# Patient Record
Sex: MALE | Race: WHITE | NOT HISPANIC OR LATINO | Employment: OTHER | URBAN - METROPOLITAN AREA
[De-identification: names, ages, dates, MRNs, and addresses within clinical notes are randomized per-mention and may not be internally consistent; named-entity substitution may affect disease eponyms.]

---

## 2017-01-03 ENCOUNTER — TELEPHONE (OUTPATIENT)
Dept: CARDIOLOGY | Facility: MEDICAL CENTER | Age: 67
End: 2017-01-03

## 2017-01-03 DIAGNOSIS — Z95.5 PRESENCE OF STENT IN LAD CORONARY ARTERY: ICD-10-CM

## 2017-01-03 DIAGNOSIS — I10 ESSENTIAL HYPERTENSION: Chronic | ICD-10-CM

## 2017-01-03 DIAGNOSIS — E78.5 HYPERLIPIDEMIA, UNSPECIFIED HYPERLIPIDEMIA TYPE: ICD-10-CM

## 2017-01-03 NOTE — TELEPHONE ENCOUNTER
----- Message from Kenya Rodriguez sent at 1/3/2017 11:25 AM PST -----  Regarding: patient wants to  his lab order  ALICIA/María      Patient needs lab order for his 01/13 appt. He wants to know if he can pick it up at the office instead of having it mailed out. He can be reached at [phone] or [phone].

## 2017-01-07 ENCOUNTER — HOSPITAL ENCOUNTER (OUTPATIENT)
Dept: LAB | Facility: MEDICAL CENTER | Age: 67
End: 2017-01-07
Attending: INTERNAL MEDICINE
Payer: MEDICARE

## 2017-01-07 DIAGNOSIS — E78.5 HYPERLIPIDEMIA, UNSPECIFIED HYPERLIPIDEMIA TYPE: ICD-10-CM

## 2017-01-07 DIAGNOSIS — Z95.5 PRESENCE OF STENT IN LAD CORONARY ARTERY: ICD-10-CM

## 2017-01-07 DIAGNOSIS — I10 ESSENTIAL HYPERTENSION: Chronic | ICD-10-CM

## 2017-01-07 LAB
ALBUMIN SERPL BCP-MCNC: 4.1 G/DL (ref 3.2–4.9)
ALBUMIN/GLOB SERPL: 1.6 G/DL
ALP SERPL-CCNC: 61 U/L (ref 30–99)
ALT SERPL-CCNC: 20 U/L (ref 2–50)
ANION GAP SERPL CALC-SCNC: 8 MMOL/L (ref 0–11.9)
AST SERPL-CCNC: 29 U/L (ref 12–45)
BILIRUB SERPL-MCNC: 1.9 MG/DL (ref 0.1–1.5)
BUN SERPL-MCNC: 18 MG/DL (ref 8–22)
CALCIUM SERPL-MCNC: 9.6 MG/DL (ref 8.5–10.5)
CHLORIDE SERPL-SCNC: 104 MMOL/L (ref 96–112)
CHOLEST SERPL-MCNC: 191 MG/DL (ref 100–199)
CO2 SERPL-SCNC: 28 MMOL/L (ref 20–33)
CREAT SERPL-MCNC: 1.05 MG/DL (ref 0.5–1.4)
GLOBULIN SER CALC-MCNC: 2.5 G/DL (ref 1.9–3.5)
GLUCOSE SERPL-MCNC: 98 MG/DL (ref 65–99)
HDLC SERPL-MCNC: 65 MG/DL
LDLC SERPL CALC-MCNC: 101 MG/DL
POTASSIUM SERPL-SCNC: 4.5 MMOL/L (ref 3.6–5.5)
PROT SERPL-MCNC: 6.6 G/DL (ref 6–8.2)
SODIUM SERPL-SCNC: 140 MMOL/L (ref 135–145)
TRIGL SERPL-MCNC: 126 MG/DL (ref 0–149)

## 2017-01-07 PROCEDURE — 36415 COLL VENOUS BLD VENIPUNCTURE: CPT

## 2017-01-07 PROCEDURE — 80061 LIPID PANEL: CPT

## 2017-01-07 PROCEDURE — 80053 COMPREHEN METABOLIC PANEL: CPT

## 2017-01-10 ENCOUNTER — OFFICE VISIT (OUTPATIENT)
Dept: CARDIOLOGY | Facility: MEDICAL CENTER | Age: 67
End: 2017-01-10
Payer: MEDICARE

## 2017-01-10 VITALS
HEIGHT: 71 IN | HEART RATE: 80 BPM | OXYGEN SATURATION: 94 % | DIASTOLIC BLOOD PRESSURE: 80 MMHG | WEIGHT: 239.8 LBS | BODY MASS INDEX: 33.57 KG/M2 | SYSTOLIC BLOOD PRESSURE: 130 MMHG

## 2017-01-10 DIAGNOSIS — I25.10 CORONARY ARTERY DISEASE INVOLVING NATIVE CORONARY ARTERY OF NATIVE HEART WITHOUT ANGINA PECTORIS: ICD-10-CM

## 2017-01-10 DIAGNOSIS — I10 ESSENTIAL HYPERTENSION: Chronic | ICD-10-CM

## 2017-01-10 DIAGNOSIS — R23.3 EASY BRUISING: ICD-10-CM

## 2017-01-10 DIAGNOSIS — Z95.5 PRESENCE OF STENT IN LAD CORONARY ARTERY: ICD-10-CM

## 2017-01-10 DIAGNOSIS — E78.5 DYSLIPIDEMIA: ICD-10-CM

## 2017-01-10 DIAGNOSIS — E66.09 NON MORBID OBESITY DUE TO EXCESS CALORIES: ICD-10-CM

## 2017-01-10 DIAGNOSIS — Z95.5 PRESENCE OF STENT IN LEFT CIRCUMFLEX CORONARY ARTERY: Chronic | ICD-10-CM

## 2017-01-10 PROCEDURE — 99214 OFFICE O/P EST MOD 30 MIN: CPT | Performed by: INTERNAL MEDICINE

## 2017-01-10 NOTE — MR AVS SNAPSHOT
"        Polo Pyle   1/10/2017 11:45 AM   Office Visit   MRN: [id number]    Department:  [facility]   Dept Phone:  [phone]    Description:  Male : 1950   Provider:  Manuel Flores M.D.           Reason for Visit     Follow-Up           Allergies as of 1/10/2017     Allergen Noted Reactions    Mercury 2010       And mercury based preservatives-Thimerasol    Sulfa Drugs 2010   Anaphylaxis, Swelling    Other Environmental 2010       Insect toxins      You were diagnosed with     Easy bruising   [268117]       Presence of stent in left circumflex coronary artery   [948234]       Presence of stent in LAD coronary artery   [494215]       Non morbid obesity due to excess calories   [9545324]       Coronary artery disease involving native coronary artery of native heart without angina pectoris   [5259425]       Essential hypertension   [0377021]       Dyslipidemia   [887645]         Vital Signs     Blood Pressure Pulse Height Weight Body Mass Index Oxygen Saturation    130/80 mmHg 80 1.791 m (5' 10.51\") 108.773 kg (239 lb 12.8 oz) 33.91 kg/m2 94%    Smoking Status                   Former Smoker           Basic Information     Date Of Birth Sex Race Ethnicity Preferred Language    1950 Male White Non- English      Your appointments     2017  3:30 PM   FOLLOW UP with Manuel Flores M.D.   [facility] (--)    [address] , [facility] [[email or web address] [address]  [facility] [address]   [phone]              Problem List              ICD-10-CM Priority Class Noted - Resolved    Dyslipidemia E78.5   2011 - Present    CAD (coronary artery disease) I25.10   2011 - Present    Obesity E66.9   3/2/2013 - Present    Essential hypertension (Chronic) I10   Unknown - Present    Presence of stent in LAD coronary artery Z95.5   Unknown - Present    Presence of stent in left circumflex coronary artery (Chronic) Z95.5   Unknown - Present      "   Health Maintenance        Date Due Completion Dates    IMM DTaP/Tdap/Td Vaccine (1 - Tdap) 4/3/1970 ---    COLONOSCOPY 4/3/2001 ---    IMM ZOSTER VACCINE 4/3/2011 ---    IMM PNEUMOCOCCAL 65+ (ADULT) LOW/MEDIUM RISK SERIES (1 of 2 - PCV13) 4/3/2016 ---    IMM INFLUENZA (1) 9/1/2016 ---            Current Immunizations     No immunizations on file.      Below and/or attached are the medications your provider expects you to take. Review all of your home medications and newly ordered medications with your provider and/or pharmacist. Follow medication instructions as directed by your provider and/or pharmacist. Please keep your medication list with you and share with your provider. Update the information when medications are discontinued, doses are changed, or new medications (including over-the-counter products) are added; and carry medication information at all times in the event of emergency situations     Allergies:  MERCURY - (reactions not documented)     SULFA DRUGS - Anaphylaxis,Swelling     OTHER ENVIRONMENTAL - (reactions not documented)               Medications  Valid as of: January 10, 2017 - 12:15 PM    Generic Name Brand Name Tablet Size Instructions for use    Ascorbic Acid   Take 6,000 mg by mouth every day.        Aspirin (Tab) aspirin 81 MG Take 81 mg by mouth every day.        Coenzyme Q10 (Cap) Coenzyme Q10 200 MG Take 300 mg by mouth every day.        Fluticasone Propionate (Suspension) FLONASE 50 MCG/ACT Spray 2 Sprays in nose every day.        Krill Oil   Take  by mouth.        Lovastatin (Tab) MEVACOR 10 MG Take 5 mg by mouth every evening.        Lovastatin (Tab) MEVACOR 10 MG Take 1 Tab by mouth every day.        Multiple Vitamins-Minerals   Take  by mouth every day.        Niacin (Antihyperlipidemic) (Tab CR) NIASPAN 1000 MG Take 2 Tabs by mouth every day at 6 PM. at bedtime        .                 Medicines prescribed today were sent to:     [id number] - [place] - [address]    [address] [address]    Phone: [phone] Fax: [phone]    Open 24 Hours?: No      Medication refill instructions:       If your prescription bottle indicates you have medication refills left, it is not necessary to call your provider’s office. Please contact your pharmacy and they will refill your medication.    If your prescription bottle indicates you do not have any refills left, you may request refills at any time through one of the following ways: The online [email or web address] system (except Urgent Care), by calling your provider’s office, or by asking your pharmacy to contact your provider’s office with a refill request. Medication refills are processed only during regular business hours and may not be available until the next business day. Your provider may request additional information or to have a follow-up visit with you prior to refilling your medication.   *Please Note: Medication refills are assigned a new Rx number when refilled electronically. Your pharmacy may indicate that no refills were authorized even though a new prescription for the same medication is available at the pharmacy. Please request the medicine by name with the pharmacy before contacting your provider for a refill.        Your To Do List     Future Labs/Procedures Complete By Expires    CBC WITH DIFFERENTIAL  As directed 1/10/2018         [email or web address] Status: Patient Declined

## 2017-01-10 NOTE — Clinical Note
[facility]   [address] [facility] [address]  [place] [address]  Phone: [phone]  Fax: [phone]              Polo Pyle  1950    Encounter Date: 1/10/2017    Manuel Flores M.D.          PROGRESS NOTE:  Subjective:   Polo Pyle is a 66 y.o. male who presents today for follow-up of his history of stenting on multiple occasions      He has been doing well he only endorses easy bruising, his CBC hasn't checked in a while    Past Medical History   Diagnosis Date   • Unspecified hemorrhagic conditions (CMS-HCC)      TAKES ASA . PROLONGED BLEEDING   • Myocardial infarct (CMS-HCC) 2005     STENTS   • CATARACT    • Hyperlipidemia 8/26/2011   • CAD (coronary artery disease) 8/26/2011   • Indigestion    • Pneumonia    • ASTHMA      as a youth   • Dental disorder      hx of staph infection; has partials   • Pain 11/14/11     NECK 7.5/10   • Personal history of venous thrombosis and embolism 2006   • Essential hypertension    • Presence of stent in left circumflex coronary artery      Past Surgical History   Procedure Laterality Date   • Tonsillectomy and adenoidectomy  1957   • Inguinal hernia repair  1962     WITH UNDESCENDED TESTICLE   • Sinus trephine frontal  1971   • Other cardiac surgery  2005     STENTS x 4   • Cataract phaco with iol  11/2/2010     Performed by RM WILLIS at SURGERY SAME DAY [facility] ORS   • Cataract phaco with iol  11/23/2010     Performed by RM WILLIS at SURGERY SAME DAY [facility] ORS   • Ventral hernia repair  11/30/2011     Performed by TROY GONZALEZ at SURGERY [facility] ORS   • Inguinal hernia repair  11/30/2011     Performed by TROY GONZALEZ at SURGERY [facility] ORS   • Sinusotomies       Family History   Problem Relation Age of Onset   • Heart Attack Mother    • Heart Disease Mother      History   Smoking status   • Former Smoker -- 30 years   • Types: Cigars   • Quit date: 11/05/2011   Smokeless  "tobacco   • Never Used     Comment: 2000     Allergies   Allergen Reactions   • Mercury      And mercury based preservatives-Thimerasol   • Sulfa Drugs Anaphylaxis and Swelling   • Other Environmental      Insect toxins     Outpatient Encounter Prescriptions as of 1/10/2017   Medication Sig Dispense Refill   • lovastatin (MEVACOR) 10 MG tablet Take 1 Tab by mouth every day. 90 Tab 0   • niacin (NIASPAN) 1000 MG CR tablet Take 2 Tabs by mouth every day at 6 PM. at bedtime 180 Tab 3   • KRILL OIL PO Take  by mouth.     • Ascorbic Acid (VITAMIN C PO) Take 6,000 mg by mouth every day.     • Multiple Vitamin (MULTIVITAMIN PO) Take  by mouth every day.     • aspirin 81 MG tablet Take 81 mg by mouth every day.     • Coenzyme Q10 (COQ10) 200 MG CAPS Take 300 mg by mouth every day.     • lovastatin (MEVACOR) 10 MG tablet Take 5 mg by mouth every evening.     • fluticasone (FLONASE) 50 MCG/ACT nasal spray Spray 2 Sprays in nose every day. 1 Bottle 0     No facility-administered encounter medications on file as of 1/10/2017.     Review of Systems   Constitutional: Negative for fever and chills.   HENT: Negative for sore throat.    Eyes: Negative for blurred vision.   Respiratory: Negative for cough and shortness of breath.    Cardiovascular: Negative for chest pain, palpitations, claudication, leg swelling and PND.   Gastrointestinal: Negative for nausea and abdominal pain.   Musculoskeletal: Negative for falls.   Skin: Negative for rash.   Neurological: Negative for dizziness, focal weakness, loss of consciousness, weakness and headaches.   Endo/Heme/Allergies: Bruises/bleeds easily.        Objective:   /80 mmHg  Pulse 80  Ht 1.791 m (5' 10.51\")  Wt 108.773 kg (239 lb 12.8 oz)  BMI 33.91 kg/m2  SpO2 94%    Physical Exam   Constitutional: No distress.   HENT:   Mouth/Throat: Oropharynx is clear and moist.   Eyes: No scleral icterus.   Neck: Neck supple. No JVD present.   Cardiovascular: Normal rate, regular rhythm, " normal heart sounds and intact distal pulses.  Exam reveals no gallop and no friction rub.    No murmur heard.  Pulmonary/Chest: Effort normal. He has no rales.   Abdominal: Soft. Bowel sounds are normal. There is no tenderness.   Musculoskeletal: He exhibits no edema.   Neurological: He is alert.   Skin: No rash noted. He is not diaphoretic.   Psychiatric: He has a normal mood and affect.       Assessment:     1. Easy bruising  CBC WITH DIFFERENTIAL   2. Presence of stent in left circumflex coronary artery     3. Presence of stent in LAD coronary artery     4. Non morbid obesity due to excess calories     5. Coronary artery disease involving native coronary artery of native heart without angina pectoris     6. Essential hypertension     7. Dyslipidemia         Medical Decision Making:  Today's Assessment / Status / Plan:     It was my pleasure to meet with Mr. Pyle.    He'll continue on aspirin we'll check a CBC to ensure he doesn't have thrombocytopenia doesn't appear to have significant bruising on exam    Blood pressure is well controlled    He'll work on weight loss as best he can    I will see Mr. Pyle back in 1 year time and encouraged him to follow up with us over the phone or e-mail using my MyChart as issues arise.    It is my pleasure to participate in the care of Mr. Pyle.  Please do not hesitate to contact me with questions or concerns.    Manuel Flores MD PhD [facility]  Cardiologist [facility]        Cherelle Wilde M.D.  [address]  [facility] [address]  VIA Facsimile: [phone]

## 2017-01-17 ASSESSMENT — ENCOUNTER SYMPTOMS
ABDOMINAL PAIN: 0
SORE THROAT: 0
FALLS: 0
LOSS OF CONSCIOUSNESS: 0
PALPITATIONS: 0
FOCAL WEAKNESS: 0
NAUSEA: 0
CLAUDICATION: 0
DIZZINESS: 0
COUGH: 0
SHORTNESS OF BREATH: 0
HEADACHES: 0
WEAKNESS: 0
BLURRED VISION: 0
CHILLS: 0
FEVER: 0
PND: 0
BRUISES/BLEEDS EASILY: 1

## 2017-01-18 NOTE — PROGRESS NOTES
Subjective:   Polo Pyle is a 66 y.o. male who presents today for follow-up of his history of stenting on multiple occasions      He has been doing well he only endorses easy bruising, his CBC hasn't checked in a while    Past Medical History   Diagnosis Date   • Unspecified hemorrhagic conditions (CMS-HCC)      TAKES ASA . PROLONGED BLEEDING   • Myocardial infarct (CMS-[facility]) 2005     STENTS   • CATARACT    • Hyperlipidemia 8/26/2011   • CAD (coronary artery disease) 8/26/2011   • Indigestion    • Pneumonia    • ASTHMA      as a youth   • Dental disorder      hx of staph infection; has partials   • Pain 11/14/11     NECK 7.5/10   • Personal history of venous thrombosis and embolism 2006   • Essential hypertension    • Presence of stent in left circumflex coronary artery      Past Surgical History   Procedure Laterality Date   • Tonsillectomy and adenoidectomy  1957   • Inguinal hernia repair  1962     WITH UNDESCENDED TESTICLE   • Sinus trephine frontal  1971   • Other cardiac surgery  2005     STENTS x 4   • Cataract phaco with iol  11/2/2010     Performed by RM WILLIS at [facility]ERY [facility]S   • Cataract phaco with iol  11/23/2010     Performed by RM WILLIS at [facility]ERY [facility] ORS   • Ventral hernia repair  11/30/2011     Performed by TROY GONZALEZ at SURGERY [facility] ORS   • Inguinal hernia repair  11/30/2011     Performed by TROY GONZALEZ at SURGERY [facility] ORS   • Sinusotomies       Family History   Problem Relation Age of Onset   • Heart Attack Mother    • Heart Disease Mother      History   Smoking status   • Former Smoker -- 30 years   • Types: Cigars   • Quit date: 11/05/2011   Smokeless tobacco   • Never Used     Comment: 2000     Allergies   Allergen Reactions   • Mercury      And mercury based preservatives-Thimerasol   • Sulfa Drugs Anaphylaxis and Swelling   • Other Environmental      Insect toxins     Outpatient Encounter Prescriptions as of 1/10/2017  "  Medication Sig Dispense Refill   • lovastatin (MEVACOR) 10 MG tablet Take 1 Tab by mouth every day. 90 Tab 0   • niacin (NIASPAN) 1000 MG CR tablet Take 2 Tabs by mouth every day at 6 PM. at bedtime 180 Tab 3   • KRILL OIL PO Take  by mouth.     • Ascorbic Acid (VITAMIN C PO) Take 6,000 mg by mouth every day.     • Multiple Vitamin (MULTIVITAMIN PO) Take  by mouth every day.     • aspirin 81 MG tablet Take 81 mg by mouth every day.     • Coenzyme Q10 (COQ10) 200 MG CAPS Take 300 mg by mouth every day.     • lovastatin (MEVACOR) 10 MG tablet Take 5 mg by mouth every evening.     • fluticasone (FLONASE) 50 MCG/ACT nasal spray Spray 2 Sprays in nose every day. 1 Bottle 0     No facility-administered encounter medications on file as of 1/10/2017.     Review of Systems   Constitutional: Negative for fever and chills.   HENT: Negative for sore throat.    Eyes: Negative for blurred vision.   Respiratory: Negative for cough and shortness of breath.    Cardiovascular: Negative for chest pain, palpitations, claudication, leg swelling and PND.   Gastrointestinal: Negative for nausea and abdominal pain.   Musculoskeletal: Negative for falls.   Skin: Negative for rash.   Neurological: Negative for dizziness, focal weakness, loss of consciousness, weakness and headaches.   Endo/Heme/Allergies: Bruises/bleeds easily.        Objective:   /80 mmHg  Pulse 80  Ht 1.791 m (5' 10.51\")  Wt 108.773 kg (239 lb 12.8 oz)  BMI 33.91 kg/m2  SpO2 94%    Physical Exam   Constitutional: No distress.   HENT:   Mouth/Throat: Oropharynx is clear and moist.   Eyes: No scleral icterus.   Neck: Neck supple. No JVD present.   Cardiovascular: Normal rate, regular rhythm, normal heart sounds and intact distal pulses.  Exam reveals no gallop and no friction rub.    No murmur heard.  Pulmonary/Chest: Effort normal. He has no rales.   Abdominal: Soft. Bowel sounds are normal. There is no tenderness.   Musculoskeletal: He exhibits no edema. "   Neurological: He is alert.   Skin: No rash noted. He is not diaphoretic.   Psychiatric: He has a normal mood and affect.       Assessment:     1. Easy bruising  CBC WITH DIFFERENTIAL   2. Presence of stent in left circumflex coronary artery     3. Presence of stent in LAD coronary artery     4. Non morbid obesity due to excess calories     5. Coronary artery disease involving native coronary artery of native heart without angina pectoris     6. Essential hypertension     7. Dyslipidemia         Medical Decision Making:  Today's Assessment / Status / Plan:     It was my pleasure to meet with Mr. Pyle.    He'll continue on aspirin we'll check a CBC to ensure he doesn't have thrombocytopenia doesn't appear to have significant bruising on exam    Blood pressure is well controlled    He'll work on weight loss as best he can    I will see Mr. Pyle back in 1 year time and encouraged him to follow up with us over the phone or e-mail using my MyChart as issues arise.    It is my pleasure to participate in the care of Mr. Pyle.  Please do not hesitate to contact me with questions or concerns.    Manuel Flores MD PhD FACC  Cardiologist [facility]

## 2017-03-12 ENCOUNTER — OFFICE VISIT (OUTPATIENT)
Dept: URGENT CARE | Facility: PHYSICIAN GROUP | Age: 67
End: 2017-03-12
Payer: MEDICARE

## 2017-03-12 VITALS
TEMPERATURE: 99.7 F | HEART RATE: 77 BPM | SYSTOLIC BLOOD PRESSURE: 128 MMHG | RESPIRATION RATE: 16 BRPM | OXYGEN SATURATION: 97 % | DIASTOLIC BLOOD PRESSURE: 82 MMHG | BODY MASS INDEX: 35.01 KG/M2 | WEIGHT: 247.6 LBS

## 2017-03-12 DIAGNOSIS — J06.9 UPPER RESPIRATORY TRACT INFECTION, UNSPECIFIED TYPE: ICD-10-CM

## 2017-03-12 PROCEDURE — 99213 OFFICE O/P EST LOW 20 MIN: CPT | Performed by: PHYSICIAN ASSISTANT

## 2017-03-12 PROCEDURE — G8419 CALC BMI OUT NRM PARAM NOF/U: HCPCS | Performed by: PHYSICIAN ASSISTANT

## 2017-03-12 PROCEDURE — G8598 ASA/ANTIPLAT THER USED: HCPCS | Performed by: PHYSICIAN ASSISTANT

## 2017-03-12 PROCEDURE — 3017F COLORECTAL CA SCREEN DOC REV: CPT | Mod: 8P | Performed by: PHYSICIAN ASSISTANT

## 2017-03-12 PROCEDURE — 1101F PT FALLS ASSESS-DOCD LE1/YR: CPT | Mod: 8P | Performed by: PHYSICIAN ASSISTANT

## 2017-03-12 PROCEDURE — G8432 DEP SCR NOT DOC, RNG: HCPCS | Performed by: PHYSICIAN ASSISTANT

## 2017-03-12 PROCEDURE — 4040F PNEUMOC VAC/ADMIN/RCVD: CPT | Mod: 8P | Performed by: PHYSICIAN ASSISTANT

## 2017-03-12 PROCEDURE — G8484 FLU IMMUNIZE NO ADMIN: HCPCS | Performed by: PHYSICIAN ASSISTANT

## 2017-03-12 PROCEDURE — 1036F TOBACCO NON-USER: CPT | Performed by: PHYSICIAN ASSISTANT

## 2017-03-12 NOTE — MR AVS SNAPSHOT
Polo Meredithjaclyn Pyle   3/12/2017 2:30 PM   Office Visit   MRN: [id number]    Department:  [facility]   Dept Phone:  [phone]    Description:  Male : 1950   Provider:  Rylee Fraser PA-C           Reason for Visit     Cough cough, congestion, aches in back, x 3 days      Allergies as of 3/12/2017     Allergen Noted Reactions    Mercury 2010       And mercury based preservatives-Thimerasol    Sulfa Drugs 2010   Anaphylaxis, Swelling    Other Environmental 2010       Insect toxins      Vital Signs     Blood Pressure Pulse Temperature Respirations Weight Oxygen Saturation    128/82 mmHg 77 37.6 °C (99.7 °F) 16 112.311 kg (247 lb 9.6 oz) 97%    Smoking Status                   Former Smoker           Basic Information     Date Of Birth Sex Race Ethnicity Preferred Language    1950 Male White Non- English      Problem List              ICD-10-CM Priority Class Noted - Resolved    Dyslipidemia E78.5   2011 - Present    CAD (coronary artery disease) I25.10   2011 - Present    Obesity E66.9   3/2/2013 - Present    Essential hypertension (Chronic) I10   Unknown - Present    Presence of stent in LAD coronary artery Z95.5   Unknown - Present    Presence of stent in left circumflex coronary artery (Chronic) Z95.5   Unknown - Present      Health Maintenance        Date Due Completion Dates    IMM DTaP/Tdap/Td Vaccine (1 - Tdap) 4/3/1970 ---    COLONOSCOPY 4/3/2001 ---    IMM ZOSTER VACCINE 4/3/2011 ---    IMM PNEUMOCOCCAL 65+ (ADULT) LOW/MEDIUM RISK SERIES (1 of 2 - PCV13) 4/3/2016 ---    IMM INFLUENZA (1) 2016 ---            Current Immunizations     No immunizations on file.      Below and/or attached are the medications your provider expects you to take. Review all of your home medications and newly ordered medications with your provider and/or pharmacist. Follow medication instructions as directed by your provider and/or pharmacist. Please keep your  medication list with you and share with your provider. Update the information when medications are discontinued, doses are changed, or new medications (including over-the-counter products) are added; and carry medication information at all times in the event of emergency situations     Allergies:  MERCURY - (reactions not documented)     SULFA DRUGS - Anaphylaxis,Swelling     OTHER ENVIRONMENTAL - (reactions not documented)               Medications  Valid as of: March 12, 2017 -  2:59 PM    Generic Name Brand Name Tablet Size Instructions for use    Ascorbic Acid   Take 6,000 mg by mouth every day.        Aspirin (Tab) aspirin 81 MG Take 81 mg by mouth every day.        Coenzyme Q10 (Cap) Coenzyme Q10 200 MG Take 300 mg by mouth every day.        Fluticasone Propionate (Suspension) FLONASE 50 MCG/ACT Spray 2 Sprays in nose every day.        Krill Oil   Take  by mouth.        Lovastatin (Tab) MEVACOR 10 MG Take 5 mg by mouth every evening.        Lovastatin (Tab) MEVACOR 10 MG TAKE ONE TABLET BY MOUTH EVERY DAY        Multiple Vitamins-Minerals   Take  by mouth every day.        Niacin (Antihyperlipidemic) (Tab CR) NIASPAN 1000 MG Take 2 Tabs by mouth every day at 6 PM. at bedtime        .                 Medicines prescribed today were sent to:     [facility] - [place], [place] - [address]    [address] [place] [address]    Phone: [phone] Fax: [phone]    Open 24 Hours?: No      Medication refill instructions:       If your prescription bottle indicates you have medication refills left, it is not necessary to call your provider’s office. Please contact your pharmacy and they will refill your medication.    If your prescription bottle indicates you do not have any refills left, you may request refills at any time through one of the following ways: The online [employer] system (except Urgent Care), by calling your provider’s office, or by asking your pharmacy to contact your provider’s office  with a refill request. Medication refills are processed only during regular business hours and may not be available until the next business day. Your provider may request additional information or to have a follow-up visit with you prior to refilling your medication.   *Please Note: Medication refills are assigned a new Rx number when refilled electronically. Your pharmacy may indicate that no refills were authorized even though a new prescription for the same medication is available at the pharmacy. Please request the medicine by name with the pharmacy before contacting your provider for a refill.           MyChart Status: Patient Declined

## 2017-03-12 NOTE — PROGRESS NOTES
CC:Patient is q__73____-[id number]___ who comes in with a _3___ day history of cough, nasal congestion, sore throat and body aches. Denies fever, chills, nausea and vomiting. Denies any hx of asthma or other respiratory disorder. IS not a smoker. Denies shortness of breath, chest pain or chest tightness    PMH: non pertinent to this examination  PSH: non pertinent to this examination  FH: not pertinent to this examination    Medications: OTC cold medication    Allergies: Mercury, sulfa     ROS: denies chest tightness, sob, fever, chills, nausea or vomiting, no change in urinary habbits    Blood pressure 128/82, pulse 77, temperature 37.6 °C (99.7 °F), resp. rate 16, weight 112.311 kg (247 lb 9.6 oz), SpO2 97 %.    Physical Exam:  Vitals: are unremarkable. Patient is afebrile and O2sats are within normal range.  Gen: A and O ×3 no acute distress, well-nourished well-hydrated, nontoxic  HEENT: TMs and oropharynx are clear. nares are patent and clear. No pain to percussion in the maxillary or frontal sinus region  Neck soft supple without cervical lymphadenopathy  Cardiovascular: Regular rate and rhythm normal S1 and S2. No murmur rubs or gallops  Lungs are clear to auscultation bilaterally, no wheezes rales or rhonchi. Good inspiratory and expiratory breath sounds  Abdomen: Soft nontender nondistended with good bowel  Skin is well perfused without evidence of rash or lesions  Neurologically: No deficit noted    Assessment plan:    1. Viral upper respiratory infection: Discussed viral etiology at length. Discussed supportive care measures. Increase fluids, take meds as directed and follow-up if symptoms persist or worsen despite treatment. Patient defers on any cough prescription. He states he'll take DayQuil and NyQuil as needed. Please follow up with us as needed

## 2017-03-13 ENCOUNTER — OFFICE VISIT (OUTPATIENT)
Dept: URGENT CARE | Facility: PHYSICIAN GROUP | Age: 67
End: 2017-03-13
Payer: MEDICARE

## 2017-03-13 VITALS
HEART RATE: 95 BPM | TEMPERATURE: 100.1 F | SYSTOLIC BLOOD PRESSURE: 118 MMHG | RESPIRATION RATE: 16 BRPM | OXYGEN SATURATION: 96 % | BODY MASS INDEX: 34.79 KG/M2 | DIASTOLIC BLOOD PRESSURE: 86 MMHG | WEIGHT: 246 LBS

## 2017-03-13 DIAGNOSIS — H10.32 ACUTE BACTERIAL CONJUNCTIVITIS OF LEFT EYE: ICD-10-CM

## 2017-03-13 DIAGNOSIS — J06.9 URI, ACUTE: ICD-10-CM

## 2017-03-13 PROCEDURE — G8484 FLU IMMUNIZE NO ADMIN: HCPCS | Performed by: PHYSICIAN ASSISTANT

## 2017-03-13 PROCEDURE — 3017F COLORECTAL CA SCREEN DOC REV: CPT | Mod: 8P | Performed by: PHYSICIAN ASSISTANT

## 2017-03-13 PROCEDURE — 99214 OFFICE O/P EST MOD 30 MIN: CPT | Performed by: PHYSICIAN ASSISTANT

## 2017-03-13 PROCEDURE — 4040F PNEUMOC VAC/ADMIN/RCVD: CPT | Mod: 8P | Performed by: PHYSICIAN ASSISTANT

## 2017-03-13 PROCEDURE — G8419 CALC BMI OUT NRM PARAM NOF/U: HCPCS | Performed by: PHYSICIAN ASSISTANT

## 2017-03-13 PROCEDURE — G8432 DEP SCR NOT DOC, RNG: HCPCS | Performed by: PHYSICIAN ASSISTANT

## 2017-03-13 PROCEDURE — 1036F TOBACCO NON-USER: CPT | Performed by: PHYSICIAN ASSISTANT

## 2017-03-13 PROCEDURE — 1101F PT FALLS ASSESS-DOCD LE1/YR: CPT | Mod: 8P | Performed by: PHYSICIAN ASSISTANT

## 2017-03-13 PROCEDURE — G8598 ASA/ANTIPLAT THER USED: HCPCS | Performed by: PHYSICIAN ASSISTANT

## 2017-03-13 RX ORDER — CIPROFLOXACIN HYDROCHLORIDE 3.5 MG/ML
SOLUTION/ DROPS TOPICAL
Qty: 1 BOTTLE | Refills: 0 | Status: SHIPPED | OUTPATIENT
Start: 2017-03-13 | End: 2019-01-31

## 2017-03-13 NOTE — MR AVS SNAPSHOT
Polo Vasquez Lashanda   3/13/2017 6:30 PM   Office Visit   MRN: [id number]    Department:  [facility]   Dept Phone:  [phone]    Description:  Male : 1950   Provider:  Lexie Ctaalan PA-C           Reason for Visit     Conjunctivitis seen yesterday for same issue its gettiung worse      Allergies as of 3/13/2017     Allergen Noted Reactions    Mercury 2010       And mercury based preservatives-Thimerasol    Sulfa Drugs 2010   Anaphylaxis, Swelling    Other Environmental 2010       Insect toxins      You were diagnosed with     Acute bacterial conjunctivitis of left eye   [[id number]]         Vital Signs     Blood Pressure Pulse Temperature Respirations Weight Oxygen Saturation    118/86 mmHg 95 37.8 °C (100.1 °F) 16 111.585 kg (246 lb) 96%    Smoking Status                   Former Smoker           Basic Information     Date Of Birth Sex Race Ethnicity Preferred Language    1950 Male White Non- English      Problem List              ICD-10-CM Priority Class Noted - Resolved    Dyslipidemia E78.5   2011 - Present    CAD (coronary artery disease) I25.10   2011 - Present    Obesity E66.9   3/2/2013 - Present    Essential hypertension (Chronic) I10   Unknown - Present    Presence of stent in LAD coronary artery Z95.5   Unknown - Present    Presence of stent in left circumflex coronary artery (Chronic) Z95.5   Unknown - Present      Health Maintenance        Date Due Completion Dates    IMM DTaP/Tdap/Td Vaccine (1 - Tdap) 4/3/1970 ---    COLONOSCOPY 4/3/2001 ---    IMM ZOSTER VACCINE 4/3/2011 ---    IMM PNEUMOCOCCAL 65+ (ADULT) LOW/MEDIUM RISK SERIES (1 of 2 - PCV13) 4/3/2016 ---    IMM INFLUENZA (1) 2016 ---            Current Immunizations     No immunizations on file.      Below and/or attached are the medications your provider expects you to take. Review all of your home medications and newly ordered medications with your provider and/or pharmacist.  Follow medication instructions as directed by your provider and/or pharmacist. Please keep your medication list with you and share with your provider. Update the information when medications are discontinued, doses are changed, or new medications (including over-the-counter products) are added; and carry medication information at all times in the event of emergency situations     Allergies:  MERCURY - (reactions not documented)     SULFA DRUGS - Anaphylaxis,Swelling     OTHER ENVIRONMENTAL - (reactions not documented)               Medications  Valid as of: March 13, 2017 -  7:35 PM    Generic Name Brand Name Tablet Size Instructions for use    Ascorbic Acid   Take 6,000 mg by mouth every day.        Aspirin (Tab) aspirin 81 MG Take 81 mg by mouth every day.        Ciprofloxacin HCl (Solution) CILOXIN 0.3 % Place 1 drop in left eye every 2 hours while awake today, then 1 drop every 4 hours while awake for 7 days.        Coenzyme Q10 (Cap) Coenzyme Q10 200 MG Take 300 mg by mouth every day.        Fluticasone Propionate (Suspension) FLONASE 50 MCG/ACT Spray 2 Sprays in nose every day.        Krill Oil   Take  by mouth.        Lovastatin (Tab) MEVACOR 10 MG Take 5 mg by mouth every evening.        Lovastatin (Tab) MEVACOR 10 MG TAKE ONE TABLET BY MOUTH EVERY DAY        Multiple Vitamins-Minerals   Take  by mouth every day.        Niacin (Antihyperlipidemic) (Tab CR) NIASPAN 1000 MG Take 2 Tabs by mouth every day at 6 PM. at bedtime        .                 Medicines prescribed today were sent to:     [id number] - [place], [place] - [address]    [address] [address]    Phone: [phone] Fax: [phone]    Open 24 Hours?: No      Medication refill instructions:       If your prescription bottle indicates you have medication refills left, it is not necessary to call your provider’s office. Please contact your pharmacy and they will refill your medication.    If your prescription bottle  "indicates you do not have any refills left, you may request refills at any time through one of the following ways: The online [employer] system (except Urgent Care), by calling your provider’s office, or by asking your pharmacy to contact your provider’s office with a refill request. Medication refills are processed only during regular business hours and may not be available until the next business day. Your provider may request additional information or to have a follow-up visit with you prior to refilling your medication.   *Please Note: Medication refills are assigned a new Rx number when refilled electronically. Your pharmacy may indicate that no refills were authorized even though a new prescription for the same medication is available at the pharmacy. Please request the medicine by name with the pharmacy before contacting your provider for a refill.        Instructions    Conjunctivitis  Conjunctivitis is commonly called \"pink eye.\" Conjunctivitis can be caused by bacterial or viral infection, allergies, or injuries. There is usually redness of the lining of the eye, itching, discomfort, and sometimes discharge. There may be deposits of matter along the eyelids. A viral infection usually causes a watery discharge, while a bacterial infection causes a yellowish, thick discharge. Pink eye is very contagious and spreads by direct contact.  You may be given antibiotic eyedrops as part of your treatment. Before using your eye medicine, remove all drainage from the eye by washing gently with warm water and cotton balls. Continue to use the medication until you have awakened 2 mornings in a row without discharge from the eye. Do not rub your eye. This increases the irritation and helps spread infection. Use separate towels from other household members. Wash your hands with soap and water before and after touching your eyes. Use cold compresses to reduce pain and sunglasses to relieve irritation from light. Do not wear " contact lenses or wear eye makeup until the infection is gone.  SEEK MEDICAL CARE IF:   · Your symptoms are not better after 3 days of treatment.  · You have increased pain or trouble seeing.  · The outer eyelids become very red or swollen.  Document Released: 01/25/2006 Document Revised: 03/11/2013 Document Reviewed: 12/18/2006  [employer]® Patient Information ©2014 [employer].            MyChart Status: Patient Declined

## 2017-03-14 NOTE — PATIENT INSTRUCTIONS
"Conjunctivitis  Conjunctivitis is commonly called \"pink eye.\" Conjunctivitis can be caused by bacterial or viral infection, allergies, or injuries. There is usually redness of the lining of the eye, itching, discomfort, and sometimes discharge. There may be deposits of matter along the eyelids. A viral infection usually causes a watery discharge, while a bacterial infection causes a yellowish, thick discharge. Pink eye is very contagious and spreads by direct contact.  You may be given antibiotic eyedrops as part of your treatment. Before using your eye medicine, remove all drainage from the eye by washing gently with warm water and cotton balls. Continue to use the medication until you have awakened 2 mornings in a row without discharge from the eye. Do not rub your eye. This increases the irritation and helps spread infection. Use separate towels from other household members. Wash your hands with soap and water before and after touching your eyes. Use cold compresses to reduce pain and sunglasses to relieve irritation from light. Do not wear contact lenses or wear eye makeup until the infection is gone.  SEEK MEDICAL CARE IF:   · Your symptoms are not better after 3 days of treatment.  · You have increased pain or trouble seeing.  · The outer eyelids become very red or swollen.  Document Released: 01/25/2006 Document Revised: 03/11/2013 Document Reviewed: 12/18/2006  [employer]® Patient Information ©2014 [employer].    "

## 2017-03-15 ASSESSMENT — ENCOUNTER SYMPTOMS
EYE PAIN: 0
PHOTOPHOBIA: 1
EYE DISCHARGE: 1
COUGH: 1
EYE REDNESS: 1

## 2017-03-15 NOTE — PROGRESS NOTES
"Subjective:      Polo Pyle is a 66 y.o. male who presents with Conjunctivitis    Pt PMH, SocHx, SurgHx, FamHx, Drug allergies and medications reviewed with pt/EPIC.      Family history reviewed, it is not pertinent to this complaint.           HPI Comments: Patient presents with:  Conjunctivitis: seen yesterday for same issue its gettiung worse        Conjunctivitis  This is a new problem. The current episode started yesterday. The problem occurs constantly. The problem has been rapidly worsening. Associated symptoms include congestion and coughing. Associated symptoms comments: \"Left eye is very gooey\". Nothing aggravates the symptoms. Treatments tried: cool compress. The treatment provided no relief.       Review of Systems   HENT: Positive for congestion.    Eyes: Positive for photophobia, discharge and redness. Negative for pain.   Respiratory: Positive for cough.    All other systems reviewed and are negative.         Objective:     /86 mmHg  Pulse 95  Temp(Src) 37.8 °C (100.1 °F)  Resp 16  Wt 111.585 kg (246 lb)  SpO2 96%     Physical Exam   Constitutional: He is oriented to person, place, and time. He appears well-developed and well-nourished. No distress.   HENT:   Head: Normocephalic.   Right Ear: Tympanic membrane normal.   Left Ear: Tympanic membrane normal.   Nose: Mucosal edema and rhinorrhea present.   Mouth/Throat: Uvula is midline and oropharynx is clear and moist.   Eyes: EOM are normal. Pupils are equal, round, and reactive to light. Lids are everted and swept, no foreign bodies found. Left eye exhibits discharge and exudate. Left eye exhibits no hordeolum. No foreign body present in the left eye. Left conjunctiva is injected. No scleral icterus.   Neck: Normal range of motion.   Cardiovascular: Normal rate.    Pulmonary/Chest: Effort normal. He has rhonchi.   Musculoskeletal: Normal range of motion.   Neurological: He is alert and oriented to person, place, and time.   Skin: " Skin is warm and dry.   Psychiatric: He has a normal mood and affect.   Nursing note and vitals reviewed.              Assessment/Plan:     1. Acute bacterial conjunctivitis of left eye     - ciprofloxacin (CILOXIN) 0.3 % Solution; Place 1 drop in left eye every 2 hours while awake today, then 1 drop every 4 hours while awake for 7 days.  Dispense: 1 Bottle; Refill: 0    PT can use cool compress on affected eye(s) for relief of symptoms.       PT should follow up with PCP in 1-2 days for re-evaluation if symptoms have not improved.  Discussed red flags and reasons to return to UC or ED.  Pt and/or family verbalized understanding of diagnosis and follow up instructions and was given informational handout on diagnosis.  PT discharged.

## 2017-06-20 DIAGNOSIS — E78.5 HYPERLIPIDEMIA, UNSPECIFIED HYPERLIPIDEMIA TYPE: ICD-10-CM

## 2017-06-21 RX ORDER — NIACIN 1000 MG/1
TABLET, EXTENDED RELEASE ORAL
Qty: 180 TAB | Refills: 3 | Status: SHIPPED | OUTPATIENT
Start: 2017-06-21 | End: 2018-09-17 | Stop reason: SDUPTHER

## 2017-10-28 ENCOUNTER — HOSPITAL ENCOUNTER (OUTPATIENT)
Facility: MEDICAL CENTER | Age: 67
End: 2017-10-28
Attending: FAMILY MEDICINE
Payer: MEDICARE

## 2017-10-28 ENCOUNTER — HOSPITAL ENCOUNTER (OUTPATIENT)
Dept: LAB | Facility: MEDICAL CENTER | Age: 67
End: 2017-10-28
Attending: FAMILY MEDICINE
Payer: MEDICARE

## 2017-10-28 LAB
ALBUMIN SERPL BCP-MCNC: 4 G/DL (ref 3.2–4.9)
ALBUMIN/GLOB SERPL: 1.4 G/DL
ALP SERPL-CCNC: 64 U/L (ref 30–99)
ALT SERPL-CCNC: 17 U/L (ref 2–50)
ANION GAP SERPL CALC-SCNC: 6 MMOL/L (ref 0–11.9)
AST SERPL-CCNC: 23 U/L (ref 12–45)
BILIRUB SERPL-MCNC: 1.7 MG/DL (ref 0.1–1.5)
BUN SERPL-MCNC: 25 MG/DL (ref 8–22)
CALCIUM SERPL-MCNC: 9.7 MG/DL (ref 8.5–10.5)
CHLORIDE SERPL-SCNC: 104 MMOL/L (ref 96–112)
CHOLEST SERPL-MCNC: 167 MG/DL (ref 100–199)
CO2 SERPL-SCNC: 27 MMOL/L (ref 20–33)
CREAT SERPL-MCNC: 1.05 MG/DL (ref 0.5–1.4)
GFR SERPL CREATININE-BSD FRML MDRD: >60 ML/MIN/1.73 M 2
GLOBULIN SER CALC-MCNC: 2.8 G/DL (ref 1.9–3.5)
GLUCOSE SERPL-MCNC: 95 MG/DL (ref 65–99)
HDLC SERPL-MCNC: 58 MG/DL
LDLC SERPL CALC-MCNC: 87 MG/DL
POTASSIUM SERPL-SCNC: 4.7 MMOL/L (ref 3.6–5.5)
PROT SERPL-MCNC: 6.8 G/DL (ref 6–8.2)
SODIUM SERPL-SCNC: 137 MMOL/L (ref 135–145)
TRIGL SERPL-MCNC: 111 MG/DL (ref 0–149)

## 2017-10-28 PROCEDURE — 36415 COLL VENOUS BLD VENIPUNCTURE: CPT

## 2017-10-28 PROCEDURE — 82274 ASSAY TEST FOR BLOOD FECAL: CPT

## 2017-10-28 PROCEDURE — 80061 LIPID PANEL: CPT

## 2017-10-28 PROCEDURE — 80053 COMPREHEN METABOLIC PANEL: CPT

## 2017-11-01 LAB — HEMOCCULT STL QL IA: NEGATIVE

## 2017-12-15 ENCOUNTER — OFFICE VISIT (OUTPATIENT)
Dept: URGENT CARE | Facility: PHYSICIAN GROUP | Age: 67
End: 2017-12-15
Payer: MEDICARE

## 2017-12-15 ENCOUNTER — HOSPITAL ENCOUNTER (OUTPATIENT)
Facility: MEDICAL CENTER | Age: 67
End: 2017-12-15
Attending: EMERGENCY MEDICINE
Payer: MEDICARE

## 2017-12-15 ENCOUNTER — HOSPITAL ENCOUNTER (OUTPATIENT)
Dept: RADIOLOGY | Facility: MEDICAL CENTER | Age: 67
End: 2017-12-15
Attending: EMERGENCY MEDICINE
Payer: MEDICARE

## 2017-12-15 VITALS
HEART RATE: 88 BPM | SYSTOLIC BLOOD PRESSURE: 122 MMHG | RESPIRATION RATE: 16 BRPM | TEMPERATURE: 97.3 F | DIASTOLIC BLOOD PRESSURE: 76 MMHG | BODY MASS INDEX: 34.65 KG/M2 | OXYGEN SATURATION: 97 % | WEIGHT: 245 LBS

## 2017-12-15 DIAGNOSIS — L03.115 CELLULITIS OF RIGHT LOWER EXTREMITY: ICD-10-CM

## 2017-12-15 PROCEDURE — 99213 OFFICE O/P EST LOW 20 MIN: CPT | Mod: 25 | Performed by: EMERGENCY MEDICINE

## 2017-12-15 PROCEDURE — 90471 IMMUNIZATION ADMIN: CPT | Performed by: EMERGENCY MEDICINE

## 2017-12-15 PROCEDURE — 87205 SMEAR GRAM STAIN: CPT

## 2017-12-15 PROCEDURE — 87070 CULTURE OTHR SPECIMN AEROBIC: CPT

## 2017-12-15 PROCEDURE — 90714 TD VACC NO PRESV 7 YRS+ IM: CPT | Performed by: EMERGENCY MEDICINE

## 2017-12-15 PROCEDURE — 73630 X-RAY EXAM OF FOOT: CPT | Mod: RT

## 2017-12-15 PROCEDURE — 87186 SC STD MICRODIL/AGAR DIL: CPT

## 2017-12-15 PROCEDURE — 87077 CULTURE AEROBIC IDENTIFY: CPT

## 2017-12-15 RX ORDER — AMOXICILLIN AND CLAVULANATE POTASSIUM 875; 125 MG/1; MG/1
1 TABLET, FILM COATED ORAL 2 TIMES DAILY
Qty: 20 TAB | Refills: 0 | Status: SHIPPED | OUTPATIENT
Start: 2017-12-15 | End: 2017-12-25

## 2017-12-15 RX ORDER — CHLORAL HYDRATE 500 MG
1000 CAPSULE ORAL DAILY
COMMUNITY
End: 2020-12-18

## 2017-12-15 RX ORDER — CEFTRIAXONE 1 G/1
1 INJECTION, POWDER, FOR SOLUTION INTRAMUSCULAR; INTRAVENOUS ONCE
Status: COMPLETED | OUTPATIENT
Start: 2017-12-15 | End: 2017-12-15

## 2017-12-15 RX ADMIN — CEFTRIAXONE 1 G: 1 INJECTION, POWDER, FOR SOLUTION INTRAMUSCULAR; INTRAVENOUS at 19:28

## 2017-12-15 ASSESSMENT — ENCOUNTER SYMPTOMS
COUGH: 0
VOMITING: 0
ABDOMINAL PAIN: 0
CHILLS: 1
NAUSEA: 0
SENSORY CHANGE: 0
FEVER: 0

## 2017-12-16 ENCOUNTER — OFFICE VISIT (OUTPATIENT)
Dept: URGENT CARE | Facility: PHYSICIAN GROUP | Age: 67
End: 2017-12-16
Payer: MEDICARE

## 2017-12-16 ENCOUNTER — HOSPITAL ENCOUNTER (OUTPATIENT)
Dept: RADIOLOGY | Facility: MEDICAL CENTER | Age: 67
End: 2017-12-16
Attending: FAMILY MEDICINE
Payer: MEDICARE

## 2017-12-16 VITALS
HEART RATE: 85 BPM | BODY MASS INDEX: 36.29 KG/M2 | HEIGHT: 69 IN | TEMPERATURE: 98.4 F | SYSTOLIC BLOOD PRESSURE: 120 MMHG | OXYGEN SATURATION: 96 % | DIASTOLIC BLOOD PRESSURE: 78 MMHG | WEIGHT: 245 LBS

## 2017-12-16 DIAGNOSIS — L03.115 CELLULITIS OF RIGHT LOWER EXTREMITY: ICD-10-CM

## 2017-12-16 DIAGNOSIS — R60.0 LEG EDEMA, RIGHT: ICD-10-CM

## 2017-12-16 LAB
GRAM STN SPEC: NORMAL
SIGNIFICANT IND 70042: NORMAL
SITE SITE: NORMAL
SOURCE SOURCE: NORMAL

## 2017-12-16 PROCEDURE — 99213 OFFICE O/P EST LOW 20 MIN: CPT | Performed by: FAMILY MEDICINE

## 2017-12-16 PROCEDURE — 93971 EXTREMITY STUDY: CPT | Mod: RT

## 2017-12-16 ASSESSMENT — PAIN SCALES - GENERAL: PAINLEVEL: 10=SEVERE PAIN

## 2017-12-16 NOTE — PROGRESS NOTES
Subjective:      Polo Pyle is a 67 y.o. male who presents with Leg Swelling (right leg swelling and painful x this AM )            HPI  Pt with cellulitis of the right leg now 50 % larger than it was 1 1/2 day ago. Had shaking chill 6 days ago.. Patient does not recall when his last tetanus shot was. He is adamant that he does not want to be seen in the emergency department or admitted to the hospital.    Allergies   Allergen Reactions   • Mercury      And mercury based preservatives-Thimerasol   • Sulfa Drugs Anaphylaxis and Swelling   • Other Environmental      Insect toxins     Social History     Social History   • Marital status:      Spouse name: N/A   • Number of children: N/A   • Years of education: N/A     Occupational History   • Not on file.     Social History Main Topics   • Smoking status: Former Smoker     Years: 30.00     Types: Cigars     Quit date: 11/5/2011   • Smokeless tobacco: Never Used      Comment: 2000   • Alcohol use No   • Drug use: No   • Sexual activity: Not on file     Other Topics Concern   • Not on file     Social History Narrative   • No narrative on file     Past Medical History:   Diagnosis Date   • ASTHMA     as a youth   • CAD (coronary artery disease) 8/26/2011   • CATARACT    • Dental disorder     hx of staph infection; has partials   • Essential hypertension    • Hyperlipidemia 8/26/2011   • Indigestion    • Myocardial infarct (CMS-HCC) 2005    STENTS   • Pain 11/14/11    NECK 7.5/10   • Personal history of venous thrombosis and embolism 2006   • Pneumonia    • Presence of stent in left circumflex coronary artery    • Unspecified hemorrhagic conditions (CMS-HCC)     TAKES ASA . PROLONGED BLEEDING     Current Outpatient Prescriptions on File Prior to Visit   Medication Sig Dispense Refill   • niacin (NIASPAN) 1000 MG CR tablet TAKE   TABLET BY MOUTH TAKE 2 TABLETS BY MOUTH EVERY DAY AT 6 PM. AT BEDTIME 180 Tab 3   • ciprofloxacin (CILOXIN) 0.3 % Solution Place  1 drop in left eye every 2 hours while awake today, then 1 drop every 4 hours while awake for 7 days. 1 Bottle 0   • lovastatin (MEVACOR) 10 MG tablet TAKE ONE TABLET BY MOUTH EVERY DAY 90 Tab 3   • lovastatin (MEVACOR) 10 MG tablet Take 5 mg by mouth every evening.     • fluticasone (FLONASE) 50 MCG/ACT nasal spray Spray 2 Sprays in nose every day. 1 Bottle 0   • KRILL OIL PO Take  by mouth.     • Ascorbic Acid (VITAMIN C PO) Take 6,000 mg by mouth every day.     • Multiple Vitamin (MULTIVITAMIN PO) Take  by mouth every day.     • aspirin 81 MG tablet Take 81 mg by mouth every day.     • Coenzyme Q10 (COQ10) 200 MG CAPS Take 300 mg by mouth every day.       No current facility-administered medications on file prior to visit.      Family History   Problem Relation Age of Onset   • Heart Attack Mother    • Heart Disease Mother      Review of Systems   Constitutional: Positive for chills. Negative for fever.   Respiratory: Negative for cough.    Gastrointestinal: Negative for abdominal pain, nausea and vomiting.   Musculoskeletal: Negative for joint pain.   Skin:        Patient has marked cellulitis two thirds of the way up his right leg with 50% increase in swelling compared to his contralateral left leg. Patient has a open wound on the medial heel with drainage.   Neurological: Negative for sensory change.          Objective:     /76   Pulse 88   Temp 36.3 °C (97.3 °F)   Resp 16   Wt 111.1 kg (245 lb)   SpO2 97%   BMI 34.65 kg/m²      Physical Exam   Constitutional: He appears well-developed and well-nourished. No distress.       HENT:   Head: Normocephalic and atraumatic.   Right Ear: External ear normal.   Left Ear: External ear normal.   Eyes: Conjunctivae are normal. Right eye exhibits no discharge. Left eye exhibits no discharge.   Cardiovascular: Normal rate and regular rhythm.    Pulmonary/Chest: Effort normal and breath sounds normal.   Abdominal: He exhibits no distension. There is no  tenderness.   Musculoskeletal: He exhibits edema, tenderness and deformity.   Examination of patient's right lower extremity reveals an extremely swollen right leg distal to the knees see the diagram. Very erythematous with a with weeping wound on the medial aspect of his foot. (Cultured) redressed   Skin: Skin is warm. He is not diaphoretic. There is erythema.   Psychiatric: He has a normal mood and affect.   Patient admits to be very stubborn          Foot x-ray no cellulitis  Assessment/Plan:     DX cellulitis to the right leg. r/ o DVT     Pt refuses to go the ED,  refuses admission due to dogs and feral cats at home, agrees to come into  tomorrow for re eval and possible US.. We checked and he will go to [place] urgent care where ultrasound is available.    I offered the patient crutches which he declined. He admits that is extremely stubborn and will not miss work in order to obtain outpatient IV therapy. He refuses to begin the hospital stating that his wife became ill from her chemotherapy and subsequently  there.. He is willing to stay in his lazy boy chair elevating his right lower extremity even though he's not been in that chair since his wife passed.

## 2017-12-17 NOTE — PROGRESS NOTES
Subjective:      Polo Pyle is a 67 y.o. male who presents with Leg Swelling (right leg swelling and painful x this AM )            HPI  Pt with cellulitis of the right leg now 50 % larger than it was 1 1/2 days ago. Had shaking chill 6 days ago.. Patient does not recall when his last tetanus shot was. He is adamant that he does not want to be seen in the emergency department or admitted to the hospital.    Pt appear very wary of health care after the recent death of his wife from [place] abd complications of chemo. Pt has hx of DVT,  Allergies   Allergen Reactions   • Mercury      And mercury based preservatives-Thimerasol   • Sulfa Drugs Anaphylaxis and Swelling   • Other Environmental      Insect toxins     Social History     Social History   • Marital status:      Spouse name: N/A   • Number of children: N/A   • Years of education: N/A     Occupational History   • Not on file.     Social History Main Topics   • Smoking status: Former Smoker     Years: 30.00     Types: Cigars     Quit date: 11/5/2011   • Smokeless tobacco: Never Used      Comment: 2000   • Alcohol use No   • Drug use: No   • Sexual activity: Not on file     Other Topics Concern   • Not on file     Social History Narrative   • No narrative on file     Past Medical History:   Diagnosis Date   • ASTHMA     as a youth   • CAD (coronary artery disease) 8/26/2011   • CATARACT    • Dental disorder     hx of staph infection; has partials   • Essential hypertension    • Hyperlipidemia 8/26/2011   • Indigestion    • Myocardial infarct (CMS-HCC) 2005    STENTS   • Pain 11/14/11    NECK 7.5/10   • Personal history of venous thrombosis and embolism 2006   • Pneumonia    • Presence of stent in left circumflex coronary artery    • Unspecified hemorrhagic conditions (CMS-HCC)     TAKES ASA . PROLONGED BLEEDING     Current Outpatient Prescriptions on File Prior to Visit   Medication Sig Dispense Refill   • niacin (NIASPAN) 1000 MG CR tablet TAKE    TABLET BY MOUTH TAKE 2 TABLETS BY MOUTH EVERY DAY AT 6 PM. AT BEDTIME 180 Tab 3   • ciprofloxacin (CILOXIN) 0.3 % Solution Place 1 drop in left eye every 2 hours while awake today, then 1 drop every 4 hours while awake for 7 days. 1 Bottle 0   • lovastatin (MEVACOR) 10 MG tablet TAKE ONE TABLET BY MOUTH EVERY DAY 90 Tab 3   • lovastatin (MEVACOR) 10 MG tablet Take 5 mg by mouth every evening.     • fluticasone (FLONASE) 50 MCG/ACT nasal spray Spray 2 Sprays in nose every day. 1 Bottle 0   • KRILL OIL PO Take  by mouth.     • Ascorbic Acid (VITAMIN C PO) Take 6,000 mg by mouth every day.     • Multiple Vitamin (MULTIVITAMIN PO) Take  by mouth every day.     • aspirin 81 MG tablet Take 81 mg by mouth every day.     • Coenzyme Q10 (COQ10) 200 MG CAPS Take 300 mg by mouth every day.       No current facility-administered medications on file prior to visit.      Family History   Problem Relation Age of Onset   • Heart Attack Mother    • Heart Disease Mother      Review of Systems   Constitutional: Positive for chills. Negative for fever.   Respiratory: Negative for cough.    Gastrointestinal: Negative for abdominal pain, nausea and vomiting.   Musculoskeletal: Negative for joint pain.   Skin:        Patient has marked cellulitis two thirds of the way up his right leg with 50% increase in swelling compared to his contralateral left leg. Patient has a open wound on the medial heel with drainage.   Neurological: Negative for sensory change.          Objective:     /76   Pulse 88   Temp 36.3 °C (97.3 °F)   Resp 16   Wt 111.1 kg (245 lb)   SpO2 97%   BMI 34.65 kg/m²      Physical Exam   Constitutional: He appears well-developed and well-nourished. No distress.       HENT:   Head: Normocephalic and atraumatic.   Right Ear: External ear normal.   Left Ear: External ear normal.   Eyes: Conjunctivae are normal. Right eye exhibits no discharge. Left eye exhibits no discharge.   Cardiovascular: Normal rate and regular  rhythm.    Pulmonary/Chest: Effort normal and breath sounds normal.   Abdominal: He exhibits no distension. There is no tenderness.   Musculoskeletal: He exhibits edema, tenderness and deformity.   Examination of patient's right lower extremity reveals an extremely swollen right leg distal to the knees see the diagram. Very erythematous with a with weeping wound on the medial aspect of his foot. (Cultured) redressed   Skin: Skin is warm. He is not diaphoretic. There is erythema.   Psychiatric: He has a normal mood and affect.   Patient admits to be very stubborn          Foot x-ray no osteo  Assessment/Plan:     DX: severe cellulitis to the right leg. r/ o DVT     Pt refuses to go the ED,  refuses admission due to dogs and feral cats at home, agrees to come into  tomorrow for re eval and possible US.. We checked and he will go to [place] urgent care where ultrasound is available.    I offered the patient crutches which he declined. He admits that is extremely stubborn and will not miss work in order to obtain outpatient IV therapy. He refuses to begin the hospital stating that his wife became ill from her chemotherapy and subsequently  there.. He is willing to stay in his lazy boy chair elevating his right lower extremity even though he's not been in that chair since his wife passed.    Pt signed AMA form acknowledging he could die from this illness that his cellulitis has probably spread systemically based on his shaking chill.pt given Rocephin, TD immunization, Augmentin and Bactroban will soak leg BID keep it elevated in recliner,

## 2017-12-18 LAB
BACTERIA WND AEROBE CULT: ABNORMAL
BACTERIA WND AEROBE CULT: ABNORMAL
GRAM STN SPEC: ABNORMAL
SIGNIFICANT IND 70042: ABNORMAL
SITE SITE: ABNORMAL
SOURCE SOURCE: ABNORMAL

## 2017-12-19 ENCOUNTER — TELEPHONE (OUTPATIENT)
Dept: URGENT CARE | Facility: PHYSICIAN GROUP | Age: 67
End: 2017-12-19

## 2017-12-19 NOTE — TELEPHONE ENCOUNTER
"I contacted the patient states he is doing much better the swelling is almost \"back to normal\". He will continue his medicines and is on the correct antibiotic for the staph aureus ulcer cellulitis that he's suffering from.  "

## 2017-12-31 ASSESSMENT — ENCOUNTER SYMPTOMS
FEVER: 0
CHILLS: 0
VOMITING: 0
LEG SWELLING: 1
NAUSEA: 0

## 2018-01-01 NOTE — PROGRESS NOTES
"Subjective:   Polo Pyle is a 67 y.o. male who presents for Leg Swelling (R ;Leg Swelling Per Dr. Morataya pt needs ultrasound for poss Clot)        Leg Swelling   This is a new problem. The current episode started in the past 7 days. The problem occurs constantly. The problem has been gradually improving. Pertinent negatives include no chills, fever, nausea or vomiting.     Review of Systems   Constitutional: Negative for chills and fever.   Gastrointestinal: Negative for nausea and vomiting.     Allergies   Allergen Reactions   • Mercury      And mercury based preservatives-Thimerasol   • Sulfa Drugs Anaphylaxis and Swelling   • Other Environmental      Insect toxins      Objective:   /78   Pulse 85   Temp 36.9 °C (98.4 °F)   Ht 1.753 m (5' 9\")   Wt 111.1 kg (245 lb)   SpO2 96%   BMI 36.18 kg/m²   Physical Exam   Constitutional: He is oriented to person, place, and time. He appears well-developed and well-nourished. No distress.   HENT:   Head: Normocephalic and atraumatic.   Eyes: Conjunctivae are normal. Pupils are equal, round, and reactive to light.   Cardiovascular: Normal rate and regular rhythm.    Pulmonary/Chest: Effort normal and breath sounds normal.   Musculoskeletal: He exhibits edema.   Neurological: He is alert and oriented to person, place, and time.   Skin: Skin is warm and dry.   Psychiatric: He has a normal mood and affect. Thought content normal.   Vitals reviewed.        Assessment/Plan:   Assessment    1. Leg edema, right  No DVT on my read. Differential diagnosis, natural history, supportive care, and indications for immediate D-up ABx.Continue iscussed.   - US-EXTREMITY VENOUS UNILATERAL-LOWER RIGHT; Future     "

## 2018-01-18 ENCOUNTER — TELEPHONE (OUTPATIENT)
Dept: CARDIOLOGY | Facility: MEDICAL CENTER | Age: 68
End: 2018-01-18

## 2018-01-18 DIAGNOSIS — I10 ESSENTIAL HYPERTENSION: Chronic | ICD-10-CM

## 2018-01-18 DIAGNOSIS — E78.5 DYSLIPIDEMIA: ICD-10-CM

## 2018-01-18 NOTE — TELEPHONE ENCOUNTER
----- Message from Tristan Ruffin sent at 1/18/2018  1:17 PM PST -----  Regarding: Lab slip for upcoming appt with CW   Contact: [phone]  ALICIA/María Navas is requesting a lab slip be mailed in preparation for upcoming appt with CW 1/31. If question's can be reached at [phone]       Please mail to:  [facility] [place] [id number]

## 2018-01-27 ENCOUNTER — HOSPITAL ENCOUNTER (OUTPATIENT)
Dept: LAB | Facility: MEDICAL CENTER | Age: 68
End: 2018-01-27
Attending: INTERNAL MEDICINE
Payer: MEDICARE

## 2018-01-27 DIAGNOSIS — I10 ESSENTIAL HYPERTENSION: Chronic | ICD-10-CM

## 2018-01-27 DIAGNOSIS — E78.5 DYSLIPIDEMIA: ICD-10-CM

## 2018-01-27 LAB
ALBUMIN SERPL BCP-MCNC: 4.2 G/DL (ref 3.2–4.9)
ALBUMIN/GLOB SERPL: 1.7 G/DL
ALP SERPL-CCNC: 63 U/L (ref 30–99)
ALT SERPL-CCNC: 15 U/L (ref 2–50)
ANION GAP SERPL CALC-SCNC: 8 MMOL/L (ref 0–11.9)
AST SERPL-CCNC: 24 U/L (ref 12–45)
BILIRUB SERPL-MCNC: 2 MG/DL (ref 0.1–1.5)
BUN SERPL-MCNC: 28 MG/DL (ref 8–22)
CALCIUM SERPL-MCNC: 9.1 MG/DL (ref 8.5–10.5)
CHLORIDE SERPL-SCNC: 106 MMOL/L (ref 96–112)
CHOLEST SERPL-MCNC: 162 MG/DL (ref 100–199)
CO2 SERPL-SCNC: 24 MMOL/L (ref 20–33)
CREAT SERPL-MCNC: 1.05 MG/DL (ref 0.5–1.4)
GLOBULIN SER CALC-MCNC: 2.5 G/DL (ref 1.9–3.5)
GLUCOSE SERPL-MCNC: 97 MG/DL (ref 65–99)
HDLC SERPL-MCNC: 56 MG/DL
LDLC SERPL CALC-MCNC: 86 MG/DL
POTASSIUM SERPL-SCNC: 4 MMOL/L (ref 3.6–5.5)
PROT SERPL-MCNC: 6.7 G/DL (ref 6–8.2)
SODIUM SERPL-SCNC: 138 MMOL/L (ref 135–145)
TRIGL SERPL-MCNC: 98 MG/DL (ref 0–149)

## 2018-01-27 PROCEDURE — 80053 COMPREHEN METABOLIC PANEL: CPT

## 2018-01-27 PROCEDURE — 80061 LIPID PANEL: CPT

## 2018-01-27 PROCEDURE — 36415 COLL VENOUS BLD VENIPUNCTURE: CPT

## 2018-01-31 ENCOUNTER — OFFICE VISIT (OUTPATIENT)
Dept: CARDIOLOGY | Facility: MEDICAL CENTER | Age: 68
End: 2018-01-31
Payer: MEDICARE

## 2018-01-31 VITALS
WEIGHT: 248 LBS | OXYGEN SATURATION: 94 % | HEIGHT: 69 IN | HEART RATE: 90 BPM | BODY MASS INDEX: 36.73 KG/M2 | DIASTOLIC BLOOD PRESSURE: 80 MMHG | SYSTOLIC BLOOD PRESSURE: 120 MMHG

## 2018-01-31 DIAGNOSIS — I87.2 VENOUS INSUFFICIENCY OF BOTH LOWER EXTREMITIES: Chronic | ICD-10-CM

## 2018-01-31 DIAGNOSIS — Z95.5 PRESENCE OF STENT IN LEFT CIRCUMFLEX CORONARY ARTERY: Chronic | ICD-10-CM

## 2018-01-31 DIAGNOSIS — Z95.5 PRESENCE OF STENT IN LAD CORONARY ARTERY: ICD-10-CM

## 2018-01-31 DIAGNOSIS — I25.83 CORONARY ARTERY DISEASE DUE TO LIPID RICH PLAQUE: ICD-10-CM

## 2018-01-31 DIAGNOSIS — I10 ESSENTIAL HYPERTENSION: Chronic | ICD-10-CM

## 2018-01-31 DIAGNOSIS — E78.5 DYSLIPIDEMIA: ICD-10-CM

## 2018-01-31 DIAGNOSIS — I25.10 CORONARY ARTERY DISEASE DUE TO LIPID RICH PLAQUE: ICD-10-CM

## 2018-01-31 DIAGNOSIS — R23.3 EASY BRUISING: ICD-10-CM

## 2018-01-31 PROCEDURE — 99214 OFFICE O/P EST MOD 30 MIN: CPT | Performed by: INTERNAL MEDICINE

## 2018-01-31 RX ORDER — CALCIUM CARBONATE 300MG(750)
TABLET,CHEWABLE ORAL
COMMUNITY
End: 2019-07-29

## 2018-01-31 NOTE — LETTER
[facility]   [address], [address]  [place] [address]  Phone: [phone]  Fax: [phone]              Polo Pyle  1950    Encounter Date: 1/31/2018    Manuel Flores M.D.          PROGRESS NOTE:  Subjective:   Polo Pyle is a 67 y.o. male who presents today for follow-up of his history of multiple stenting in the past    Overall has been stable tolerating his medications well he didn't get the CBC but did do recent blood work for chemistries and cholesterol which is near goal    Past Medical History:   Diagnosis Date   • ASTHMA     as a youth   • CAD (coronary artery disease) 8/26/2011   • CATARACT    • Dental disorder     hx of staph infection; has partials   • Essential hypertension    • Hyperlipidemia 8/26/2011   • Indigestion    • Myocardial infarct 2005    STENTS   • Pain 11/14/11    NECK 7.5/10   • Personal history of venous thrombosis and embolism 2006   • Pneumonia    • Presence of stent in left circumflex coronary artery    • Unspecified hemorrhagic conditions     TAKES ASA . PROLONGED BLEEDING   • Venous insufficiency of both lower extremities      Past Surgical History:   Procedure Laterality Date   • VENTRAL HERNIA REPAIR  11/30/2011    Performed by TROY GONZALEZ at SURGERY [facility] ORS   • INGUINAL HERNIA REPAIR  11/30/2011    Performed by TROY GONZALEZ at SURGERY [facility] ORS   • CATARACT PHACO WITH IOL  11/23/2010    Performed by RM WILLIS at SURGERY SAME DAY [facility] ORS   • CATARACT PHACO WITH IOL  11/2/2010    Performed by RM WILLIS at SURGERY SAME DAY [facility] ORS   • OTHER CARDIAC SURGERY  2005    STENTS x 4   • SINUS TREPHINE FRONTAL  1971   • INGUINAL HERNIA REPAIR  1962    WITH UNDESCENDED TESTICLE   • TONSILLECTOMY AND ADENOIDECTOMY  1957   • SINUSOTOMIES       Family History   Problem Relation Age of Onset   • Heart Attack Mother    • Heart Disease Mother      History   Smoking Status     • Former Smoker   • Years: 30.00   • Types: Cigars   • Quit date: 11/5/2011   Smokeless Tobacco   • Never Used     Comment: 2000     Allergies   Allergen Reactions   • Mercury      And mercury based preservatives-Thimerasol   • Sulfa Drugs Anaphylaxis and Swelling   • Other Environmental      Insect toxins     Outpatient Encounter Prescriptions as of 1/31/2018   Medication Sig Dispense Refill   • Calcium Polycarbophil (FIBER-CAPS PO) Take  by mouth.     • Magnesium 400 MG Tab Take  by mouth.     • Omega-3 Fatty Acids (FISH OIL) 1000 MG Cap capsule Take 1,000 mg by mouth 3 times a day, with meals.     • mupirocin (BACTROBAN) 2 % Ointment Apply 1 Application to affected area(s) 2 times a day. 1 Tube 0   • niacin (NIASPAN) 1000 MG CR tablet TAKE   TABLET BY MOUTH TAKE 2 TABLETS BY MOUTH EVERY DAY AT 6 PM. AT BEDTIME 180 Tab 3   • lovastatin (MEVACOR) 10 MG tablet TAKE ONE TABLET BY MOUTH EVERY DAY 90 Tab 3   • lovastatin (MEVACOR) 10 MG tablet Take 10 mg by mouth every evening.     • KRILL OIL PO Take  by mouth.     • Ascorbic Acid (VITAMIN C PO) Take 6,000 mg by mouth every day.     • Multiple Vitamin (MULTIVITAMIN PO) Take  by mouth every day.     • aspirin 81 MG tablet Take 162 mg by mouth every day.     • Coenzyme Q10 (COQ10) 200 MG CAPS Take 300 mg by mouth every day.     • mupirocin (BACTROBAN) 2 % Ointment Apply 1 Application to affected area(s) 2 times a day. 1 Tube 0   • ciprofloxacin (CILOXIN) 0.3 % Solution Place 1 drop in left eye every 2 hours while awake today, then 1 drop every 4 hours while awake for 7 days. (Patient not taking: Reported on 1/31/2018) 1 Bottle 0   • fluticasone (FLONASE) 50 MCG/ACT nasal spray Spray 2 Sprays in nose every day. (Patient not taking: Reported on 1/31/2018) 1 Bottle 0     No facility-administered encounter medications on file as of 1/31/2018.      Review of Systems   Constitutional: Negative for chills and fever.   HENT: Negative for sore throat.    Eyes: Negative for  "blurred vision.   Respiratory: Negative for cough and shortness of breath.    Cardiovascular: Negative for chest pain, palpitations, claudication, leg swelling and PND.   Gastrointestinal: Negative for abdominal pain and nausea.   Musculoskeletal: Negative for falls and joint pain.   Skin: Negative for rash.   Neurological: Negative for dizziness, focal weakness and weakness.   Endo/Heme/Allergies: Does not bruise/bleed easily.        Objective:   /80   Pulse 90   Ht 1.753 m (5' 9\")   Wt 112.5 kg (248 lb)   SpO2 94%   BMI 36.62 kg/m²      Physical Exam   Constitutional: No distress.   HENT:   Mouth/Throat: Oropharynx is clear and moist.   Eyes: No scleral icterus.   Neck: Neck supple. No JVD present.   Cardiovascular: Normal rate, regular rhythm, normal heart sounds and intact distal pulses.  Exam reveals no gallop and no friction rub.    No murmur heard.  Pulmonary/Chest: Effort normal. He has no rales.   Abdominal: Soft. Bowel sounds are normal. There is no tenderness.   Musculoskeletal: He exhibits no edema.   Neurological: He is alert.   Skin: No rash noted. He is not diaphoretic.   Psychiatric: He has a normal mood and affect.       Assessment:     1. Essential hypertension     2. Presence of stent in left circumflex coronary artery     3. Dyslipidemia     4. Presence of stent in LAD coronary artery     5. Coronary artery disease due to lipid rich plaque     6. Easy bruising  CBC WITH DIFFERENTIAL   7. Venous insufficiency of both lower extremities         Medical Decision Making:  Today's Assessment / Status / Plan:     It was my pleasure to meet with Mr. Pyle.    Encouraged to get a CBC to evaluate his platelets as he reported easy bruising in the past but overall has been doing well    Pressure is well controlled,     He is on low-dose statin but his cholesterol is near goal    I will see Mr. Pyle back in 1 year time and encouraged him to follow up with us over the phone or e-mail " using my MyChart as issues arise.    It is my pleasure to participate in the care of Mr. Pyle.  Please do not hesitate to contact me with questions or concerns.    Manuel Flores MD PhD [facility]  Cardiologist [facility]            Austen Talbot D.O.  [address] [address]  VIA Facsimile: [phone]

## 2018-02-09 ASSESSMENT — ENCOUNTER SYMPTOMS
FOCAL WEAKNESS: 0
BRUISES/BLEEDS EASILY: 0
DIZZINESS: 0
FALLS: 0
CLAUDICATION: 0
PND: 0
FEVER: 0
WEAKNESS: 0
PALPITATIONS: 0
COUGH: 0
ABDOMINAL PAIN: 0
CHILLS: 0
NAUSEA: 0
SORE THROAT: 0
BLURRED VISION: 0
SHORTNESS OF BREATH: 0

## 2018-02-10 NOTE — PROGRESS NOTES
Subjective:   Polo Pyle is a 67 y.o. male who presents today for follow-up of his history of multiple stenting in the past    Overall has been stable tolerating his medications well he didn't get the CBC but did do recent blood work for chemistries and cholesterol which is near goal    Past Medical History:   Diagnosis Date   • ASTHMA     as a youth   • CAD (coronary artery disease) 8/26/2011   • CATARACT    • Dental disorder     hx of staph infection; has partials   • Essential hypertension    • Hyperlipidemia 8/26/2011   • Indigestion    • Myocardial infarct 2005    STENTS   • Pain 11/14/11    NECK 7.5/10   • Personal history of venous thrombosis and embolism 2006   • Pneumonia    • Presence of stent in left circumflex coronary artery    • Unspecified hemorrhagic conditions     TAKES ASA . PROLONGED BLEEDING   • Venous insufficiency of both lower extremities      Past Surgical History:   Procedure Laterality Date   • VENTRAL HERNIA REPAIR  11/30/2011    Performed by TROY GONZALEZ at SURGERY [facility] ORS   • INGUINAL HERNIA REPAIR  11/30/2011    Performed by TROY GONZALEZ at SURGERY [facility] ORS   • CATARACT PHACO WITH IOL  11/23/2010    Performed by RM WILLIS at SURGERY SAME DAY [facility] ORS   • CATARACT PHACO WITH IOL  11/2/2010    Performed by RM WILLIS at SURGERY [facility] DAY [facility] ORS   • OTHER CARDIAC SURGERY  2005    STENTS x 4   • SINUS TREPHINE FRONTAL  1971   • INGUINAL HERNIA REPAIR  1962    WITH UNDESCENDED TESTICLE   • TONSILLECTOMY AND ADENOIDECTOMY  1957   • SINUSOTOMIES       Family History   Problem Relation Age of Onset   • Heart Attack Mother    • Heart Disease Mother      History   Smoking Status   • Former Smoker   • Years: 30.00   • Types: Cigars   • Quit date: 11/5/2011   Smokeless Tobacco   • Never Used     Comment: 2000     Allergies   Allergen Reactions   • Mercury      And mercury based preservatives-Thimerasol   • Sulfa Drugs Anaphylaxis and Swelling   •  Other Environmental      Insect toxins     Outpatient Encounter Prescriptions as of 1/31/2018   Medication Sig Dispense Refill   • Calcium Polycarbophil (FIBER-CAPS PO) Take  by mouth.     • Magnesium 400 MG Tab Take  by mouth.     • Omega-3 Fatty Acids (FISH OIL) 1000 MG Cap capsule Take 1,000 mg by mouth 3 times a day, with meals.     • mupirocin (BACTROBAN) 2 % Ointment Apply 1 Application to affected area(s) 2 times a day. 1 Tube 0   • niacin (NIASPAN) 1000 MG CR tablet TAKE   TABLET BY MOUTH TAKE 2 TABLETS BY MOUTH EVERY DAY AT 6 PM. AT BEDTIME 180 Tab 3   • lovastatin (MEVACOR) 10 MG tablet TAKE ONE TABLET BY MOUTH EVERY DAY 90 Tab 3   • lovastatin (MEVACOR) 10 MG tablet Take 10 mg by mouth every evening.     • KRILL OIL PO Take  by mouth.     • Ascorbic Acid (VITAMIN C PO) Take 6,000 mg by mouth every day.     • Multiple Vitamin (MULTIVITAMIN PO) Take  by mouth every day.     • aspirin 81 MG tablet Take 162 mg by mouth every day.     • Coenzyme Q10 (COQ10) 200 MG CAPS Take 300 mg by mouth every day.     • mupirocin (BACTROBAN) 2 % Ointment Apply 1 Application to affected area(s) 2 times a day. 1 Tube 0   • ciprofloxacin (CILOXIN) 0.3 % Solution Place 1 drop in left eye every 2 hours while awake today, then 1 drop every 4 hours while awake for 7 days. (Patient not taking: Reported on 1/31/2018) 1 Bottle 0   • fluticasone (FLONASE) 50 MCG/ACT nasal spray Spray 2 Sprays in nose every day. (Patient not taking: Reported on 1/31/2018) 1 Bottle 0     No facility-administered encounter medications on file as of 1/31/2018.      Review of Systems   Constitutional: Negative for chills and fever.   HENT: Negative for sore throat.    Eyes: Negative for blurred vision.   Respiratory: Negative for cough and shortness of breath.    Cardiovascular: Negative for chest pain, palpitations, claudication, leg swelling and PND.   Gastrointestinal: Negative for abdominal pain and nausea.   Musculoskeletal: Negative for falls and  "joint pain.   Skin: Negative for rash.   Neurological: Negative for dizziness, focal weakness and weakness.   Endo/Heme/Allergies: Does not bruise/bleed easily.        Objective:   /80   Pulse 90   Ht 1.753 m (5' 9\")   Wt 112.5 kg (248 lb)   SpO2 94%   BMI 36.62 kg/m²     Physical Exam   Constitutional: No distress.   HENT:   Mouth/Throat: Oropharynx is clear and moist.   Eyes: No scleral icterus.   Neck: Neck supple. No JVD present.   Cardiovascular: Normal rate, regular rhythm, normal heart sounds and intact distal pulses.  Exam reveals no gallop and no friction rub.    No murmur heard.  Pulmonary/Chest: Effort normal. He has no rales.   Abdominal: Soft. Bowel sounds are normal. There is no tenderness.   Musculoskeletal: He exhibits no edema.   Neurological: He is alert.   Skin: No rash noted. He is not diaphoretic.   Psychiatric: He has a normal mood and affect.       Assessment:     1. Essential hypertension     2. Presence of stent in left circumflex coronary artery     3. Dyslipidemia     4. Presence of stent in LAD coronary artery     5. Coronary artery disease due to lipid rich plaque     6. Easy bruising  CBC WITH DIFFERENTIAL   7. Venous insufficiency of both lower extremities         Medical Decision Making:  Today's Assessment / Status / Plan:     It was my pleasure to meet with Mr. Pyle.    Encouraged to get a CBC to evaluate his platelets as he reported easy bruising in the past but overall has been doing well    Pressure is well controlled,     He is on low-dose statin but his cholesterol is near goal    I will see Mr. Pyle back in 1 year time and encouraged him to follow up with us over the phone or e-mail using my MyChart as issues arise.    It is my pleasure to participate in the care of Mr. Pyle.  Please do not hesitate to contact me with questions or concerns.    Manuel Flores MD PhD FACC  Cardiologist [facility]        "

## 2018-02-27 DIAGNOSIS — E78.49 OTHER HYPERLIPIDEMIA: ICD-10-CM

## 2018-02-28 RX ORDER — LOVASTATIN 10 MG/1
TABLET ORAL
Qty: 90 TAB | Refills: 3 | Status: SHIPPED | OUTPATIENT
Start: 2018-02-28 | End: 2019-02-22 | Stop reason: SDUPTHER

## 2018-09-08 ENCOUNTER — HOSPITAL ENCOUNTER (OUTPATIENT)
Dept: LAB | Facility: MEDICAL CENTER | Age: 68
End: 2018-09-08
Attending: FAMILY MEDICINE
Payer: MEDICARE

## 2018-09-08 PROCEDURE — 82274 ASSAY TEST FOR BLOOD FECAL: CPT

## 2018-09-14 LAB — AMBIGUOUS DTTM AMBI4: NORMAL

## 2018-09-16 LAB — HEMOCCULT STL QL IA: NEGATIVE

## 2018-09-17 DIAGNOSIS — E78.5 HYPERLIPIDEMIA, UNSPECIFIED HYPERLIPIDEMIA TYPE: ICD-10-CM

## 2018-09-17 RX ORDER — NIACIN 1000 MG/1
TABLET, EXTENDED RELEASE ORAL
Qty: 180 TAB | Refills: 2 | Status: SHIPPED | OUTPATIENT
Start: 2018-09-17 | End: 2019-09-01 | Stop reason: SDUPTHER

## 2018-12-13 ENCOUNTER — HOSPITAL ENCOUNTER (OUTPATIENT)
Dept: RADIOLOGY | Facility: MEDICAL CENTER | Age: 68
End: 2018-12-13
Attending: FAMILY MEDICINE
Payer: MEDICARE

## 2018-12-13 DIAGNOSIS — M17.9 OSTEOARTHRITIS OF KNEE, UNSPECIFIED (CODE): ICD-10-CM

## 2018-12-13 PROCEDURE — 73562 X-RAY EXAM OF KNEE 3: CPT | Mod: LT

## 2019-01-26 ENCOUNTER — HOSPITAL ENCOUNTER (OUTPATIENT)
Dept: LAB | Facility: MEDICAL CENTER | Age: 69
End: 2019-01-26
Attending: INTERNAL MEDICINE
Payer: MEDICARE

## 2019-01-26 DIAGNOSIS — R23.3 EASY BRUISING: ICD-10-CM

## 2019-01-26 LAB
BASOPHILS # BLD AUTO: 1.1 % (ref 0–1.8)
BASOPHILS # BLD: 0.1 K/UL (ref 0–0.12)
EOSINOPHIL # BLD AUTO: 0.24 K/UL (ref 0–0.51)
EOSINOPHIL NFR BLD: 2.7 % (ref 0–6.9)
ERYTHROCYTE [DISTWIDTH] IN BLOOD BY AUTOMATED COUNT: 45.2 FL (ref 35.9–50)
HCT VFR BLD AUTO: 46.1 % (ref 42–52)
HGB BLD-MCNC: 15.2 G/DL (ref 14–18)
IMM GRANULOCYTES # BLD AUTO: 0.03 K/UL (ref 0–0.11)
IMM GRANULOCYTES NFR BLD AUTO: 0.3 % (ref 0–0.9)
LYMPHOCYTES # BLD AUTO: 1.93 K/UL (ref 1–4.8)
LYMPHOCYTES NFR BLD: 21.8 % (ref 22–41)
MCH RBC QN AUTO: 30.7 PG (ref 27–33)
MCHC RBC AUTO-ENTMCNC: 33 G/DL (ref 33.7–35.3)
MCV RBC AUTO: 93.1 FL (ref 81.4–97.8)
MONOCYTES # BLD AUTO: 1.01 K/UL (ref 0–0.85)
MONOCYTES NFR BLD AUTO: 11.4 % (ref 0–13.4)
NEUTROPHILS # BLD AUTO: 5.55 K/UL (ref 1.82–7.42)
NEUTROPHILS NFR BLD: 62.7 % (ref 44–72)
NRBC # BLD AUTO: 0 K/UL
NRBC BLD-RTO: 0 /100 WBC
PLATELET # BLD AUTO: 171 K/UL (ref 164–446)
PMV BLD AUTO: 10.4 FL (ref 9–12.9)
RBC # BLD AUTO: 4.95 M/UL (ref 4.7–6.1)
WBC # BLD AUTO: 8.9 K/UL (ref 4.8–10.8)

## 2019-01-26 PROCEDURE — 36415 COLL VENOUS BLD VENIPUNCTURE: CPT

## 2019-01-26 PROCEDURE — 85025 COMPLETE CBC W/AUTO DIFF WBC: CPT

## 2019-01-31 ENCOUNTER — OFFICE VISIT (OUTPATIENT)
Dept: CARDIOLOGY | Facility: MEDICAL CENTER | Age: 69
End: 2019-01-31
Payer: MEDICARE

## 2019-01-31 VITALS
BODY MASS INDEX: 35.99 KG/M2 | HEART RATE: 80 BPM | SYSTOLIC BLOOD PRESSURE: 158 MMHG | HEIGHT: 69 IN | WEIGHT: 243 LBS | OXYGEN SATURATION: 96 % | DIASTOLIC BLOOD PRESSURE: 100 MMHG

## 2019-01-31 DIAGNOSIS — E66.9 OBESITY (BMI 30-39.9): ICD-10-CM

## 2019-01-31 DIAGNOSIS — Z95.5 PRESENCE OF STENT IN LEFT CIRCUMFLEX CORONARY ARTERY: Chronic | ICD-10-CM

## 2019-01-31 DIAGNOSIS — E78.5 DYSLIPIDEMIA: ICD-10-CM

## 2019-01-31 DIAGNOSIS — I25.10 CORONARY ARTERY DISEASE DUE TO LIPID RICH PLAQUE: ICD-10-CM

## 2019-01-31 DIAGNOSIS — I10 ESSENTIAL HYPERTENSION: Chronic | ICD-10-CM

## 2019-01-31 DIAGNOSIS — I25.83 CORONARY ARTERY DISEASE DUE TO LIPID RICH PLAQUE: ICD-10-CM

## 2019-01-31 DIAGNOSIS — I10 WHITE COAT SYNDROME WITH DIAGNOSIS OF HYPERTENSION: Chronic | ICD-10-CM

## 2019-01-31 DIAGNOSIS — I87.2 VENOUS INSUFFICIENCY OF BOTH LOWER EXTREMITIES: Chronic | ICD-10-CM

## 2019-01-31 DIAGNOSIS — Z95.5 PRESENCE OF STENT IN LAD CORONARY ARTERY: ICD-10-CM

## 2019-01-31 PROCEDURE — 99214 OFFICE O/P EST MOD 30 MIN: CPT | Performed by: INTERNAL MEDICINE

## 2019-01-31 RX ORDER — ACETAMINOPHEN 325 MG/1
650 TABLET ORAL EVERY 4 HOURS PRN
COMMUNITY
End: 2021-02-10

## 2019-01-31 ASSESSMENT — ENCOUNTER SYMPTOMS
NAUSEA: 0
CLAUDICATION: 0
PALPITATIONS: 0
CHILLS: 0
COUGH: 0
ABDOMINAL PAIN: 0
DIZZINESS: 0
PND: 0
SORE THROAT: 0
BLURRED VISION: 0
FEVER: 0
SHORTNESS OF BREATH: 0
FOCAL WEAKNESS: 0
BRUISES/BLEEDS EASILY: 0
WEAKNESS: 0
FALLS: 0

## 2019-01-31 NOTE — LETTER
[facility]   [address]  [place] [address]  Phone: [phone]  Fax: [phone]              Polo Pyle  1950    Encounter Date: 1/31/2019    Manuel Folres M.D.          PROGRESS NOTE:  Chief Complaint   Patient presents with   • Hyperlipidemia     Follow up       Subjective:   Polo Pyle is a 68 y.o. male who presents today for follow-up of his history of coronary disease status post remote stenting in 2006    Is been doing well on his current medications he does have high blood pressure reading in the office but reports normal blood pressure readings at home which is been long-term he believes in the past he was given some antihypertensives which appear to be in the setting of his MI likely had diarrhea with metoprolol    Past Medical History:   Diagnosis Date   • ASTHMA     as a youth   • CAD (coronary artery disease) 8/26/2011   • CATARACT    • Dental disorder     hx of staph infection; has partials   • Essential hypertension    • Hyperlipidemia 8/26/2011   • Indigestion    • Myocardial infarct (HCC) 2005    STENTS   • Pain 11/14/11    NECK 7.5/10   • Personal history of venous thrombosis and embolism 2006   • Pneumonia    • Presence of stent in left circumflex coronary artery    • Unspecified hemorrhagic conditions     TAKES ASA . PROLONGED BLEEDING   • Venous insufficiency of both lower extremities      Past Surgical History:   Procedure Laterality Date   • VENTRAL HERNIA REPAIR  11/30/2011    Performed by TROY GONZALEZ at SURGERY [facility] ORS   • INGUINAL HERNIA REPAIR  11/30/2011    Performed by TROY GONZALEZ at SURGERY [facility] ORS   • CATARACT PHACO WITH IOL  11/23/2010    Performed by RM WILLIS at SURGERY SAME DAY [facility] ORS   • CATARACT PHACO WITH IOL  11/2/2010    Performed by RM WILLIS at SURGERY SAME DAY [facility] ORS   • OTHER CARDIAC SURGERY  2005    STENTS x 4   • SINUS TREPHINE  FRONTAL  1971   • INGUINAL HERNIA REPAIR  1962    WITH UNDESCENDED TESTICLE   • TONSILLECTOMY AND ADENOIDECTOMY  1957   • SINUSOTOMIES       Family History   Problem Relation Age of Onset   • Heart Attack Mother    • Heart Disease Mother      Social History     Social History   • Marital status:      Spouse name: N/A   • Number of children: N/A   • Years of education: N/A     Occupational History   • Not on file.     Social History Main Topics   • Smoking status: Former Smoker     Years: 30.00     Types: Cigars     Quit date: 11/5/2011   • Smokeless tobacco: Never Used      Comment: 2000   • Alcohol use No   • Drug use: No   • Sexual activity: Not on file     Other Topics Concern   • Not on file     Social History Narrative   • No narrative on file     Allergies   Allergen Reactions   • Mercury      And mercury based preservatives-Thimerasol   • Sulfa Drugs Anaphylaxis and Swelling   • Other Environmental      Insect toxins     Outpatient Encounter Prescriptions as of 1/31/2019   Medication Sig Dispense Refill   • acetaminophen (TYLENOL) 325 MG Tab Take 650 mg by mouth every four hours as needed.     • niacin (NIASPAN) 1000 MG CR tablet Take 2 tablets by mouth daily 180 Tab 2   • lovastatin (MEVACOR) 10 MG tablet TAKE ONE TABLET BY MOUTH EVERY DAY 90 Tab 3   • Magnesium 400 MG Tab Take  by mouth.     • Omega-3 Fatty Acids (FISH OIL) 1000 MG Cap capsule Take 1,000 mg by mouth 3 times a day, with meals.     • Ascorbic Acid (VITAMIN C PO) Take 6,000 mg by mouth every day.     • Multiple Vitamin (MULTIVITAMIN PO) Take  by mouth every day.     • aspirin 81 MG tablet Take 162 mg by mouth every day.     • Coenzyme Q10 (COQ10) 200 MG CAPS Take 300 mg by mouth every day.     • [DISCONTINUED] Calcium Polycarbophil (FIBER-CAPS PO) Take  by mouth.     • [DISCONTINUED] mupirocin (BACTROBAN) 2 % Ointment Apply 1 Application to affected area(s) 2 times a day. (Patient not taking: Reported on 1/31/2019) 1 Tube 0   •  "[DISCONTINUED] mupirocin (BACTROBAN) 2 % Ointment Apply 1 Application to affected area(s) 2 times a day. (Patient not taking: Reported on 1/31/2019) 1 Tube 0   • [DISCONTINUED] ciprofloxacin (CILOXIN) 0.3 % Solution Place 1 drop in left eye every 2 hours while awake today, then 1 drop every 4 hours while awake for 7 days. (Patient not taking: Reported on 1/31/2018) 1 Bottle 0   • [DISCONTINUED] lovastatin (MEVACOR) 10 MG tablet Take 10 mg by mouth every evening.     • [DISCONTINUED] fluticasone (FLONASE) 50 MCG/ACT nasal spray Spray 2 Sprays in nose every day. (Patient not taking: Reported on 1/31/2018) 1 Bottle 0   • [DISCONTINUED] KRILL OIL PO Take  by mouth.       No facility-administered encounter medications on file as of 1/31/2019.      Review of Systems   Constitutional: Negative for chills and fever.   HENT: Negative for sore throat.    Eyes: Negative for blurred vision.   Respiratory: Negative for cough and shortness of breath.    Cardiovascular: Negative for chest pain, palpitations, claudication, leg swelling and PND.   Gastrointestinal: Negative for abdominal pain and nausea.   Musculoskeletal: Negative for falls and joint pain.   Skin: Negative for rash.   Neurological: Negative for dizziness, focal weakness and weakness.   Endo/Heme/Allergies: Does not bruise/bleed easily.        Objective:   /100 (BP Location: Left arm, Patient Position: Sitting, BP Cuff Size: Large adult)   Pulse 80   Ht 1.753 m (5' 9\")   Wt 110.2 kg (243 lb)   SpO2 96%   BMI 35.88 kg/m²      Physical Exam   Constitutional: No distress.   HENT:   Mouth/Throat: Oropharynx is clear and moist. No oropharyngeal exudate.   Eyes: No scleral icterus.   Neck: No JVD present.   Cardiovascular: Normal rate and normal heart sounds.  Exam reveals no gallop and no friction rub.    No murmur heard.  Pulmonary/Chest: No respiratory distress. He has no wheezes. He has no rales.   Abdominal: Soft. Bowel sounds are normal.   "   Musculoskeletal: He exhibits no edema.   Neurological: He is alert.   Skin: No rash noted. He is not diaphoretic.   Psychiatric: He has a normal mood and affect.       Assessment:     1. Dyslipidemia     2. Essential hypertension     3. Presence of stent in LAD coronary artery     4. Presence of stent in left circumflex coronary artery     5. Venous insufficiency of both lower extremities     6. Obesity (BMI 30-39.9)     7. Coronary artery disease due to lipid rich plaque         Medical Decision Making:  Today's Assessment / Status / Plan:     It was my pleasure to meet with Mr. Pyle.    He has had a reaction to pravastatin in the past it appears may be some other so he is doing lovastatin cholesterol is been reasonable    Pressure certainly elevated today no symptoms he reports adequate blood pressure control at home    Reviewed the reports of his angiograms from 2007 with his stenting to left circumflex and LAD  We discussed the importance of meaningful weight loss.    I will see Mr. Pyle back in 1 year time and encouraged him to follow up with us over the phone or e-mail using my MyChart as issues arise.    It is my pleasure to participate in the care of Mr. yPle.  Please do not hesitate to contact me with questions or concerns.    Manuel Flores MD PhD [facility]  Cardiologist [facility]        Austen Talobt D.O.  [address] [facility] [address]  VIA Facsimile: [phone]

## 2019-02-01 NOTE — PROGRESS NOTES
Chief Complaint   Patient presents with   • Hyperlipidemia     Follow up       Subjective:   Polo Pyle is a 68 y.o. male who presents today for follow-up of his history of coronary disease status post remote stenting in 2006    Is been doing well on his current medications he does have high blood pressure reading in the office but reports normal blood pressure readings at home which is been long-term he believes in the past he was given some antihypertensives which appear to be in the setting of his MI likely had diarrhea with metoprolol    Past Medical History:   Diagnosis Date   • ASTHMA     as a youth   • CAD (coronary artery disease) 8/26/2011   • CATARACT    • Dental disorder     hx of staph infection; has partials   • Essential hypertension    • Hyperlipidemia 8/26/2011   • Indigestion    • Myocardial infarct (HCC) 2005    STENTS   • Pain 11/14/11    NECK 7.5/10   • Personal history of venous thrombosis and embolism 2006   • Pneumonia    • Presence of stent in left circumflex coronary artery    • Unspecified hemorrhagic conditions     TAKES ASA . PROLONGED BLEEDING   • Venous insufficiency of both lower extremities      Past Surgical History:   Procedure Laterality Date   • VENTRAL HERNIA REPAIR  11/30/2011    Performed by TROY GONZALEZ at SURGERY [facility] ORS   • INGUINAL HERNIA REPAIR  11/30/2011    Performed by TROY GONZALEZ at SURGERY [facility] ORS   • CATARACT PHACO WITH IOL  11/23/2010    Performed by RM WILLIS at SURGERY SAME DAY [facility] ORS   • CATARACT PHACO WITH IOL  11/2/2010    Performed by RM WILLIS at SURGERY SAME DAY [facility] ORS   • OTHER CARDIAC SURGERY  2005    STENTS x 4   • SINUS TREPHINE FRONTAL  1971   • INGUINAL HERNIA REPAIR  1962    WITH UNDESCENDED TESTICLE   • TONSILLECTOMY AND ADENOIDECTOMY  1957   • SINUSOTOMIES       Family History   Problem Relation Age of Onset   • Heart Attack Mother    • Heart Disease Mother      Social History     Social  History   • Marital status:      Spouse name: N/A   • Number of children: N/A   • Years of education: N/A     Occupational History   • Not on file.     Social History Main Topics   • Smoking status: Former Smoker     Years: 30.00     Types: Cigars     Quit date: 11/5/2011   • Smokeless tobacco: Never Used      Comment: 2000   • Alcohol use No   • Drug use: No   • Sexual activity: Not on file     Other Topics Concern   • Not on file     Social History Narrative   • No narrative on file     Allergies   Allergen Reactions   • Mercury      And mercury based preservatives-Thimerasol   • Sulfa Drugs Anaphylaxis and Swelling   • Other Environmental      Insect toxins     Outpatient Encounter Prescriptions as of 1/31/2019   Medication Sig Dispense Refill   • acetaminophen (TYLENOL) 325 MG Tab Take 650 mg by mouth every four hours as needed.     • niacin (NIASPAN) 1000 MG CR tablet Take 2 tablets by mouth daily 180 Tab 2   • lovastatin (MEVACOR) 10 MG tablet TAKE ONE TABLET BY MOUTH EVERY DAY 90 Tab 3   • Magnesium 400 MG Tab Take  by mouth.     • Omega-3 Fatty Acids (FISH OIL) 1000 MG Cap capsule Take 1,000 mg by mouth 3 times a day, with meals.     • Ascorbic Acid (VITAMIN C PO) Take 6,000 mg by mouth every day.     • Multiple Vitamin (MULTIVITAMIN PO) Take  by mouth every day.     • aspirin 81 MG tablet Take 162 mg by mouth every day.     • Coenzyme Q10 (COQ10) 200 MG CAPS Take 300 mg by mouth every day.     • [DISCONTINUED] Calcium Polycarbophil (FIBER-CAPS PO) Take  by mouth.     • [DISCONTINUED] mupirocin (BACTROBAN) 2 % Ointment Apply 1 Application to affected area(s) 2 times a day. (Patient not taking: Reported on 1/31/2019) 1 Tube 0   • [DISCONTINUED] mupirocin (BACTROBAN) 2 % Ointment Apply 1 Application to affected area(s) 2 times a day. (Patient not taking: Reported on 1/31/2019) 1 Tube 0   • [DISCONTINUED] ciprofloxacin (CILOXIN) 0.3 % Solution Place 1 drop in left eye every 2 hours while awake today,  "then 1 drop every 4 hours while awake for 7 days. (Patient not taking: Reported on 1/31/2018) 1 Bottle 0   • [DISCONTINUED] lovastatin (MEVACOR) 10 MG tablet Take 10 mg by mouth every evening.     • [DISCONTINUED] fluticasone (FLONASE) 50 MCG/ACT nasal spray Spray 2 Sprays in nose every day. (Patient not taking: Reported on 1/31/2018) 1 Bottle 0   • [DISCONTINUED] KRILL OIL PO Take  by mouth.       No facility-administered encounter medications on file as of 1/31/2019.      Review of Systems   Constitutional: Negative for chills and fever.   HENT: Negative for sore throat.    Eyes: Negative for blurred vision.   Respiratory: Negative for cough and shortness of breath.    Cardiovascular: Negative for chest pain, palpitations, claudication, leg swelling and PND.   Gastrointestinal: Negative for abdominal pain and nausea.   Musculoskeletal: Negative for falls and joint pain.   Skin: Negative for rash.   Neurological: Negative for dizziness, focal weakness and weakness.   Endo/Heme/Allergies: Does not bruise/bleed easily.        Objective:   /100 (BP Location: Left arm, Patient Position: Sitting, BP Cuff Size: Large adult)   Pulse 80   Ht 1.753 m (5' 9\")   Wt 110.2 kg (243 lb)   SpO2 96%   BMI 35.88 kg/m²     Physical Exam   Constitutional: No distress.   HENT:   Mouth/Throat: Oropharynx is clear and moist. No oropharyngeal exudate.   Eyes: No scleral icterus.   Neck: No JVD present.   Cardiovascular: Normal rate and normal heart sounds.  Exam reveals no gallop and no friction rub.    No murmur heard.  Pulmonary/Chest: No respiratory distress. He has no wheezes. He has no rales.   Abdominal: Soft. Bowel sounds are normal.   Musculoskeletal: He exhibits no edema.   Neurological: He is alert.   Skin: No rash noted. He is not diaphoretic.   Psychiatric: He has a normal mood and affect.       Assessment:     1. Dyslipidemia     2. Essential hypertension     3. Presence of stent in LAD coronary artery     4. " Presence of stent in left circumflex coronary artery     5. Venous insufficiency of both lower extremities     6. Obesity (BMI 30-39.9)     7. Coronary artery disease due to lipid rich plaque         Medical Decision Making:  Today's Assessment / Status / Plan:     It was my pleasure to meet with Mr. Pyle.    He has had a reaction to pravastatin in the past it appears may be some other so he is doing lovastatin cholesterol is been reasonable    Pressure certainly elevated today no symptoms he reports adequate blood pressure control at home    Reviewed the reports of his angiograms from 2007 with his stenting to left circumflex and LAD  We discussed the importance of meaningful weight loss.    I will see Mr. Pyle back in 1 year time and encouraged him to follow up with us over the phone or e-mail using my MyChart as issues arise.    It is my pleasure to participate in the care of Mr. Pyle.  Please do not hesitate to contact me with questions or concerns.    Manuel Flores MD PhD FAC  Cardiologist [facility]

## 2019-02-15 ENCOUNTER — HOSPITAL ENCOUNTER (OUTPATIENT)
Dept: LAB | Facility: MEDICAL CENTER | Age: 69
End: 2019-02-15
Attending: FAMILY MEDICINE
Payer: MEDICARE

## 2019-02-15 ENCOUNTER — HOSPITAL ENCOUNTER (OUTPATIENT)
Dept: RADIOLOGY | Facility: MEDICAL CENTER | Age: 69
End: 2019-02-15
Attending: FAMILY MEDICINE
Payer: MEDICARE

## 2019-02-15 DIAGNOSIS — L03.115 CELLULITIS OF RIGHT LOWER EXTREMITY: ICD-10-CM

## 2019-02-15 DIAGNOSIS — I87.2 VENOUS INSUFFICIENCY OF RIGHT LOWER EXTREMITY: ICD-10-CM

## 2019-02-15 LAB
BASOPHILS # BLD AUTO: 0 % (ref 0–1.8)
BASOPHILS # BLD: 0 K/UL (ref 0–0.12)
D DIMER PPP IA.FEU-MCNC: 7.89 UG/ML (FEU) (ref 0–0.5)
EOSINOPHIL # BLD AUTO: 0.3 K/UL (ref 0–0.51)
EOSINOPHIL NFR BLD: 1.8 % (ref 0–6.9)
ERYTHROCYTE [DISTWIDTH] IN BLOOD BY AUTOMATED COUNT: 48.1 FL (ref 35.9–50)
HCT VFR BLD AUTO: 42.4 % (ref 42–52)
HGB BLD-MCNC: 13.8 G/DL (ref 14–18)
LYMPHOCYTES # BLD AUTO: 1.32 K/UL (ref 1–4.8)
LYMPHOCYTES NFR BLD: 7.8 % (ref 22–41)
MANUAL DIFF BLD: NORMAL
MCH RBC QN AUTO: 30.8 PG (ref 27–33)
MCHC RBC AUTO-ENTMCNC: 32.5 G/DL (ref 33.7–35.3)
MCV RBC AUTO: 94.6 FL (ref 81.4–97.8)
MONOCYTES # BLD AUTO: 1.91 K/UL (ref 0–0.85)
MONOCYTES NFR BLD AUTO: 11.3 % (ref 0–13.4)
MORPHOLOGY BLD-IMP: NORMAL
NEUTROPHILS # BLD AUTO: 13.37 K/UL (ref 1.82–7.42)
NEUTROPHILS NFR BLD: 77.4 % (ref 44–72)
NEUTS BAND NFR BLD MANUAL: 1.7 % (ref 0–10)
NRBC # BLD AUTO: 0 K/UL
NRBC BLD-RTO: 0 /100 WBC
PLATELET # BLD AUTO: 198 K/UL (ref 164–446)
PLATELET BLD QL SMEAR: NORMAL
PMV BLD AUTO: 10.6 FL (ref 9–12.9)
RBC # BLD AUTO: 4.48 M/UL (ref 4.7–6.1)
RBC BLD AUTO: NORMAL
WBC # BLD AUTO: 16.9 K/UL (ref 4.8–10.8)

## 2019-02-15 PROCEDURE — 93971 EXTREMITY STUDY: CPT | Mod: RT

## 2019-02-15 PROCEDURE — 36415 COLL VENOUS BLD VENIPUNCTURE: CPT

## 2019-02-15 PROCEDURE — 85027 COMPLETE CBC AUTOMATED: CPT

## 2019-02-15 PROCEDURE — 85007 BL SMEAR W/DIFF WBC COUNT: CPT

## 2019-02-15 PROCEDURE — 85379 FIBRIN DEGRADATION QUANT: CPT

## 2019-02-22 DIAGNOSIS — E78.49 OTHER HYPERLIPIDEMIA: ICD-10-CM

## 2019-02-22 RX ORDER — LOVASTATIN 10 MG/1
TABLET ORAL
Qty: 90 TAB | Refills: 2 | Status: SHIPPED | OUTPATIENT
Start: 2019-02-22 | End: 2019-09-01 | Stop reason: SDUPTHER

## 2019-03-25 ENCOUNTER — OFFICE VISIT (OUTPATIENT)
Dept: WOUND CARE | Facility: MEDICAL CENTER | Age: 69
End: 2019-03-25
Attending: FAMILY MEDICINE
Payer: MEDICARE

## 2019-03-25 VITALS
TEMPERATURE: 98.7 F | RESPIRATION RATE: 20 BRPM | SYSTOLIC BLOOD PRESSURE: 124 MMHG | DIASTOLIC BLOOD PRESSURE: 91 MMHG | OXYGEN SATURATION: 97 % | HEART RATE: 98 BPM

## 2019-03-25 PROCEDURE — 97602 WOUND(S) CARE NON-SELECTIVE: CPT

## 2019-03-25 ASSESSMENT — PAIN SCALES - GENERAL: PAINLEVEL: 4=SLIGHT-MODERATE PAIN

## 2019-03-26 NOTE — PATIENT INSTRUCTIONS
Avoid prolonged standing or sitting without elevating your legs.  - Apply tubigrip to your legs ending 2 fingers below back of knee without wrinkles. Remove if tubi feels too tight by rolling off, not cutting.       Should you experience any significant changes in your wound(s), such as infection (redness, swelling, localized heat, increased pain, fever > 101 F, chills) or have any questions regarding your home care instructions, please contact the wound center at [phone]. If after hours, contact your primary care physician or go to the hospital emergency room.   Keep dressing clean, dry and covered while bathing. Only change dressing if it becomes over saturated, soiled or falls off.

## 2019-03-26 NOTE — PROCEDURES
"Joint visit with Dr. Crawford.. She stated that pt has \"poor arterial status\" as well as venous insufficiency.   Non selective wound debridement with NS, gauze.   "

## 2019-03-26 NOTE — PROGRESS NOTES
Provider Encounter- Venous Stasis Ulcer    HISTORY OF PRESENT ILLNESS    START OF CARE IN CLINIC: 3/25/2019      REFERRING PROVIDER: Austen Talbot MD     WOUND- Venous stasis ulcer   LOCATION: Right medial and anterior lower extremity   VARICOSE VEINS: none obvious   WOUND HISTORY: patient states the wounds have been present for several weeks    PREVIOUS TREATMENT:dressings and tubigrip at home   PERTINENT PMH: known history of PAD with NIV demonstrating Right SFA and popliteal occlusion       IMAGING:NIV from my office on their way   VASCULAR STUDIES:Same     LAST  WOUND CULTURE:  DATE : not indicated  DIABETES:  no  TOBACCO USE: no    RESULTS:  MOST RECENT VASCULAR STUDIES: KAMAR's .66 and .74 on the right and .54 and .59 on the left.  Arterial duplex from last July suggest bilateral SFA and popliteal artery occlusion.     Venous reflux throughout the right greater saphenous vein on the right and only below the knee on the left, in the great saphenous vein.     PAST MEDICAL HISTORY:   Past Medical History:   Diagnosis Date   • ASTHMA     as a youth   • CAD (coronary artery disease) 8/26/2011   • CATARACT    • Dental disorder     hx of staph infection; has partials   • Essential hypertension    • Hyperlipidemia 8/26/2011   • Indigestion    • Myocardial infarct (HCC) 2005    STENTS   • Pain 11/14/11    NECK 7.5/10   • Personal history of venous thrombosis and embolism 2006   • Pneumonia    • Presence of stent in left circumflex coronary artery    • Unspecified hemorrhagic conditions     TAKES ASA . PROLONGED BLEEDING   • Venous insufficiency of both lower extremities    • White coat syndrome with diagnosis of hypertension        PAST SURGICAL HISTORY:   Past Surgical History:   Procedure Laterality Date   • VENTRAL HERNIA REPAIR  11/30/2011    Performed by TROY GONZALEZ at SURGERY [facility] ORS   • INGUINAL HERNIA REPAIR  11/30/2011    Performed by TROY GONZALEZ at SURGERY [facility] ORS   • CATARACT PHACO  WITH IOL  11/23/2010    Performed by RM WILLIS at SURGERY SAME DAY [facility] ORS   • CATARACT PHACO WITH IOL  11/2/2010    Performed by RM WILLIS at SURGERY SAME DAY [facility] ORS   • OTHER CARDIAC SURGERY  2005    STENTS x 4   • SINUS TREPHINE FRONTAL  1971   • INGUINAL HERNIA REPAIR  1962    WITH UNDESCENDED TESTICLE   • TONSILLECTOMY AND ADENOIDECTOMY  1957   • SINUSOTOMIES          MEDICATIONS:   Current Outpatient Prescriptions   Medication   • lovastatin (MEVACOR) 10 MG tablet   • acetaminophen (TYLENOL) 325 MG Tab   • niacin (NIASPAN) 1000 MG CR tablet   • Magnesium 400 MG Tab   • Omega-3 Fatty Acids (FISH OIL) 1000 MG Cap capsule   • Ascorbic Acid (VITAMIN C PO)   • Multiple Vitamin (MULTIVITAMIN PO)   • aspirin 81 MG tablet   • Coenzyme Q10 (COQ10) 200 MG CAPS     No current facility-administered medications for this visit.        ALLERGIES:    Allergies   Allergen Reactions   • Mercury      And mercury based preservatives-Thimerasol   • Sulfa Drugs Anaphylaxis and Swelling   • Other Environmental      Insect toxins         SOCIAL HISTORY:   Social History     Social History   • Marital status:      Spouse name: N/A   • Number of children: N/A   • Years of education: N/A     Social History Main Topics   • Smoking status: Former Smoker     Years: 30.00     Types: Cigars     Quit date: 11/5/2011   • Smokeless tobacco: Never Used      Comment: 2000   • Alcohol use No   • Drug use: No   • Sexual activity: Not on file     Other Topics Concern   • Not on file     Social History Narrative   • No narrative on file       FAMILY HISTORY:   Family History   Problem Relation Age of Onset   • Heart Attack Mother    • Heart Disease Mother         REVIEW OF SYSTEMS:   Review of Systems   Skin:        Partial thickness open wounds   All other systems reviewed and are negative.      PHYSICAL EXAMINATION:   /91   Pulse 98   Temp 37.1 °C (98.7 °F)   Resp 20   SpO2 97%   Physical Exam    Constitutional: He is well-developed, well-nourished, and in no distress.   Obese   HENT:   Head: Normocephalic and atraumatic.   Eyes: Pupils are equal, round, and reactive to light. Conjunctivae are normal.   Neck: Normal range of motion. Neck supple.   Cardiovascular: Normal rate and regular rhythm.    Pulmonary/Chest: Effort normal. No respiratory distress.   Abdominal: Soft. There is no tenderness.   obese   Skin:   Several partial thickness wound on the right leg          Pre-debridement photo                Wound care completed by Arielle Irizarry RN.    PATIENT EDUCATION  - Etiology of venous stasis ulceration discussed with patient  - Importance of managing edema for healing of ulcer, and for prevention of new ulcer development  -Need for limited compression of lower legs   -Elevate legs above the level of the heart periodically throughout the day.  - Importance of adequate nutrition for wound healing  -Increase protein intake (unless contraindicated by renal status)  -Advised to go to ER for any increased redness, swelling, drainage or odor, or if patient develops fever, chills, nausea or vomiting.    ASSESSMENT AND PLAN:   Mr. Pyle is a 69 year old gentleman with mixed venous and arterial disease.  His wounds at this point are very superficial and only partial thickness.  No excisional debridement was performed.  I believe they will improve with gentle care and given his severe bilateral peripheral arterial disease, if we can be judicious and avoid worsening wounds, limb threat will also be avoided.    Long-term, we must consider arteriography with possible bypass.  Percutaneous treatment of long segment superficial femoral and popliteal occlusion is probably unreasonable.  Treatment of venous disease with saphenous ablation is ill advised as this would eliminate possible conduit options.    Please note that this dictation was created using voice recognition software. I have worked with technical  experts from [facility] to optimize the interface.  I have made every reasonable attempt to correct obvious errors, but there may be errors of grammar and possibly content that I did not discover before finalizing the note.

## 2019-03-29 ENCOUNTER — NON-PROVIDER VISIT (OUTPATIENT)
Dept: WOUND CARE | Facility: MEDICAL CENTER | Age: 69
End: 2019-03-29
Attending: FAMILY MEDICINE
Payer: MEDICARE

## 2019-03-29 PROCEDURE — 97602 WOUND(S) CARE NON-SELECTIVE: CPT

## 2019-03-29 NOTE — PATIENT INSTRUCTIONS
Avoid prolonged standing or sitting without elevating your legs.  - Apply tubigrip to your legs ending 2 fingers below back of knee without wrinkles.   Remove compression if decrease in circulation.     Should you experience any significant changes in your wound(s), such as infection (redness, swelling, localized heat, increased pain, fever > 101 F, chills) or have any questions regarding your home care instructions, please contact the wound center at [phone]. If after hours, contact your primary care physician or go to the hospital emergency room.   Keep dressing clean, dry and covered while bathing. Only change dressing if it becomes over saturated, soiled or falls off.

## 2019-04-02 ENCOUNTER — NON-PROVIDER VISIT (OUTPATIENT)
Dept: WOUND CARE | Facility: MEDICAL CENTER | Age: 69
End: 2019-04-02
Attending: FAMILY MEDICINE
Payer: MEDICARE

## 2019-04-02 PROCEDURE — 97597 DBRDMT OPN WND 1ST 20 CM/<: CPT

## 2019-04-03 NOTE — PATIENT INSTRUCTIONS
Avoid prolonged standing or sitting without elevating your legs.  - Apply tubigrip to your legs ending 2 fingers below back of knee without wrinkles.     If compression needs to be removed, un-wrap it do not cut it off.     Should you experience any significant changes in your wound(s), such as infection (redness, swelling, localized heat, increased pain, fever > 101 F, chills) or have any questions regarding your home care instructions, please contact the wound center at [phone]. If after hours, contact your primary care physician or go to the hospital emergency room.   Keep dressing clean, dry and covered while bathing. Only change dressing if it becomes over saturated, soiled or falls off.

## 2019-04-04 ENCOUNTER — NON-PROVIDER VISIT (OUTPATIENT)
Dept: WOUND CARE | Facility: MEDICAL CENTER | Age: 69
End: 2019-04-04
Attending: FAMILY MEDICINE
Payer: MEDICARE

## 2019-04-04 PROCEDURE — 97602 WOUND(S) CARE NON-SELECTIVE: CPT

## 2019-04-04 NOTE — PATIENT INSTRUCTIONS
-Keep dressings clean, dry and covered while bathing. Only change dressings if they become over saturated, soiled or fall off.     -Avoid prolonged standing or sitting without elevating your legs.    -Remove your compression garments if you have severe pain, severe swelling, numbness, color change, or temperature change in your toes. If you need to remove your compression garments, do so by unrolling them. Do not cut the compression garments off, this is to prevent cutting yourself on accident.    -Should you experience any significant changes in your wound(s), such as infection (redness, swelling, localized heat, increased pain, fever > 101 F, chills) or have any questions regarding your home care instructions, please contact the wound center at [phone]. If after hours, contact your primary care physician or go to the hospital emergency room.

## 2019-04-04 NOTE — PROCEDURES
Wound Care Procedures 4/4/2019:  Nonselective debridement with NS and gauze to remove biofilm from RLE wounds.

## 2019-04-09 ENCOUNTER — NON-PROVIDER VISIT (OUTPATIENT)
Dept: WOUND CARE | Facility: MEDICAL CENTER | Age: 69
End: 2019-04-09
Attending: FAMILY MEDICINE
Payer: MEDICARE

## 2019-04-09 PROCEDURE — 97602 WOUND(S) CARE NON-SELECTIVE: CPT

## 2019-04-09 NOTE — PROCEDURES
Wound Care Procedures 4/9/2019:  Nonselective debridement with NS and gauze to remove biofilm from RLE wounds.

## 2019-04-09 NOTE — PATIENT INSTRUCTIONS
-Keep dressings clean, dry and covered while bathing. Only change dressings if they become over saturated, soiled or fall off.     -Avoid prolonged standing or sitting without elevating your legs.    -Remove your compression garments if you have severe pain, severe swelling, numbness, color change, or temperature change in your toes. If you need to remove your compression garments, do so by unrolling them. Do not cut the compression garments off, this is to prevent cutting yourself on accident.    -Should you experience any significant changes in your wound(s), such as infection (redness, swelling, localized heat, increased pain, fever > 101 F, chills) or have any questions regarding your home care instructions, please contact the wound center at [phone]. If after hours, contact your primary care physician or go to the hospital emergency room.

## 2019-04-12 ENCOUNTER — NON-PROVIDER VISIT (OUTPATIENT)
Dept: WOUND CARE | Facility: MEDICAL CENTER | Age: 69
End: 2019-04-12
Attending: FAMILY MEDICINE
Payer: MEDICARE

## 2019-04-12 PROCEDURE — 97602 WOUND(S) CARE NON-SELECTIVE: CPT

## 2019-04-12 NOTE — PATIENT INSTRUCTIONS
-Keep dressings clean, dry and covered while bathing. Only change dressings if they become over saturated, soiled or fall off.     -Avoid prolonged standing or sitting without elevating your legs.    -Remove your compression garments if you have severe pain, severe swelling, numbness, color change, or temperature change in your toes.    -Should you experience any significant changes in your wound(s), such as infection (redness, swelling, localized heat, increased pain, fever > 101 F, chills) or have any questions regarding your home care instructions, please contact the wound center at [phone]. If after hours, contact your primary care physician or go to the hospital emergency room.

## 2019-04-16 ENCOUNTER — NON-PROVIDER VISIT (OUTPATIENT)
Dept: WOUND CARE | Facility: MEDICAL CENTER | Age: 69
End: 2019-04-16
Attending: FAMILY MEDICINE
Payer: MEDICARE

## 2019-04-16 PROCEDURE — 97597 DBRDMT OPN WND 1ST 20 CM/<: CPT

## 2019-04-16 NOTE — PROCEDURES
2% Viscous lidocaine applied to wound and periwound ~5 minutes dwell time.  CSWD using curette to remove approx. ~1cm2 of nonviable tissue from right lateral ankle wound bed.

## 2019-04-16 NOTE — PATIENT INSTRUCTIONS
Keep dressing clean and dry and cover while bathing. Only change dressing if over saturated, soiled or its falling off.     Should you experience any significant changes in your wound(s) such as infection (redness, swelling, localized heat, increased pain, fever >101 F, chills) or have any questions regarding your home care instructions, please contact the wound center [phone]. If after hours, contact your primary care physician or go the hospital emergency room.

## 2019-04-18 ENCOUNTER — NON-PROVIDER VISIT (OUTPATIENT)
Dept: WOUND CARE | Facility: MEDICAL CENTER | Age: 69
End: 2019-04-18
Attending: FAMILY MEDICINE
Payer: MEDICARE

## 2019-04-18 PROCEDURE — 97602 WOUND(S) CARE NON-SELECTIVE: CPT

## 2019-04-23 ENCOUNTER — NON-PROVIDER VISIT (OUTPATIENT)
Dept: WOUND CARE | Facility: MEDICAL CENTER | Age: 69
End: 2019-04-23
Attending: FAMILY MEDICINE
Payer: MEDICARE

## 2019-04-23 PROCEDURE — 97602 WOUND(S) CARE NON-SELECTIVE: CPT

## 2019-04-23 NOTE — PROCEDURES
Non-selective debridement using ns/gauze to remove nonviable material from right lateral ankle wound bed.  Pt tolerated well; denied pain.

## 2019-05-02 ENCOUNTER — NON-PROVIDER VISIT (OUTPATIENT)
Dept: WOUND CARE | Facility: MEDICAL CENTER | Age: 69
End: 2019-05-02
Attending: FAMILY MEDICINE
Payer: MEDICARE

## 2019-05-02 PROCEDURE — 97602 WOUND(S) CARE NON-SELECTIVE: CPT

## 2019-05-02 NOTE — PATIENT INSTRUCTIONS
Should you experience any significant changes in your wound(s) such as infection (redness, swelling, localized heat, increased pain, fever >101 F, chills) or have any questions regarding your home care instructions, please contact the wound center [phone]. If after hours, contact your primary care physician or go the hospital emergency room.  Keep dressing clean and dry and cover while bathing. Only change dressing if over saturated, soiled or its falling off.

## 2019-05-02 NOTE — PROGRESS NOTES
"Pt refused to let me remove the R anterior LE band aid or assess wound today, saying \"It's just about healed, I don't want you to disturb it\"   "

## 2019-05-09 ENCOUNTER — NON-PROVIDER VISIT (OUTPATIENT)
Dept: WOUND CARE | Facility: MEDICAL CENTER | Age: 69
End: 2019-05-09
Attending: FAMILY MEDICINE
Payer: MEDICARE

## 2019-05-09 PROCEDURE — 97602 WOUND(S) CARE NON-SELECTIVE: CPT

## 2019-05-10 NOTE — PATIENT INSTRUCTIONS
Should you experience any significant changes in your wound(s) such as infection (redness, swelling, localized heat, increased pain, fever >101 F, chills) or have any questions regarding your home care instructions, please contact the wound center [phone]. If after hours, contact your primary care physician or go the hospital emergency room.  Keep dressing clean and dry and cover while bathing. Only change dressing if over saturated, soiled or its falling off.

## 2019-05-16 ENCOUNTER — OFFICE VISIT (OUTPATIENT)
Dept: WOUND CARE | Facility: MEDICAL CENTER | Age: 69
End: 2019-05-16
Attending: FAMILY MEDICINE
Payer: MEDICARE

## 2019-05-16 VITALS
DIASTOLIC BLOOD PRESSURE: 89 MMHG | TEMPERATURE: 97.4 F | OXYGEN SATURATION: 100 % | RESPIRATION RATE: 18 BRPM | HEART RATE: 99 BPM | SYSTOLIC BLOOD PRESSURE: 118 MMHG

## 2019-05-16 DIAGNOSIS — R60.0 EDEMA OF LEG: ICD-10-CM

## 2019-05-16 DIAGNOSIS — E66.9 OBESITY (BMI 30-39.9): ICD-10-CM

## 2019-05-16 PROCEDURE — 11042 DBRDMT SUBQ TIS 1ST 20SQCM/<: CPT

## 2019-05-16 PROCEDURE — 11042 DBRDMT SUBQ TIS 1ST 20SQCM/<: CPT | Performed by: NURSE PRACTITIONER

## 2019-05-16 NOTE — PATIENT INSTRUCTIONS
-Keep dressings clean, dry and covered while bathing. Only change dressings if they become over saturated, soiled or fall off.     -Avoid prolonged standing or sitting without elevating your legs.    -Remove your compression garments if you have severe pain, severe swelling, numbness, color change, or temperature change in your toes. If you need to remove your compression garments, do so by unrolling them. Do not cut the compression garments off, this is to prevent cutting yourself on accident.    -Should you experience any significant changes in your wound(s), such as infection (redness, swelling, localized heat, increased pain, fever > 101 F, chills) or have any questions regarding your home care instructions, please contact the wound center at [phone]. If after hours, contact your primary care physician or go to the hospital emergency room.

## 2019-05-20 ENCOUNTER — TELEPHONE (OUTPATIENT)
Dept: SCHEDULING | Facility: IMAGING CENTER | Age: 69
End: 2019-05-20

## 2019-05-20 PROBLEM — R60.0 EDEMA OF LEG: Status: ACTIVE | Noted: 2019-05-20

## 2019-05-20 ASSESSMENT — ENCOUNTER SYMPTOMS
VOMITING: 0
SHORTNESS OF BREATH: 0
COUGH: 0
CLAUDICATION: 0
CHILLS: 0
FEVER: 0
CONSTIPATION: 0
DIARRHEA: 0
NAUSEA: 0

## 2019-05-21 NOTE — PROGRESS NOTES
" Provider Encounter- Venous Stasis Ulcer    HISTORY OF PRESENT ILLNESS    START OF CARE IN CLINIC: 3/25/2019      REFERRING PROVIDER: Austen Talbot MD     WOUND- Venous stasis ulcer   LOCATION: Right medial and anterior lower extremity   VARICOSE VEINS: none obvious   WOUND HISTORY: patient states the wounds have been present for several weeks, occurred spontaneously.   PREVIOUS TREATMENT:dressings and tubigrip at home   PERTINENT PMH: known history of PAD with NIV demonstrating Right SFA and popliteal occlusion       IMAGING:NIV results available at Dr. Crawford's office   VASCULAR STUDIES:Same     LAST  WOUND CULTURE: None found in epic  DIABETES:  no  TOBACCO USE: no        5/16: Patient presents to the clinic today for assessment debridement of her wounds.  Thin layer of slough buildup on several of her wounds.  He agreed to allow for, \"gentle debridement\".    RESULTS:  MOST RECENT VASCULAR STUDIES: KAMAR's .66 and .74 on the right and .54 and .59 on the left.  Arterial duplex from last July suggest bilateral SFA and popliteal artery occlusion.     Venous reflux throughout the right greater saphenous vein on the right and only below the knee on the left, in the great saphenous vein.     PAST MEDICAL HISTORY:   Past Medical History:   Diagnosis Date   • ASTHMA     as a youth   • CAD (coronary artery disease) 8/26/2011   • CATARACT    • Dental disorder     hx of staph infection; has partials   • Essential hypertension    • Hyperlipidemia 8/26/2011   • Indigestion    • Myocardial infarct (HCC) 2005    STENTS   • Pain 11/14/11    NECK 7.5/10   • Personal history of venous thrombosis and embolism 2006   • Pneumonia    • Presence of stent in left circumflex coronary artery    • Unspecified hemorrhagic conditions     TAKES ASA . PROLONGED BLEEDING   • Venous insufficiency of both lower extremities    • White coat syndrome with diagnosis of hypertension        PAST SURGICAL HISTORY:   Past Surgical History:   Procedure " Laterality Date   • VENTRAL HERNIA REPAIR  11/30/2011    Performed by TROY GONZALEZ at SURGERY [facility]   • INGUINAL HERNIA REPAIR  11/30/2011    Performed by TROY GONZALEZ at SURGERY [facility]   • CATARACT PHACO WITH IOL  11/23/2010    Performed by RM WILLIS at SURGERY SAME DAY [facility]S   • CATARACT PHACO WITH IOL  11/2/2010    Performed by RM WILLIS at SURGERY SAME DAY [facility]   • OTHER CARDIAC SURGERY  2005    STENTS x 4   • SINUS TREPHINE FRONTAL  1971   • INGUINAL HERNIA REPAIR  1962    WITH UNDESCENDED TESTICLE   • TONSILLECTOMY AND ADENOIDECTOMY  1957   • SINUSOTOMIES          MEDICATIONS:   Current Outpatient Prescriptions   Medication   • lovastatin (MEVACOR) 10 MG tablet   • acetaminophen (TYLENOL) 325 MG Tab   • niacin (NIASPAN) 1000 MG CR tablet   • Magnesium 400 MG Tab   • Omega-3 Fatty Acids (FISH OIL) 1000 MG Cap capsule   • Ascorbic Acid (VITAMIN C PO)   • Multiple Vitamin (MULTIVITAMIN PO)   • aspirin 81 MG tablet   • Coenzyme Q10 (COQ10) 200 MG CAPS     No current facility-administered medications for this visit.        ALLERGIES:    Allergies   Allergen Reactions   • Mercury      And mercury based preservatives-Thimerasol   • Sulfa Drugs Anaphylaxis and Swelling   • Other Environmental      Insect toxins         SOCIAL HISTORY:   Social History     Social History   • Marital status:      Spouse name: N/A   • Number of children: N/A   • Years of education: N/A     Social History Main Topics   • Smoking status: Former Smoker     Years: 30.00     Types: Cigars     Quit date: 11/5/2011   • Smokeless tobacco: Never Used      Comment: 2000   • Alcohol use No   • Drug use: No   • Sexual activity: Not on file     Other Topics Concern   • Not on file     Social History Narrative   • No narrative on file       FAMILY HISTORY:   Family History   Problem Relation Age of Onset   • Heart Attack Mother    • Heart Disease Mother         REVIEW OF SYSTEMS:   Review of  Systems   Constitutional: Negative for chills and fever.   Respiratory: Negative for cough and shortness of breath.    Cardiovascular: Positive for leg swelling. Negative for chest pain and claudication.   Gastrointestinal: Negative for constipation, diarrhea, nausea and vomiting.   Genitourinary: Negative for dysuria.   Musculoskeletal: Negative for joint pain.   Skin:        Partial thickness open wounds   All other systems reviewed and are negative.      PHYSICAL EXAMINATION:   /89   Pulse 99   Temp 36.3 °C (97.4 °F) (Temporal)   Resp 18   SpO2 100%   Physical Exam   Constitutional: He is well-developed, well-nourished, and in no distress.   Obese   HENT:   Head: Normocephalic and atraumatic.   Eyes: Pupils are equal, round, and reactive to light. Conjunctivae are normal.   Neck: Normal range of motion. Neck supple.   Cardiovascular: Normal rate and regular rhythm.    Pulmonary/Chest: Effort normal. No respiratory distress.   Abdominal: Soft. There is no tenderness.   obese   Musculoskeletal: He exhibits edema.   2+ edema bilateral lower extremities   Skin:   Several partial thickness wound on the right leg     WOUND ASSESSMENT     Wound 04/02/19 Venous Ulcer Ankle Right Lateral Ankle (Active)   Wound Image    5/16/2019  4:30 PM   Site Assessment Yellow 5/16/2019  4:30 PM   Gina-wound Assessment Edema;Fragile 5/16/2019  4:30 PM   Margins Attached edges 5/16/2019  4:30 PM   Wound Length (cm) 0.2 cm 5/16/2019  4:30 PM   Wound Width (cm) 0.4 cm 5/16/2019  4:30 PM   Wound Depth (cm) 0.1 cm 5/16/2019  4:30 PM   Wound Surface Area (cm^2) 0.08 cm^2 5/16/2019  4:30 PM   Post Wound Length (cm) 0.3 cm 5/16/2019  4:30 PM    Post Wound Width (cm) 0.5 cm 5/16/2019  4:30 PM   Post Wound Depth (cm) 0.1 cm 5/16/2019  4:30 PM   Post Wound Surface Area (cm^2) 0.15 cm^2 5/16/2019  4:30 PM   Tunneling 0 cm 5/9/2019  4:30 PM   Undermining 0 cm 5/9/2019  4:30 PM   Closure Secondary intention 5/9/2019  4:30 PM   Drainage  Amount Small 5/16/2019  4:30 PM   Drainage Description Serosanguineous 5/16/2019  4:30 PM   Non-staged Wound Description Full thickness 5/16/2019  4:30 PM   Treatments Cleansed;Pharmaceutical agent;Other (Comment) 5/16/2019  4:30 PM   Cleansing Approved Wound Cleanser 5/16/2019  4:30 PM   Periwound Protectant Skin Protectant wipes to Periwound 5/16/2019  4:30 PM   Dressing Options Hydrofiber Silver;Adhesive Foam;Tubigrip 5/16/2019  4:30 PM   Dressing Cleansing/Solutions Normal Saline 5/9/2019  4:30 PM   Dressing Changed Changed 5/16/2019  4:30 PM   Dressing Status Clean;Dry;Intact 5/9/2019  4:30 PM   Dressing Change Frequency Every 72 hrs 4/2/2019  4:30 PM   WOUND NURSE ONLY - Odor None 5/16/2019  4:30 PM   WOUND NURSE ONLY - Pulses Not palpable 5/9/2019  4:30 PM   WOUND NURSE ONLY - Exposed Structures Other (Comments) 5/16/2019  4:30 PM   WOUND NURSE ONLY - Tissue Type and Percentage Pre-debridement: 100% yellow adherent tissue. 5/16/2019  4:30 PM       Wound 04/16/19 Right Anterior LE (Active)   Wound Image    5/16/2019  4:30 PM   Site Assessment Red 5/16/2019  4:30 PM   Gian-wound Assessment Edema;Fragile 5/16/2019  4:30 PM   Margins Attached edges 5/16/2019  4:30 PM   Wound Length (cm) 14.4 cm 5/16/2019  4:30 PM   Wound Width (cm) 14 cm 5/16/2019  4:30 PM   Wound Depth (cm) 0.1 cm 5/16/2019  4:30 PM   Wound Surface Area (cm^2) 201.6 cm^2 5/16/2019  4:30 PM   Post Wound Length (cm) 14.5 cm 5/16/2019  4:30 PM    Post Wound Width (cm) 14 cm 5/16/2019  4:30 PM   Post Wound Depth (cm) 0.1 cm 5/16/2019  4:30 PM   Post Wound Surface Area (cm^2) 203 cm^2 5/16/2019  4:30 PM   Tunneling 0 cm 5/9/2019  4:30 PM   Undermining 0 cm 5/9/2019  4:30 PM   Closure Secondary intention 4/23/2019  4:00 PM   Drainage Amount Small 5/16/2019  4:30 PM   Drainage Description Serosanguineous 5/16/2019  4:30 PM   Non-staged Wound Description Partial thickness 5/16/2019  4:30 PM   Treatments Cleansed;Pharmaceutical agent;Other (Comment)  5/16/2019  4:30 PM   Cleansing Approved Wound Cleanser 5/16/2019  4:30 PM   Periwound Protectant Barrier Paste 5/16/2019  4:30 PM   Dressing Options Other (Comments);Roll Gauze;Hypafix Tape;Tubigrip 5/16/2019  4:30 PM   Dressing Changed Changed 5/16/2019  4:30 PM   Dressing Status Clean;Dry;Intact 5/9/2019  4:30 PM   Dressing Change Frequency Daily 5/9/2019  4:30 PM   WOUND NURSE ONLY - Odor None 5/16/2019  4:30 PM   WOUND NURSE ONLY - Exposed Structures None 5/16/2019  4:30 PM   WOUND NURSE ONLY - Tissue Type and Percentage Pre-debridement: 90% red moist, 10% yellow adherent. 5/16/2019  4:30 PM          PRE-DEBRIDEMENT PHOTOS  Right lateral ankle, pre-debridement    Right anterior lower leg wound cluster, pre-debridement      Procedure: Excisional debridement of right lower extremity wound  -2% viscous lidocaine applied topically to wound bed for approximately 5 minutes prior to debridement  -Curette used to debride slough from wound beds.  Excisional debridement was performed to remove devitalized tissue until healthy, bleeding tissue was visualized.  Total area debrided approximately 10 cm² cm², debrided  Tissue debrided into the subcutaneous layer.    -Bleeding controlled with manual pressure.   -Wound care completed per orders, by Arielle Galindo RN.    Right lateral ankle, post debridement      Right anterior lower leg wound cluster, post debridement            PATIENT EDUCATION  - Etiology of venous stasis ulceration discussed with patient  - Importance of managing edema for healing of ulcer, and for prevention of new ulcer development  -Need for limited compression of lower legs   -Elevate legs above the level of the heart periodically throughout the day.  - Importance of adequate nutrition for wound healing    -Advised to go to ER for any increased redness, swelling, drainage or odor, or if patient develops fever, chills, nausea or vomiting.    ASSESSMENT AND PLAN:   1. Chronic venous hypertension with  ulcer involving right side (HCC)  Comments: Multiple ulcers to right lower extremity    5/16: Patient agreed to allow for debridement of his wounds in clinic today  -Excisional debridement of wound in clinic today, medically necessary to promote wound healing.  -Patient to return to clinic 1-2 times per week for assessment debridement  -He is to change his dressings in between clinic visits as needed for saturation or displacement   Wound care: Viscopaste patches over open wounds, Kerlix to secure, Tubigrip F to manage edema    2. Edema of leg  5/16: 2+ edema bilateral lower extremities.  Patient unable to tolerate significant compression  -Tubigrip F to right lower extremity to manage edema  -Patient instructed to remove Tubigrip if he experiences any discomfort or pain.      3. Obesity (BMI 30-39.9)  Comments: Complicating factor.  Contributes to increased venous hypertension of lower extremities.  Poor wound healing potential        Please note that this dictation was created using voice recognition software. I have worked with technical experts from [facility] to optimize the interface.  I have made every reasonable attempt to correct obvious errors, but there may be errors of grammar and possibly content that I did not discover before finalizing the note.

## 2019-05-23 ENCOUNTER — NON-PROVIDER VISIT (OUTPATIENT)
Dept: WOUND CARE | Facility: MEDICAL CENTER | Age: 69
End: 2019-05-23
Attending: FAMILY MEDICINE
Payer: MEDICARE

## 2019-05-23 PROCEDURE — 97602 WOUND(S) CARE NON-SELECTIVE: CPT

## 2019-05-23 NOTE — PATIENT INSTRUCTIONS
Should you experience any significant changes in your wound(s) such as infection (redness, swelling, localized heat, increased pain, fever >101 F, chills) or have any questions regarding your home care instructions, please contact the wound center [phone]. If after hours, contact your primary care physician or go the hospital emergency room.  Keep dressing clean and dry and cover while bathing. Only change dressing if over saturated, soiled or its falling off.

## 2019-05-30 ENCOUNTER — NON-PROVIDER VISIT (OUTPATIENT)
Dept: WOUND CARE | Facility: MEDICAL CENTER | Age: 69
End: 2019-05-30
Attending: FAMILY MEDICINE
Payer: MEDICARE

## 2019-06-06 ENCOUNTER — APPOINTMENT (OUTPATIENT)
Dept: WOUND CARE | Facility: MEDICAL CENTER | Age: 69
End: 2019-06-06
Attending: FAMILY MEDICINE
Payer: MEDICARE

## 2019-06-13 ENCOUNTER — NON-PROVIDER VISIT (OUTPATIENT)
Dept: WOUND CARE | Facility: MEDICAL CENTER | Age: 69
End: 2019-06-13
Attending: FAMILY MEDICINE
Payer: MEDICARE

## 2019-06-13 PROCEDURE — 97602 WOUND(S) CARE NON-SELECTIVE: CPT

## 2019-06-14 NOTE — PATIENT INSTRUCTIONS
Should you experience any significant changes in your wound(s) such as infection (redness, swelling, localized heat, increased pain, fever >101 F, chills) or have any questions regarding your home care instructions, please contact the wound center [phone]. If after hours, contact your primary care physician or go the hospital emergency room.  Keep dressing clean and dry and cover while bathing. Only change dressing if over saturated, soiled or its falling off.       Pt instr to call Dr. Crawford's office to f/u asap. He says he will.

## 2019-06-20 ENCOUNTER — OFFICE VISIT (OUTPATIENT)
Dept: WOUND CARE | Facility: MEDICAL CENTER | Age: 69
End: 2019-06-20
Attending: FAMILY MEDICINE
Payer: MEDICARE

## 2019-06-20 VITALS
HEART RATE: 92 BPM | SYSTOLIC BLOOD PRESSURE: 128 MMHG | RESPIRATION RATE: 18 BRPM | TEMPERATURE: 98.1 F | DIASTOLIC BLOOD PRESSURE: 92 MMHG | OXYGEN SATURATION: 99 %

## 2019-06-20 DIAGNOSIS — R60.0 EDEMA OF LEG: ICD-10-CM

## 2019-06-20 DIAGNOSIS — E66.9 OBESITY (BMI 30-39.9): ICD-10-CM

## 2019-06-20 PROCEDURE — 99212 OFFICE O/P EST SF 10 MIN: CPT | Performed by: NURSE PRACTITIONER

## 2019-06-20 PROCEDURE — 99212 OFFICE O/P EST SF 10 MIN: CPT

## 2019-06-20 ASSESSMENT — ENCOUNTER SYMPTOMS
VOMITING: 0
SHORTNESS OF BREATH: 0
NAUSEA: 0
CHILLS: 0
FEVER: 0
COUGH: 0
CLAUDICATION: 0
CONSTIPATION: 0
DIARRHEA: 0

## 2019-06-20 NOTE — PATIENT INSTRUCTIONS
-Keep dressings clean, dry and covered while bathing. Only change dressings if they become over saturated, soiled or fall off.     -Avoid prolonged standing or sitting without elevating your legs.    -Remove your compression garments if you have severe pain, severe swelling, numbness, color change, or temperature change in your toes. If you need to remove your compression garments, do so by unrolling them. Do not cut the compression garments off, this is to prevent cutting yourself on accident.    -Should you experience any significant changes in your wound(s), such as infection (redness, swelling, localized heat, increased pain, fever > 101 F, chills) or have any questions regarding your home care instructions, please contact the wound center at [phone]. If after hours, contact your primary care physician or go to the hospital emergency room.

## 2019-06-21 NOTE — PROGRESS NOTES
" Provider Encounter- Venous Stasis Ulcer    HISTORY OF PRESENT ILLNESS    START OF CARE IN CLINIC: 3/25/2019      REFERRING PROVIDER: Austen Talbot MD     WOUND- Venous stasis ulcer   LOCATION: Right medial and anterior lower extremity   VARICOSE VEINS: none obvious   WOUND HISTORY: patient states the wounds have been present for several weeks, occurred spontaneously.   PREVIOUS TREATMENT:dressings and tubigrip at home   PERTINENT PMH: known history of PAD with NIV demonstrating Right SFA and popliteal occlusion       IMAGING:NIV results available at Dr. Crawford's office   VASCULAR STUDIES:Same     LAST  WOUND CULTURE: None found in epic  DIABETES:  no  TOBACCO USE: no        5/16: Patient presents to the clinic today for assessment debridement of her wounds.  Thin layer of slough buildup on several of her wounds.  He agreed to allow for, \"gentle debridement\".    6/20: Multiple shallow, diffuse wounds.  He has developed some new wounds, and several of his old wounds have healed.  He informed me today that his workplace will be shutting down in the next week or so.  He is planning to take several weeks to several months off with the hopes of being able to heal these wounds.  He will be able to elevate his legs more frequently, will not be standing all day.  He is supposed to be following up with Dr. Crawford sometime this summer.  He is called and left a message with her office.    RESULTS:  MOST RECENT VASCULAR STUDIES: KAMAR's .66 and .74 on the right and .54 and .59 on the left.  Arterial duplex from last July suggest bilateral SFA and popliteal artery occlusion.     Venous reflux throughout the right greater saphenous vein on the right and only below the knee on the left, in the great saphenous vein.     PAST MEDICAL HISTORY:   Past Medical History:   Diagnosis Date   • ASTHMA     as a youth   • CAD (coronary artery disease) 8/26/2011   • CATARACT    • Dental disorder     hx of staph infection; has partials   • " Essential hypertension    • Hyperlipidemia 8/26/2011   • Indigestion    • Myocardial infarct (HCC) 2005    STENTS   • Pain 11/14/11    NECK 7.5/10   • Personal history of venous thrombosis and embolism 2006   • Pneumonia    • Presence of stent in left circumflex coronary artery    • Unspecified hemorrhagic conditions     TAKES ASA . PROLONGED BLEEDING   • Venous insufficiency of both lower extremities    • White coat syndrome with diagnosis of hypertension        PAST SURGICAL HISTORY:   Past Surgical History:   Procedure Laterality Date   • VENTRAL HERNIA REPAIR  11/30/2011    Performed by TROY GONZALEZ at SURGERY [facility]   • INGUINAL HERNIA REPAIR  11/30/2011    Performed by TROY GONZALEZ at SURGERY [facility]   • CATARACT PHACO WITH IOL  11/23/2010    Performed by RM WILLIS at SURGERY [facility] DAY [facility]   • CATARACT PHACO WITH IOL  11/2/2010    Performed by RM WILLIS at SURGERY SAME DAY [facility]   • OTHER CARDIAC SURGERY  2005    STENTS x 4   • SINUS TREPHINE FRONTAL  1971   • INGUINAL HERNIA REPAIR  1962    WITH UNDESCENDED TESTICLE   • TONSILLECTOMY AND ADENOIDECTOMY  1957   • SINUSOTOMIES          MEDICATIONS:   Current Outpatient Prescriptions   Medication   • lovastatin (MEVACOR) 10 MG tablet   • acetaminophen (TYLENOL) 325 MG Tab   • niacin (NIASPAN) 1000 MG CR tablet   • Magnesium 400 MG Tab   • Omega-3 Fatty Acids (FISH OIL) 1000 MG Cap capsule   • Ascorbic Acid (VITAMIN C PO)   • Multiple Vitamin (MULTIVITAMIN PO)   • aspirin 81 MG tablet   • Coenzyme Q10 (COQ10) 200 MG CAPS     No current facility-administered medications for this visit.        ALLERGIES:    Allergies   Allergen Reactions   • Mercury      And mercury based preservatives-Thimerasol   • Sulfa Drugs Anaphylaxis and Swelling   • Other Environmental      Insect toxins         SOCIAL HISTORY:   Social History     Social History   • Marital status:      Spouse name: N/A   • Number of children: N/A   •  Years of education: N/A     Social History Main Topics   • Smoking status: Former Smoker     Years: 30.00     Types: Cigars     Quit date: 11/5/2011   • Smokeless tobacco: Never Used      Comment: 2000   • Alcohol use No   • Drug use: No   • Sexual activity: Not on file     Other Topics Concern   • Not on file     Social History Narrative   • No narrative on file       FAMILY HISTORY:   Family History   Problem Relation Age of Onset   • Heart Attack Mother    • Heart Disease Mother         REVIEW OF SYSTEMS:   Review of Systems   Constitutional: Negative for chills and fever.   Respiratory: Negative for cough and shortness of breath.    Cardiovascular: Positive for leg swelling. Negative for chest pain and claudication.   Gastrointestinal: Negative for constipation, diarrhea, nausea and vomiting.   Genitourinary: Negative for dysuria.   Musculoskeletal: Negative for joint pain.   Skin:        Partial thickness open wounds   All other systems reviewed and are negative.      PHYSICAL EXAMINATION:   /92   Pulse 92   Temp 36.7 °C (98.1 °F) (Temporal)   Resp 18   SpO2 99%   Physical Exam   Constitutional: He is well-developed, well-nourished, and in no distress.   Obese   HENT:   Head: Normocephalic and atraumatic.   Eyes: Pupils are equal, round, and reactive to light. Conjunctivae are normal.   Neck: Normal range of motion. Neck supple.   Cardiovascular: Normal rate and regular rhythm.    Pulmonary/Chest: Effort normal. No respiratory distress.   Abdominal: Soft. There is no tenderness.   obese   Musculoskeletal: He exhibits edema.   2+ edema bilateral lower extremities   Skin:   Several partial thickness wound on the right leg     WOUND ASSESSMENT   Wound 04/16/19 Right Anterior LE (Active)   Wound Image   6/20/2019  4:07 PM   Site Assessment Red 6/20/2019  4:07 PM   Gina-wound Assessment Edema 6/20/2019  4:07 PM   Margins Attached edges 6/20/2019  4:07 PM   Wound Length (cm) 8.9 cm 6/20/2019  4:07 PM      Wound Width (cm) 8 cm 6/20/2019  4:07 PM   Wound Depth (cm) 0.1 cm 6/13/2019  3:00 PM   Wound Surface Area (cm^2) 71.2 cm^2 6/20/2019  4:07 PM   Post Wound Length (cm) 14.5 cm 5/16/2019  4:30 PM    Post Wound Width (cm) 14 cm 5/16/2019  4:30 PM   Post Wound Depth (cm) 0.1 cm 5/16/2019  4:30 PM   Post Wound Surface Area (cm^2) 203 cm^2 5/16/2019  4:30 PM   Tunneling 0 cm 6/13/2019  3:00 PM   Undermining 0 cm 6/13/2019  3:00 PM   Closure Secondary intention 6/13/2019  3:00 PM   Drainage Amount Small 6/20/2019  4:07 PM   Drainage Description Serosanguineous 6/20/2019  4:07 PM   Non-staged Wound Description Partial thickness 6/20/2019  4:07 PM   Treatments Cleansed 6/20/2019  4:07 PM   Cleansing Approved Wound Cleanser 6/20/2019  4:07 PM   Periwound Protectant Barrier Paste 6/20/2019  4:07 PM   Dressing Options Viscopaste;Roll Gauze;Hypafix Tape;Tubigrip 6/20/2019  4:07 PM   Dressing Cleansing/Solutions Normal Saline 6/13/2019  3:00 PM   Dressing Changed Changed 6/20/2019  4:07 PM   Dressing Status Clean;Dry;Intact 6/13/2019  3:00 PM   Dressing Change Frequency Every 72 hrs 6/13/2019  3:00 PM   WOUND NURSE ONLY - Odor None 6/20/2019  4:07 PM   WOUND NURSE ONLY - Exposed Structures None 6/20/2019  4:07 PM   WOUND NURSE ONLY - Tissue Type and Percentage 100% red moist tissue. 6/20/2019  4:07 PM             WOUND PHOTOS  Right anterolateral lower leg, no debridement          Procedure: Wound care  -No debridement of wounds required  -Wound care completed per orders, by Arielle Galindo RN-refer to flowsheet.        PATIENT EDUCATION  - Etiology of venous stasis ulceration discussed with patient  - Importance of managing edema for healing of ulcer, and for prevention of new ulcer development  -Need for limited compression of lower legs   -Elevate legs above the level of the heart periodically throughout the day.  - Importance of adequate nutrition for wound healing    -Advised to go to ER for any increased redness,  swelling, drainage or odor, or if patient develops fever, chills, nausea or vomiting.    ASSESSMENT AND PLAN:   1. Chronic venous hypertension with ulcer involving right side (HCC)  Comments: Multiple ulcers to right lower extremity    6/20: Patient has new wounds, and some of his old wounds have healed.  All wounds are shallow with clean wound beds  -Debridement was not necessary today  -He is to return to clinic weekly for assessment and debridement as indicated  -He is to schedule for follow-up with Dr. Crawford in her office.  If he is unable to get scheduled there he may be able to see her here in the clinic.   Wound care: Viscopaste patches over open wounds, Kerlix to secure, Tubigrip F to manage edema    2. Edema of leg  6/20: 2+ edema bilateral lower extremities.  Patient unable to tolerate significant compression  -Tubigrip F to right lower extremity to manage edema  -Patient instructed to remove Tubigrip if he experiences any discomfort or pain.      3. Obesity (BMI 30-39.9)  Comments: Complicating factor.  Contributes to increased venous hypertension of lower extremities.  Poor wound healing potential        Please note that this dictation was created using voice recognition software. I have worked with technical experts from [employer] to optimize the interface.  I have made every reasonable attempt to correct obvious errors, but there may be errors of grammar and possibly content that I did not discover before finalizing the note.

## 2019-06-27 ENCOUNTER — NON-PROVIDER VISIT (OUTPATIENT)
Dept: WOUND CARE | Facility: MEDICAL CENTER | Age: 69
End: 2019-06-27
Attending: FAMILY MEDICINE
Payer: MEDICARE

## 2019-06-27 PROCEDURE — 97602 WOUND(S) CARE NON-SELECTIVE: CPT

## 2019-07-03 ENCOUNTER — HOSPITAL ENCOUNTER (OUTPATIENT)
Dept: LAB | Facility: MEDICAL CENTER | Age: 69
End: 2019-07-03
Attending: SURGERY
Payer: MEDICARE

## 2019-07-03 LAB
BUN SERPL-MCNC: 23 MG/DL (ref 8–22)
CREAT SERPL-MCNC: 1.13 MG/DL (ref 0.5–1.4)

## 2019-07-03 PROCEDURE — 84520 ASSAY OF UREA NITROGEN: CPT

## 2019-07-03 PROCEDURE — 82565 ASSAY OF CREATININE: CPT

## 2019-07-03 PROCEDURE — 36415 COLL VENOUS BLD VENIPUNCTURE: CPT

## 2019-07-05 ENCOUNTER — NON-PROVIDER VISIT (OUTPATIENT)
Dept: WOUND CARE | Facility: MEDICAL CENTER | Age: 69
End: 2019-07-05
Attending: FAMILY MEDICINE
Payer: MEDICARE

## 2019-07-05 PROCEDURE — 99211 OFF/OP EST MAY X REQ PHY/QHP: CPT

## 2019-07-05 NOTE — CERTIFICATION
[facility]   [facility]   [address]   [address]   [place] [address]   [phone] Fax: [phone]     Discharge Note        Referring Physician:  Austen Talbot MD  Wound Etiology: Venous stasis ulcer  Wound location: Right anterior LE  ICD-9: I87.2 (ICD-10-CM) - Venous insufficiency (chronic) (peripheral)  Date of Discharge: 07/05/2019     Assessment:  Discharge patient at this time secondary to wound resolution. Educated pt on maturation process of wound healing and how it will take several months up to more than a year to fully heal, which will be about 80% tensile strength of what it was. Instructed pt to continue gentle care, protect the wound site, avoid sunlight, apply sun block if exposed to sun, and apply moisturizer daily. Pt verbalized understanding to all.    Thank you for the referral and the opportunity to treat your patient.

## 2019-07-05 NOTE — PATIENT INSTRUCTIONS
Keep dressing clean and dry and cover while bathing. Only change dressing if over saturated, soiled or its falling off.     Should you experience any significant changes in your wound(s) such as infection (redness, swelling, localized heat, increased pain, fever >101 F, chills) or have any questions regarding your home care instructions, please contact the wound center [phone]. If after hours, contact your primary care physician or go the hospital emergency room.

## 2019-07-10 ENCOUNTER — APPOINTMENT (OUTPATIENT)
Dept: WOUND CARE | Facility: MEDICAL CENTER | Age: 69
End: 2019-07-10
Attending: FAMILY MEDICINE
Payer: MEDICARE

## 2019-07-12 ENCOUNTER — HOSPITAL ENCOUNTER (OUTPATIENT)
Dept: RADIOLOGY | Facility: MEDICAL CENTER | Age: 69
End: 2019-07-12
Attending: SURGERY
Payer: MEDICARE

## 2019-07-12 DIAGNOSIS — I70.213 ATHEROSCLEROSIS OF NATIVE ARTERY OF BOTH LOWER EXTREMITIES WITH INTERMITTENT CLAUDICATION (HCC): ICD-10-CM

## 2019-07-12 PROCEDURE — 74174 CTA ABD&PLVS W/CONTRAST: CPT

## 2019-07-12 PROCEDURE — 700117 HCHG RX CONTRAST REV CODE 255: Performed by: SURGERY

## 2019-07-12 RX ADMIN — IOHEXOL 100 ML: 350 INJECTION, SOLUTION INTRAVENOUS at 09:45

## 2019-07-29 DIAGNOSIS — Z01.812 PRE-OPERATIVE LABORATORY EXAMINATION: ICD-10-CM

## 2019-07-29 LAB
ANION GAP SERPL CALC-SCNC: 10 MMOL/L (ref 0–11.9)
ANISOCYTOSIS BLD QL SMEAR: ABNORMAL
BASOPHILS # BLD AUTO: 1.1 % (ref 0–1.8)
BASOPHILS # BLD: 0.1 K/UL (ref 0–0.12)
BUN SERPL-MCNC: 28 MG/DL (ref 8–22)
CALCIUM SERPL-MCNC: 9.2 MG/DL (ref 8.5–10.5)
CHLORIDE SERPL-SCNC: 109 MMOL/L (ref 96–112)
CO2 SERPL-SCNC: 26 MMOL/L (ref 20–33)
COMMENT 1642: NORMAL
CREAT SERPL-MCNC: 1.26 MG/DL (ref 0.5–1.4)
EOSINOPHIL # BLD AUTO: 0.33 K/UL (ref 0–0.51)
EOSINOPHIL NFR BLD: 3.7 % (ref 0–6.9)
ERYTHROCYTE [DISTWIDTH] IN BLOOD BY AUTOMATED COUNT: 52 FL (ref 35.9–50)
GLUCOSE SERPL-MCNC: 106 MG/DL (ref 65–99)
HCT VFR BLD AUTO: 36.2 % (ref 42–52)
HGB BLD-MCNC: 10.5 G/DL (ref 14–18)
IMM GRANULOCYTES # BLD AUTO: 0.03 K/UL (ref 0–0.11)
IMM GRANULOCYTES NFR BLD AUTO: 0.3 % (ref 0–0.9)
LYMPHOCYTES # BLD AUTO: 1.99 K/UL (ref 1–4.8)
LYMPHOCYTES NFR BLD: 22.3 % (ref 22–41)
MACROCYTES BLD QL SMEAR: ABNORMAL
MCH RBC QN AUTO: 23.3 PG (ref 27–33)
MCHC RBC AUTO-ENTMCNC: 29.2 G/DL (ref 33.7–35.3)
MCV RBC AUTO: 79.6 FL (ref 81.4–97.8)
MONOCYTES # BLD AUTO: 1.1 K/UL (ref 0–0.85)
MONOCYTES NFR BLD AUTO: 12.3 % (ref 0–13.4)
MORPHOLOGY BLD-IMP: NORMAL
NEUTROPHILS # BLD AUTO: 5.38 K/UL (ref 1.82–7.42)
NEUTROPHILS NFR BLD: 60.3 % (ref 44–72)
NRBC # BLD AUTO: 0 K/UL
NRBC BLD-RTO: 0 /100 WBC
OVALOCYTES BLD QL SMEAR: NORMAL
PLATELET # BLD AUTO: 197 K/UL (ref 164–446)
PLATELET BLD QL SMEAR: NORMAL
PMV BLD AUTO: 9.9 FL (ref 9–12.9)
POIKILOCYTOSIS BLD QL SMEAR: NORMAL
POTASSIUM SERPL-SCNC: 4.7 MMOL/L (ref 3.6–5.5)
RBC # BLD AUTO: 4.51 M/UL (ref 4.7–6.1)
RBC BLD AUTO: PRESENT
SODIUM SERPL-SCNC: 145 MMOL/L (ref 135–145)
WBC # BLD AUTO: 8.9 K/UL (ref 4.8–10.8)

## 2019-07-29 PROCEDURE — 85025 COMPLETE CBC W/AUTO DIFF WBC: CPT

## 2019-07-29 PROCEDURE — 36415 COLL VENOUS BLD VENIPUNCTURE: CPT

## 2019-07-29 PROCEDURE — 80048 BASIC METABOLIC PNL TOTAL CA: CPT

## 2019-08-01 ENCOUNTER — HOSPITAL ENCOUNTER (OUTPATIENT)
Facility: MEDICAL CENTER | Age: 69
End: 2019-08-01
Attending: SURGERY | Admitting: SURGERY
Payer: MEDICARE

## 2019-08-01 ENCOUNTER — APPOINTMENT (OUTPATIENT)
Dept: RADIOLOGY | Facility: MEDICAL CENTER | Age: 69
End: 2019-08-01
Attending: SURGERY
Payer: MEDICARE

## 2019-08-01 VITALS
HEIGHT: 70 IN | SYSTOLIC BLOOD PRESSURE: 158 MMHG | RESPIRATION RATE: 16 BRPM | HEART RATE: 60 BPM | BODY MASS INDEX: 35 KG/M2 | DIASTOLIC BLOOD PRESSURE: 97 MMHG | OXYGEN SATURATION: 95 % | TEMPERATURE: 97.3 F | WEIGHT: 244.49 LBS

## 2019-08-01 DIAGNOSIS — I70.213 ATHEROSCLEROSIS OF NATIVE ARTERIES OF EXTREMITIES WITH INTERMITTENT CLAUDICATION, BILATERAL LEGS (HCC): ICD-10-CM

## 2019-08-01 PROBLEM — I73.9 PERIPHERAL ARTERY DISEASE (HCC): Chronic | Status: ACTIVE | Noted: 2019-08-01

## 2019-08-01 PROCEDURE — 160002 HCHG RECOVERY MINUTES (STAT)

## 2019-08-01 PROCEDURE — 700111 HCHG RX REV CODE 636 W/ 250 OVERRIDE (IP): Performed by: SURGERY

## 2019-08-01 PROCEDURE — 700117 HCHG RX CONTRAST REV CODE 255: Performed by: SURGERY

## 2019-08-01 PROCEDURE — 700111 HCHG RX REV CODE 636 W/ 250 OVERRIDE (IP)

## 2019-08-01 PROCEDURE — 99153 MOD SED SAME PHYS/QHP EA: CPT

## 2019-08-01 RX ORDER — HYDRALAZINE HYDROCHLORIDE 20 MG/ML
INJECTION INTRAMUSCULAR; INTRAVENOUS
Status: COMPLETED
Start: 2019-08-01 | End: 2019-08-01

## 2019-08-01 RX ORDER — HEPARIN SODIUM,PORCINE 1000/ML
VIAL (ML) INJECTION
Status: DISCONTINUED
Start: 2019-08-01 | End: 2019-08-01 | Stop reason: HOSPADM

## 2019-08-01 RX ORDER — SODIUM CHLORIDE 9 MG/ML
500 INJECTION, SOLUTION INTRAVENOUS
Status: ACTIVE | OUTPATIENT
Start: 2019-08-01 | End: 2019-08-01

## 2019-08-01 RX ORDER — MIDAZOLAM HYDROCHLORIDE 1 MG/ML
.5-2 INJECTION INTRAMUSCULAR; INTRAVENOUS PRN
Status: ACTIVE | OUTPATIENT
Start: 2019-08-01 | End: 2019-08-01

## 2019-08-01 RX ORDER — MIDAZOLAM HYDROCHLORIDE 1 MG/ML
INJECTION INTRAMUSCULAR; INTRAVENOUS
Status: COMPLETED
Start: 2019-08-01 | End: 2019-08-01

## 2019-08-01 RX ORDER — IODIXANOL 270 MG/ML
100 INJECTION, SOLUTION INTRAVASCULAR ONCE
Status: COMPLETED | OUTPATIENT
Start: 2019-08-01 | End: 2019-08-01

## 2019-08-01 RX ORDER — HYDRALAZINE HYDROCHLORIDE 20 MG/ML
10 INJECTION INTRAMUSCULAR; INTRAVENOUS
Status: DISCONTINUED | OUTPATIENT
Start: 2019-08-01 | End: 2019-08-01 | Stop reason: HOSPADM

## 2019-08-01 RX ORDER — SODIUM CHLORIDE 9 MG/ML
INJECTION, SOLUTION INTRAVENOUS CONTINUOUS
Status: DISCONTINUED | OUTPATIENT
Start: 2019-08-01 | End: 2019-08-01 | Stop reason: HOSPADM

## 2019-08-01 RX ADMIN — MIDAZOLAM HYDROCHLORIDE 1 MG: 1 INJECTION, SOLUTION INTRAMUSCULAR; INTRAVENOUS at 08:46

## 2019-08-01 RX ADMIN — HYDRALAZINE HYDROCHLORIDE 10 MG: 20 INJECTION INTRAMUSCULAR; INTRAVENOUS at 11:47

## 2019-08-01 RX ADMIN — IODIXANOL 40 ML: 270 INJECTION, SOLUTION INTRAVASCULAR at 09:15

## 2019-08-01 RX ADMIN — FENTANYL CITRATE 50 MCG: 0.05 INJECTION, SOLUTION INTRAMUSCULAR; INTRAVENOUS at 08:46

## 2019-08-01 RX ADMIN — MIDAZOLAM HYDROCHLORIDE 1 MG: 1 INJECTION, SOLUTION INTRAMUSCULAR; INTRAVENOUS at 09:12

## 2019-08-01 ASSESSMENT — PAIN SCALES - GENERAL
PAINLEVEL: NO PAIN

## 2019-08-01 NOTE — DISCHARGE INSTRUCTIONS
ACTIVITY: Rest and take it easy for the first 24 hours.  A responsible adult is recommended to remain with you during that time.  It is normal to feel sleepy.  We encourage you to not do anything that requires balance, judgment or coordination.    MILD FLU-LIKE SYMPTOMS ARE NORMAL. YOU MAY EXPERIENCE GENERALIZED MUSCLE ACHES, THROAT IRRITATION, HEADACHE AND/OR SOME NAUSEA.    FOR 24 HOURS DO NOT:  Drive, operate machinery or run household appliances.  Drink beer or alcoholic beverages.   Make important decisions or sign legal documents.    SPECIAL INSTRUCTIONS: Minimal activity at home for 2 days.   ·   No Pushing, pulling, or heavy lifting (20 LBS) for 2 weeks.   ·   Keep dressings on for 2 days, then remove.   ·   Keep incisions dry and clean.   ·   Okay to shower after 2 days.   ·   No bath for 10 days  follow up with your primary care physician as needed  If you experience chest pain, shortness of breath call 911 return to ER   Resume your home medications     DIET: To avoid nausea, slowly advance diet as tolerated, avoiding spicy or greasy foods for the first day.  Add more substantial food to your diet according to your physician's instructions.  Babies can be fed formula or breast milk as soon as they are hungry.  INCREASE FLUIDS AND FIBER TO AVOID CONSTIPATION.    SURGICAL DRESSING/BATHING: keep dressing clean dry intact for 24 hours. Remove dressing after 24 hours.     FOLLOW-UP APPOINTMENT:  A follow-up appointment should be arranged with your doctor in [id number]; call to schedule.    You should CALL YOUR PHYSICIAN if you develop:  Fever greater than 101 degrees F.  Pain not relieved by medication, or persistent nausea or vomiting.  Excessive bleeding (blood soaking through dressing) or unexpected drainage from the wound.  Extreme redness or swelling around the incision site, drainage of pus or foul smelling drainage.  Inability to urinate or empty your bladder within 8 hours.  Problems with breathing or  chest pain.    You should call 911 if you develop problems with breathing or chest pain.  If you are unable to contact your doctor or surgical center, you should go to the nearest emergency room or urgent care center.  Physician's telephone #: [phone]    If any questions arise, call your doctor.  If your doctor is not available, please feel free to call the Surgical Center at [phone].  The Center is open Monday through Friday from 7AM to 7PM.  You can also call the HEALTH HOTLINE open 24 hours/day, 7 days/week and speak to a nurse at [phone], or toll free at [phone].    A registered nurse may call you a few days after your surgery to see how you are doing after your procedure.    MEDICATIONS: Resume taking daily medication.  Take prescribed pain medication with food.  If no medication is prescribed, you may take non-aspirin pain medication if needed.  PAIN MEDICATION CAN BE VERY CONSTIPATING.  Take a stool softener or laxative such as senokot, pericolace, or milk of magnesia if needed.    If your physician has prescribed pain medication that includes Acetaminophen (Tylenol), do not take additional Acetaminophen (Tylenol) while taking the prescribed medication.    Depression / Suicide Risk    As you are discharged from this [facility] facility, it is important to learn how to keep safe from harming yourself.    Recognize the warning signs:  · Abrupt changes in personality, positive or negative- including increase in energy   · Giving away possessions  · Change in eating patterns- significant weight changes-  positive or negative  · Change in sleeping patterns- unable to sleep or sleeping all the time   · Unwillingness or inability to communicate  · Depression  · Unusual sadness, discouragement and loneliness  · Talk of wanting to die  · Neglect of personal appearance   · Rebelliousness- reckless behavior  · Withdrawal from people/activities they love  · Confusion- inability to  "concentrate     If you or a loved one observes any of these behaviors or has concerns about self-harm, here's what you can do:  · Talk about it- your feelings and reasons for harming yourself  · Remove any means that you might use to hurt yourself (examples: pills, rope, extension cords, firearm)  · Get professional help from the community (Mental Health, Substance Abuse, psychological counseling)  · Do not be alone:Call your Safe Contact- someone whom you trust who will be there for you.  · Call your local CRISIS HOTLINE 640-9404 or 090-166-6466  · Call your local Children's Mobile Crisis Response Team [place] (360) 543-8606 or www.1calendar  · Call the toll free National Suicide Prevention Hotlines   · National Suicide Prevention Lifeline 266-917-RSYT (4029)  [place] [facility] Network 800-SUICIDE (559-7390)  Post Angiogram Groin Care Instructions     INSTRUCTIONS  2. Examine (look and feel) the site of your incision site TODAY so you can recognize changes that should be called to your doctor (see below).  3. Avoid straining either by lifting or pulling objects for 4-5 days. Avoid lifting over 5 pounds.   4. For at least 72 hours, if you should sneeze or cough, please hold pressure over your groin area.  5. If you should begin to have oozing from the catheterization site, please hold firm pressure and call your doctor's office immediately.  6. If profuse bleeding occurs from the catheterization site, hold firm pressure and call \"[phone]\" immediately for assistance.  7. Remove bandage after 24 hours.     ACTIVITY  2. Limit activity as instructed by your doctor.  3. No driving or very limited driving with frequent stops for one week.   4. If you must take a long car ride, stop every hour and walk around the car.   5. Warm showers or baths are permitted after the bandage is removed. Avoid hot showers, baths, hot tubs, and swimming for one week.    PLEASE CALL YOUR DOCTOR IF:  1. Temperature elevation " occurs.  2. Catheterization site becomes reddened or begins to drain.   3. Bruising appears to be new or not resolving. The bruise may move down your leg. This is normal.  4. The small round lump in the groin increases in size.  5. Any leg numbness, aching, or discomfort (immediately).  6. Increasing discomfort in the leg at the insertion site.  7. Chest pains, even if relieved by Nitroglycerin.    MISCELLANEOUS INSTRUCTIONS  1. Bruising may occur as a result of heart catheterization. Some of the discoloration may travel down the leg, going from blue to green in color.  2. A small round lump under the catheterization site will remain for up to six weeks.  3. If any questions arise call your physician's office. You can also call the [facility] HOTLINE open 24 hours/day, 7 days/week and speak to a nurse at [phone], or toll free at [phone].   4. You should call 911 if you develop problems with breathing or chest pain.    FOR PROBLEMS CALL LARRY Crawford AT: [phone]    I acknowledge receipt and understanding of these Home Care instructions.  ·

## 2019-08-01 NOTE — OP REPORT
DATE OF SERVICE:  08/01/2019    PREOPERATIVE DIAGNOSIS:  Left leg rest pain.    POSTPROCEDURE DIAGNOSIS:  Left leg rest pain.    PROCEDURES:  1.  Ultrasound-guided right femoral artery access.  2.  Pelvic angiogram.  3.  Bilateral lower extremity angiogram.    SURGEON:  Candelaria Crawford MD    ANESTHESIA:  Conscious sedation    INDICATIONS:  The patient presents with left foot rest pain.  Noninvasive   studies indicate extensive peripheral arterial disease.  He is taken to the   angio suite for identification and treatment of peripheral arterial disease.    He has known renal insufficiency and therefore, contrast administration is a   concern.  However, today, his creatinine is normal.  Risks and benefits   including bleeding, infection, access site complications were all explained to   the patient.  He understands and agrees to proceed.    DESCRIPTION OF PROCEDURE:  Patient was taken to the angio suite, placed in   supine position.  After adequate sedation, the groins were prepped and draped   in usual sterile fashion with Chloraseptic prep, cloth towels and paper   drapes.  The right femoral artery was identified as a highly calcified vessel.    I administered local anesthesia over the right femoral artery and attempted   access with a single puncture technique using a 19-gauge needle.  The vessel   was too calcified to perforate and I then used a micropuncture needle and had   to change to a stiffened micropuncture sheath in order to obtain access.  I   attempted to exchange this for a 4-Kosovan sheath with the standard guidewire,   but this was unsuccessful and we had to use a magic torque wire.  I eventually   gained access and using the stiff wire, I exchanged for an Omniflush   catheter.  This was placed in the infrarenal aorta and a pelvic angiogram was   performed.  I then used a Glidewire to gain access to the left iliac system   with some difficulty.  I used the Glidewire to place the tip of the Omniflush    catheter in the left external iliac artery and performed a left leg angiogram   from there.  When images were sufficient, and I confirmed no percutaneous   treatment could be performed.  I removed the Omniflush catheter and performed   a right leg angiogram through the right femoral sheath.  Good images were   obtained and the sheath was removed.  Pressure was held for hemostasis.    FINDINGS:  Pelvic angiogram demonstrated no evidence of distal aortic, common   iliac, internal or external iliac artery disease.  The left external iliac   artery was patent and large collaterals from the circumflex vessels provided   flow to the superficial femoral and profunda femoral arteries.  The left   common femoral artery is completely occluded.  The superficial femoral and   profunda femoral arteries are both patent at their takeoff with retrograde   flow providing perfusion to both vessels.  The superficial femoral artery   occludes at the adductor canal with a large collateral providing flow to the   tibial trifurcation.  The entire popliteal artery is occluded from just above   the adductor canal to the tibial trifurcation.    On the left, there is a significant femoral plaque with what appears to be a   stenosis of the profunda femoris artery.  Similar to the left lower extremity,   the superficial femoral artery occludes at or just above the adductor canal.    The popliteal artery is occluded to just above the knee joint where it   reconstitutes by extensive collaterals.  The tibial trifurcation is patent   with what appears to be 2-vessel runoff to the ankle.  The right peroneal   artery does not given substantial perfusion to the right foot.    The patient received 7.4 minutes of fluoroscopy with the radiation dose of   438.51 mGy and 40 mL of Visipaque 270 contrast.       ____________________________________     MD EDY Ibrahim / LUDMILA    DD:  08/01/2019 10:08:11  DT:  08/01/2019 12:58:25    D#:  [id number]   Job#:  [id number]

## 2019-08-01 NOTE — OR SURGEON
Immediate Post OP Note    PreOp Diagnosis: Left leg rest pain    PostOp Diagnosis: Same    Procedure(s):ULTRASOUND GUIDED RIGHT FEMORAL ARTERY ACCESS  PELVIC ANGIOGRAM   BILATERAL LOWER EXTREMITY RUNOFF    Surgeon(s):LIVIA Crawford MD    Anesthesiologist/Type of Anesthesia:Nemo Damico RN:  Conscious sedation    Estimated Blood Loss: minimal    Findings: see dictation    Complications: none    7.4min flouro time  438.51mGy  40cc Visipaque  [id number]    8/1/2019 9:55 AM Candelaria Crawford M.D.

## 2019-08-13 ENCOUNTER — OFFICE VISIT (OUTPATIENT)
Dept: MEDICAL GROUP | Facility: MEDICAL CENTER | Age: 69
End: 2019-08-13
Payer: MEDICARE

## 2019-08-13 VITALS
TEMPERATURE: 97.2 F | HEIGHT: 69 IN | RESPIRATION RATE: 16 BRPM | BODY MASS INDEX: 36.43 KG/M2 | WEIGHT: 246 LBS | OXYGEN SATURATION: 96 % | DIASTOLIC BLOOD PRESSURE: 78 MMHG | HEART RATE: 85 BPM | SYSTOLIC BLOOD PRESSURE: 142 MMHG

## 2019-08-13 DIAGNOSIS — H53.9 VISION CHANGES: ICD-10-CM

## 2019-08-13 DIAGNOSIS — E78.5 DYSLIPIDEMIA: ICD-10-CM

## 2019-08-13 DIAGNOSIS — I73.9 PERIPHERAL ARTERY DISEASE (HCC): Chronic | ICD-10-CM

## 2019-08-13 DIAGNOSIS — Z95.5 PRESENCE OF STENT IN LAD CORONARY ARTERY: ICD-10-CM

## 2019-08-13 DIAGNOSIS — R59.1 LYMPHADENOPATHY: ICD-10-CM

## 2019-08-13 DIAGNOSIS — Z12.11 SCREEN FOR COLON CANCER: ICD-10-CM

## 2019-08-13 DIAGNOSIS — I87.2 VENOUS INSUFFICIENCY OF BOTH LOWER EXTREMITIES: Chronic | ICD-10-CM

## 2019-08-13 DIAGNOSIS — E66.9 OBESITY (BMI 35.0-39.9 WITHOUT COMORBIDITY): ICD-10-CM

## 2019-08-13 PROCEDURE — 99214 OFFICE O/P EST MOD 30 MIN: CPT | Performed by: NURSE PRACTITIONER

## 2019-08-13 ASSESSMENT — ENCOUNTER SYMPTOMS
GENERAL WELL-BEING: GOOD
BLURRED VISION: 1

## 2019-08-13 ASSESSMENT — ACTIVITIES OF DAILY LIVING (ADL): BATHING_REQUIRES_ASSISTANCE: 0

## 2019-08-13 ASSESSMENT — PATIENT HEALTH QUESTIONNAIRE - PHQ9: CLINICAL INTERPRETATION OF PHQ2 SCORE: 0

## 2019-08-13 NOTE — PROGRESS NOTES
Subjective:      Polo Pyle is a 69 y.o. male who presents with Establish Care        CC: Patient is here today to establish care for a number of issues including peripheral arterial disease, dyslipidemia and venous insufficiency as well as requesting referral to ophthalmology    HPI     1. Vision changes  Patient reports that his previous ophthalmologist was a Dr. Georges but the practice is no longer open so he needs referral to ophthalmologist.    2. Peripheral artery disease (HCC)  Patient apparently has occlusion of the left common femoral artery and therefore will be going for a femoral endarterectomy next month.  He was on Xarelto at one time but states it was because of a DVT in the medicine was started by his PCP but he stopped the medicine after a few months because he was told he would not need it any longer.  He did not check with his vascular surgeon before stopping the medicine.    3. Venous insufficiency of both lower extremities  Patient currently on lovastatin, fish oil and niacin.    4. Lymphadenopathy  On a recent CT a of the abdomen pelvis he was noted to have mildly enlarged right external iliac and inguinal lymph nodes which were indeterminate so patient states he does have a biopsy pending.    5. Dyslipidemia  Patient on a statin and niacin.  When questioned about the niacin since it is not used as much anymore, he states it was prescribed by his cardiologist.  He apparently did have some problems with pravastatin in the past and that is why he is on lovastatin.    6. Obesity (BMI 35.0-39.9 without comorbidity)  Patient morbidly obese.    7. Presence of stent in LAD coronary artery  Patient on a statin and follows with cardiology.    8. Screen for colon cancer  Patient reports that he was advised by his PCP to have a colonoscopy not only because he he has not had regular screening but apparently he had a positive stool test but he refused any further follow-up on this.  Past Medical  History:   Diagnosis Date   • ASTHMA     as a youth   • CAD (coronary artery disease) 2011   • CATARACT    • Dental disorder     upper and partial lower   • Essential hypertension    • Hyperlipidemia 2011   • Indigestion    • Myocardial infarct (HCC)     STENTS   • Pain 11    NECK 7.5/10   • Personal history of venous thrombosis and embolism    • Pneumonia    • Presence of stent in left circumflex coronary artery    • Unspecified hemorrhagic conditions     TAKES ASA . PROLONGED BLEEDING   • Venous insufficiency of both lower extremities    • White coat syndrome with diagnosis of hypertension      Social History     Socioeconomic History   • Marital status:      Spouse name: Not on file   • Number of children: Not on file   • Years of education: Not on file   • Highest education level: Not on file   Occupational History   • Not on file   Social Needs   • Financial resource strain: Not on file   • Food insecurity:     Worry: Not on file     Inability: Not on file   • Transportation needs:     Medical: Not on file     Non-medical: Not on file   Tobacco Use   • Smoking status: Former Smoker     Years: 30.00     Types: Cigars     Last attempt to quit: 2011     Years since quittin.7   • Smokeless tobacco: Never Used   • Tobacco comment:    Substance and Sexual Activity   • Alcohol use: No   • Drug use: No   • Sexual activity: Not Currently     Partners: Female   Lifestyle   • Physical activity:     Days per week: Not on file     Minutes per session: Not on file   • Stress: Not on file   Relationships   • Social connections:     Talks on phone: Not on file     Gets together: Not on file     Attends Rastafari service: Not on file     Active member of club or organization: Not on file     Attends meetings of clubs or organizations: Not on file     Relationship status: Not on file   • Intimate partner violence:     Fear of current or ex partner: Not on file     Emotionally  "abused: Not on file     Physically abused: Not on file     Forced sexual activity: Not on file   Other Topics Concern   • Not on file   Social History Narrative   • Not on file     Current Outpatient Medications   Medication Sig Dispense Refill   • lovastatin (MEVACOR) 10 MG tablet TAKE ONE TABLET BY MOUTH EVERY DAY 90 Tab 2   • niacin (NIASPAN) 1000 MG CR tablet Take 2 tablets by mouth daily 180 Tab 2   • Omega-3 Fatty Acids (FISH OIL) 1000 MG Cap capsule Take 1,000 mg by mouth 3 times a day, with meals.     • Ascorbic Acid (VITAMIN C PO) Take 6,000 mg by mouth every day.     • Multiple Vitamin (MULTIVITAMIN PO) Take  by mouth every day.     • aspirin 81 MG tablet Take 162 mg by mouth every day.     • Coenzyme Q10 (COQ10) 200 MG CAPS Take 300 mg by mouth every day.     • Rivaroxaban (XARELTO PO) Take  by mouth.     • acetaminophen (TYLENOL) 325 MG Tab Take 650 mg by mouth every four hours as needed.       No current facility-administered medications for this visit.      Family History   Problem Relation Age of Onset   • Heart Attack Mother    • Heart Disease Mother          Review of Systems   Eyes: Positive for blurred vision.   All other systems reviewed and are negative.         Objective:     /78 (BP Location: Right arm, Patient Position: Sitting, BP Cuff Size: Adult)   Pulse 85   Temp 36.2 °C (97.2 °F) (Temporal)   Resp 16   Ht 1.753 m (5' 9\")   Wt 111.6 kg (246 lb)   SpO2 96%   BMI 36.33 kg/m²      Physical Exam   Constitutional: He is oriented to person, place, and time. He appears well-developed and well-nourished. No distress.   HENT:   Head: Normocephalic and atraumatic.   Right Ear: External ear normal.   Left Ear: External ear normal.   Nose: Nose normal.   Mouth/Throat: Oropharynx is clear and moist.   Eyes: Conjunctivae are normal. Right eye exhibits no discharge. Left eye exhibits no discharge.   Neck: Normal range of motion. Neck supple. No tracheal deviation present. No thyromegaly " present.   Cardiovascular: Normal rate, regular rhythm and normal heart sounds.   No murmur heard.  Lower extremity edema   Pulmonary/Chest: Effort normal and breath sounds normal. No respiratory distress. He has no wheezes. He has no rales.   Lymphadenopathy:     He has no cervical adenopathy.   Neurological: He is alert and oriented to person, place, and time. Coordination normal.   Skin: Skin is warm and dry. No rash noted. He is not diaphoretic. No erythema.   Psychiatric: He has a normal mood and affect. His behavior is normal. Judgment and thought content normal.   Nursing note and vitals reviewed.              Assessment/Plan:     1. Vision changes  Patient will be referred to a new ophthalmologist because his previous ophthalmologist has apparently left the area.  - REFERRAL TO OPHTHALMOLOGY    2. Peripheral artery disease (HCC)  Patient follows with Dr. Crawford and has a pending femoral endarterectomy next month.    3. Venous insufficiency of both lower extremities  I advised patient to check with Dr. Crawford regarding the stopping of the Xarelto on his own.  I am not sure why he was on the medication but apparently he reports he had a DVT in the past.    4. Lymphadenopathy  Patient has biopsy pending to determine cause of lymphadenopathy in the external iliac and inguinal lymph node area.    5. Dyslipidemia  Patient currently on medication through cardiology    6. Obesity (BMI 35.0-39.9 without comorbidity)    - Patient identified as having weight management issue.  Appropriate orders and counseling given.    7. Presence of stent in LAD coronary artery  Patient on statin and also taking niacin.    8. Screen for colon cancer  I advised patient that I agree with his previous PCP that he should have routine colonoscopies performed.  I also explained that if he had a positive stool test, he also should follow with gastroenterology.  He left before I can give him a fit kit but I will mail him the request and  have him go to the laboratory to  a new fit test.  - OCCULT BLOOD FECES IMMUNOASSAY; Future

## 2019-08-19 ENCOUNTER — HOSPITAL ENCOUNTER (OUTPATIENT)
Facility: MEDICAL CENTER | Age: 69
End: 2019-08-19
Attending: NURSE PRACTITIONER
Payer: MEDICARE

## 2019-08-19 PROCEDURE — 82274 ASSAY TEST FOR BLOOD FECAL: CPT

## 2019-08-20 DIAGNOSIS — Z12.11 SCREEN FOR COLON CANCER: ICD-10-CM

## 2019-08-21 LAB — HEMOCCULT STL QL IA: NEGATIVE

## 2019-09-01 DIAGNOSIS — E78.49 OTHER HYPERLIPIDEMIA: ICD-10-CM

## 2019-09-01 DIAGNOSIS — E78.5 HYPERLIPIDEMIA, UNSPECIFIED HYPERLIPIDEMIA TYPE: ICD-10-CM

## 2019-09-03 ENCOUNTER — HOSPITAL ENCOUNTER (OUTPATIENT)
Dept: RADIOLOGY | Facility: MEDICAL CENTER | Age: 69
End: 2019-09-03
Attending: SURGERY
Payer: MEDICARE

## 2019-09-03 DIAGNOSIS — R59.0 INGUINAL LYMPHADENOPATHY: ICD-10-CM

## 2019-09-03 LAB — PATHOLOGY CONSULT NOTE: NORMAL

## 2019-09-03 PROCEDURE — 88342 IMHCHEM/IMCYTCHM 1ST ANTB: CPT

## 2019-09-03 PROCEDURE — 88341 IMHCHEM/IMCYTCHM EA ADD ANTB: CPT

## 2019-09-03 PROCEDURE — 76942 ECHO GUIDE FOR BIOPSY: CPT

## 2019-09-03 PROCEDURE — 88305 TISSUE EXAM BY PATHOLOGIST: CPT

## 2019-09-03 RX ORDER — LOVASTATIN 10 MG/1
TABLET ORAL
Qty: 100 TAB | Refills: 2 | Status: ON HOLD | OUTPATIENT
Start: 2019-09-03 | End: 2019-09-20

## 2019-09-03 RX ORDER — NIACIN 1000 MG/1
TABLET, EXTENDED RELEASE ORAL
Qty: 200 TAB | Refills: 2 | Status: ON HOLD | OUTPATIENT
Start: 2019-09-03 | End: 2019-09-20

## 2019-09-03 NOTE — PROGRESS NOTES
US guided Right Inguinal Core Biopsy done by Dr. Paige; 1 jar of Formalin with 5 cores and 1 vial of RPMI with cores x 3 obtained and sent to pathology lab; pt tolerated the procedure well; all appropriate patient education provided for procedure, pt verbalizes understanding all questions and concerns answered prior to being d/c; pt d/c home    Bedside Nurse - JOSE LUIS Starkey

## 2019-09-09 ENCOUNTER — HOSPITAL ENCOUNTER (OUTPATIENT)
Dept: RADIOLOGY | Facility: MEDICAL CENTER | Age: 69
DRG: 253 | End: 2019-09-09
Attending: SURGERY | Admitting: SURGERY
Payer: MEDICARE

## 2019-09-09 DIAGNOSIS — Z01.812 PRE-OPERATIVE LABORATORY EXAMINATION: ICD-10-CM

## 2019-09-09 DIAGNOSIS — Z01.811 PRE-OPERATIVE RESPIRATORY EXAMINATION: ICD-10-CM

## 2019-09-09 DIAGNOSIS — Z01.810 PRE-OPERATIVE CARDIOVASCULAR EXAMINATION: ICD-10-CM

## 2019-09-09 LAB
ANION GAP SERPL CALC-SCNC: 8 MMOL/L (ref 0–11.9)
ANISOCYTOSIS BLD QL SMEAR: ABNORMAL
BASOPHILS # BLD AUTO: 0.9 % (ref 0–1.8)
BASOPHILS # BLD: 0.07 K/UL (ref 0–0.12)
BUN SERPL-MCNC: 20 MG/DL (ref 8–22)
CALCIUM SERPL-MCNC: 9.7 MG/DL (ref 8.5–10.5)
CHLORIDE SERPL-SCNC: 106 MMOL/L (ref 96–112)
CO2 SERPL-SCNC: 25 MMOL/L (ref 20–33)
COMMENT 1642: NORMAL
CREAT SERPL-MCNC: 1.12 MG/DL (ref 0.5–1.4)
EKG IMPRESSION: NORMAL
EOSINOPHIL # BLD AUTO: 0.48 K/UL (ref 0–0.51)
EOSINOPHIL NFR BLD: 6.3 % (ref 0–6.9)
ERYTHROCYTE [DISTWIDTH] IN BLOOD BY AUTOMATED COUNT: 52.4 FL (ref 35.9–50)
GLUCOSE SERPL-MCNC: 95 MG/DL (ref 65–99)
HCT VFR BLD AUTO: 36.5 % (ref 42–52)
HGB BLD-MCNC: 10.4 G/DL (ref 14–18)
IMM GRANULOCYTES # BLD AUTO: 0.03 K/UL (ref 0–0.11)
IMM GRANULOCYTES NFR BLD AUTO: 0.4 % (ref 0–0.9)
LYMPHOCYTES # BLD AUTO: 1.78 K/UL (ref 1–4.8)
LYMPHOCYTES NFR BLD: 23.4 % (ref 22–41)
MCH RBC QN AUTO: 23.2 PG (ref 27–33)
MCHC RBC AUTO-ENTMCNC: 28.5 G/DL (ref 33.7–35.3)
MCV RBC AUTO: 81.3 FL (ref 81.4–97.8)
MICROCYTES BLD QL SMEAR: ABNORMAL
MONOCYTES # BLD AUTO: 0.73 K/UL (ref 0–0.85)
MONOCYTES NFR BLD AUTO: 9.6 % (ref 0–13.4)
MORPHOLOGY BLD-IMP: NORMAL
NEUTROPHILS # BLD AUTO: 4.53 K/UL (ref 1.82–7.42)
NEUTROPHILS NFR BLD: 59.4 % (ref 44–72)
NRBC # BLD AUTO: 0 K/UL
NRBC BLD-RTO: 0 /100 WBC
PLATELET # BLD AUTO: 188 K/UL (ref 164–446)
PLATELET BLD QL SMEAR: NORMAL
PMV BLD AUTO: 10.4 FL (ref 9–12.9)
POIKILOCYTOSIS BLD QL SMEAR: NORMAL
POTASSIUM SERPL-SCNC: 4.3 MMOL/L (ref 3.6–5.5)
RBC # BLD AUTO: 4.49 M/UL (ref 4.7–6.1)
RBC BLD AUTO: PRESENT
SODIUM SERPL-SCNC: 139 MMOL/L (ref 135–145)
WBC # BLD AUTO: 7.6 K/UL (ref 4.8–10.8)

## 2019-09-09 PROCEDURE — 36415 COLL VENOUS BLD VENIPUNCTURE: CPT

## 2019-09-09 PROCEDURE — 85025 COMPLETE CBC W/AUTO DIFF WBC: CPT

## 2019-09-09 PROCEDURE — 93010 ELECTROCARDIOGRAM REPORT: CPT | Performed by: INTERNAL MEDICINE

## 2019-09-09 PROCEDURE — 80048 BASIC METABOLIC PNL TOTAL CA: CPT

## 2019-09-09 PROCEDURE — 93005 ELECTROCARDIOGRAM TRACING: CPT | Performed by: SURGERY

## 2019-09-09 PROCEDURE — 71045 X-RAY EXAM CHEST 1 VIEW: CPT

## 2019-09-18 ENCOUNTER — HOSPITAL ENCOUNTER (OUTPATIENT)
Dept: CARDIOLOGY | Facility: MEDICAL CENTER | Age: 69
DRG: 253 | End: 2019-09-18
Attending: SURGERY
Payer: MEDICARE

## 2019-09-18 DIAGNOSIS — Z01.810 PRE-OPERATIVE CARDIOVASCULAR EXAMINATION: ICD-10-CM

## 2019-09-18 LAB
LV EJECT FRACT  99904: 55
LV EJECT FRACT MOD 2C 99903: 55
LV EJECT FRACT MOD 4C 99902: 52.56
LV EJECT FRACT MOD BP 99901: 55

## 2019-09-18 PROCEDURE — 93306 TTE W/DOPPLER COMPLETE: CPT

## 2019-09-18 PROCEDURE — 93306 TTE W/DOPPLER COMPLETE: CPT | Mod: 26 | Performed by: INTERNAL MEDICINE

## 2019-09-20 ENCOUNTER — HOSPITAL ENCOUNTER (INPATIENT)
Facility: MEDICAL CENTER | Age: 69
LOS: 2 days | DRG: 253 | End: 2019-09-22
Attending: SURGERY | Admitting: SURGERY
Payer: MEDICARE

## 2019-09-20 ENCOUNTER — ANESTHESIA EVENT (OUTPATIENT)
Dept: SURGERY | Facility: MEDICAL CENTER | Age: 69
DRG: 253 | End: 2019-09-20
Payer: MEDICARE

## 2019-09-20 ENCOUNTER — ANESTHESIA (OUTPATIENT)
Dept: SURGERY | Facility: MEDICAL CENTER | Age: 69
DRG: 253 | End: 2019-09-20
Payer: MEDICARE

## 2019-09-20 PROCEDURE — 160035 HCHG PACU - 1ST 60 MINS PHASE I: Performed by: SURGERY

## 2019-09-20 PROCEDURE — 04UL07Z SUPPLEMENT LEFT FEMORAL ARTERY WITH AUTOLOGOUS TISSUE SUBSTITUTE, OPEN APPROACH: ICD-10-PCS | Performed by: SURGERY

## 2019-09-20 PROCEDURE — C1725 CATH, TRANSLUMIN NON-LASER: HCPCS | Performed by: SURGERY

## 2019-09-20 PROCEDURE — 94760 N-INVAS EAR/PLS OXIMETRY 1: CPT

## 2019-09-20 PROCEDURE — 160009 HCHG ANES TIME/MIN: Performed by: SURGERY

## 2019-09-20 PROCEDURE — 160041 HCHG SURGERY MINUTES - EA ADDL 1 MIN LEVEL 4: Performed by: SURGERY

## 2019-09-20 PROCEDURE — 501837 HCHG SUTURE CV: Performed by: SURGERY

## 2019-09-20 PROCEDURE — 700105 HCHG RX REV CODE 258: Performed by: SURGERY

## 2019-09-20 PROCEDURE — 500389 HCHG DRAIN, RESERVOIR SUCT JP 100CC: Performed by: SURGERY

## 2019-09-20 PROCEDURE — 501445 HCHG STAPLER, SKIN DISP: Performed by: SURGERY

## 2019-09-20 PROCEDURE — 160002 HCHG RECOVERY MINUTES (STAT): Performed by: SURGERY

## 2019-09-20 PROCEDURE — 501838 HCHG SUTURE GENERAL: Performed by: SURGERY

## 2019-09-20 PROCEDURE — 700111 HCHG RX REV CODE 636 W/ 250 OVERRIDE (IP): Performed by: ANESTHESIOLOGY

## 2019-09-20 PROCEDURE — 770006 HCHG ROOM/CARE - MED/SURG/GYN SEMI*

## 2019-09-20 PROCEDURE — 160029 HCHG SURGERY MINUTES - 1ST 30 MINS LEVEL 4: Performed by: SURGERY

## 2019-09-20 PROCEDURE — 700101 HCHG RX REV CODE 250: Performed by: ANESTHESIOLOGY

## 2019-09-20 PROCEDURE — 160036 HCHG PACU - EA ADDL 30 MINS PHASE I: Performed by: SURGERY

## 2019-09-20 PROCEDURE — 06BQ0ZZ EXCISION OF LEFT SAPHENOUS VEIN, OPEN APPROACH: ICD-10-PCS | Performed by: SURGERY

## 2019-09-20 PROCEDURE — 700111 HCHG RX REV CODE 636 W/ 250 OVERRIDE (IP)

## 2019-09-20 PROCEDURE — 700102 HCHG RX REV CODE 250 W/ 637 OVERRIDE(OP): Performed by: SURGERY

## 2019-09-20 PROCEDURE — 700101 HCHG RX REV CODE 250: Performed by: SURGERY

## 2019-09-20 PROCEDURE — 04CL0ZZ EXTIRPATION OF MATTER FROM LEFT FEMORAL ARTERY, OPEN APPROACH: ICD-10-PCS | Performed by: SURGERY

## 2019-09-20 PROCEDURE — 700111 HCHG RX REV CODE 636 W/ 250 OVERRIDE (IP): Performed by: SURGERY

## 2019-09-20 PROCEDURE — 160048 HCHG OR STATISTICAL LEVEL 1-5: Performed by: SURGERY

## 2019-09-20 PROCEDURE — A9270 NON-COVERED ITEM OR SERVICE: HCPCS | Performed by: SURGERY

## 2019-09-20 PROCEDURE — 500371 HCHG DRAIN, BLAKE 10MM: Performed by: SURGERY

## 2019-09-20 RX ORDER — LOVASTATIN 20 MG/1
10 TABLET ORAL NIGHTLY
Status: DISCONTINUED | OUTPATIENT
Start: 2019-09-20 | End: 2019-09-22

## 2019-09-20 RX ORDER — BUPIVACAINE HYDROCHLORIDE AND EPINEPHRINE 5; 5 MG/ML; UG/ML
INJECTION, SOLUTION EPIDURAL; INTRACAUDAL; PERINEURAL
Status: DISCONTINUED | OUTPATIENT
Start: 2019-09-20 | End: 2019-09-20 | Stop reason: HOSPADM

## 2019-09-20 RX ORDER — SODIUM CHLORIDE, SODIUM LACTATE, POTASSIUM CHLORIDE, CALCIUM CHLORIDE 600; 310; 30; 20 MG/100ML; MG/100ML; MG/100ML; MG/100ML
INJECTION, SOLUTION INTRAVENOUS CONTINUOUS
Status: ACTIVE | OUTPATIENT
Start: 2019-09-20 | End: 2019-09-20

## 2019-09-20 RX ORDER — HALOPERIDOL 5 MG/ML
1 INJECTION INTRAMUSCULAR
Status: DISCONTINUED | OUTPATIENT
Start: 2019-09-20 | End: 2019-09-22 | Stop reason: HOSPADM

## 2019-09-20 RX ORDER — LABETALOL HYDROCHLORIDE 5 MG/ML
5 INJECTION, SOLUTION INTRAVENOUS
Status: DISCONTINUED | OUTPATIENT
Start: 2019-09-20 | End: 2019-09-22 | Stop reason: HOSPADM

## 2019-09-20 RX ORDER — SPIRONOLACT/HYDROCHLOROTHIAZID 25 MG-25MG
1 TABLET ORAL DAILY
COMMUNITY

## 2019-09-20 RX ORDER — OXYCODONE HYDROCHLORIDE AND ACETAMINOPHEN 5; 325 MG/1; MG/1
2 TABLET ORAL
Status: DISCONTINUED | OUTPATIENT
Start: 2019-09-20 | End: 2019-09-22 | Stop reason: HOSPADM

## 2019-09-20 RX ORDER — HYDROMORPHONE HYDROCHLORIDE 1 MG/ML
0.1 INJECTION, SOLUTION INTRAMUSCULAR; INTRAVENOUS; SUBCUTANEOUS
Status: DISCONTINUED | OUTPATIENT
Start: 2019-09-20 | End: 2019-09-22 | Stop reason: HOSPADM

## 2019-09-20 RX ORDER — M-VIT,TX,IRON,MINS/CALC/FOLIC 27MG-0.4MG
1 TABLET ORAL DAILY
Status: DISCONTINUED | OUTPATIENT
Start: 2019-09-21 | End: 2019-09-22 | Stop reason: HOSPADM

## 2019-09-20 RX ORDER — OXYCODONE HYDROCHLORIDE AND ACETAMINOPHEN 5; 325 MG/1; MG/1
1 TABLET ORAL
Status: DISCONTINUED | OUTPATIENT
Start: 2019-09-20 | End: 2019-09-22 | Stop reason: HOSPADM

## 2019-09-20 RX ORDER — PROTAMINE SULFATE 10 MG/ML
INJECTION, SOLUTION INTRAVENOUS PRN
Status: DISCONTINUED | OUTPATIENT
Start: 2019-09-20 | End: 2019-09-20 | Stop reason: SURG

## 2019-09-20 RX ORDER — SODIUM CHLORIDE, SODIUM LACTATE, POTASSIUM CHLORIDE, CALCIUM CHLORIDE 600; 310; 30; 20 MG/100ML; MG/100ML; MG/100ML; MG/100ML
INJECTION, SOLUTION INTRAVENOUS CONTINUOUS
Status: DISCONTINUED | OUTPATIENT
Start: 2019-09-20 | End: 2019-09-22 | Stop reason: HOSPADM

## 2019-09-20 RX ORDER — DIPHENHYDRAMINE HYDROCHLORIDE 50 MG/ML
25 INJECTION INTRAMUSCULAR; INTRAVENOUS EVERY 6 HOURS PRN
Status: DISCONTINUED | OUTPATIENT
Start: 2019-09-20 | End: 2019-09-22 | Stop reason: HOSPADM

## 2019-09-20 RX ORDER — HYDROMORPHONE HYDROCHLORIDE 1 MG/ML
0.4 INJECTION, SOLUTION INTRAMUSCULAR; INTRAVENOUS; SUBCUTANEOUS
Status: DISCONTINUED | OUTPATIENT
Start: 2019-09-20 | End: 2019-09-22 | Stop reason: HOSPADM

## 2019-09-20 RX ORDER — CELECOXIB 200 MG/1
200 CAPSULE ORAL 2 TIMES DAILY
Status: DISCONTINUED | OUTPATIENT
Start: 2019-09-20 | End: 2019-09-22 | Stop reason: HOSPADM

## 2019-09-20 RX ORDER — GLYCOPYRROLATE 0.2 MG/ML
INJECTION INTRAMUSCULAR; INTRAVENOUS PRN
Status: DISCONTINUED | OUTPATIENT
Start: 2019-09-20 | End: 2019-09-20 | Stop reason: SURG

## 2019-09-20 RX ORDER — ASCORBIC ACID 500 MG
6000 TABLET ORAL DAILY
Status: DISCONTINUED | OUTPATIENT
Start: 2019-09-21 | End: 2019-09-22 | Stop reason: HOSPADM

## 2019-09-20 RX ORDER — LOVASTATIN 10 MG/1
10 TABLET ORAL NIGHTLY
Status: ON HOLD | COMMUNITY
End: 2019-09-22

## 2019-09-20 RX ORDER — DEXAMETHASONE SODIUM PHOSPHATE 4 MG/ML
INJECTION, SOLUTION INTRA-ARTICULAR; INTRALESIONAL; INTRAMUSCULAR; INTRAVENOUS; SOFT TISSUE PRN
Status: DISCONTINUED | OUTPATIENT
Start: 2019-09-20 | End: 2019-09-20 | Stop reason: SURG

## 2019-09-20 RX ORDER — CEFAZOLIN SODIUM 1 G/3ML
INJECTION, POWDER, FOR SOLUTION INTRAMUSCULAR; INTRAVENOUS PRN
Status: DISCONTINUED | OUTPATIENT
Start: 2019-09-20 | End: 2019-09-20 | Stop reason: SURG

## 2019-09-20 RX ORDER — MIDAZOLAM HYDROCHLORIDE 1 MG/ML
INJECTION INTRAMUSCULAR; INTRAVENOUS PRN
Status: DISCONTINUED | OUTPATIENT
Start: 2019-09-20 | End: 2019-09-20 | Stop reason: SURG

## 2019-09-20 RX ORDER — HYDRALAZINE HYDROCHLORIDE 20 MG/ML
5 INJECTION INTRAMUSCULAR; INTRAVENOUS
Status: DISCONTINUED | OUTPATIENT
Start: 2019-09-20 | End: 2019-09-22 | Stop reason: HOSPADM

## 2019-09-20 RX ORDER — NIACIN 1000 MG/1
2000 TABLET, EXTENDED RELEASE ORAL NIGHTLY
COMMUNITY
End: 2020-09-15

## 2019-09-20 RX ORDER — ONDANSETRON 2 MG/ML
INJECTION INTRAMUSCULAR; INTRAVENOUS PRN
Status: DISCONTINUED | OUTPATIENT
Start: 2019-09-20 | End: 2019-09-20 | Stop reason: SURG

## 2019-09-20 RX ORDER — CHLORAL HYDRATE 500 MG
1000 CAPSULE ORAL DAILY
Status: DISCONTINUED | OUTPATIENT
Start: 2019-09-20 | End: 2019-09-22 | Stop reason: HOSPADM

## 2019-09-20 RX ORDER — DEXTROSE MONOHYDRATE, SODIUM CHLORIDE, AND POTASSIUM CHLORIDE 50; 1.49; 4.5 G/1000ML; G/1000ML; G/1000ML
INJECTION, SOLUTION INTRAVENOUS EVERY 6 HOURS
Status: COMPLETED | OUTPATIENT
Start: 2019-09-20 | End: 2019-09-20

## 2019-09-20 RX ORDER — SCOLOPAMINE TRANSDERMAL SYSTEM 1 MG/1
1 PATCH, EXTENDED RELEASE TRANSDERMAL
Status: DISCONTINUED | OUTPATIENT
Start: 2019-09-20 | End: 2019-09-22 | Stop reason: HOSPADM

## 2019-09-20 RX ORDER — ACETAMINOPHEN 500 MG
1000 TABLET ORAL EVERY 6 HOURS
Status: DISCONTINUED | OUTPATIENT
Start: 2019-09-20 | End: 2019-09-22 | Stop reason: HOSPADM

## 2019-09-20 RX ORDER — DIPHENHYDRAMINE HYDROCHLORIDE 50 MG/ML
12.5 INJECTION INTRAMUSCULAR; INTRAVENOUS
Status: DISCONTINUED | OUTPATIENT
Start: 2019-09-20 | End: 2019-09-22 | Stop reason: HOSPADM

## 2019-09-20 RX ORDER — ONDANSETRON 2 MG/ML
4 INJECTION INTRAMUSCULAR; INTRAVENOUS EVERY 4 HOURS PRN
Status: DISCONTINUED | OUTPATIENT
Start: 2019-09-20 | End: 2019-09-22 | Stop reason: HOSPADM

## 2019-09-20 RX ORDER — HEPARIN SODIUM,PORCINE 1000/ML
VIAL (ML) INJECTION PRN
Status: DISCONTINUED | OUTPATIENT
Start: 2019-09-20 | End: 2019-09-20 | Stop reason: SURG

## 2019-09-20 RX ORDER — METOPROLOL TARTRATE 1 MG/ML
1 INJECTION, SOLUTION INTRAVENOUS
Status: DISCONTINUED | OUTPATIENT
Start: 2019-09-20 | End: 2019-09-22 | Stop reason: HOSPADM

## 2019-09-20 RX ORDER — HYDROMORPHONE HYDROCHLORIDE 1 MG/ML
0.2 INJECTION, SOLUTION INTRAMUSCULAR; INTRAVENOUS; SUBCUTANEOUS
Status: DISCONTINUED | OUTPATIENT
Start: 2019-09-20 | End: 2019-09-22 | Stop reason: HOSPADM

## 2019-09-20 RX ORDER — LIDOCAINE HYDROCHLORIDE 10 MG/ML
INJECTION, SOLUTION EPIDURAL; INFILTRATION; INTRACAUDAL; PERINEURAL
Status: COMPLETED
Start: 2019-09-20 | End: 2019-09-20

## 2019-09-20 RX ORDER — ASPIRIN 81 MG/1
81 TABLET, CHEWABLE ORAL DAILY
Status: DISCONTINUED | OUTPATIENT
Start: 2019-09-20 | End: 2019-09-22 | Stop reason: HOSPADM

## 2019-09-20 RX ADMIN — PROTAMINE SULFATE 10 MG: 10 INJECTION, SOLUTION INTRAVENOUS at 10:17

## 2019-09-20 RX ADMIN — PROTAMINE SULFATE 10 MG: 10 INJECTION, SOLUTION INTRAVENOUS at 10:20

## 2019-09-20 RX ADMIN — MIDAZOLAM 1 MG: 1 INJECTION INTRAMUSCULAR; INTRAVENOUS at 07:39

## 2019-09-20 RX ADMIN — FENTANYL CITRATE 25 MCG: 50 INJECTION, SOLUTION INTRAMUSCULAR; INTRAVENOUS at 09:07

## 2019-09-20 RX ADMIN — OMEGA-3 FATTY ACIDS CAP 1000 MG 1000 MG: 1000 CAP at 15:27

## 2019-09-20 RX ADMIN — CELECOXIB 200 MG: 200 CAPSULE ORAL at 17:01

## 2019-09-20 RX ADMIN — CEFAZOLIN 2 G: 330 INJECTION, POWDER, FOR SOLUTION INTRAMUSCULAR; INTRAVENOUS at 07:39

## 2019-09-20 RX ADMIN — FENTANYL CITRATE 25 MCG: 50 INJECTION, SOLUTION INTRAMUSCULAR; INTRAVENOUS at 10:15

## 2019-09-20 RX ADMIN — ASPIRIN 81 MG 81 MG: 81 TABLET ORAL at 15:27

## 2019-09-20 RX ADMIN — FENTANYL CITRATE 25 MCG: 50 INJECTION, SOLUTION INTRAMUSCULAR; INTRAVENOUS at 08:37

## 2019-09-20 RX ADMIN — FENTANYL CITRATE 25 MCG: 50 INJECTION, SOLUTION INTRAMUSCULAR; INTRAVENOUS at 09:17

## 2019-09-20 RX ADMIN — ONDANSETRON 4 MG: 2 INJECTION INTRAMUSCULAR; INTRAVENOUS at 09:54

## 2019-09-20 RX ADMIN — DEXAMETHASONE SODIUM PHOSPHATE 4 MG: 4 INJECTION, SOLUTION INTRA-ARTICULAR; INTRALESIONAL; INTRAMUSCULAR; INTRAVENOUS; SOFT TISSUE at 07:42

## 2019-09-20 RX ADMIN — Medication 0.5 ML: at 07:06

## 2019-09-20 RX ADMIN — FENTANYL CITRATE 100 MCG: 50 INJECTION, SOLUTION INTRAMUSCULAR; INTRAVENOUS at 07:45

## 2019-09-20 RX ADMIN — PROTAMINE SULFATE 10 MG: 10 INJECTION, SOLUTION INTRAVENOUS at 10:14

## 2019-09-20 RX ADMIN — PROPOFOL 150 MG: 10 INJECTION, EMULSION INTRAVENOUS at 07:45

## 2019-09-20 RX ADMIN — PROTAMINE SULFATE 10 MG: 10 INJECTION, SOLUTION INTRAVENOUS at 10:29

## 2019-09-20 RX ADMIN — PROTAMINE SULFATE 10 MG: 10 INJECTION, SOLUTION INTRAVENOUS at 10:26

## 2019-09-20 RX ADMIN — LOVASTATIN 10 MG: 20 TABLET ORAL at 21:10

## 2019-09-20 RX ADMIN — GLYCOPYRROLATE 0.1 MG: 0.2 INJECTION INTRAMUSCULAR; INTRAVENOUS at 08:27

## 2019-09-20 RX ADMIN — LIDOCAINE HYDROCHLORIDE 0.5 ML: 10 INJECTION, SOLUTION EPIDURAL; INFILTRATION; INTRACAUDAL at 07:06

## 2019-09-20 RX ADMIN — HEPARIN SODIUM 1000 UNITS: 1000 INJECTION, SOLUTION INTRAVENOUS; SUBCUTANEOUS at 09:38

## 2019-09-20 RX ADMIN — HEPARIN SODIUM 8000 UNITS: 1000 INJECTION, SOLUTION INTRAVENOUS; SUBCUTANEOUS at 08:59

## 2019-09-20 RX ADMIN — FENTANYL CITRATE 25 MCG: 50 INJECTION, SOLUTION INTRAMUSCULAR; INTRAVENOUS at 08:28

## 2019-09-20 RX ADMIN — GLYCOPYRROLATE 0.1 MG: 0.2 INJECTION INTRAMUSCULAR; INTRAVENOUS at 08:15

## 2019-09-20 RX ADMIN — SODIUM CHLORIDE, POTASSIUM CHLORIDE, SODIUM LACTATE AND CALCIUM CHLORIDE: 600; 310; 30; 20 INJECTION, SOLUTION INTRAVENOUS at 07:05

## 2019-09-20 RX ADMIN — FENTANYL CITRATE 50 MCG: 50 INJECTION, SOLUTION INTRAMUSCULAR; INTRAVENOUS at 08:00

## 2019-09-20 RX ADMIN — FENTANYL CITRATE 25 MCG: 50 INJECTION, SOLUTION INTRAMUSCULAR; INTRAVENOUS at 09:52

## 2019-09-20 ASSESSMENT — COGNITIVE AND FUNCTIONAL STATUS - GENERAL
DAILY ACTIVITIY SCORE: 24
MOBILITY SCORE: 24
SUGGESTED CMS G CODE MODIFIER MOBILITY: CH
SUGGESTED CMS G CODE MODIFIER DAILY ACTIVITY: CH

## 2019-09-20 ASSESSMENT — COPD QUESTIONNAIRES
IN THE PAST 12 MONTHS DO YOU DO LESS THAN YOU USED TO BECAUSE OF YOUR BREATHING PROBLEMS: DISAGREE/UNSURE
DO YOU EVER COUGH UP ANY MUCUS OR PHLEGM?: NO/ONLY WITH OCCASIONAL COLDS OR INFECTIONS
COPD SCREENING SCORE: 4
DURING THE PAST 4 WEEKS HOW MUCH DID YOU FEEL SHORT OF BREATH: NONE/LITTLE OF THE TIME
HAVE YOU SMOKED AT LEAST 100 CIGARETTES IN YOUR ENTIRE LIFE: YES
DURING THE PAST 4 WEEKS HOW MUCH DID YOU FEEL SHORT OF BREATH: NONE/LITTLE OF THE TIME
HAVE YOU SMOKED AT LEAST 100 CIGARETTES IN YOUR ENTIRE LIFE: YES
DO YOU EVER COUGH UP ANY MUCUS OR PHLEGM?: NO/ONLY WITH OCCASIONAL COLDS OR INFECTIONS
COPD SCREENING SCORE: 4

## 2019-09-20 ASSESSMENT — LIFESTYLE VARIABLES
CONSUMPTION TOTAL: NEGATIVE
EVER FELT BAD OR GUILTY ABOUT YOUR DRINKING: NO
EVER HAD A DRINK FIRST THING IN THE MORNING TO STEADY YOUR NERVES TO GET RID OF A HANGOVER: NO
ON A TYPICAL DAY WHEN YOU DRINK ALCOHOL HOW MANY DRINKS DO YOU HAVE: 0
ALCOHOL_USE: NO
TOTAL SCORE: 0
EVER_SMOKED: YES
TOTAL SCORE: 0
AVERAGE NUMBER OF DAYS PER WEEK YOU HAVE A DRINK CONTAINING ALCOHOL: 0
TOTAL SCORE: 0
HAVE PEOPLE ANNOYED YOU BY CRITICIZING YOUR DRINKING: NO
HAVE YOU EVER FELT YOU SHOULD CUT DOWN ON YOUR DRINKING: NO
HOW MANY TIMES IN THE PAST YEAR HAVE YOU HAD 5 OR MORE DRINKS IN A DAY: 0
DOES PATIENT WANT TO STOP DRINKING: NO
EVER_SMOKED: YES

## 2019-09-20 ASSESSMENT — PAIN SCALES - GENERAL: PAIN_LEVEL: 0

## 2019-09-20 ASSESSMENT — PATIENT HEALTH QUESTIONNAIRE - PHQ9
SUM OF ALL RESPONSES TO PHQ9 QUESTIONS 1 AND 2: 0
1. LITTLE INTEREST OR PLEASURE IN DOING THINGS: NOT AT ALL
2. FEELING DOWN, DEPRESSED, IRRITABLE, OR HOPELESS: NOT AT ALL

## 2019-09-20 NOTE — ANESTHESIA TIME REPORT
Anesthesia Start and Stop Event Times     Date Time Event    9/20/2019 0722 Ready for Procedure     0739 Anesthesia Start     1108 Anesthesia Stop        Responsible Staff  09/20/19    Name Role Begin End    Roni Chen D.O. Anesth 0739 1108        Preop Diagnosis (Free Text):  Pre-op Diagnosis     ATHEROSCLEROSIS OF NATIVE ATRETERIS OF EXTREMITIES        Preop Diagnosis (Codes):    Post op Diagnosis  Femoral-popliteal artery atherosclerosis (HCC)      Premium Reason  Non-Premium    Comments:

## 2019-09-20 NOTE — ANESTHESIA QCDR
2019 [facility] Clinical Data Registry (for Quality Improvement)     Postoperative nausea/vomiting risk protocol (Adult = 18 yrs and Pediatric 3-17 yrs)- (430 and 463)  General inhalation anesthetic (NOT TIVA) with PONV risk factors: Yes  Provision of anti-emetic therapy with at least 2 different classes of agents: Yes   Patient DID NOT receive anti-emetic therapy and reason is documented in Medical Record:  N/A    Multimodal Pain Management- (AQI59)  Patient undergoing Elective Surgery (i.e. Outpatient, or ASC, or Prescheduled Surgery prior to Hospital Admission): Yes  Use of Multimodal Pain Management, two or more drugs and/or interventions, NOT including systemic opioids: Yes   Exception: Documented allergy to multiple classes of analgesics:  N/A    PACU assessment of acute postoperative pain prior to Anesthesia Care End- Applies to Patients Age = 18- (ABG7)  Initial PACU pain score is which of the following: < 7/10  Patient unable to report pain score: N/A    Post-anesthetic transfer of care checklist/protocol to PACU/ICU- (426 and 427)  Upon conclusion of case, patient transferred to which of the following locations: PACU/Non-ICU  Use of transfer checklist/protocol: Yes  Exclusion: Service Performed in Patient Hospital Room (and thus did not require transfer): N/A    PACU Reintubation- (AQI31)  General anesthesia requiring endotracheal intubation (ETT) along with subsequent extubation in OR or PACU: No  Required reintubation in the PACU: N/A  Extubation was a planned trial documented in the medical record prior to removal of the original airway device: N/A    Unplanned admission to ICU related to anesthesia service up through end of PACU care- (MD51)  Unplanned admission to ICU (not initially anticipated at anesthesia start time): No

## 2019-09-20 NOTE — OR NURSING
Patient VSS. Denies pain or nausea. Declines meds. Able to eat all lunch.  Report called to floor RN.  Plan to transfer to [facility].  Family updated with patient plan to transfer to floor.  Will meet him there

## 2019-09-20 NOTE — OR SURGEON
OP Note    PreOp Diagnosis: Left foot rest pain    PostOp Diagnosis: Same    Procedure(s):  ENDARTERECTOMY, SUPERFICIAL FEMORAL AND PROFUNDA FEMORAL WITH svg PATCH - Wound Class: Clean    Surgeon(s):  Candelaria Crawford M.D.    Anesthesiologist/Type of Anesthesia:  Anesthesiologist: Roni Chen D.O./General    Surgical Staff:  Circulator: Cisco Downs R.N.  Relief Circulator: Earlene Reyes R.N.  Scrub Person: Rachid Ruffin    Specimens removed if any:Plaque and old thrombus  Sent with patient    Estimated Blood Loss: 150cc    Findings: Calcified plaque and old thrombus extending into profunda and SFA    Complications: none    Description of procedure: Patient was taken to the operating room and placed in a supine position.  General anesthesia was initiated with laryngeal mask airway.  The left lower abdomen and the entire left leg were prepped circumferentially.  Cloth towels and paper drapes were placed.  An Ioban was used to fix the cloth towels in the appropriate position.  Local anesthesia was given at the same time a timeout was called and the proposed procedure confirmed with all team members.      Longitudinal incision was made in the groin and taken down onto the upper thigh.  Dissection was taken down through extensive subcutaneous tissue and the common femoral artery identified.  The circumflex branches were identified and controlled with Vesseloops.  I took the dissection down the superficial femoral artery and to first-order branches on the profunda femoris artery.  All branches were controlled with Vesseloops.    I then dissected medially and identified the saphenous vein.  In order to get a good patch I harvested the proximal saphenous vein but kept the first medial branch in place, ligating the saphenous vein with a silk tie.  The vein was opened and prepared for patch.  Heparin 8,000units were given and supplemented as needed.    I then placed profunda clamps both proximally  and distally and identified another profunda branch.  The artery was opened with an 11 blade followed by Caban scissors.  Dense calcified plaque was removed from the common femoral artery and an eversion endarterectomy performed of the profunda femoris artery removing occlusive plaque.  I then left about 3 cm of common femoral artery intact and performed an eversion endarterectomy of the proximal common femoral artery.  A similar technique was done in the superficial femoral artery removing plaque up to an open area identified looking down the open vessel.  Shards of media were removed to clean the residual vessel and I used 5-0 and 6-0 Prolene suture to tack the plaque in the superficial femoral artery down to prevent dissection.    I then used 6-0 Prolene suture to create the patch angioplasty.  The saphenous vein patch was stretched over the arterial defect and the Prolene repair completed over the common femoral artery.  A couple 6-0 Prolene sutures were required to obtain hemostasis.  The patient's heparin was reversed with 50 mg of protamine.  Hemostasis was easily obtained.  The wound was closed with 3 layers of interrupted 3-0 Vicryl followed by running 3-0 Vicryl and finally, 4-0 Monocryl in a subcuticular closure.  I left a 10 mm ALBERTO drain in the subcutaneous tissue and sutured this in place with 3-0 nylon.  A Mlainda dressing was placed over the left groin and attached to suction.  There were no apparent complications.  The patient was awakened in the operating room.  Sponge needle and instrument counts were correct x2.    9/20/2019 11:56 AM Candelaria Crawford M.D.

## 2019-09-20 NOTE — DISCHARGE PLANNING
Care Transition Team Assessment    Anticipated Discharge Disposition: Home    Action: RN CM assessed pt at bedside and verified demographics on facesheet. Pt lives alone in a single story home in [place]; he has support from his sister Radha, son Samy and daughter in law. He is independent with all his ADLs and IADLs. He has prescription drug coverage and reports no financial barriers to discharge at this time. Pt anticipates discharging home with no needs.     Barriers to Discharge: Medical clearance pending.    Plan: Await medical clearance for discharge home with no needs.      Information Source  Orientation : Oriented x 4  Information Given By: Patient  Who is responsible for making decisions for patient? : Patient    Readmission Evaluation  Is this a readmission?: No    Elopement Risk  Legal Hold: No  Ambulatory or Self Mobile in Wheelchair: No-Not an Elopement Risk    Interdisciplinary Discharge Planning  Does Admitting Nurse Feel This Could be a Complex Discharge?: No  Primary Care Physician: Dr. Mohinder Beebe   Lives with - Patient's Self Care Capacity: Alone and Able to Care For Self  Patient or legal guardian wants to designate a caregiver (see row info): No  Support Systems: Family Member(s)  Housing / Facility: [address]  Do You Take your Prescribed Medications Regularly: Yes  Able to Return to Previous ADL's: Yes  Mobility Issues: No  Prior Services: None  Patient Expects to be Discharged to:: Home   Assistance Needed: No  Durable Medical Equipment: Not Applicable    Discharge Preparedness  What is your plan after discharge?: Home with help  What are your discharge supports?: Sibling, Child  Prior Functional Level: Ambulatory, Drives Self, Independent with Activities of Daily Living, Independent with Medication Management  Difficulity with ADLs: None  Difficulity with IADLs: None    Functional Assesment  Prior Functional Level: Ambulatory, Drives Self, Independent with Activities of Daily Living,  Independent with Medication Management    Finances  Financial Barriers to Discharge: No  Prescription Coverage: Yes    Vision / Hearing Impairment  Vision Impairment : Yes  Right Eye Vision: Impaired, Wears Glasses  Left Eye Vision: Wears Glasses, Impaired  Hearing Impairment : No         Advance Directive  Advance Directive?: None    Domestic Abuse  Have you ever been the victim of abuse or violence?: No  Physical Abuse or Sexual Abuse: No  Verbal Abuse or Emotional Abuse: No  Possible Abuse Reported to:: Not Applicable         Discharge Risks or Barriers  Discharge risks or barriers?: No    Anticipated Discharge Information  Anticipated discharge disposition: Home  Discharge Address: [place] [address]  Discharge Contact Phone Number: [phone]

## 2019-09-20 NOTE — ANESTHESIA POSTPROCEDURE EVALUATION
Patient: Polo Pyle    Procedure Summary     Date:  09/20/19 Room / Location:  [facility] OR 08 / SURGERY [facility]    Anesthesia Start:  0739 Anesthesia Stop:  1108    Procedure:  ENDARTERECTOMY, FEMORAL (Left Leg Upper) Diagnosis:  (ATHEROSCLEROSIS OF NATIVE ATRETERIS OF EXTREMITIES)    Surgeon:  Candelaria Crawford M.D. Responsible Provider:  Roni Chen D.O.    Anesthesia Type:  general ASA Status:  3          Final Anesthesia Type: general  Last vitals  BP   Blood Pressure : 130/89, NIBP: 143/92    Temp   36.4 °C (97.5 °F)    Pulse   Pulse: 91   Resp   (!) 9    SpO2   94 %      Anesthesia Post Evaluation    Patient location during evaluation: PACU  Patient participation: complete - patient participated  Level of consciousness: awake and alert  Pain score: 0    Airway patency: patent  Anesthetic complications: no  Cardiovascular status: hemodynamically stable  Respiratory status: acceptable  Hydration status: euvolemic    PONV: none           Nurse Pain Score: 4 (NPRS)

## 2019-09-20 NOTE — ANESTHESIA PROCEDURE NOTES
Airway  Date/Time: 9/20/2019 7:48 AM  Performed by: Roni Chen D.O.  Authorized by: Roni Chen D.O.     Location:  OR  Urgency:  Elective  Indications for Airway Management:  Anesthesia  Spontaneous Ventilation: absent    Sedation Level:  Deep  Preoxygenated: Yes    Mask Difficulty Assessment:  2 - vent by mask + OA or adjuvant +/- NMBA  Final Airway Type:  Supraglottic airway  Final Supraglottic Airway:  Standard LMA  SGA Size:  5  Cuff Pressure (cm H2O):  30  Number of Attempts at Approach:  1

## 2019-09-20 NOTE — OR NURSING
Patient A+Ox4. Denies pain or nausea.  veronica po well.  Left groin provena dressing intact.  aria x1 also intact with scant amount of bloody drainage.  Pulses by doppler intact on left foot.  Skin warm and dry.  Cap refill immediate.. Plan for patient to admit.  Belongings brought to bedside.  x1 bag which patient identified.  Awaiting room assigment.  Family update

## 2019-09-20 NOTE — ANESTHESIA PREPROCEDURE EVALUATION
Relevant Problems   CARDIAC   (+) Coronary artery disease due to lipid rich plaque   (+) Essential hypertension   (+) Presence of stent in LAD coronary artery   (+) Presence of stent in left circumflex coronary artery   (+) Venous insufficiency of both lower extremities       Physical Exam    Airway   Mallampati: II  TM distance: >3 FB  Neck ROM: full       Cardiovascular - normal exam  Rhythm: regular  Rate: normal  (-) murmur     Dental - normal exam         Pulmonary - normal exam  Breath sounds clear to auscultation     Abdominal    Neurological - normal exam                 Anesthesia Plan    ASA 3       Plan - general       Airway plan will be LMA        Induction: intravenous    Postoperative Plan: Postoperative administration of opioids is intended.    Pertinent diagnostic labs and testing reviewed    Informed Consent:    Anesthetic plan and risks discussed with patient.    Use of blood products discussed with: patient whom consented to blood products.

## 2019-09-21 LAB
ANION GAP SERPL CALC-SCNC: 8 MMOL/L (ref 0–11.9)
BUN SERPL-MCNC: 19 MG/DL (ref 8–22)
CALCIUM SERPL-MCNC: 8.8 MG/DL (ref 8.5–10.5)
CHLORIDE SERPL-SCNC: 105 MMOL/L (ref 96–112)
CO2 SERPL-SCNC: 26 MMOL/L (ref 20–33)
CREAT SERPL-MCNC: 1.17 MG/DL (ref 0.5–1.4)
ERYTHROCYTE [DISTWIDTH] IN BLOOD BY AUTOMATED COUNT: 48.6 FL (ref 35.9–50)
GLUCOSE SERPL-MCNC: 113 MG/DL (ref 65–99)
HCT VFR BLD AUTO: 33.1 % (ref 42–52)
HGB BLD-MCNC: 9.9 G/DL (ref 14–18)
MCH RBC QN AUTO: 24.1 PG (ref 27–33)
MCHC RBC AUTO-ENTMCNC: 29.9 G/DL (ref 33.7–35.3)
MCV RBC AUTO: 80.5 FL (ref 81.4–97.8)
MORPHOLOGY BLD-IMP: NORMAL
PLATELET # BLD AUTO: 194 K/UL (ref 164–446)
PMV BLD AUTO: 9.9 FL (ref 9–12.9)
POTASSIUM SERPL-SCNC: 4.6 MMOL/L (ref 3.6–5.5)
RBC # BLD AUTO: 4.11 M/UL (ref 4.7–6.1)
SODIUM SERPL-SCNC: 139 MMOL/L (ref 135–145)
WBC # BLD AUTO: 9.5 K/UL (ref 4.8–10.8)

## 2019-09-21 PROCEDURE — 80048 BASIC METABOLIC PNL TOTAL CA: CPT

## 2019-09-21 PROCEDURE — 770006 HCHG ROOM/CARE - MED/SURG/GYN SEMI*

## 2019-09-21 PROCEDURE — 36415 COLL VENOUS BLD VENIPUNCTURE: CPT

## 2019-09-21 PROCEDURE — 700102 HCHG RX REV CODE 250 W/ 637 OVERRIDE(OP): Performed by: SURGERY

## 2019-09-21 PROCEDURE — 700111 HCHG RX REV CODE 636 W/ 250 OVERRIDE (IP): Performed by: SURGERY

## 2019-09-21 PROCEDURE — 85027 COMPLETE CBC AUTOMATED: CPT

## 2019-09-21 PROCEDURE — A9270 NON-COVERED ITEM OR SERVICE: HCPCS | Performed by: SURGERY

## 2019-09-21 RX ADMIN — ACETAMINOPHEN 1000 MG: 500 TABLET ORAL at 05:13

## 2019-09-21 RX ADMIN — CELECOXIB 200 MG: 200 CAPSULE ORAL at 05:13

## 2019-09-21 RX ADMIN — CELECOXIB 200 MG: 200 CAPSULE ORAL at 17:32

## 2019-09-21 RX ADMIN — LOVASTATIN 10 MG: 20 TABLET ORAL at 21:13

## 2019-09-21 RX ADMIN — OMEGA-3 FATTY ACIDS CAP 1000 MG 1000 MG: 1000 CAP at 05:13

## 2019-09-21 RX ADMIN — OXYCODONE HYDROCHLORIDE AND ACETAMINOPHEN 6000 MG: 500 TABLET ORAL at 05:14

## 2019-09-21 RX ADMIN — MULTIPLE VITAMINS W/ MINERALS TAB 1 TABLET: TAB at 05:13

## 2019-09-21 RX ADMIN — ASPIRIN 81 MG 81 MG: 81 TABLET ORAL at 05:14

## 2019-09-21 RX ADMIN — ENOXAPARIN SODIUM 40 MG: 100 INJECTION SUBCUTANEOUS at 11:42

## 2019-09-21 NOTE — PROGRESS NOTES
Progress Note  POD #1 s/p L CFA endarterectomy    Doing well today. Ambulating  Pain controlled.    Drain >50cc s/s since OR    Vitals:    09/21/19 0709   BP: 146/92   Pulse: 67   Resp: 18   Temp: 36.4 °C (97.5 °F)   SpO2: 97%     NAD  Suctions dressing in place to L groin  ALBERTO w/ s/s output    Lab Results   Component Value Date/Time    WBC 9.5 09/21/2019 05:54 AM    RBC 4.11 (L) 09/21/2019 05:54 AM    HEMOGLOBIN 9.9 (L) 09/21/2019 05:54 AM    HEMATOCRIT 33.1 (L) 09/21/2019 05:54 AM    MCV 80.5 (L) 09/21/2019 05:54 AM    MCH 24.1 (L) 09/21/2019 05:54 AM    MCHC 29.9 (L) 09/21/2019 05:54 AM    MPV 9.9 09/21/2019 05:54 AM    NEUTSPOLYS 59.40 09/09/2019 02:40 PM    LYMPHOCYTES 23.40 09/09/2019 02:40 PM    MONOCYTES 9.60 09/09/2019 02:40 PM    EOSINOPHILS 6.30 09/09/2019 02:40 PM    BASOPHILS 0.90 09/09/2019 02:40 PM    ANISOCYTOSIS 1+ 09/09/2019 02:40 PM      A/P  69M POD #1 s/p L CFA endarterectomy    Doing well today  Ambulating, veronica PO  Would like to go home but drain output significant over 1st day. Will keep inhouse today. Expect d/c randall.      Max Felton MD  Vascular Surgeon  [facility]  Office: [phone]

## 2019-09-21 NOTE — CARE PLAN
Problem: Pain Management  Goal: Pain level will decrease to patient's comfort goal  Outcome: PROGRESSING AS EXPECTED  Note:   Denies pain at this time, will continue to monitor     Problem: Safety  Goal: Will remain free from injury  Outcome: PROGRESSING AS EXPECTED  Note:   Educated to dangle at bedside prior to standing

## 2019-09-21 NOTE — PROGRESS NOTES
Assumed care at 1900    Received report from day shift RN.    Reviewed recent lab results, notes, orders, and MAR  POC discussed and updated on care board  Bed is in the lowest and locked position, call light within reach      Tolerating PO  Denies pain interventions 2/10 pain  ALBERTO drain to left groin serosanguinous output   preneva wound vac intact no leaks detected  Patient is up self   Ambulated in hallway with family  Denies n/v  SCD's are on   RA  +voiding +BM

## 2019-09-21 NOTE — PROGRESS NOTES
"Assumed care of patient from night shift RN.   Patient is alert and oriented times 4, states pain of 1/10, declines intervention at this time.  VSS /92   Pulse 67   Temp 36.4 °C (97.5 °F) (Temporal)   Resp 18   Ht 1.778 m (5' 10\")   Wt 111.7 kg (246 lb 4.1 oz)   SpO2 97%   BMI 35.33 kg/m²   PIV in the RFA, patent and saline locked.  On RA with saturations in the mid 90s.  Last BM 9/20, urinating without difficulty.  Cardiac diet, tolerating well.  Prevena wound vac to the L groin, functioning appropriately.  No output noted.  ALBERTO drain to the L groin, self compressed to suction, small sanguinous drainage noted.  +1 generalized edema to the BLE, +1 pedal pulses.  Patient is up self, demonstrates steady gait, no assistance needed.  Patient ambulates frequently.  POC discussed for the day, bed is locked and in the lowest position, call light is within reach.  All needs are met at this time, hourly rounding is in place.  "

## 2019-09-21 NOTE — DISCHARGE SUMMARY
Discharge Summary    CHIEF COMPLAINT ON ADMISSION  No chief complaint on file.      Reason for Admission  ATHEROSCLEROSIS OF NATIVE ATRETERI*     Admission Date  9/20/2019    CODE STATUS  Full Code    HPI & HOSPITAL COURSE  This is a 69 y.o. male admitted after left CFA endarterectomy. Uncomplicated post-op course. Pt is ready for discharge today.        Therefore, he is discharged in good and stable condition to home with close outpatient follow-up.    The patient met 2-midnight criteria for an inpatient stay at the time of discharge.    Discharge Date  ***    FOLLOW UP ITEMS POST DISCHARGE  Keep suction dressing in place until it loses suction and/or battery dies. Once suction is lost, remove the entire dressing and place dry gauze over the incision.        FOLLOW UP  [facility]  [address]  [facility] [place] [address]    MEDICATIONS ON DISCHARGE     Medication List      ASK your doctor about these medications      Instructions   acetaminophen 325 MG Tabs  Commonly known as:  TYLENOL   Take 650 mg by mouth every four hours as needed.  Dose:  650 mg     aspirin 81 MG tablet   Take 81 mg by mouth every day.  Dose:  81 mg     Co Q-10 300 MG Caps  Ask about: Which instructions should I use?   Take 1 Cap by mouth every day.  Dose:  1 Cap     fish oil 1000 MG Caps capsule   Take 1,000 mg by mouth every day.  Dose:  1,000 mg     lovastatin 10 MG tablet  Commonly known as:  MEVACOR  Ask about: Which instructions should I use?   Take 10 mg by mouth every evening.  Dose:  10 mg     MULTIVITAMIN PO   Take 1 Tab by mouth every day.  Dose:  1 Tab     niacin 1000 MG CR tablet  Commonly known as:  NIASPAN  Ask about: Which instructions should I use?   Take 2,000 mg by mouth every evening.  Dose:  2,000 mg     PAIN RELIEF EX   Apply 1 Each to affected area(s) 1 time daily as needed (left knee).  Dose:  1 Each     VITAMIN C PO   Take 6,000 mg by mouth every day.  Dose:  6,000 mg       "      Allergies  Allergies   Allergen Reactions   • Mercury Swelling     And mercury based preservatives-Thimerasol  Swelling, \"strange discharge from eyes\"   • Other Environmental Anaphylaxis     Insect toxins/ bees and wasps- Anaphylaxis   • Sulfa Drugs Anaphylaxis and Swelling       DIET  Orders Placed This Encounter   Procedures   • Diet Order Cardiac     Standing Status:   Standing     Number of Occurrences:   1     Order Specific Question:   Diet:     Answer:   Cardiac [6]       ACTIVITY  Light duty.  Weight bearing as tolerated    CONSULTATIONS  None    PROCEDURES  Left common femoral endarterectomy    LABORATORY  Lab Results   Component Value Date    SODIUM 139 09/21/2019    POTASSIUM 4.6 09/21/2019    CHLORIDE 105 09/21/2019    CO2 26 09/21/2019    GLUCOSE 113 (H) 09/21/2019    BUN 19 09/21/2019    CREATININE 1.17 09/21/2019    CREATININE 1.4 11/28/2006        Lab Results   Component Value Date    WBC 9.5 09/21/2019    HEMOGLOBIN 9.9 (L) 09/21/2019    HEMATOCRIT 33.1 (L) 09/21/2019    PLATELETCT 194 09/21/2019        Total time of the discharge process exceeds 15 minutes.  "

## 2019-09-21 NOTE — CARE PLAN
Problem: Pain Management  Goal: Pain level will decrease to patient's comfort goal  Outcome: PROGRESSING AS EXPECTED   Pain is well controlled  Problem: Communication  Goal: The ability to communicate needs accurately and effectively will improve  Outcome: PROGRESSING AS EXPECTED   Participation in POC  Problem: Safety  Goal: Will remain free from injury  Outcome: PROGRESSING AS EXPECTED   Fall precautions in place

## 2019-09-22 VITALS
TEMPERATURE: 97.5 F | WEIGHT: 253.53 LBS | SYSTOLIC BLOOD PRESSURE: 100 MMHG | HEART RATE: 99 BPM | DIASTOLIC BLOOD PRESSURE: 58 MMHG | OXYGEN SATURATION: 96 % | BODY MASS INDEX: 36.3 KG/M2 | HEIGHT: 70 IN | RESPIRATION RATE: 18 BRPM

## 2019-09-22 PROCEDURE — A9270 NON-COVERED ITEM OR SERVICE: HCPCS | Performed by: SURGERY

## 2019-09-22 PROCEDURE — 700102 HCHG RX REV CODE 250 W/ 637 OVERRIDE(OP): Performed by: SURGERY

## 2019-09-22 RX ORDER — ROSUVASTATIN CALCIUM 20 MG/1
20 TABLET, COATED ORAL EVERY EVENING
Qty: 30 TAB | Refills: 11 | Status: SHIPPED | OUTPATIENT
Start: 2019-09-22 | End: 2019-09-30

## 2019-09-22 RX ORDER — ATORVASTATIN CALCIUM 40 MG/1
80 TABLET, FILM COATED ORAL EVERY EVENING
Status: DISCONTINUED | OUTPATIENT
Start: 2019-09-22 | End: 2019-09-22

## 2019-09-22 RX ORDER — ROSUVASTATIN CALCIUM 20 MG/1
20 TABLET, COATED ORAL EVERY EVENING
Status: DISCONTINUED | OUTPATIENT
Start: 2019-09-22 | End: 2019-09-22 | Stop reason: HOSPADM

## 2019-09-22 RX ADMIN — CELECOXIB 200 MG: 200 CAPSULE ORAL at 05:26

## 2019-09-22 RX ADMIN — OXYCODONE HYDROCHLORIDE AND ACETAMINOPHEN 6000 MG: 500 TABLET ORAL at 05:26

## 2019-09-22 RX ADMIN — ASPIRIN 81 MG 81 MG: 81 TABLET ORAL at 05:26

## 2019-09-22 RX ADMIN — OMEGA-3 FATTY ACIDS CAP 1000 MG 1000 MG: 1000 CAP at 05:26

## 2019-09-22 RX ADMIN — MULTIPLE VITAMINS W/ MINERALS TAB 1 TABLET: TAB at 05:26

## 2019-09-22 NOTE — DISCHARGE INSTRUCTIONS
Discharge Instructions    Discharged to home by car with relative. Discharged via walking, hospital escort: Yes.  Special equipment needed: Not Applicable    Be sure to schedule a follow-up appointment with your primary care doctor or any specialists as instructed.     Discharge Plan:   Diet Plan: Discussed  Activity Level: Discussed  Confirmed Follow up Appointment: Appointment Scheduled  Confirmed Symptoms Management: Discussed  Medication Reconciliation Updated: Yes  Influenza Vaccine Indication: Patient Refuses    I understand that a diet low in cholesterol, fat, and sodium is recommended for good health. Unless I have been given specific instructions below for another diet, I accept this instruction as my diet prescription.   Other diet: regular Femoral Endarterectomy, Care After  Refer to this sheet in the next few weeks. These discharge instructions provide you with general information on caring for yourself after you leave the hospital. Your caregiver may also give you specific instructions. Your treatment has been planned according to the most current medical practices available, but unavoidable complications sometimes occur. If you have any problems or questions after discharge, please call your caregiver.  HOME CARE INSTRUCTIONS   · Medicine.  ¨ Some pain is normal after this type of surgery. Take the pain medicine that was prescribed. Follow the directions carefully. Do not take over-the-counter pain medicine unless your caregiver says it is okay. Some of them can cause bleeding problems after surgery.  ¨ You might need to take a blood thinner (anticoagulant). This keeps blood clots from forming. Follow the directions carefully.  · Wound care.  ¨ Keep the bandage (dressing) on your surgical cut (incision) dry for a few days after surgery. Once the dressing can be taken off, it will be okay for you to shower. Do not take a tub bath for at least 2 weeks after surgery.  ¨ You will need to return to have  your stitches (sutures) or staples removed. This is usually done about a week after your surgery.  · Activity.  ¨ It is important to walk as much as possible. This helps keep blood clots from forming in your legs.  ¨ Ask your caregiver before going up or down steps. Even after your caregiver says it is okay to use stairs, you may need help with steps for awhile.  ¨ Do not sit or stand for long periods of time. When you do sit, keep your legs raised. Put your feet on a stool or lie on the couch.  ¨ Do not lift anything heavy for several weeks.  ¨ Do not bend or strain for several weeks.  ¨ Do not drive until your caregiver says it is okay. Do not drive while taking narcotic pain medicine.  ¨ Ask your caregiver when you can go back to work. This will depend on the type of work you do.  ¨ Go back to your normal routine slowly. Give your body time to heal. Ask your caregiver if you have questions about any particular activity.  · Lifestyle.  ¨ You might need to change some habits to make sure your arteries stay clear.  ¨ Talk with a nutritionist about what you eat. You may need to eat less of some types of food. Good foods are those low in saturated fats. Vegetables, fruits, and whole grains are good for you.  ¨ Think about exercising more. Check with your caregiver before starting a new exercise plan.  ¨ Keep your weight under control.  ¨ Do not smoke.  SEEK MEDICAL CARE IF:   · You have any questions about medicines.  · Pain continues, even after taking pain medicine.  · You have pain or cramps in your leg while walking.  · You have an oral temperature above 102° F (38.9° C).  SEEK IMMEDIATE MEDICAL CARE IF:   · Pain gets much worse.  · You have shortness of breath.  · You have chest pain.  · The area around the incision becomes red and swells.  · Blood or other liquid leaks from the incision.  · Your leg becomes red, swollen, or sore.  · Your leg or foot changes color or becomes numb.  · You have an oral temperature  above 102° F (38.9° C), not controlled by medicine.  MAKE SURE YOU:   · Understand these instructions.  · Will watch your condition.  · Will get help right away if you are not doing well or get worse.     This information is not intended to replace advice given to you by your health care provider. Make sure you discuss any questions you have with your health care provider.     Document Released: 03/14/2011 Document Revised: 10/08/2014 Document Reviewed: 03/14/2011  [employer] Interactive Patient Education ©2016 [employer]        · Is patient discharged on Warfarin / Coumadin?   No     Depression / Suicide Risk    As you are discharged from this [facility] facility, it is important to learn how to keep safe from harming yourself.    Recognize the warning signs:  · Abrupt changes in personality, positive or negative- including increase in energy   · Giving away possessions  · Change in eating patterns- significant weight changes-  positive or negative  · Change in sleeping patterns- unable to sleep or sleeping all the time   · Unwillingness or inability to communicate  · Depression  · Unusual sadness, discouragement and loneliness  · Talk of wanting to die  · Neglect of personal appearance   · Rebelliousness- reckless behavior  · Withdrawal from people/activities they love  · Confusion- inability to concentrate     If you or a loved one observes any of these behaviors or has concerns about self-harm, here's what you can do:  · Talk about it- your feelings and reasons for harming yourself  · Remove any means that you might use to hurt yourself (examples: pills, rope, extension cords, firearm)  · Get professional help from the community (Mental Health, Substance Abuse, psychological counseling)  · Do not be alone:Call your Safe Contact- someone whom you trust who will be there for you.  · Call your local CRISIS HOTLINE 791-9846 or 696-983-1428  · Call your local Children's Mobile Crisis Response Team [facility]  (659) 601-7512 or www.[employer].Heidi Shaulis  · Call the toll free National Suicide Prevention Hotlines   · National Suicide Prevention Lifeline 916-370-HIKM (2898)  · National [employer] Line Network 800-SUICIDE (053-4655)

## 2019-09-22 NOTE — PROGRESS NOTES
Assumed care at 1900    Received report from day shift RN.    Reviewed recent lab results, notes, orders, and MAR  POC discussed and updated on care board  Bed is in the lowest and locked position, call light within reach      A&Ox4  RA   Denies pain intervention  Tolerating cardiac diet  prevena wound vac L groin,   Vac is making loud rumbling   No leak detected  ALBERTO drain self suctioning bulb  to L groin  With small serosanguinous output.    +1 generalized edema to BLE  Patient is up self and steady no assistance needed  Family at patient's bedside  All questions answered at this time.

## 2019-09-22 NOTE — PROGRESS NOTES
Progress Note  POD #2 s/p L CFA endarterectomy    Doing well today. Ambulating  Pain controlled.    Minimal drain output yesterday    Vitals:    09/22/19 0825   BP: 100/58   Pulse: 99   Resp: 18   Temp: 36.4 °C (97.5 °F)   SpO2: 96%     NAD  Suction dressing in place to L groin      Lab Results   Component Value Date/Time    WBC 9.5 09/21/2019 05:54 AM    RBC 4.11 (L) 09/21/2019 05:54 AM    HEMOGLOBIN 9.9 (L) 09/21/2019 05:54 AM    HEMATOCRIT 33.1 (L) 09/21/2019 05:54 AM    MCV 80.5 (L) 09/21/2019 05:54 AM    MCH 24.1 (L) 09/21/2019 05:54 AM    MCHC 29.9 (L) 09/21/2019 05:54 AM    MPV 9.9 09/21/2019 05:54 AM    NEUTSPOLYS 59.40 09/09/2019 02:40 PM    LYMPHOCYTES 23.40 09/09/2019 02:40 PM    MONOCYTES 9.60 09/09/2019 02:40 PM    EOSINOPHILS 6.30 09/09/2019 02:40 PM    BASOPHILS 0.90 09/09/2019 02:40 PM    ANISOCYTOSIS 1+ 09/09/2019 02:40 PM      A/P  69M POD #2 s/p L CFA endarterectomy    Drain removed.  Will attempt to reinforce provena. If unable to get a seal, will need to remove it and place guaze over incision.    D/c home today.  F/u in 2-3wks for wound check.      Max Felton MD  Vascular Surgeon  [facility]  Office: [phone]

## 2019-09-22 NOTE — PROGRESS NOTES
Pt AOx4, pleasant this morning. Ready to go home.  saw pt at the bedside. Attempted to repatch the prevena vac to the left groin. Still sounds like there is not good suction.  stated to place dry gauze dressing over if the prevena would not work. Pt was supplied extra gauze and tegaderm to change. 's office is to call pt for f/u appt. Pt aware Crestor prescription is at his preferred pharmacy. Pt ambulated to private vehicle for discharge home with his sister.

## 2019-09-22 NOTE — CARE PLAN
Problem: Pain Management  Goal: Pain level will decrease to patient's comfort goal  Outcome: PROGRESSING AS EXPECTED   Pain is well controlled   Problem: Communication  Goal: The ability to communicate needs accurately and effectively will improve  Outcome: PROGRESSING AS EXPECTED   Participation in POC

## 2019-09-22 NOTE — DISCHARGE SUMMARY
Discharge Summary    CHIEF COMPLAINT ON ADMISSION  No chief complaint on file.      Reason for Admission  ATHEROSCLEROSIS OF NATIVE ATRETERI*     Admission Date  9/20/2019    CODE STATUS  Full Code    HPI & HOSPITAL COURSE  This is a 69 y.o. male admitted for routine post-op care s/p L CFA endarterectomy. Uncomplicated post-op course.       Therefore, he is discharged in good and stable condition to home with close outpatient follow-up.    The patient met 2-midnight criteria for an inpatient stay at the time of discharge.    Discharge Date  09/22/19    FOLLOW UP ITEMS POST DISCHARGE  Keep suction dressing in place until it loses suction and/or battery dies. Then remove the entire dressing and place dry gauze over the incision.    DISCHARGE DIAGNOSES  Active Problems:    * No active hospital problems. *  Resolved Problems:    * No resolved hospital problems. *      FOLLOW UP  No future appointments.  No follow-up provider specified.    MEDICATIONS ON DISCHARGE     Medication List      ASK your doctor about these medications      Instructions   acetaminophen 325 MG Tabs  Commonly known as:  TYLENOL   Take 650 mg by mouth every four hours as needed.  Dose:  650 mg     aspirin 81 MG tablet   Take 81 mg by mouth every day.  Dose:  81 mg     Co Q-10 300 MG Caps  Ask about: Which instructions should I use?   Take 1 Cap by mouth every day.  Dose:  1 Cap     fish oil 1000 MG Caps capsule   Take 1,000 mg by mouth every day.  Dose:  1,000 mg     lovastatin 10 MG tablet  Commonly known as:  MEVACOR  Ask about: Which instructions should I use?   Take 10 mg by mouth every evening.  Dose:  10 mg     MULTIVITAMIN PO   Take 1 Tab by mouth every day.  Dose:  1 Tab     niacin 1000 MG CR tablet  Commonly known as:  NIASPAN  Ask about: Which instructions should I use?   Take 2,000 mg by mouth every evening.  Dose:  2,000 mg     PAIN RELIEF EX   Apply 1 Each to affected area(s) 1 time daily as needed (left knee).  Dose:  1 Each    "  VITAMIN C PO   Take 6,000 mg by mouth every day.  Dose:  6,000 mg            Allergies  Allergies   Allergen Reactions   • Mercury Swelling     And mercury based preservatives-Thimerasol  Swelling, \"strange discharge from eyes\"   • Other Environmental Anaphylaxis     Insect toxins/ bees and wasps- Anaphylaxis   • Sulfa Drugs Anaphylaxis and Swelling       DIET  Orders Placed This Encounter   Procedures   • Diet Order Cardiac     Standing Status:   Standing     Number of Occurrences:   1     Order Specific Question:   Diet:     Answer:   Cardiac [6]       ACTIVITY  Light duty.  Weight bearing as tolerated    CONSULTATIONS  None    PROCEDURES  Left common femoral endarterectomy    LABORATORY  Lab Results   Component Value Date    SODIUM 139 09/21/2019    POTASSIUM 4.6 09/21/2019    CHLORIDE 105 09/21/2019    CO2 26 09/21/2019    GLUCOSE 113 (H) 09/21/2019    BUN 19 09/21/2019    CREATININE 1.17 09/21/2019    CREATININE 1.4 11/28/2006        Lab Results   Component Value Date    WBC 9.5 09/21/2019    HEMOGLOBIN 9.9 (L) 09/21/2019    HEMATOCRIT 33.1 (L) 09/21/2019    PLATELETCT 194 09/21/2019        Total time of the discharge process exceeds 15 minutes.  "

## 2019-09-23 ENCOUNTER — PATIENT OUTREACH (OUTPATIENT)
Dept: HEALTH INFORMATION MANAGEMENT | Facility: OTHER | Age: 69
End: 2019-09-23

## 2019-09-30 ENCOUNTER — OFFICE VISIT (OUTPATIENT)
Dept: MEDICAL GROUP | Facility: MEDICAL CENTER | Age: 69
End: 2019-09-30
Payer: MEDICARE

## 2019-09-30 VITALS
BODY MASS INDEX: 35.65 KG/M2 | HEART RATE: 86 BPM | TEMPERATURE: 98.7 F | HEIGHT: 70 IN | SYSTOLIC BLOOD PRESSURE: 134 MMHG | WEIGHT: 249 LBS | OXYGEN SATURATION: 98 % | DIASTOLIC BLOOD PRESSURE: 68 MMHG | RESPIRATION RATE: 16 BRPM

## 2019-09-30 DIAGNOSIS — I73.9 PERIPHERAL ARTERY DISEASE (HCC): Chronic | ICD-10-CM

## 2019-09-30 DIAGNOSIS — D50.9 IRON DEFICIENCY ANEMIA, UNSPECIFIED IRON DEFICIENCY ANEMIA TYPE: ICD-10-CM

## 2019-09-30 DIAGNOSIS — Z11.59 NEED FOR HEPATITIS C SCREENING TEST: ICD-10-CM

## 2019-09-30 PROCEDURE — 99213 OFFICE O/P EST LOW 20 MIN: CPT | Performed by: NURSE PRACTITIONER

## 2019-09-30 RX ORDER — LOVASTATIN 20 MG/1
20 TABLET ORAL NIGHTLY
COMMUNITY
End: 2019-11-25

## 2019-09-30 ASSESSMENT — ENCOUNTER SYMPTOMS: GENERAL WELL-BEING: FAIR

## 2019-09-30 ASSESSMENT — PATIENT HEALTH QUESTIONNAIRE - PHQ9: CLINICAL INTERPRETATION OF PHQ2 SCORE: 0

## 2019-09-30 ASSESSMENT — ACTIVITIES OF DAILY LIVING (ADL): BATHING_REQUIRES_ASSISTANCE: 0

## 2019-09-30 NOTE — PROGRESS NOTES
Subjective:      Polo Pyle is a 69 y.o. male who presents with Hospital Follow-up        CC: Patient is here today for hospital follow-up after femoral endarterectomy of left leg.    HPI       1. Peripheral artery disease (HCC)  Patient has peripheral artery disease for which he follows with Dr. Crawford.  10 days ago he underwent a left femoral endarterectomy which he feels was successful.  He has minimal discomfort but some swelling of the left lower leg.  He denies chest pain, cough or trouble breathing.  He was on Crestor but apparently was switched over to lovastatin at 20 mg a day and reports no myalgias.  He also takes a number of supplements including niacin, coenzyme Q and fish oil.  He states he has an appointment with both cardiology and the vascular surgeon in the next 2 weeks.    2. Iron deficiency anemia, unspecified iron deficiency anemia type  Review of patient's CBC shows a microcytic anemia which could be secondary to his surgeries since his hemoglobin and hematocrit was normal in January for cardiology.  Patient states he never has gone for colonoscopy for screening and never well.  He did have a fit test done last month that was negative for blood.    3. Need for hepatitis C screening test  Patient due for screening  Past Medical History:   Diagnosis Date   • ASTHMA     as a youth   • CAD (coronary artery disease) 8/26/2011   • CATARACT     bilateral IOL   • Colorblind     problems with red/green   • Dental disorder     upper and partial lower   • Essential hypertension    • Heart burn    • High cholesterol    • Hyperlipidemia 8/26/2011   • Indigestion    • Infectious disease     Hx of staph infections- last infection 2018-    • Myocardial infarct (HCC) 2005    STENTS   • Pain 11/14/11    NECK 7.5/10; C5-C6   • Personal history of venous thrombosis and embolism 2006    DVT 01/2019   • Pneumonia 2009   • Presence of stent in left circumflex coronary artery    • Unspecified hemorrhagic  conditions     TAKES ASA . PROLONGED BLEEDING, nosebleeds   • Venous insufficiency of both lower extremities    • White coat syndrome with diagnosis of hypertension      Social History     Socioeconomic History   • Marital status:      Spouse name: Not on file   • Number of children: Not on file   • Years of education: Not on file   • Highest education level: Not on file   Occupational History   • Not on file   Social Needs   • Financial resource strain: Not on file   • Food insecurity:     Worry: Not on file     Inability: Not on file   • Transportation needs:     Medical: Not on file     Non-medical: Not on file   Tobacco Use   • Smoking status: Former Smoker     Years: 30.00     Types: Cigars     Last attempt to quit: 2011     Years since quittin.9   • Smokeless tobacco: Never Used   • Tobacco comment:    Substance and Sexual Activity   • Alcohol use: No   • Drug use: No   • Sexual activity: Not Currently     Partners: Female   Lifestyle   • Physical activity:     Days per week: Not on file     Minutes per session: Not on file   • Stress: Not on file   Relationships   • Social connections:     Talks on phone: Not on file     Gets together: Not on file     Attends Congregation service: Not on file     Active member of club or organization: Not on file     Attends meetings of clubs or organizations: Not on file     Relationship status: Not on file   • Intimate partner violence:     Fear of current or ex partner: Not on file     Emotionally abused: Not on file     Physically abused: Not on file     Forced sexual activity: Not on file   Other Topics Concern   • Not on file   Social History Narrative   • Not on file     Current Outpatient Medications   Medication Sig Dispense Refill   • lovastatin (MEVACOR) 20 MG Tab Take 20 mg by mouth every evening.     • Coenzyme Q10 (CO Q-10) 300 MG Cap Take 1 Cap by mouth every day.     • niacin (NIASPAN) 1000 MG CR tablet Take 2,000 mg by mouth every evening.    "  • Capsaicin-Menthol (PAIN RELIEF EX) Apply 1 Each to affected area(s) 1 time daily as needed (left knee).     • acetaminophen (TYLENOL) 325 MG Tab Take 650 mg by mouth every four hours as needed.     • Omega-3 Fatty Acids (FISH OIL) 1000 MG Cap capsule Take 1,000 mg by mouth every day.     • Ascorbic Acid (VITAMIN C PO) Take 6,000 mg by mouth every day.     • Multiple Vitamin (MULTIVITAMIN PO) Take 1 Tab by mouth every day.     • aspirin 81 MG tablet Take 81 mg by mouth every day.       No current facility-administered medications for this visit.      Family History   Problem Relation Age of Onset   • Heart Attack Mother    • Heart Disease Mother          Review of Systems   Cardiovascular: Positive for leg swelling.   All other systems reviewed and are negative.         Objective:     /68 (BP Location: Right arm, Patient Position: Sitting, BP Cuff Size: Adult)   Pulse 86   Temp 37.1 °C (98.7 °F) (Temporal)   Resp 16   Ht 1.778 m (5' 10\")   Wt 112.9 kg (249 lb)   SpO2 98%   BMI 35.73 kg/m²      Physical Exam   Constitutional: He is oriented to person, place, and time. He appears well-developed and well-nourished. No distress.   HENT:   Head: Normocephalic and atraumatic.   Right Ear: External ear normal.   Left Ear: External ear normal.   Nose: Nose normal.   Mouth/Throat: Oropharynx is clear and moist.   Eyes: Conjunctivae are normal. Right eye exhibits no discharge. Left eye exhibits no discharge.   Neck: Normal range of motion. Neck supple. No tracheal deviation present. No thyromegaly present.   Cardiovascular: Normal rate, regular rhythm and normal heart sounds.   No murmur heard.  Pulmonary/Chest: Effort normal and breath sounds normal. No respiratory distress. He has no wheezes. He has no rales.   Lymphadenopathy:     He has no cervical adenopathy.   Neurological: He is alert and oriented to person, place, and time. Coordination normal.   Skin: Skin is warm and dry. No rash noted. He is not " diaphoretic. No erythema.   Legs and feet are warm to touch bilaterally.  There is some edema of the left lower leg.  No tenderness to palpation.  Dry scaly skin of the feet bilaterally.  2 small shallow ulcers of the right lower leg.   Psychiatric: He has a normal mood and affect. His behavior is normal. Judgment and thought content normal.   Nursing note and vitals reviewed.              Assessment/Plan:     1. Peripheral artery disease (HCC)  Patient had a successful left femoral endarterectomy and has follow-up with his vascular surgeon.  He has reported no fever and minimal pain.  He is on a statin but he also is taking niacin, coenzyme Q and fish oil which I question the efficacy on.  I explained I was recently at a conference and the lecture on statins did not recommend niacin or coenzyme Q.  However, I told him to discuss this with his cardiologist.  He is on a statin.    2. Iron deficiency anemia, unspecified iron deficiency anemia type  I will check to see if his anemia was secondary to blood loss from surgery or if he does have microcytic anemia.  His stool testing was negative for blood but I am concerned that he has never gone for colonoscopy and refuses to do so.  I told him if he is still anemic then I would refer him to GI.  - FE+TIBC+JOLEEN  - CBC WITH DIFFERENTIAL; Future    3. Need for hepatitis C screening test    - HEP C VIRUS ANTIBODY; Future

## 2019-10-10 ENCOUNTER — OFFICE VISIT (OUTPATIENT)
Dept: URGENT CARE | Facility: PHYSICIAN GROUP | Age: 69
End: 2019-10-10
Payer: MEDICARE

## 2019-10-10 ENCOUNTER — HOSPITAL ENCOUNTER (OUTPATIENT)
Facility: MEDICAL CENTER | Age: 69
End: 2019-10-10
Attending: PHYSICIAN ASSISTANT
Payer: MEDICARE

## 2019-10-10 VITALS
HEART RATE: 115 BPM | RESPIRATION RATE: 16 BRPM | DIASTOLIC BLOOD PRESSURE: 74 MMHG | BODY MASS INDEX: 35.73 KG/M2 | OXYGEN SATURATION: 98 % | SYSTOLIC BLOOD PRESSURE: 132 MMHG | TEMPERATURE: 101 F | WEIGHT: 249 LBS

## 2019-10-10 DIAGNOSIS — R50.9 FEVER, UNSPECIFIED FEVER CAUSE: ICD-10-CM

## 2019-10-10 DIAGNOSIS — N30.01 ACUTE CYSTITIS WITH HEMATURIA: ICD-10-CM

## 2019-10-10 LAB
APPEARANCE UR: NORMAL
BILIRUB UR STRIP-MCNC: NORMAL MG/DL
COLOR UR AUTO: NORMAL
GLUCOSE UR STRIP.AUTO-MCNC: NORMAL MG/DL
KETONES UR STRIP.AUTO-MCNC: NORMAL MG/DL
LEUKOCYTE ESTERASE UR QL STRIP.AUTO: NORMAL
NITRITE UR QL STRIP.AUTO: NORMAL
PH UR STRIP.AUTO: 5.5 [PH] (ref 5–8)
PROT UR QL STRIP: 100 MG/DL
RBC UR QL AUTO: NORMAL
SP GR UR STRIP.AUTO: 1.03
UROBILINOGEN UR STRIP-MCNC: 0.2 MG/DL

## 2019-10-10 PROCEDURE — 99214 OFFICE O/P EST MOD 30 MIN: CPT | Performed by: PHYSICIAN ASSISTANT

## 2019-10-10 PROCEDURE — 81002 URINALYSIS NONAUTO W/O SCOPE: CPT | Performed by: PHYSICIAN ASSISTANT

## 2019-10-10 PROCEDURE — 87077 CULTURE AEROBIC IDENTIFY: CPT

## 2019-10-10 PROCEDURE — 87086 URINE CULTURE/COLONY COUNT: CPT

## 2019-10-10 PROCEDURE — 87186 SC STD MICRODIL/AGAR DIL: CPT

## 2019-10-10 RX ORDER — CEFDINIR 300 MG/1
300 CAPSULE ORAL 2 TIMES DAILY
Qty: 14 CAP | Refills: 0 | Status: SHIPPED | OUTPATIENT
Start: 2019-10-10 | End: 2019-10-17

## 2019-10-11 NOTE — PROGRESS NOTES
"Subjective:      Polo Pyle is a 69 y.o. male who presents with Abdominal Pain (possible uti,frequent urination,chills,started lastnight)    PMH:  has a past medical history of ASTHMA, CAD (coronary artery disease) (8/26/2011), CATARACT, Colorblind, Dental disorder, Essential hypertension, Heart burn, High cholesterol, Hyperlipidemia (8/26/2011), Indigestion, Infectious disease, Myocardial infarct (HCC) (2005), Pain (11/14/11), Personal history of venous thrombosis and embolism (2006), Pneumonia (2009), Presence of stent in left circumflex coronary artery, Unspecified hemorrhagic conditions, Venous insufficiency of both lower extremities, and White coat syndrome with diagnosis of hypertension.  MEDS:   Current Outpatient Medications:   •  lovastatin (MEVACOR) 20 MG Tab, Take 20 mg by mouth every evening., Disp: , Rfl:   •  Coenzyme Q10 (CO Q-10) 300 MG Cap, Take 1 Cap by mouth every day., Disp: , Rfl:   •  niacin (NIASPAN) 1000 MG CR tablet, Take 2,000 mg by mouth every evening., Disp: , Rfl:   •  acetaminophen (TYLENOL) 325 MG Tab, Take 650 mg by mouth every four hours as needed., Disp: , Rfl:   •  Omega-3 Fatty Acids (FISH OIL) 1000 MG Cap capsule, Take 1,000 mg by mouth every day., Disp: , Rfl:   •  Ascorbic Acid (VITAMIN C PO), Take 6,000 mg by mouth every day., Disp: , Rfl:   •  Multiple Vitamin (MULTIVITAMIN PO), Take 1 Tab by mouth every day., Disp: , Rfl:   •  aspirin 81 MG tablet, Take 81 mg by mouth every day., Disp: , Rfl:   •  Capsaicin-Menthol (PAIN RELIEF EX), Apply 1 Each to affected area(s) 1 time daily as needed (left knee)., Disp: , Rfl:   ALLERGIES:   Allergies   Allergen Reactions   • Mercury Swelling     And mercury based preservatives-Thimerasol  Swelling, \"strange discharge from eyes\"   • Other Environmental Anaphylaxis     Insect toxins/ bees and wasps- Anaphylaxis   • Sulfa Drugs Anaphylaxis and Swelling     SURGHX:   Past Surgical History:   Procedure Laterality Date   • FEMORAL " ENDARTERECTOMY Left 9/20/2019    Procedure: ENDARTERECTOMY, FEMORAL;  Surgeon: Candelaria Crawford M.D.;  Location: SURGERY [facility];  Service: General   • VENTRAL HERNIA REPAIR  11/30/2011    Performed by TROY GONZALEZ at SURGERY [facility]   • INGUINAL HERNIA REPAIR  11/30/2011    Performed by TROY GONZALEZ at SURGERY [facility]   • CATARACT PHACO WITH IOL  11/23/2010    Performed by RM WILLIS at SURGERY SAME DAY [facility]   • CATARACT PHACO WITH IOL  11/2/2010    Performed by RM WILLIS at SURGERY SAME DAY [facility]   • OTHER CARDIAC SURGERY  2005    STENTS x 4   • SINUS TREPHINE FRONTAL  1971   • SINUSOTOMIES  08/1963   • INGUINAL HERNIA REPAIR  1962    WITH UNDESCENDED TESTICLE   • TONSILLECTOMY AND ADENOIDECTOMY  1957     SOCHX:  reports that he quit smoking about 7 years ago. His smoking use included cigars. He quit after 30.00 years of use. He has never used smokeless tobacco. He reports that he does not drink alcohol or use drugs.  FH: Reviewed with patient, not pertinent to this visit.           Patient presents with:  Abdominal Pain: possible uti,frequent urination,chills,started lastnight.     Pt also has healing surgical wound to left groin that has been healing well but wound does weep just a little.  Pt has been seen by surgeon for wound checks, was told to place dry guaze to absorb slight drainage.  Pt states he does not go through more than one piece of guaze daily.        Dysuria    This is a new problem. The current episode started yesterday. The problem occurs every urination. The problem has been gradually worsening. The quality of the pain is described as burning. The pain is at a severity of 5/10. The pain is moderate. The maximum temperature recorded prior to his arrival was 100 - 100.9 F. He is not sexually active. There is no history of pyelonephritis. Associated symptoms include chills, frequency, sweats and urgency. Pertinent negatives include no  discharge, flank pain, hematuria, hesitancy, nausea or vomiting. He has tried increased fluids and NSAIDs for the symptoms. The treatment provided mild relief.       Review of Systems   Constitutional: Positive for chills and fever.   Gastrointestinal: Negative for nausea and vomiting.   Genitourinary: Positive for dysuria, frequency and urgency. Negative for flank pain, hematuria and hesitancy.   All other systems reviewed and are negative.         Objective:     /74 (BP Location: Left arm, Patient Position: Sitting, BP Cuff Size: Large adult)   Pulse (!) 115   Temp (!) 38.3 °C (101 °F) (Temporal)   Resp 16   Wt 112.9 kg (249 lb)   SpO2 98%   BMI 35.73 kg/m²      Physical Exam   Constitutional: He is oriented to person, place, and time. He appears well-developed and well-nourished. No distress.   HENT:   Head: Normocephalic and atraumatic.   Nose: Nose normal.   Mouth/Throat: Oropharynx is clear and moist.   Eyes: Pupils are equal, round, and reactive to light. Conjunctivae and EOM are normal.   Neck: Normal range of motion. Neck supple.   Cardiovascular: Normal rate, regular rhythm and normal heart sounds.   Pulmonary/Chest: Effort normal and breath sounds normal.   Abdominal: Soft. Bowel sounds are normal. There is no rebound and no guarding.   Musculoskeletal: Normal range of motion.   Neurological: He is alert and oriented to person, place, and time. Gait normal.   Skin: Skin is warm and dry. Capillary refill takes less than 2 seconds.        Psychiatric: He has a normal mood and affect.   Nursing note and vitals reviewed.         UA results interpreted by me:   Results for LYNN MORGAN (MRN [id number]) as of 10/13/2019 23:42   Ref. Range 10/10/2019 17:09   POC Color Latest Ref Range: Negative  dark yellow   POC Appearance Latest Ref Range: Negative  turbid   POC Specific Gravity Latest Ref Range: <1.005 - >1.030  1.030   POC Urine PH Latest Ref Range: 5.0 - 8.0  5.5   POC Glucose Latest Ref  "Range: Negative mg/dL neg   POC Ketones Latest Ref Range: Negative mg/dL tr   POC Protein Latest Ref Range: Negative mg/dL 100   POC Nitrites Latest Ref Range: Negative  neg   POC Leukocyte Esterase Latest Ref Range: Negative  sm   POC Blood Latest Ref Range: Negative  tr   POC Bilirubin Latest Ref Range: Negative mg/dL neg   POC Urobiligen Latest Ref Range: Negative (0.2) mg/dL 0.2        Assessment/Plan:     1. Fever, unspecified fever cause  cefdinir (OMNICEF) 300 MG Cap    POCT Urinalysis    URINE CULTURE(NEW)   2. Acute cystitis with hematuria  cefdinir (OMNICEF) 300 MG Cap    POCT Urinalysis    URINE CULTURE(NEW)     We discussed my concerns that his fever may be caused by a UTI, but could also be secondary to his surgical wound that is draining.  I told him I thought with his symptoms and his vital signs, he really should be seen in the ER to have blood work done and have a more thorough evaluation.  Pt stated he was \"not going to go anywhere but home to his dogs and granddogs except a pharmacy for medicine.      Culture sent to lab, will call with any necessary treatment or treatment changes.     PT should follow up with PCP in 1-2 days for re-evaluation if symptoms have not improved.  Discussed red flags and reasons to return to UC or ED.  Pt and/or family verbalized understanding of diagnosis and follow up instructions and was offered informational handout on diagnosis.  PT discharged.     "

## 2019-10-13 LAB
BACTERIA UR CULT: ABNORMAL
BACTERIA UR CULT: ABNORMAL
SIGNIFICANT IND 70042: ABNORMAL
SITE SITE: ABNORMAL
SOURCE SOURCE: ABNORMAL

## 2019-10-13 ASSESSMENT — ENCOUNTER SYMPTOMS
FLANK PAIN: 0
FEVER: 1
NAUSEA: 0
SWEATS: 1
CHILLS: 1
VOMITING: 0

## 2019-10-21 ENCOUNTER — HOSPITAL ENCOUNTER (OUTPATIENT)
Dept: LAB | Facility: MEDICAL CENTER | Age: 69
End: 2019-10-21
Attending: NURSE PRACTITIONER
Payer: MEDICARE

## 2019-10-21 DIAGNOSIS — Z11.59 NEED FOR HEPATITIS C SCREENING TEST: ICD-10-CM

## 2019-10-21 DIAGNOSIS — D50.9 IRON DEFICIENCY ANEMIA, UNSPECIFIED IRON DEFICIENCY ANEMIA TYPE: ICD-10-CM

## 2019-10-21 LAB
ANISOCYTOSIS BLD QL SMEAR: ABNORMAL
BASOPHILS # BLD AUTO: 0.7 % (ref 0–1.8)
BASOPHILS # BLD: 0.07 K/UL (ref 0–0.12)
BURR CELLS BLD QL SMEAR: NORMAL
COMMENT 1642: NORMAL
EOSINOPHIL # BLD AUTO: 0.29 K/UL (ref 0–0.51)
EOSINOPHIL NFR BLD: 2.9 % (ref 0–6.9)
ERYTHROCYTE [DISTWIDTH] IN BLOOD BY AUTOMATED COUNT: 50.4 FL (ref 35.9–50)
FERRITIN SERPL-MCNC: 33.7 NG/ML (ref 22–322)
HCT VFR BLD AUTO: 36.6 % (ref 42–52)
HGB BLD-MCNC: 10.5 G/DL (ref 14–18)
IMM GRANULOCYTES # BLD AUTO: 0.09 K/UL (ref 0–0.11)
IMM GRANULOCYTES NFR BLD AUTO: 0.9 % (ref 0–0.9)
LYMPHOCYTES # BLD AUTO: 1.98 K/UL (ref 1–4.8)
LYMPHOCYTES NFR BLD: 20 % (ref 22–41)
MCH RBC QN AUTO: 23.9 PG (ref 27–33)
MCHC RBC AUTO-ENTMCNC: 28.7 G/DL (ref 33.7–35.3)
MCV RBC AUTO: 83.4 FL (ref 81.4–97.8)
MICROCYTES BLD QL SMEAR: ABNORMAL
MONOCYTES # BLD AUTO: 0.64 K/UL (ref 0–0.85)
MONOCYTES NFR BLD AUTO: 6.5 % (ref 0–13.4)
MORPHOLOGY BLD-IMP: NORMAL
NEUTROPHILS # BLD AUTO: 6.83 K/UL (ref 1.82–7.42)
NEUTROPHILS NFR BLD: 69 % (ref 44–72)
NRBC # BLD AUTO: 0 K/UL
NRBC BLD-RTO: 0 /100 WBC
PLATELET # BLD AUTO: 317 K/UL (ref 164–446)
PLATELET BLD QL SMEAR: NORMAL
PMV BLD AUTO: 9.9 FL (ref 9–12.9)
POIKILOCYTOSIS BLD QL SMEAR: NORMAL
POLYCHROMASIA BLD QL SMEAR: NORMAL
RBC # BLD AUTO: 4.39 M/UL (ref 4.7–6.1)
RBC BLD AUTO: PRESENT
WBC # BLD AUTO: 9.9 K/UL (ref 4.8–10.8)

## 2019-10-21 PROCEDURE — 85025 COMPLETE CBC W/AUTO DIFF WBC: CPT

## 2019-10-21 PROCEDURE — 83540 ASSAY OF IRON: CPT

## 2019-10-21 PROCEDURE — 82728 ASSAY OF FERRITIN: CPT

## 2019-10-21 PROCEDURE — 36415 COLL VENOUS BLD VENIPUNCTURE: CPT

## 2019-10-21 PROCEDURE — 86803 HEPATITIS C AB TEST: CPT

## 2019-10-21 PROCEDURE — 83550 IRON BINDING TEST: CPT

## 2019-10-22 ENCOUNTER — PATIENT OUTREACH (OUTPATIENT)
Dept: HEALTH INFORMATION MANAGEMENT | Facility: OTHER | Age: 69
End: 2019-10-22

## 2019-10-22 DIAGNOSIS — D50.9 IRON DEFICIENCY ANEMIA, UNSPECIFIED IRON DEFICIENCY ANEMIA TYPE: ICD-10-CM

## 2019-10-22 LAB
HCV AB SER QL: NEGATIVE
IRON SATN MFR SERPL: 5 % (ref 15–55)
IRON SERPL-MCNC: 26 UG/DL (ref 50–180)
TIBC SERPL-MCNC: 503 UG/DL (ref 250–450)

## 2019-11-05 NOTE — PROGRESS NOTES
A 69-year-old male was an elective admission to [facility] from 9/20/2019 to 9/22/2019 to treat Atherosclerosis of native arteries of extremities with intermittent claudication, bilateral legs. IHD did not visit the patient bedside. The patient was discharged Home.      The patient was ordered to start/continue to take the following medication: Rosuvastatin Calcium (Crestor). The patient successfully filled all medications.     The patient was ordered to follow-up with PCP and Specialist. The patient had the following appointments:     1) 9/30/2019 @ 10:00 Robin Ma, general/family practice - CONFIRMED AS KEPT     2) 10/2/2019 @ 2:30 Candelaria Crawford, general vascular surgery - CONFIRMED AS KEPT     3) 10/21/2019 @ 2:00 Laboratory Services , laboratory - CONFIRMED AS KEPT  The patient has no future appointments scheduled.     IHD followed the patient for a total of 30 days and Patient was receptive to services. IHD scheduled a follow-up appointment with PCP. As a result, Patient adhered with Discharge Orders and Patient attended follow up appointments. PPS screening was not conducted secondary to low LACE score.

## 2019-11-19 ENCOUNTER — PATIENT OUTREACH (OUTPATIENT)
Dept: HEALTH INFORMATION MANAGEMENT | Facility: OTHER | Age: 69
End: 2019-11-19

## 2019-11-19 ENCOUNTER — TELEPHONE (OUTPATIENT)
Dept: MEDICAL GROUP | Facility: MEDICAL CENTER | Age: 69
End: 2019-11-19

## 2019-11-19 DIAGNOSIS — D50.9 IRON DEFICIENCY ANEMIA, UNSPECIFIED IRON DEFICIENCY ANEMIA TYPE: ICD-10-CM

## 2019-11-19 NOTE — PROGRESS NOTES
1. Attempt #:1    2. HealthConnect Verified: yes    3. Verify PCP: yes    4. Review Care Team: yes    5. WebIZ Checked & Epic Updated: No WebIZ record  · WebIZ Recommendations: Patient is up to date on all vaccines  · Is patient due for Tdap? NO  · Is patient due for Shingles? NO    6. Reviewed/Updated the following with patient:       •   Communication Preference Obtained? YES       •   Preferred Pharmacy? YES       •   Preferred Lab? YES       •   Family History (document living status of immediate family members and if + hx of cancer, diabetes, hypertension, hyperlipidemia, heart attack, stroke) YES    7. Annual Wellness Visit Scheduling  · Scheduling Status:Scheduled     8. Care Gap Scheduling (Attempt to Schedule EACH Overdue Care Gap!)     Health Maintenance Due   Topic Date Due   • Annual Wellness Visit  1950        Scheduled patient for Annual Wellness Visit     9. Phantom Activation: already active    10. Phantom Emil: no    11. Virtual Visits: no    12. Opt In to Text Messages: no    13. Patient was advised: “This is a free wellness visit. The provider will screen for medical conditions to help you stay healthy. If you have other concerns to address you may be asked to discuss these at a separate visit or there may be an additional fee.”     14. Patient was informed to arrive 15 min prior to their scheduled appointment and bring in their medication bottles.

## 2019-11-19 NOTE — TELEPHONE ENCOUNTER
Good Morning,   Called and scheduled awv and pt had a question he wanted to know if you can reroute his gastroenterology referral to [facility] with Dr Bose please advise thank you.     Missy

## 2019-11-25 ENCOUNTER — OFFICE VISIT (OUTPATIENT)
Dept: MEDICAL GROUP | Facility: MEDICAL CENTER | Age: 69
End: 2019-11-25
Payer: MEDICARE

## 2019-11-25 VITALS
HEART RATE: 103 BPM | SYSTOLIC BLOOD PRESSURE: 128 MMHG | WEIGHT: 247.3 LBS | TEMPERATURE: 97.8 F | HEIGHT: 69 IN | DIASTOLIC BLOOD PRESSURE: 78 MMHG | BODY MASS INDEX: 36.63 KG/M2 | OXYGEN SATURATION: 98 %

## 2019-11-25 DIAGNOSIS — I87.2 VENOUS INSUFFICIENCY OF BOTH LOWER EXTREMITIES: Chronic | ICD-10-CM

## 2019-11-25 DIAGNOSIS — I25.83 CORONARY ARTERY DISEASE DUE TO LIPID RICH PLAQUE: ICD-10-CM

## 2019-11-25 DIAGNOSIS — I25.10 CORONARY ARTERY DISEASE DUE TO LIPID RICH PLAQUE: ICD-10-CM

## 2019-11-25 DIAGNOSIS — E78.5 DYSLIPIDEMIA: ICD-10-CM

## 2019-11-25 DIAGNOSIS — I10 ESSENTIAL HYPERTENSION: Chronic | ICD-10-CM

## 2019-11-25 DIAGNOSIS — Z95.5 PRESENCE OF STENT IN LAD CORONARY ARTERY: ICD-10-CM

## 2019-11-25 DIAGNOSIS — R60.0 EDEMA OF LEG: ICD-10-CM

## 2019-11-25 DIAGNOSIS — Z00.00 MEDICARE ANNUAL WELLNESS VISIT, SUBSEQUENT: ICD-10-CM

## 2019-11-25 DIAGNOSIS — D50.0 IRON DEFICIENCY ANEMIA DUE TO CHRONIC BLOOD LOSS: ICD-10-CM

## 2019-11-25 DIAGNOSIS — I73.9 PERIPHERAL ARTERY DISEASE (HCC): Chronic | ICD-10-CM

## 2019-11-25 PROCEDURE — G0439 PPPS, SUBSEQ VISIT: HCPCS | Performed by: NURSE PRACTITIONER

## 2019-11-25 PROCEDURE — 99999 INITIAL ANNUAL WELLNESS VISIT-INCLUDES PPPS (G0438): CPT | Performed by: NURSE PRACTITIONER

## 2019-11-25 RX ORDER — ROSUVASTATIN CALCIUM 20 MG/1
20 TABLET, COATED ORAL EVERY EVENING
COMMUNITY
End: 2020-04-03 | Stop reason: SDUPTHER

## 2019-11-25 ASSESSMENT — ACTIVITIES OF DAILY LIVING (ADL): BATHING_REQUIRES_ASSISTANCE: 0

## 2019-11-25 ASSESSMENT — ENCOUNTER SYMPTOMS: GENERAL WELL-BEING: FAIR

## 2019-11-25 ASSESSMENT — PATIENT HEALTH QUESTIONNAIRE - PHQ9: CLINICAL INTERPRETATION OF PHQ2 SCORE: 0

## 2019-11-25 NOTE — PROGRESS NOTES
Chief Complaint   Patient presents with   • Annual Wellness Visit         HPI:  Polo is a 69 y.o. here for Medicare Annual Wellness Visit    1. Medicare annual wellness visit, subsequent  Screening performed below.  Patient currently on    2. Essential hypertension  Patient states that one time he was on antihypertensive medicine but his blood pressure improved and he was able to come off it.  His readings typically have been in the 130s over 80s range although today it appears better.  He would prefer not to be on medicine    3. Presence of stent in LAD coronary artery  Patient changed from lovastatin to Crestor and cholesterol is doing well with the change and he reports no myalgias    4. Dyslipidemia  Patient states he is taking his Crestor faithfully    5. Coronary artery disease due to lipid rich plaque  Patient on Crestor and only using baby aspirin for anticoagulation    6. Peripheral artery disease (HCC)  Patient was seen by Dr. Crawford in September and had a left femoral endarterectomy and patient states that besides some swelling, he feels it is working well and he has minimal pain    7. Iron deficiency anemia due to chronic blood loss  Patient was referred to gastroenterology after his last CBC which showed a hemoglobin of 10.5 and hematocrit of 36.  This is similar to his readings over the past 3 months.  He also showed a low iron saturation and iron but normal ferritin.  He reports no active bleeding, abdominal pain or black stools.  Patient had some problems with [facility] so he was unable to get into their and a referral was placed to [facility] and he states he is calling them today regarding an appointment.    8. Edema of leg  Patient states that this is still present but improved with surgery    9. Venous insufficiency of both lower extremities  Patient follows with Dr. Crawford        Patient Active Problem List    Diagnosis Date Noted   • Obesity (BMI 35.0-39.9  without comorbidity) ([facility]) 08/13/2019   • Peripheral artery disease ([facility]) 08/01/2019   • Edema of leg 05/20/2019   • Venous insufficiency of both lower extremities    • Essential hypertension    • Presence of stent in LAD coronary artery    • Presence of stent in left circumflex coronary artery    • Dyslipidemia 08/26/2011   • Coronary artery disease due to lipid rich plaque 08/26/2011       Current Outpatient Medications   Medication Sig Dispense Refill   • lovastatin (MEVACOR) 20 MG Tab Take 20 mg by mouth every evening.     • Coenzyme Q10 (CO Q-10) 300 MG Cap Take 1 Cap by mouth every day.     • niacin (NIASPAN) 1000 MG CR tablet Take 2,000 mg by mouth every evening.     • Capsaicin-Menthol (PAIN RELIEF EX) Apply 1 Each to affected area(s) 1 time daily as needed (left knee).     • acetaminophen (TYLENOL) 325 MG Tab Take 650 mg by mouth every four hours as needed.     • Omega-3 Fatty Acids (FISH OIL) 1000 MG Cap capsule Take 1,000 mg by mouth every day.     • Ascorbic Acid (VITAMIN C PO) Take 6,000 mg by mouth every day.     • Multiple Vitamin (MULTIVITAMIN PO) Take 1 Tab by mouth every day.     • aspirin 81 MG tablet Take 81 mg by mouth every day.       No current facility-administered medications for this visit.         Patient is taking medications as noted in medication list.  Current supplements as per medication list.     Allergies: Mercury; Other environmental; and Sulfa drugs    Current social contact/activities: PT states he lives with his two     Is patient current with immunizations? Yes.    He  reports that he quit smoking about 8 years ago. His smoking use included cigars. He quit after 30.00 years of use. He has never used smokeless tobacco. He reports that he does not drink alcohol or use drugs.  Counseling given: Not Answered  Comment: 2000        DPA/Advanced directive: Patient does not have an Advanced Directive.  A packet and workshop information was given on Advanced Directives.    ROS:     Gait: Uses no  Ostomy: No   Other tubes: No   Amputations: No   Chronic oxygen use No   Last eye exam 3 weeks ago  Wears hearing aids: No   : Denies any urinary leakage during the last 6 months      Depression Screening    Little interest or pleasure in doing things?  0 - not at all  Feeling down, depressed, or hopeless? 0 - not at all  Patient Health Questionnaire Score: 0    If depressive symptoms identified deferred to follow up visit unless specifically addressed in assessment and plan.    Interpretation of PHQ-9 Total Score   Score Severity   1-4 No Depression   5-9 Mild Depression   10-14 Moderate Depression   15-19 Moderately Severe Depression   20-27 Severe Depression    Screening for Cognitive Impairment    Three Minute Recall (village, kitchen, baby)  3/3    Sabas clock face with all 12 numbers and set the hands to show 10 past 10.  Yes 5/5  If cognitive concerns identified, deferred for follow up unless specifically addressed in assessment and plan.    Fall Risk Assessment    Has the patient had two or more falls in the last year or any fall with injury in the last year?  No  If fall risk identified, deferred for follow up unless specifically addressed in assessment and plan.    Safety Assessment    Throw rugs on floor.  No  Handrails on all stairs.  Yes  Good lighting in all hallways.  Yes  Difficulty hearing.  No  Patient counseled about all safety risks that were identified.    Functional Assessment ADLs    Are there any barriers preventing you from cooking for yourself or meeting nutritional needs?  No.    Are there any barriers preventing you from driving safely or obtaining transportation?  No.    Are there any barriers preventing you from using a telephone or calling for help?  No.    Are there any barriers preventing you from shopping?  No.    Are there any barriers preventing you from taking care of your own finances?  No.    Are there any barriers preventing you from managing your  medications?  No.    Are there any barriers preventing you from showering, bathing or dressing yourself?  No.    Are you currently engaging in any exercise or physical activity?  Yes.  500 Press Ups and 500 Press Ups per day and walking  What is your perception of your health?  Fair.    Health Maintenance Summary                Annual Wellness Visit Overdue 1950     IMM DTaP/Tdap/Td Vaccine Postponed 2020 Originally 4/3/1962. Insurance/Financial     Done 12/15/2017 Imm Admin: TD Vaccine    COLONOSCOPY Postponed 2021 Originally 4/3/2001. Patient Refused    IMM PNEUMOCOCCAL VACCINE: 65+ Years Postponed 8/15/2023 Originally 4/3/2016. Patient Refused    IMM ZOSTER VACCINES Postponed 2024 Originally 4/3/2001. System: vaccine not available, other system reasons    IMM INFLUENZA Postponed 2025 Originally 2019. Patient Refused          Patient Care Team:  LAMAR Lennon as PCP - General (Internal Medicine)  Manuel Flores M.D. (Cardiology)  Candelaria Crawford M.D. (Surgery)  [facility] as Consulting Physician  Guillermo Butt M.D. as Consulting Physician (Ophthalmology)    Social History     Tobacco Use   • Smoking status: Former Smoker     Years: 30.00     Types: Cigars     Last attempt to quit: 2011     Years since quittin.0   • Smokeless tobacco: Never Used   • Tobacco comment:    Substance Use Topics   • Alcohol use: No   • Drug use: No     Family History   Problem Relation Age of Onset   • Heart Attack Mother    • Heart Disease Mother    • Cancer Mother         LYKIMA    • Dementia Mother      He  has a past medical history of ASTHMA, CAD (coronary artery disease) (2011), CATARACT, Colorblind, Dental disorder, Essential hypertension, Heart burn, High cholesterol, Hyperlipidemia (2011), Indigestion, Infectious disease, Myocardial infarct (HCC) (), Pain (11), Personal history of venous thrombosis and embolism (), Pneumonia (),  Presence of stent in left circumflex coronary artery, Unspecified hemorrhagic conditions, Venous insufficiency of both lower extremities, and White coat syndrome with diagnosis of hypertension.   Past Surgical History:   Procedure Laterality Date   • FEMORAL ENDARTERECTOMY Left 9/20/2019    Procedure: ENDARTERECTOMY, FEMORAL;  Surgeon: Candelaria Crawford M.D.;  Location: SURGERY [facility];  Service: General   • VENTRAL HERNIA REPAIR  11/30/2011    Performed by TROY GONZALEZ at SURGERY [facility]   • INGUINAL HERNIA REPAIR  11/30/2011    Performed by TROY GONZALEZ at SURGERY [facility]   • CATARACT PHACO WITH IOL  11/23/2010    Performed by RM WILLIS at SURGERY SAME DAY [facility]   • CATARACT PHACO WITH IOL  11/2/2010    Performed by RM WILLIS at SURGERY SAME DAY [facility]   • OTHER CARDIAC SURGERY  2005    STENTS x 4   • SINUS TREPHINE FRONTAL  1971   • SINUSOTOMIES  08/1963   • INGUINAL HERNIA REPAIR  1962    WITH UNDESCENDED TESTICLE   • TONSILLECTOMY AND ADENOIDECTOMY  1957           Exam:     There were no vitals taken for this visit. There is no height or weight on file to calculate BMI.    Hearing good.    Dentition fair  Alert, oriented in no acute distress.  Eye contact is good, speech goal directed, affect calm      Assessment and Plan. The following treatment and monitoring plan is recommended:       1. Medicare annual wellness visit, subsequent  Annual wellness topics discussed, review of chronic medical problems completed    - Initial Annual Wellness Visit - Includes PPPS ()    2. Essential hypertension  I discussed with patient that he may wish to consider going on a low-dose beta-blocker but at this point he states he will wait and talk with his cardiologist with his appointment next month    3. Presence of stent in LAD coronary artery  Patient doing well on Crestor    4. Dyslipidemia  Patient on Crestor and reports no myalgias    5. Coronary artery disease due  to lipid rich plaque  Patient on Crestor and baby aspirin for anticoagulation    6. Peripheral artery disease (HCC)  Patient follows with Dr. Crawford    7. Iron deficiency anemia due to chronic blood loss  I told patient to be sure he makes his appointment with GI to find out why he is having iron deficiency anemia and probably needs a scoping.  He will start on some over-the-counter iron.    8. Edema of leg  Patient will continue to follow with the vascular surgeon      Services suggested: No services needed at this time  Health Care Screening recommendations as per orders if indicated.  Referrals offered: PT/OT/Nutrition counseling/Behavioral Health/Smoking cessation as per orders if indicated.    Discussion today about general wellness and lifestyle habits:    · Prevent falls and reduce trip hazards; Cautioned about securing or removing rugs.  · Have a working fire alarm and carbon monoxide detector;   · Engage in regular physical activity and social activities       Follow-up: No follow-ups on file.

## 2019-12-23 DIAGNOSIS — M25.562 ACUTE PAIN OF BOTH KNEES: ICD-10-CM

## 2019-12-23 DIAGNOSIS — M25.561 ACUTE PAIN OF BOTH KNEES: ICD-10-CM

## 2020-02-21 ENCOUNTER — OFFICE VISIT (OUTPATIENT)
Dept: MEDICAL GROUP | Facility: MEDICAL CENTER | Age: 70
End: 2020-02-21
Payer: MEDICARE

## 2020-02-21 VITALS
HEART RATE: 89 BPM | DIASTOLIC BLOOD PRESSURE: 72 MMHG | OXYGEN SATURATION: 94 % | TEMPERATURE: 98.2 F | RESPIRATION RATE: 16 BRPM | HEIGHT: 68 IN | SYSTOLIC BLOOD PRESSURE: 122 MMHG | BODY MASS INDEX: 36.68 KG/M2 | WEIGHT: 242 LBS

## 2020-02-21 DIAGNOSIS — M25.562 CHRONIC PAIN OF LEFT KNEE: ICD-10-CM

## 2020-02-21 DIAGNOSIS — I73.9 PERIPHERAL ARTERY DISEASE (HCC): Chronic | ICD-10-CM

## 2020-02-21 DIAGNOSIS — I25.83 CORONARY ARTERY DISEASE DUE TO LIPID RICH PLAQUE: ICD-10-CM

## 2020-02-21 DIAGNOSIS — I10 ESSENTIAL HYPERTENSION: Chronic | ICD-10-CM

## 2020-02-21 DIAGNOSIS — I25.10 CORONARY ARTERY DISEASE DUE TO LIPID RICH PLAQUE: ICD-10-CM

## 2020-02-21 DIAGNOSIS — E78.5 DYSLIPIDEMIA: ICD-10-CM

## 2020-02-21 DIAGNOSIS — D50.0 IRON DEFICIENCY ANEMIA DUE TO CHRONIC BLOOD LOSS: ICD-10-CM

## 2020-02-21 DIAGNOSIS — I87.2 VENOUS INSUFFICIENCY OF BOTH LOWER EXTREMITIES: Chronic | ICD-10-CM

## 2020-02-21 DIAGNOSIS — G89.29 CHRONIC PAIN OF LEFT KNEE: ICD-10-CM

## 2020-02-21 DIAGNOSIS — Z12.5 SCREENING FOR PROSTATE CANCER: ICD-10-CM

## 2020-02-21 PROCEDURE — 99213 OFFICE O/P EST LOW 20 MIN: CPT | Performed by: NURSE PRACTITIONER

## 2020-02-21 PROCEDURE — 8041 PR SCP AHA: Performed by: NURSE PRACTITIONER

## 2020-02-21 ASSESSMENT — PATIENT HEALTH QUESTIONNAIRE - PHQ9: CLINICAL INTERPRETATION OF PHQ2 SCORE: 0

## 2020-02-21 NOTE — PROGRESS NOTES
Annual Health Assessment Questions:    1.  Are you currently engaging in any exercise or physical activity? Yes    2.  How would you describe your mood or emotional well-being today? good    3.  Have you had any falls in the last year? No    4.  Have you noticed any problems with your balance or had difficulty walking? Yes    5.  In the last six months have you experienced any leakage of urine? No    6. DPA/Advanced Directive: Patient does not have an Advanced Directive.  A packet and workshop information was given on Advanced Directives.

## 2020-02-21 NOTE — PROGRESS NOTES
Subjective:      Polo Pyle is a 69 y.o. male who presents with Knee Pain (left one)        CC: Patient is here for 6-month follow-up on issues including anemia knee pain and venous and arterial circulation issues.    HPI       1. Peripheral artery disease (HCC)  Patient follows with Dr. Crawford and has had previous surgery and he states he has imaging pending next week to decide whether further surgery is needed.  He states he has chronic swelling of his lower extremities, worse on the left side.    2. Essential hypertension  Patient's blood pressure is currently controlled off his medication    3. Dyslipidemia  Patient takes Crestor 20 mg and finds this helpful and he is due for lab work.    4. Iron deficiency anemia due to chronic blood loss  Patient has history of iron deficiency anemia which has been stable and for which she is taking iron.  He has been going [facility] but apparently did not like it there and states he would never go back.  He was referred to [facility] but apparently they would not see him because of these issues.  He then requested and received a referral to a GI with [facility] but they are out of network and therefore he has not been to anybody.  He states he is not having bloody stools or black stools or indigestion.    5. Venous insufficiency of both lower extremities  Patient also being followed by Dr. Crawford for this as well.  He has not smoked cigarettes in years.  He is on Crestor and aspirin.    6. Coronary artery disease due to lipid rich plaque  Patient is due for his yearly follow-up with cardiology.  It appears he has been stable and was also advised by cardiology to see GI    7. Chronic pain of left knee  Patient states he has history of chronic left knee problems without history of trauma.  He states he received a steroid injection at one time which was helpful but it wore off after a month or 2.  He would like to get back to  orthopedics to see about options for this recurring knee pain    8. Screening for prostate cancer  Patient due for screening  Past Medical History:   Diagnosis Date   • ASTHMA     as a youth   • CAD (coronary artery disease) 2011   • CATARACT     bilateral IOL   • Colorblind     problems with red/green   • Dental disorder     upper and partial lower   • Essential hypertension    • Heart burn    • High cholesterol    • Hyperlipidemia 2011   • Indigestion    • Infectious disease     Hx of staph infections- last infection -    • Myocardial infarct (HCC) 2005    STENTS   • Pain 11    NECK 7.5/10; C5-C6   • Personal history of venous thrombosis and embolism     DVT 2019   • Pneumonia    • Presence of stent in left circumflex coronary artery    • Unspecified hemorrhagic conditions     TAKES ASA . PROLONGED BLEEDING, nosebleeds   • Venous insufficiency of both lower extremities    • White coat syndrome with diagnosis of hypertension      Social History     Socioeconomic History   • Marital status:      Spouse name: Not on file   • Number of children: Not on file   • Years of education: Not on file   • Highest education level: Not on file   Occupational History   • Not on file   Social Needs   • Financial resource strain: Not on file   • Food insecurity     Worry: Not on file     Inability: Not on file   • Transportation needs     Medical: Not on file     Non-medical: Not on file   Tobacco Use   • Smoking status: Former Smoker     Years: 30.00     Types: Cigars     Last attempt to quit: 2011     Years since quittin.3   • Smokeless tobacco: Never Used   • Tobacco comment:    Substance and Sexual Activity   • Alcohol use: No   • Drug use: No   • Sexual activity: Not Currently     Partners: Female   Lifestyle   • Physical activity     Days per week: Not on file     Minutes per session: Not on file   • Stress: Not on file   Relationships   • Social connections     Talks on  "phone: Not on file     Gets together: Not on file     Attends Congregational service: Not on file     Active member of club or organization: Not on file     Attends meetings of clubs or organizations: Not on file     Relationship status: Not on file   • Intimate partner violence     Fear of current or ex partner: Not on file     Emotionally abused: Not on file     Physically abused: Not on file     Forced sexual activity: Not on file   Other Topics Concern   • Not on file   Social History Narrative   • Not on file     Current Outpatient Medications   Medication Sig Dispense Refill   • Diclofenac Sodium 1 % Gel Apply 2 g to skin as directed 3 times a day as needed. 1 Tube 11   • rosuvastatin (CRESTOR) 20 MG Tab Take 20 mg by mouth every evening.     • Coenzyme Q10 (CO Q-10) 300 MG Cap Take 1 Cap by mouth every day.     • niacin (NIASPAN) 1000 MG CR tablet Take 2,000 mg by mouth every evening.     • acetaminophen (TYLENOL) 325 MG Tab Take 650 mg by mouth every four hours as needed.     • Omega-3 Fatty Acids (FISH OIL) 1000 MG Cap capsule Take 1,000 mg by mouth every day.     • Ascorbic Acid (VITAMIN C PO) Take 6,000 mg by mouth every day.     • Multiple Vitamin (MULTIVITAMIN PO) Take 1 Tab by mouth every day.     • aspirin 81 MG tablet Take 81 mg by mouth every day.       No current facility-administered medications for this visit.      Family History   Problem Relation Age of Onset   • Heart Attack Mother    • Heart Disease Mother    • Cancer Mother         GENET    • Dementia Mother          Review of Systems   Cardiovascular: Positive for leg swelling.   Musculoskeletal: Positive for joint pain.   Skin: Positive for rash.   All other systems reviewed and are negative.         Objective:     /72 (BP Location: Right arm, Patient Position: Sitting, BP Cuff Size: Adult)   Pulse 89   Temp 36.8 °C (98.2 °F) (Temporal)   Resp 16   Ht 1.727 m (5' 8\")   Wt 109.8 kg (242 lb)   SpO2 94%   BMI 36.80 kg/m²  "     Physical Exam  Vitals signs and nursing note reviewed.   Constitutional:       General: He is not in acute distress.     Appearance: He is well-developed. He is not diaphoretic.   HENT:      Head: Normocephalic and atraumatic.      Right Ear: External ear normal.      Left Ear: External ear normal.      Nose: Nose normal.   Eyes:      General:         Right eye: No discharge.         Left eye: No discharge.      Conjunctiva/sclera: Conjunctivae normal.   Neck:      Musculoskeletal: Normal range of motion and neck supple.      Thyroid: No thyromegaly.      Trachea: No tracheal deviation.   Cardiovascular:      Rate and Rhythm: Normal rate and regular rhythm.      Heart sounds: Normal heart sounds. No murmur.      Comments: Bilateral lower extremity edema, worse on the left side.  Hyperpigmentation of the lower extremities.  Pulmonary:      Effort: Pulmonary effort is normal. No respiratory distress.      Breath sounds: Normal breath sounds. No wheezing or rales.   Musculoskeletal:      Left knee: He exhibits decreased range of motion. Tenderness found.      Right lower leg: Edema present.      Left lower leg: Edema present.   Lymphadenopathy:      Cervical: No cervical adenopathy.   Skin:     General: Skin is warm and dry.      Findings: No erythema or rash.   Neurological:      Mental Status: He is alert and oriented to person, place, and time.      Coordination: Coordination normal.   Psychiatric:         Behavior: Behavior normal.         Thought Content: Thought content normal.         Judgment: Judgment normal.               Annual Health Assessment Questions:    1.  Are you currently engaging in any exercise or physical activity? Yes    2.  How would you describe your mood or emotional well-being today? good    3.  Have you had any falls in the last year? No    4.  Have you noticed any problems with your balance or had difficulty walking? Yes    5.  In the last six months have you experienced any leakage of  urine? No    6. DPA/Advanced Directive: Patient does not have an Advanced Directive.  A packet and workshop information was given on Advanced Directives.  Assessment/Plan:       1. Peripheral artery disease (HCC)  Patient has known history of lower extremity arterial disease for which he follows with Dr. Crawford and apparently is having imaging done in the near future to determine if future surgery is needed.  He still has circulation issues and swelling issues.    2. Essential hypertension  Blood pressure appears controlled and he is not on any current medicines  - Comp Metabolic Panel; Future    3. Dyslipidemia  Patient will do lipid panel to see if he is getting adequate dosing of Crestor  - Lipid Profile; Future    4. Iron deficiency anemia due to chronic blood loss  Patient really should see gastroenterology and have scoping to determine the cause of his anemia but he unfortunately will not go to [facility] and apparently [facility] will not see him.  I told him to keep trying to get into them and he does not want to pay cash or go out of state for this.  I will recheck his levels and he will continue on his iron  - CBC WITHOUT DIFFERENTIAL; Future  - IRON/TOTAL IRON BIND; Future    5. Venous insufficiency of both lower extremities  Patient will follow with Dr. Crawford continue on a statin.  He is not currently on anticoagulation.    6. Coronary artery disease due to lipid rich plaque  Patient to make his yearly follow-up with cardiology.  He states he is asymptomatic    7. Chronic pain of left knee  Patient will be referred to orthopedics per his request to evaluate his left knee pain and see if he is a candidate for Kenalog injection  - REFERRAL TO ORTHOPEDICS    8. Screening for prostate cancer    - PROSTATE SPECIFIC AG SCREENING; Future

## 2020-03-14 ENCOUNTER — HOSPITAL ENCOUNTER (OUTPATIENT)
Dept: LAB | Facility: MEDICAL CENTER | Age: 70
End: 2020-03-14
Attending: NURSE PRACTITIONER
Payer: MEDICARE

## 2020-03-14 DIAGNOSIS — D50.0 IRON DEFICIENCY ANEMIA DUE TO CHRONIC BLOOD LOSS: ICD-10-CM

## 2020-03-14 DIAGNOSIS — E78.5 DYSLIPIDEMIA: ICD-10-CM

## 2020-03-14 DIAGNOSIS — I10 ESSENTIAL HYPERTENSION: Chronic | ICD-10-CM

## 2020-03-14 DIAGNOSIS — Z12.5 SCREENING FOR PROSTATE CANCER: ICD-10-CM

## 2020-03-14 LAB
ALBUMIN SERPL BCP-MCNC: 4.3 G/DL (ref 3.2–4.9)
ALBUMIN/GLOB SERPL: 1.4 G/DL
ALP SERPL-CCNC: 68 U/L (ref 30–99)
ALT SERPL-CCNC: 21 U/L (ref 2–50)
ANION GAP SERPL CALC-SCNC: 10 MMOL/L (ref 7–16)
AST SERPL-CCNC: 25 U/L (ref 12–45)
BILIRUB SERPL-MCNC: 1.6 MG/DL (ref 0.1–1.5)
BUN SERPL-MCNC: 28 MG/DL (ref 8–22)
CALCIUM SERPL-MCNC: 10.3 MG/DL (ref 8.5–10.5)
CHLORIDE SERPL-SCNC: 104 MMOL/L (ref 96–112)
CHOLEST SERPL-MCNC: 144 MG/DL (ref 100–199)
CO2 SERPL-SCNC: 27 MMOL/L (ref 20–33)
CREAT SERPL-MCNC: 1.33 MG/DL (ref 0.5–1.4)
ERYTHROCYTE [DISTWIDTH] IN BLOOD BY AUTOMATED COUNT: 58.2 FL (ref 35.9–50)
GLOBULIN SER CALC-MCNC: 3 G/DL (ref 1.9–3.5)
GLUCOSE SERPL-MCNC: 98 MG/DL (ref 65–99)
HCT VFR BLD AUTO: 49.4 % (ref 42–52)
HDLC SERPL-MCNC: 63 MG/DL
HGB BLD-MCNC: 16.1 G/DL (ref 14–18)
IRON SATN MFR SERPL: 23 % (ref 15–55)
IRON SERPL-MCNC: 88 UG/DL (ref 50–180)
LDLC SERPL CALC-MCNC: 66 MG/DL
MCH RBC QN AUTO: 30 PG (ref 27–33)
MCHC RBC AUTO-ENTMCNC: 32.6 G/DL (ref 33.7–35.3)
MCV RBC AUTO: 92 FL (ref 81.4–97.8)
PLATELET # BLD AUTO: 166 K/UL (ref 164–446)
PMV BLD AUTO: 10.3 FL (ref 9–12.9)
POTASSIUM SERPL-SCNC: 4.6 MMOL/L (ref 3.6–5.5)
PROT SERPL-MCNC: 7.3 G/DL (ref 6–8.2)
PSA SERPL-MCNC: 2.34 NG/ML (ref 0–4)
RBC # BLD AUTO: 5.37 M/UL (ref 4.7–6.1)
SODIUM SERPL-SCNC: 141 MMOL/L (ref 135–145)
TIBC SERPL-MCNC: 379 UG/DL (ref 250–450)
TRIGL SERPL-MCNC: 73 MG/DL (ref 0–149)
WBC # BLD AUTO: 7.1 K/UL (ref 4.8–10.8)

## 2020-03-14 PROCEDURE — 84153 ASSAY OF PSA TOTAL: CPT

## 2020-03-14 PROCEDURE — 83550 IRON BINDING TEST: CPT

## 2020-03-14 PROCEDURE — 80053 COMPREHEN METABOLIC PANEL: CPT

## 2020-03-14 PROCEDURE — 36415 COLL VENOUS BLD VENIPUNCTURE: CPT

## 2020-03-14 PROCEDURE — 85027 COMPLETE CBC AUTOMATED: CPT

## 2020-03-14 PROCEDURE — 83540 ASSAY OF IRON: CPT

## 2020-03-14 PROCEDURE — 80061 LIPID PANEL: CPT

## 2020-03-26 NOTE — PROGRESS NOTES
Outcome: SCPPA Intro call - Requested call back     Please transfer to Patient Outreach Team at [phone] when patient returns call.    [facility] Verified: yes    Attempt # 1

## 2020-04-03 ENCOUNTER — OFFICE VISIT (OUTPATIENT)
Dept: CARDIOLOGY | Facility: MEDICAL CENTER | Age: 70
End: 2020-04-03
Payer: MEDICARE

## 2020-04-03 VITALS — BODY MASS INDEX: 36.8 KG/M2 | HEIGHT: 68 IN

## 2020-04-03 DIAGNOSIS — I10 ESSENTIAL HYPERTENSION: Chronic | ICD-10-CM

## 2020-04-03 DIAGNOSIS — Z95.5 PRESENCE OF STENT IN LAD CORONARY ARTERY: ICD-10-CM

## 2020-04-03 DIAGNOSIS — Z95.5 PRESENCE OF STENT IN LEFT CIRCUMFLEX CORONARY ARTERY: Chronic | ICD-10-CM

## 2020-04-03 DIAGNOSIS — R60.0 EDEMA OF LEG: ICD-10-CM

## 2020-04-03 DIAGNOSIS — I25.10 CORONARY ARTERY DISEASE DUE TO LIPID RICH PLAQUE: ICD-10-CM

## 2020-04-03 DIAGNOSIS — I87.2 VENOUS INSUFFICIENCY OF BOTH LOWER EXTREMITIES: Chronic | ICD-10-CM

## 2020-04-03 DIAGNOSIS — I73.9 PERIPHERAL ARTERY DISEASE (HCC): Chronic | ICD-10-CM

## 2020-04-03 DIAGNOSIS — I25.83 CORONARY ARTERY DISEASE DUE TO LIPID RICH PLAQUE: ICD-10-CM

## 2020-04-03 DIAGNOSIS — E78.5 DYSLIPIDEMIA: ICD-10-CM

## 2020-04-03 PROCEDURE — 99442 PR PHYSICIAN TELEPHONE EVALUATION 11-20 MIN: CPT | Mod: CR | Performed by: INTERNAL MEDICINE

## 2020-04-03 RX ORDER — ROSUVASTATIN CALCIUM 20 MG/1
20 TABLET, COATED ORAL EVERY EVENING
Qty: 90 TAB | Refills: 3 | Status: SHIPPED | OUTPATIENT
Start: 2020-04-03 | End: 2021-08-12 | Stop reason: SDUPTHER

## 2020-04-03 NOTE — PROGRESS NOTES
Chief Complaint   Patient presents with   • Follow-Up     1 year Follow Up       Subjective:   Polo Pyle is a 70 y.o. male who presents today for follow-up of his history of coronary disease status post remote stenting    He follows closely with vascular surgery as well as been doing well just had a checkup his blood pressure there was good    Past Medical History:   Diagnosis Date   • ASTHMA     as a youth   • CAD (coronary artery disease) 8/26/2011   • CATARACT     bilateral IOL   • Colorblind     problems with red/green   • Dental disorder     upper and partial lower   • Essential hypertension    • Heart burn    • High cholesterol    • Hyperlipidemia 8/26/2011   • Indigestion    • Infectious disease     Hx of staph infections- last infection 2018-    • Myocardial infarct (HCC) 2005    STENTS   • Pain 11/14/11    NECK 7.5/10; C5-C6   • Personal history of venous thrombosis and embolism 2006    DVT 01/2019   • Pneumonia 2009   • Presence of stent in left circumflex coronary artery    • Unspecified hemorrhagic conditions     TAKES ASA . PROLONGED BLEEDING, nosebleeds   • Venous insufficiency of both lower extremities    • White coat syndrome with diagnosis of hypertension      Past Surgical History:   Procedure Laterality Date   • FEMORAL ENDARTERECTOMY Left 9/20/2019    Procedure: ENDARTERECTOMY, FEMORAL;  Surgeon: Candelaria Crawford M.D.;  Location: SURGERY [facility];  Service: General   • VENTRAL HERNIA REPAIR  11/30/2011    Performed by TROY GONZALEZ at SURGERY [facility]   • INGUINAL HERNIA REPAIR  11/30/2011    Performed by TROY GONZALEZ at SURGERY [facility]   • CATARACT PHACO WITH IOL  11/23/2010    Performed by RM WILLIS at SURGERY SAME DAY [facility]   • CATARACT PHACO WITH IOL  11/2/2010    Performed by RM WILLIS at SURGERY SAME DAY [facility]   • OTHER CARDIAC SURGERY  2005    STENTS x 4   • SINUS TREPHINE FRONTAL  1971   • SINUSOTOMIES  08/1963   • INGUINAL  "HERNIA REPAIR      WITH UNDESCENDED TESTICLE   • TONSILLECTOMY AND ADENOIDECTOMY       Family History   Problem Relation Age of Onset   • Heart Attack Mother    • Heart Disease Mother    • Cancer Mother         LYKIMA    • Dementia Mother      Social History     Socioeconomic History   • Marital status:      Spouse name: Not on file   • Number of children: Not on file   • Years of education: Not on file   • Highest education level: Not on file   Occupational History   • Not on file   Social Needs   • Financial resource strain: Not on file   • Food insecurity     Worry: Not on file     Inability: Not on file   • Transportation needs     Medical: Not on file     Non-medical: Not on file   Tobacco Use   • Smoking status: Former Smoker     Years: 30.00     Types: Cigars     Last attempt to quit: 2011     Years since quittin.4   • Smokeless tobacco: Never Used   • Tobacco comment:    Substance and Sexual Activity   • Alcohol use: No   • Drug use: No   • Sexual activity: Not Currently     Partners: Female   Lifestyle   • Physical activity     Days per week: Not on file     Minutes per session: Not on file   • Stress: Not on file   Relationships   • Social connections     Talks on phone: Not on file     Gets together: Not on file     Attends [employer] service: Not on file     Active member of club or organization: Not on file     Attends meetings of clubs or organizations: Not on file     Relationship status: Not on file   • Intimate partner violence     Fear of current or ex partner: Not on file     Emotionally abused: Not on file     Physically abused: Not on file     Forced sexual activity: Not on file   Other Topics Concern   • Not on file   Social History Narrative   • Not on file     Allergies   Allergen Reactions   • Mercury Swelling     And mercury based preservatives-Thimerasol  Swelling, \"strange discharge from eyes\"   • Other Environmental Anaphylaxis     Insect toxins/ bees and " "wasps- Anaphylaxis   • Sulfa Drugs Anaphylaxis and Swelling   • Pravastatin Diarrhea     Diarrhea      Outpatient Encounter Medications as of 4/3/2020   Medication Sig Dispense Refill   • Diclofenac Sodium 1 % Gel Apply 2 g to skin as directed 3 times a day as needed. 1 Tube 11   • rosuvastatin (CRESTOR) 20 MG Tab Take 20 mg by mouth every evening.     • Coenzyme Q10 (CO Q-10) 300 MG Cap Take 1 Cap by mouth every day.     • niacin (NIASPAN) 1000 MG CR tablet Take 2,000 mg by mouth every evening.     • acetaminophen (TYLENOL) 325 MG Tab Take 650 mg by mouth every four hours as needed.     • Omega-3 Fatty Acids (FISH OIL) 1000 MG Cap capsule Take 1,000 mg by mouth every day.     • Ascorbic Acid (VITAMIN C PO) Take 6,000 mg by mouth every day.     • Multiple Vitamin (MULTIVITAMIN PO) Take 1 Tab by mouth every day.     • aspirin 81 MG tablet Take 81 mg by mouth every day.       No facility-administered encounter medications on file as of 4/3/2020.      ROS     Objective:   BP (!) 0/0 (BP Location: Left arm, Patient Position: Sitting, BP Cuff Size: Adult)   Ht 1.727 m (5' 8\")   BMI 36.80 kg/m²     Physical Exam    This visit was conducted entirely over the phone due to the infection concerns from COVID 19, the vital signs recorded are self-reported by the patient from today and prior recent record    We reviewed in person the most recent labs  Recent Results (from the past 4032 hour(s))   CBC WITH DIFFERENTIAL    Collection Time: 10/21/19  1:51 PM   Result Value Ref Range    WBC 9.9 4.8 - 10.8 K/uL    RBC 4.39 (L) 4.70 - 6.10 M/uL    Hemoglobin 10.5 (L) 14.0 - 18.0 g/dL    Hematocrit 36.6 (L) 42.0 - 52.0 %    MCV 83.4 81.4 - 97.8 fL    MCH 23.9 (L) 27.0 - 33.0 pg    MCHC 28.7 (L) 33.7 - 35.3 g/dL    RDW 50.4 (H) 35.9 - 50.0 fL    Platelet Count 317 164 - 446 K/uL    MPV 9.9 9.0 - 12.9 fL    Neutrophils-Polys 69.00 44.00 - 72.00 %    Lymphocytes 20.00 (L) 22.00 - 41.00 %    Monocytes 6.50 0.00 - 13.40 %    Eosinophils " 2.90 0.00 - 6.90 %    Basophils 0.70 0.00 - 1.80 %    Immature Granulocytes 0.90 0.00 - 0.90 %    Nucleated RBC 0.00 /100 WBC    Neutrophils (Absolute) 6.83 1.82 - 7.42 K/uL    Lymphs (Absolute) 1.98 1.00 - 4.80 K/uL    Monos (Absolute) 0.64 0.00 - 0.85 K/uL    Eos (Absolute) 0.29 0.00 - 0.51 K/uL    Baso (Absolute) 0.07 0.00 - 0.12 K/uL    Immature Granulocytes (abs) 0.09 0.00 - 0.11 K/uL    NRBC (Absolute) 0.00 K/uL    Anisocytosis 1+     Microcytosis 1+    HEP C VIRUS ANTIBODY    Collection Time: 10/21/19  1:51 PM   Result Value Ref Range    Hepatitis C Antibody Negative Negative   FERRITIN    Collection Time: 10/21/19  1:51 PM   Result Value Ref Range    Ferritin 33.7 22.0 - 322.0 ng/mL   IRON/TOTAL IRON BIND    Collection Time: 10/21/19  1:51 PM   Result Value Ref Range    Iron 26 (L) 50 - 180 ug/dL    Total Iron Binding 503 (H) 250 - 450 ug/dL    % Saturation 5 (L) 15 - 55 %   PERIPHERAL SMEAR REVIEW    Collection Time: 10/21/19  1:51 PM   Result Value Ref Range    Peripheral Smear Review see below    PLATELET ESTIMATE    Collection Time: 10/21/19  1:51 PM   Result Value Ref Range    Plt Estimation Normal    MORPHOLOGY    Collection Time: 10/21/19  1:51 PM   Result Value Ref Range    RBC Morphology Present     Polychromia 1+     Poikilocytosis 2+     Echinocytes 2+    DIFFERENTIAL COMMENT    Collection Time: 10/21/19  1:51 PM   Result Value Ref Range    Comments-Diff see below    PROSTATE SPECIFIC AG SCREENING    Collection Time: 03/14/20 10:02 AM   Result Value Ref Range    Prostatic Specific Antigen Tot 2.34 0.00 - 4.00 ng/mL   Lipid Profile    Collection Time: 03/14/20 10:02 AM   Result Value Ref Range    Cholesterol,Tot 144 100 - 199 mg/dL    Triglycerides 73 0 - 149 mg/dL    HDL 63 >=40 mg/dL    LDL 66 <100 mg/dL   Comp Metabolic Panel    Collection Time: 03/14/20 10:02 AM   Result Value Ref Range    Sodium 141 135 - 145 mmol/L    Potassium 4.6 3.6 - 5.5 mmol/L    Chloride 104 96 - 112 mmol/L    Co2 27 20  - 33 mmol/L    Anion Gap 10.0 7.0 - 16.0    Glucose 98 65 - 99 mg/dL    Bun 28 (H) 8 - 22 mg/dL    Creatinine 1.33 0.50 - 1.40 mg/dL    Calcium 10.3 8.5 - 10.5 mg/dL    AST(SGOT) 25 12 - 45 U/L    ALT(SGPT) 21 2 - 50 U/L    Alkaline Phosphatase 68 30 - 99 U/L    Total Bilirubin 1.6 (H) 0.1 - 1.5 mg/dL    Albumin 4.3 3.2 - 4.9 g/dL    Total Protein 7.3 6.0 - 8.2 g/dL    Globulin 3.0 1.9 - 3.5 g/dL    A-G Ratio 1.4 g/dL   IRON/TOTAL IRON BIND    Collection Time: 03/14/20 10:02 AM   Result Value Ref Range    Iron 88 50 - 180 ug/dL    Total Iron Binding 379 250 - 450 ug/dL    % Saturation 23 15 - 55 %   CBC WITHOUT DIFFERENTIAL    Collection Time: 03/14/20 10:02 AM   Result Value Ref Range    WBC 7.1 4.8 - 10.8 K/uL    RBC 5.37 4.70 - 6.10 M/uL    Hemoglobin 16.1 14.0 - 18.0 g/dL    Hematocrit 49.4 42.0 - 52.0 %    MCV 92.0 81.4 - 97.8 fL    MCH 30.0 27.0 - 33.0 pg    MCHC 32.6 (L) 33.7 - 35.3 g/dL    RDW 58.2 (H) 35.9 - 50.0 fL    Platelet Count 166 164 - 446 K/uL    MPV 10.3 9.0 - 12.9 fL   ESTIMATED GFR    Collection Time: 03/14/20 10:02 AM   Result Value Ref Range    GFR If African American >60 >60 mL/min/1.73 m 2    GFR If Non  53 (A) >60 mL/min/1.73 m 2       Assessment:     1. Peripheral artery disease (HCC)     2. Presence of stent in LAD coronary artery     3. Essential hypertension     4. Edema of leg     5. Dyslipidemia     6. Coronary artery disease due to lipid rich plaque     7. Venous insufficiency of both lower extremities     8. Presence of stent in left circumflex coronary artery         Medical Decision Making:  Today's Assessment / Status / Plan:     It was my pleasure to meet with Mr. Pyle.    Blood pressure is well controlled.  We specifically assessed the labs on hypertension treatment    He was appropriately switched by vascular to higher dose statin he is tolerating that well I encouraged him to take even more to get his LDL even lower but he like to stay on this dose as he  had reaction in the past    I will see Mr. Pyle back in 1 year time and encouraged him to follow up with us over the phone or electronically using my MyChart as issues arise.    It is my pleasure to participate in the care of Mr. Pyle.  Please do not hesitate to contact me with questions or concerns.    Manuel Flores MD PhD [facility]  Cardiologist [facility]    Please note that this dictation was created using voice recognition software. There may be errors I did not discover before finalizing the note.       Telephone Appointment Visit   As a means of avoiding spread of COVID-19, this visit is being conducted by telephone. This telephone visit was initiated by the patient and they verbally consented.    Time at start of call: 930    Reason for Call:  Medication Follow-up and Symptom Follow-up    Patient Comments / History:        Labs / Images Reviewed        Assessment and Plan:     1. Peripheral artery disease (HCC)    2. Presence of stent in LAD coronary artery    3. Essential hypertension    4. Edema of leg    5. Dyslipidemia    6. Coronary artery disease due to lipid rich plaque    7. Venous insufficiency of both lower extremities    8. Presence of stent in left circumflex coronary artery      Follow-up: Return in about 1 year (around 4/3/2021).    Time at end of call: 943  Total Time Spent: 11-20 minutes    Manuel Flores M.D.

## 2020-04-03 NOTE — PROGRESS NOTES
1. HealthConnect Verified: yes    2. Verify PCP: yes    3. Review and add  to Care Team: yes    4. WebIZ Checked & Epic Updated: No WebIZ record  WebIZ Recommendations: Patient is up to date on all vaccines  Is patient due for Tdap? NO  Is patient due for Shingles? NO    5. Reviewed/Updated the following with patient:       •   Communication Preference Obtained? YES  • KIYATEChart Activation: already active       •   E-Mail Address Obtained? YES       •   Appointment Day and Time Preferences? YES       •   Preferred Pharmacy? YES       •   Preferred Lab? YES    6. Care Gap Scheduling (Attempt to Schedule EACH Overdue Care Gap!)

## 2020-04-03 NOTE — PROGRESS NOTES
Outcome: Left Message Birthday & Intro call     Please transfer to Patient Outreach Team at [phone] when patient returns call.    HealthConnect Verified: yes    Attempt # 3

## 2020-07-06 DIAGNOSIS — L97.911 ULCER OF RIGHT LOWER EXTREMITY, LIMITED TO BREAKDOWN OF SKIN (HCC): ICD-10-CM

## 2020-07-17 ENCOUNTER — OFFICE VISIT (OUTPATIENT)
Dept: WOUND CARE | Facility: MEDICAL CENTER | Age: 70
End: 2020-07-17
Attending: NURSE PRACTITIONER
Payer: MEDICARE

## 2020-07-17 ENCOUNTER — HOSPITAL ENCOUNTER (OUTPATIENT)
Facility: MEDICAL CENTER | Age: 70
End: 2020-07-17
Attending: NURSE PRACTITIONER
Payer: MEDICARE

## 2020-07-17 VITALS
TEMPERATURE: 97.4 F | OXYGEN SATURATION: 94 % | SYSTOLIC BLOOD PRESSURE: 134 MMHG | RESPIRATION RATE: 20 BRPM | DIASTOLIC BLOOD PRESSURE: 83 MMHG | HEART RATE: 92 BPM

## 2020-07-17 DIAGNOSIS — I10 ESSENTIAL HYPERTENSION: ICD-10-CM

## 2020-07-17 DIAGNOSIS — E78.5 DYSLIPIDEMIA: ICD-10-CM

## 2020-07-17 DIAGNOSIS — R68.89 ABNORMAL ANKLE BRACHIAL INDEX (ABI): ICD-10-CM

## 2020-07-17 DIAGNOSIS — R60.0 VENOUS STASIS ULCER OF RIGHT LOWER LEG WITH EDEMA OF RIGHT LOWER LEG (HCC): ICD-10-CM

## 2020-07-17 DIAGNOSIS — L08.9 WOUND INFECTION: ICD-10-CM

## 2020-07-17 DIAGNOSIS — I25.10 CORONARY ARTERY DISEASE DUE TO LIPID RICH PLAQUE: ICD-10-CM

## 2020-07-17 DIAGNOSIS — L97.919 VENOUS STASIS ULCER OF RIGHT LOWER LEG WITH EDEMA OF RIGHT LOWER LEG (HCC): ICD-10-CM

## 2020-07-17 DIAGNOSIS — D50.0 IRON DEFICIENCY ANEMIA DUE TO CHRONIC BLOOD LOSS: ICD-10-CM

## 2020-07-17 DIAGNOSIS — T14.8XXA WOUND INFECTION: ICD-10-CM

## 2020-07-17 DIAGNOSIS — R09.89 DECREASED PEDAL PULSES: ICD-10-CM

## 2020-07-17 DIAGNOSIS — R60.0 EDEMA OF LEG: ICD-10-CM

## 2020-07-17 DIAGNOSIS — Z95.5 PRESENCE OF STENT IN LAD CORONARY ARTERY: ICD-10-CM

## 2020-07-17 DIAGNOSIS — E66.9 OBESITY (BMI 35.0-39.9 WITHOUT COMORBIDITY): ICD-10-CM

## 2020-07-17 DIAGNOSIS — I73.9 PERIPHERAL ARTERY DISEASE (HCC): ICD-10-CM

## 2020-07-17 DIAGNOSIS — I83.019 VENOUS STASIS ULCER OF RIGHT LOWER LEG WITH EDEMA OF RIGHT LOWER LEG (HCC): ICD-10-CM

## 2020-07-17 DIAGNOSIS — L97.909 ARTERIAL LEG ULCER (HCC): ICD-10-CM

## 2020-07-17 DIAGNOSIS — I87.2 VENOUS INSUFFICIENCY OF BOTH LOWER EXTREMITIES: ICD-10-CM

## 2020-07-17 DIAGNOSIS — I83.891 VENOUS STASIS ULCER OF RIGHT LOWER LEG WITH EDEMA OF RIGHT LOWER LEG (HCC): ICD-10-CM

## 2020-07-17 DIAGNOSIS — I25.83 CORONARY ARTERY DISEASE DUE TO LIPID RICH PLAQUE: ICD-10-CM

## 2020-07-17 PROCEDURE — 87077 CULTURE AEROBIC IDENTIFY: CPT

## 2020-07-17 PROCEDURE — 87185 SC STD ENZYME DETCJ PER NZM: CPT

## 2020-07-17 PROCEDURE — 99214 OFFICE O/P EST MOD 30 MIN: CPT | Mod: 25

## 2020-07-17 PROCEDURE — 87205 SMEAR GRAM STAIN: CPT

## 2020-07-17 PROCEDURE — 87070 CULTURE OTHR SPECIMN AEROBIC: CPT

## 2020-07-17 PROCEDURE — 11045 DBRDMT SUBQ TISS EACH ADDL: CPT

## 2020-07-17 PROCEDURE — 11042 DBRDMT SUBQ TIS 1ST 20SQCM/<: CPT

## 2020-07-17 PROCEDURE — 87186 SC STD MICRODIL/AGAR DIL: CPT | Mod: XU

## 2020-07-17 RX ORDER — FERROUS SULFATE 325(65) MG
325 TABLET ORAL DAILY
COMMUNITY
End: 2021-06-11

## 2020-07-17 ASSESSMENT — PAIN SCALES - GENERAL: PAINLEVEL: 1=MINIMAL PAIN

## 2020-07-17 ASSESSMENT — ENCOUNTER SYMPTOMS
RESPIRATORY NEGATIVE: 1
GASTROINTESTINAL NEGATIVE: 1
EYES NEGATIVE: 1
PSYCHIATRIC NEGATIVE: 1
PND: 1
MUSCULOSKELETAL NEGATIVE: 1
CONSTITUTIONAL NEGATIVE: 1

## 2020-07-17 NOTE — PATIENT INSTRUCTIONS
Avoid prolonged standing or sitting without elevating your legs.  - Multilayer compression wrap to right leg. Do not get wet and keep on for the week. Only remove if temperature or sensation changes.   If compression needs to be removed, un-wrap it do not cut it off.     Should you experience any significant changes in your wound(s), such as infection (redness, swelling, localized heat, increased pain, fever > 101 F, chills) or have any questions regarding your home care instructions, please contact the wound center at [phone]. If after hours, contact your primary care physician or go to the hospital emergency room.   Keep dressing clean, dry and covered while bathing. Only change dressing if it becomes over saturated, soiled or falls off.

## 2020-07-17 NOTE — PROGRESS NOTES
Provider Encounter- Lower Extremity Ulcer      HISTORY OF PRESENT ILLNESS  Wound History:    START OF CARE IN CLINIC: 7/17/2020    REFERRING PROVIDER: Robin SHAIKH     WOUND:  Venous stasis ulcers   LOCATION: Right.  Gina-tibial   VARICOSE VEINS: None obvious    HISTORY: States that his wound spontaneously appeared approximately 2 weeks ago   PREVIOUS TREATMENT: Patient was using Honey med gel and Band-Aids   ETIOLOGY HISTORY: Diagnosed it is similar episode March 2019.  Evaluated by Dr. Crawford at                            that time  and underwent a left CFA endarterectomy 9/20/2019 for PAD with an IV demonstrating right                   SFA and popliteal occlusion.      IMAGING:  NIV results at Dr. Crawford's office   VASCULAR STUDIES: Same   DIABETES:  N/A   TOBACCO: Many years ago        Patient's problem list, allergies, and current medications reviewed and updated in Epic    Interval History:  7/17/2020 : Initial clinic visit with Dr. Nunez.  Today the patient denies chills fever cough shortness of breath loss of smell body aches or any other signs of viral infection.  He states he is not sure how the ulcer did occur they just sort of appeared he has had problems with venous stasis ulcers in the past.  REVIEW OF SYSTEMS:   Review of Systems   Constitutional: Negative.    HENT: Negative.    Eyes: Negative.    Respiratory: Negative.    Cardiovascular: Positive for leg swelling and PND.   Gastrointestinal: Negative.    Genitourinary: Negative.    Musculoskeletal: Negative.    Skin: Negative.         Venous stasis changes lower extremities bilaterally and ulcers right lower extremity   Endo/Heme/Allergies: Negative.    Psychiatric/Behavioral: Negative.        PHYSICAL EXAMINATION:   /83 (BP Location: Left arm, Patient Position: Sitting, BP Cuff Size: Adult)   Pulse 92   Temp 36.3 °C (97.4 °F) (Temporal)   Resp 20   SpO2 94%     Physical Exam  Constitutional:       Appearance: He is obese.    HENT:      Head: Normocephalic.      Nose: Nose normal.   Eyes:      Extraocular Movements: Extraocular movements intact.      Pupils: Pupils are equal, round, and reactive to light.   Neck:      Musculoskeletal: Normal range of motion.   Cardiovascular:      Rate and Rhythm: Regular rhythm.      Pulses: Normal pulses.   Abdominal:      Comments: Obese   Musculoskeletal:      Right lower leg: Edema present.   Skin:     General: Skin is warm and dry.      Findings: Lesion present.   Neurological:      Mental Status: He is oriented to person, place, and time.   Psychiatric:         Mood and Affect: Mood normal.         Behavior: Behavior normal.       WOUND ASSESSMENT         Wound 07/17/20 Pretibial Right lower leg anterior (Active)   Wound Image   07/17/20 1428   Site Assessment Red 07/17/20 1400   Periwound Assessment Blanchable erythema;Edema 07/17/20 1400   Margins Attached edges 07/17/20 1400   Closure Secondary intention 07/17/20 1400   Drainage Amount Moderate 07/17/20 1400   Drainage Description Serous 07/17/20 1400   Treatments Cleansed;Provider debridement;Topical Lidocaine 07/17/20 1400   Wound Cleansing Puracyn [place] 07/17/20 1400   Periwound Protectant Skin Protectant Wipes to Periwound;Barrier Paste;Skin Moisturizer 07/17/20 1400   Dressing Cleansing/Solutions Normal Saline 07/17/20 1400   Dressing Options Hypafix Tape;Hydrofera Blue Ready 07/17/20 1400   Dressing Changed Changed 07/17/20 1400   Dressing Status Intact;Dry;Clean 07/17/20 1400   Non-staged Wound Description Full thickness 07/17/20 1400   Wound Length (cm) 4 cm 07/17/20 1400   Wound Width (cm) 9.8 cm 07/17/20 1400   Wound Depth (cm) 0.1 cm 07/17/20 1400   Wound Surface Area (cm^2) 39.2 cm^2 07/17/20 1400   Wound Volume (cm^3) 3.92 cm^3 07/17/20 1400   Post-Procedure Length (cm) 4 cm 07/17/20 1400   Post-Procedure Width (cm) 9.8 cm 07/17/20 1400   Post-Procedure Depth (cm) 0.1 cm 07/17/20 1400   Post-Procedure Surface Area (cm^2) 39.2  cm^2 07/17/20 1400   Post-Procedure Volume (cm^3) 3.92 cm^3 07/17/20 1400   Wound Bed Granulation (%) 85 % 07/17/20 1400   Wound Bed Slough (%) 15 % 07/17/20 1400   Tunneling (cm) 0 cm 07/17/20 1400   Undermining (cm) 0 cm 07/17/20 1400   Wound Odor None 07/17/20 1400   Pulses Right;PT;DP;1+;Doppler;Other (Comments) 07/17/20 1400   Exposed Structures None 07/17/20 1400        PROCEDURE:   -2% viscous lidocaine applied topically to wound bed for approximately 5 minutes prior to debridement  -4 mm curette used to debride wound beds of biofilm and slough.  Excisional debridement was performed to remove devitalized tissue until healthy, bleeding tissue was visualized.   Entire surface of wound, 39.2 cm2 debrided.  Tissue debrided into the subcutaneouslayer.    -Bleeding controlled with manual pressure.   -Wound care completed by wound RN, refer to wound flowsheet   -Patient tolerated the procedure well, without c/o of significant pain or discomfort.       Pertinent Labs and Diagnostics:    Labs:     IMAGING: None  VASCULAR STUDIES: Ending  LAST  WOUND CULTURE:  DATE : Ending            ASSESSMENT AND PLAN:     1. Wound infection  Wound culture pending    2. Abnormal ankle brachial index (KAMAR)  Ordered  We will have the patient follow-up with Dr. Crawford on Monday to evaluate possible vascular surgical intervention      PATIENT EDUCATION  - Etiology ofvenous stasis ulceration discussed with patient  - Importance of managing edema for healing of ulcer, and for prevention of new ulcer development  -Need for lifelong compression of lower legs   -Elevate legs above the level of the heart periodically throughout the day.  - Importance of adequate nutrition for wound healing  -Advised to go to ER for any increased redness, swelling, drainage or odor, or if patient develops fever, chills, nausea or vomiting.    30 min spent face to face with patient, >50% of time spent counseling, coordinating care, reviewing records,  discussing POC, educating patient regarding vascular disease, wound healing and progression. This time was spent in excess to procedure time.       Please note that this note may have been created using voice recognition software. I have worked with technical experts from [employer] to optimize the interface.  I have made every reasonable attempt to correct obvious errors, but there may be errors of grammar and possibly     N

## 2020-07-18 LAB
GRAM STN SPEC: NORMAL
SIGNIFICANT IND 70042: NORMAL
SITE SITE: NORMAL
SOURCE SOURCE: NORMAL

## 2020-07-20 ENCOUNTER — NON-PROVIDER VISIT (OUTPATIENT)
Dept: WOUND CARE | Facility: MEDICAL CENTER | Age: 70
End: 2020-07-20
Attending: NURSE PRACTITIONER
Payer: MEDICARE

## 2020-07-20 PROCEDURE — 97602 WOUND(S) CARE NON-SELECTIVE: CPT

## 2020-07-20 NOTE — PATIENT INSTRUCTIONS
-Keep your wound dressing clean, dry, and intact.    -Change your dressing if it becomes soiled, soaked, or falls off.    -Should you experience any significant changes in your wound(s), such as infection (redness, swelling, localized heat, increased pain, fever > 101 F, chills) or have any questions regarding your home care instructions, please contact the wound center at [phone]. If after hours, contact your primary care physician or go to the hospital emergency room.

## 2020-07-20 NOTE — NON-PROVIDER
Non-selective debridement performed using foam cleanser and a wash cloth. Patient tolerated procedure well.

## 2020-07-21 DIAGNOSIS — T14.8XXA WOUND INFECTION: ICD-10-CM

## 2020-07-21 DIAGNOSIS — L08.9 WOUND INFECTION: ICD-10-CM

## 2020-07-21 RX ORDER — AMOXICILLIN AND CLAVULANATE POTASSIUM 875; 125 MG/1; MG/1
1 TABLET, FILM COATED ORAL 2 TIMES DAILY
Qty: 20 TAB | Refills: 0 | Status: SHIPPED | OUTPATIENT
Start: 2020-07-21 | End: 2020-07-31

## 2020-07-21 NOTE — PROGRESS NOTES
Wound culture results reviewed.  Wound care notes and photos reviewed.  Culture results discussed with pt via phone, pharmacy info verified. Augmentin 875-125 mg ordered BID x 10 days.

## 2020-07-24 ENCOUNTER — HOSPITAL ENCOUNTER (OUTPATIENT)
Dept: RADIOLOGY | Facility: MEDICAL CENTER | Age: 70
End: 2020-07-24
Attending: SPECIALIST
Payer: MEDICARE

## 2020-07-24 DIAGNOSIS — R09.89 DECREASED PEDAL PULSES: ICD-10-CM

## 2020-07-24 DIAGNOSIS — L97.919 VENOUS STASIS ULCER OF RIGHT LOWER LEG WITH EDEMA OF RIGHT LOWER LEG (HCC): ICD-10-CM

## 2020-07-24 DIAGNOSIS — L97.909 ARTERIAL LEG ULCER (HCC): ICD-10-CM

## 2020-07-24 DIAGNOSIS — I83.019 VENOUS STASIS ULCER OF RIGHT LOWER LEG WITH EDEMA OF RIGHT LOWER LEG (HCC): ICD-10-CM

## 2020-07-24 DIAGNOSIS — I83.891 VENOUS STASIS ULCER OF RIGHT LOWER LEG WITH EDEMA OF RIGHT LOWER LEG (HCC): ICD-10-CM

## 2020-07-24 DIAGNOSIS — R60.0 VENOUS STASIS ULCER OF RIGHT LOWER LEG WITH EDEMA OF RIGHT LOWER LEG (HCC): ICD-10-CM

## 2020-07-24 DIAGNOSIS — R68.89 ABNORMAL ANKLE BRACHIAL INDEX (ABI): ICD-10-CM

## 2020-07-24 PROCEDURE — 93922 UPR/L XTREMITY ART 2 LEVELS: CPT

## 2020-07-27 ENCOUNTER — OFFICE VISIT (OUTPATIENT)
Dept: WOUND CARE | Facility: MEDICAL CENTER | Age: 70
End: 2020-07-27
Attending: NURSE PRACTITIONER
Payer: MEDICARE

## 2020-07-27 VITALS
SYSTOLIC BLOOD PRESSURE: 131 MMHG | RESPIRATION RATE: 18 BRPM | HEART RATE: 70 BPM | DIASTOLIC BLOOD PRESSURE: 77 MMHG | TEMPERATURE: 98.9 F | OXYGEN SATURATION: 94 %

## 2020-07-27 DIAGNOSIS — I89.0 LYMPHEDEMA OF LEFT LOWER EXTREMITY: ICD-10-CM

## 2020-07-27 DIAGNOSIS — R60.0 EDEMA OF LEG: ICD-10-CM

## 2020-07-27 DIAGNOSIS — R09.89 DECREASED PEDAL PULSES: ICD-10-CM

## 2020-07-27 DIAGNOSIS — I73.9 PERIPHERAL ARTERY DISEASE (HCC): ICD-10-CM

## 2020-07-27 PROCEDURE — 99212 OFFICE O/P EST SF 10 MIN: CPT

## 2020-07-27 ASSESSMENT — ENCOUNTER SYMPTOMS
MUSCULOSKELETAL NEGATIVE: 1
PSYCHIATRIC NEGATIVE: 1
GASTROINTESTINAL NEGATIVE: 1
CONSTITUTIONAL NEGATIVE: 1
EYES NEGATIVE: 1
PND: 1
RESPIRATORY NEGATIVE: 1

## 2020-07-27 ASSESSMENT — PAIN SCALES - GENERAL: PAINLEVEL: NO PAIN

## 2020-07-27 NOTE — PROGRESS NOTES
Provider Encounter- Lower Extremity Ulcer      HISTORY OF PRESENT ILLNESS  Wound History:    START OF CARE IN CLINIC: 7/17/2020    REFERRING PROVIDER: Robin SHAIKH     WOUND:  Traumatic wounds   LOCATION: Right.  Low calf   VARICOSE VEINS: None obvious    HISTORY: States that his wound spontaneously appeared approximately 2 weeks ago   PREVIOUS TREATMENT: Patient was using Honey med gel and Band-Aids   ETIOLOGY HISTORY: Diagnosed it is similar episode March 2019.  Evaluated by Dr. Crawford at                            that time  and underwent a left CFA endarterectomy 9/20/2019 for PAD with an angiogram demonstrating right                   SFA and popliteal occlusion.      IMAGING:  NIV results at Dr. Crawford's office   VASCULAR STUDIES: Same   DIABETES:  N/A   TOBACCO: Many years ago    Patient's problem list, allergies, and current medications reviewed and updated in Epic    Interval History:  7/17/2020 : Initial clinic visit with Dr. Nunez.  Today the patient denies chills fever cough shortness of breath loss of smell body aches or any other signs of viral infection.  He states he is not sure how the ulcer did occur they just sort of appeared he has had problems with venous stasis ulcers in the past.    7/27/2020:  Clinic visit with Dr. Crawford. Polo states that he live with a chronic Staph infection and it flares up intermittently. I reviewed right leg angios and he has reasonably well collateralized SFA occlusion.  Wounds appear like traction blisters or pressure wounds.   Really too high to be venous stasis in the classic sense.  Her complains of terrible left leg swelling after his extensive left common and proximal SFA endarterectomy, which required extensive dissection around chronically inflamed lymph nodes.  He has had multiple suggestions for compression hose, but no discussion regarding lymphedema. Apparently, he was told he is too swollen to have a knee replacement.  Had a steroid  injection in the left knee and it's effects are now wearing off.  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        REVIEW OF SYSTEMS:   Review of Systems   Constitutional: Negative.    HENT: Negative.    Eyes: Negative.    Respiratory: Negative.    Cardiovascular: Positive for leg swelling and PND.   Gastrointestinal: Negative.    Genitourinary: Negative.    Musculoskeletal: Negative.    Skin: Negative.         Superficial wounds that follow the line of his sock/boot.  Discoloration present   Endo/Heme/Allergies: Negative.    Psychiatric/Behavioral: Negative.        PHYSICAL EXAMINATION:   /77   Pulse 70   Temp 37.2 °C (98.9 °F)   Resp 18   SpO2 94%     Physical Exam  Constitutional:       Appearance: He is obese.   HENT:      Head: Normocephalic.      Nose: Nose normal.   Eyes:      Extraocular Movements: Extraocular movements intact.      Pupils: Pupils are equal, round, and reactive to light.   Neck:      Musculoskeletal: Normal range of motion.   Cardiovascular:      Rate and Rhythm: Regular rhythm.      Pulses: Normal pulses.   Abdominal:      Comments: Obese   Musculoskeletal:      Right lower leg: Edema present.      Left lower leg: Edema present.      Comments: Left leg much more edematous than right   Skin:     General: Skin is warm and dry.      Findings: Lesion present.      Comments: discoloration   Neurological:      Mental Status: He is oriented to person, place, and time.   Psychiatric:         Mood and Affect: Mood normal.         Behavior: Behavior normal.       WOUND ASSESSMENT              PROCEDURE:   -2% viscous lidocaine applied topically to wound bed for approximately 5 minutes prior to debridement  -No debridement today, wounds epithelialized.  -Bleeding controlled with manual pressure.   -Wound care completed by wound RN, refer to wound flowsheet   -Patient tolerated the procedure well, without c/o of significant pain or discomfort.       Pertinent Labs and Diagnostics:    Labs:      IMAGING: None  VASCULAR STUDIES: Right:  .0 cm/s  Profunda 211.7 cm/s  Proximal .7 cm/s  Mid SFA 64.2 cm/s  Distal .8 cm/s  Popliteal 22.6 cm/s  Proximal JOCELYN 31.0 cm/s  Distal JOCELYN 27.9 cm/s  Proximal PTA 24.8 cm/s  Distal PTA 21.7 cm/s  Proximal Peroneal not visualized  Distal Peroneal not visualized     Left:  .4 cm/s  Profunda 66.7 cm/s  Proximal SFA 40.2 cm/s  Mid .7 cm/s  Distal SFA 83.4 cm/s  Popliteal 40.5 cm/s  Proximal JOCELYN 27.6 cm/s  Distal JOCELYN 24.7 cm/s  Proximal PTA 37.4 cm/s  Distal PTA 38.9 cm/s  Proximal Peroneal not visualized  Distal Peroneal not visualized     Right KAMAR pre exercise: 0.84 with triphasic waveform  Right KAMAR post exercise: 0.48 with triphasic waveform     Left KAMAR pre exercise: 0.77 with triphasic waveform  Left KAMAR post exercise: 0.63 with triphasic waveform     Angios show SFA occlusion and stenosis in proximal SFA profunda orifice.  LAST  WOUND CULTURE:  DATE : Ending            ASSESSMENT AND PLAN:     1. Wound infection  Staph, moderate growth.    Augmentin ordered and patient almost done with prescription    2. Abnormal ankle brachial index (KAMAR)  -  Patient well known to me.  Extensive left inguinal procedure performed with resultant lymphedema.  He really needs lymphedema therapy and this is no longer offered through the [facility] system.  Patient has SCP and hopefully, we can get approval to move out of network for this essential care. He cannot don and doff compression that has been previously prescribed, likely ill-fitting.  Needs the sequential treatment to reduce the size of the leg.  Eventually, zippered compression stocking with custom fit will be essential.     3.  Lymphedema- see above discussion.  Patient's wounds are completely epithelialized.  Dressed with Tubigrip.      PATIENT EDUCATION  - Etiology of lymphedema discussed with Mr. Forbes.    - Importance of adequate nutrition for wound healing  -Advised to go to ER for any  increased redness, swelling, drainage or odor, or if patient develops fever, chills, nausea or vomiting.    30 min spent face to face with patient, >50% of time spent counseling, coordinating care, reviewing records, discussing POC, educating patient regarding vascular disease, wound healing and progression. This time was spent in excess to procedure time.       Please note that this note may have been created using voice recognition software. I have worked with technical experts from [facility] to optimize the interface.  I have made every reasonable attempt to correct obvious errors, but there may be errors of grammar and possibly

## 2020-08-03 ENCOUNTER — APPOINTMENT (OUTPATIENT)
Dept: WOUND CARE | Facility: MEDICAL CENTER | Age: 70
End: 2020-08-03
Attending: NURSE PRACTITIONER
Payer: MEDICARE

## 2020-08-10 ENCOUNTER — APPOINTMENT (OUTPATIENT)
Dept: WOUND CARE | Facility: MEDICAL CENTER | Age: 70
End: 2020-08-10
Attending: NURSE PRACTITIONER
Payer: MEDICARE

## 2020-09-15 RX ORDER — NIACIN 1000 MG/1
TABLET, EXTENDED RELEASE ORAL
Qty: 180 TAB | Refills: 1 | Status: SHIPPED
Start: 2020-09-15 | End: 2021-08-27

## 2020-12-18 ENCOUNTER — OFFICE VISIT (OUTPATIENT)
Dept: MEDICAL GROUP | Facility: MEDICAL CENTER | Age: 70
End: 2020-12-18
Payer: MEDICARE

## 2020-12-18 VITALS
TEMPERATURE: 97.9 F | OXYGEN SATURATION: 97 % | DIASTOLIC BLOOD PRESSURE: 70 MMHG | WEIGHT: 257 LBS | HEART RATE: 70 BPM | SYSTOLIC BLOOD PRESSURE: 124 MMHG | HEIGHT: 69 IN | BODY MASS INDEX: 38.06 KG/M2

## 2020-12-18 DIAGNOSIS — G89.29 CHRONIC PAIN OF LEFT KNEE: ICD-10-CM

## 2020-12-18 DIAGNOSIS — Z95.5 PRESENCE OF STENT IN LEFT CIRCUMFLEX CORONARY ARTERY: Chronic | ICD-10-CM

## 2020-12-18 DIAGNOSIS — M25.562 CHRONIC PAIN OF LEFT KNEE: ICD-10-CM

## 2020-12-18 DIAGNOSIS — D50.0 IRON DEFICIENCY ANEMIA DUE TO CHRONIC BLOOD LOSS: ICD-10-CM

## 2020-12-18 DIAGNOSIS — L97.919 STASIS EDEMA WITH ULCER, RIGHT (HCC): ICD-10-CM

## 2020-12-18 DIAGNOSIS — I25.83 CORONARY ARTERY DISEASE DUE TO LIPID RICH PLAQUE: ICD-10-CM

## 2020-12-18 DIAGNOSIS — Z00.00 MEDICARE ANNUAL WELLNESS VISIT, SUBSEQUENT: ICD-10-CM

## 2020-12-18 DIAGNOSIS — I73.9 PERIPHERAL ARTERY DISEASE (HCC): Chronic | ICD-10-CM

## 2020-12-18 DIAGNOSIS — E78.5 DYSLIPIDEMIA: ICD-10-CM

## 2020-12-18 DIAGNOSIS — I25.10 CORONARY ARTERY DISEASE DUE TO LIPID RICH PLAQUE: ICD-10-CM

## 2020-12-18 DIAGNOSIS — E66.9 OBESITY (BMI 35.0-39.9 WITHOUT COMORBIDITY): ICD-10-CM

## 2020-12-18 DIAGNOSIS — I10 ESSENTIAL HYPERTENSION: Chronic | ICD-10-CM

## 2020-12-18 DIAGNOSIS — Z12.5 SCREENING FOR PROSTATE CANCER: ICD-10-CM

## 2020-12-18 DIAGNOSIS — Z95.5 PRESENCE OF STENT IN LAD CORONARY ARTERY: ICD-10-CM

## 2020-12-18 DIAGNOSIS — I87.311 STASIS EDEMA WITH ULCER, RIGHT (HCC): ICD-10-CM

## 2020-12-18 PROCEDURE — 8041 PR SCP AHA: Performed by: NURSE PRACTITIONER

## 2020-12-18 PROCEDURE — G0439 PPPS, SUBSEQ VISIT: HCPCS | Performed by: NURSE PRACTITIONER

## 2020-12-18 ASSESSMENT — PATIENT HEALTH QUESTIONNAIRE - PHQ9: CLINICAL INTERPRETATION OF PHQ2 SCORE: 0

## 2020-12-18 ASSESSMENT — ENCOUNTER SYMPTOMS: GENERAL WELL-BEING: GOOD

## 2020-12-18 ASSESSMENT — ACTIVITIES OF DAILY LIVING (ADL): BATHING_REQUIRES_ASSISTANCE: 0

## 2020-12-18 ASSESSMENT — FIBROSIS 4 INDEX: FIB4 SCORE: 2.27

## 2020-12-18 NOTE — PROGRESS NOTES
Chief Complaint   Patient presents with   • Medicare Annual Wellness         HPI: Patient is here today for his yearly annual Medicare wellness visit.      1. Medicare annual wellness visit, subsequent  Screening performed below.    2. Peripheral artery disease ([facility])  Patient was seen by Dr. Crawford in July and is monitoring his peripheral arterial disease and treating his chronic wounds.  He states there has been no change in status and he continues on his Crestor, aspirin and niacin.    3. Stasis edema with ulcer, right ([facility])  Patient last seen in July by the vascular clinic and follows with them twice a year.    4. Coronary artery disease due to lipid rich plaque  Patient taking his Crestor and is apparently on prescription niacin as well through cardiology.  He reports no chest pain.    5. Dyslipidemia  Cholesterol levels have been good on Crestor and he reports no myalgias    6. Essential hypertension  Blood pressure controlled without any current medication    7. Iron deficiency anemia due to chronic blood loss  Patient taking iron regularly and his most recent iron studies and CBC showed normal    8. Obesity (BMI 35.0-39.9 without comorbidity) ([facility])  Patient continues to be obese but does not want to go to the obesity clinic.    9. Presence of stent in LAD coronary artery  Patient follows with cardiology which is monitoring him and prescribing his Crestor, Niaspan and aspirin.    10. Presence of stent in left circumflex coronary artery  Patient follows with cardiology    11.  Chronic knee pain  Patient requesting referral to [facility] orthopedic clinic where he has previously gotten steroid injections in his left knee feels he needs a new one and has not been there in 9 months.  Patient Active Problem List    Diagnosis Date Noted   • Iron deficiency anemia due to chronic blood loss 11/25/2019   • Obesity (BMI 35.0-39.9 without comorbidity) ([facility]) 08/13/2019   • Peripheral artery disease ([facility]) 08/01/2019   • Edema  of leg 05/20/2019   • Venous insufficiency of both lower extremities    • Essential hypertension    • Presence of stent in LAD coronary artery    • Presence of stent in left circumflex coronary artery    • Dyslipidemia 08/26/2011   • Coronary artery disease due to lipid rich plaque 08/26/2011       Current Outpatient Medications   Medication Sig Dispense Refill   • niacin (NIASPAN) 1000 MG CR tablet TAKE TWO TABLETS BY MOUTH DAILY 180 Tab 1   • ferrous sulfate 325 (65 Fe) MG tablet Take 325 mg by mouth every day.     • rosuvastatin (CRESTOR) 20 MG Tab Take 1 Tab by mouth every evening. 90 Tab 3   • Coenzyme Q10 (CO Q-10) 300 MG Cap Take 1 Cap by mouth every day.     • acetaminophen (TYLENOL) 325 MG Tab Take 650 mg by mouth every four hours as needed.     • Omega-3 Fatty Acids (FISH OIL) 1000 MG Cap capsule Take 1,000 mg by mouth every day.     • Ascorbic Acid (VITAMIN C PO) Take 6,000 mg by mouth every day.     • Multiple Vitamin (MULTIVITAMIN PO) Take 1 Tab by mouth every day.     • aspirin 81 MG tablet Take 81 mg by mouth every day.     • Diclofenac Sodium 1 % Gel Apply 2 g to skin as directed 3 times a day as needed. (Patient not taking: Reported on 12/18/2020) 1 Tube 11     No current facility-administered medications for this visit.         Patient is taking medications as noted in medication list.  Current supplements as per medication list.     Allergies: Mercury, Other environmental, Sulfa drugs, and Pravastatin    Current social contact/activities: PT states he lives with his his 2 dogs and has family in town.    Is patient current with immunizations? Yes.    He  reports that he quit smoking about 9 years ago. His smoking use included cigars. He quit after 30.00 years of use. He has never used smokeless tobacco. He reports that he does not drink alcohol or use drugs.  Counseling given: Not Answered  Comment: 2000        DPA/Advanced directive: Patient does not have an Advanced Directive.  A packet and  workshop information was given on Advanced Directives.    ROS:    Gait: Uses no assistive device   Ostomy: no  Other tubes: No   Amputations: No   Chronic oxygen use No   Last eye exam - 1 year ago  Wears hearing aids: No   : Denies any urinary leakage during the last 6 months        Depression Screening    Little interest or pleasure in doing things?  0 - not at all  Feeling down, depressed, or hopeless? 0 - not at all  Patient Health Questionnaire Score: 0    If depressive symptoms identified deferred to follow up visit unless specifically addressed in assessment and plan.    Interpretation of PHQ-9 Total Score   Score Severity   1-4 No Depression   5-9 Mild Depression   10-14 Moderate Depression   15-19 Moderately Severe Depression   20-27 Severe Depression    Screening for Cognitive Impairment    Three Minute Recall (river, nation, finger)  3/3    Sabas clock face with all 12 numbers and set the hands to show 10 past 11.  Yes 5/5  If cognitive concerns identified, deferred for follow up unless specifically addressed in assessment and plan.    Fall Risk Assessment    Has the patient had two or more falls in the last year or any fall with injury in the last year?  No  If fall risk identified, deferred for follow up unless specifically addressed in assessment and plan.    Safety Assessment    Throw rugs on floor.  No  Handrails on all stairs.  Yes  Good lighting in all hallways.  Yes  Difficulty hearing.  No  Patient counseled about all safety risks that were identified.    Functional Assessment ADLs    Are there any barriers preventing you from cooking for yourself or meeting nutritional needs?  No.    Are there any barriers preventing you from driving safely or obtaining transportation?  No.    Are there any barriers preventing you from using a telephone or calling for help?  No.    Are there any barriers preventing you from shopping?  No.    Are there any barriers preventing you from taking care of your own  finances?  No.    Are there any barriers preventing you from managing your medications?  No.    Are there any barriers preventing you from showering, bathing or dressing yourself?  No.    Are you currently engaging in any exercise or physical activity?  Yes.     What is your perception of your health?  Good.    Health Maintenance Summary                IMM DTaP/Tdap/Td Vaccine Overdue 4/3/1970      Done 12/15/2017 Imm Admin: TD Vaccine    Annual Wellness Visit Overdue 2020      Done 2019 INITIAL ANNUAL WELLNESS VISIT-INCLUDES PPPS ()     Patient has more history with this topic...    COLONOSCOPY Postponed 2021 Originally 4/3/2001. Patient Refused    IMM PNEUMOCOCCAL VACCINE: 65+ Years Postponed 8/15/2023 Originally 4/3/2016. Patient Refused    IMM ZOSTER VACCINES Postponed 2024 Originally 4/3/2001. System: vaccine not available, other system reasons    IMM INFLUENZA Postponed 2025 Originally 2020. Patient Refused          Patient Care Team:  LAMAR Lennon as PCP - General (Internal Medicine)  Manuel Flores M.D. (Cardiology)  Candelaria Crawford M.D. (Surgery)  [facility] (Inactive) as Consulting Physician  Guillermo Butt M.D. as Consulting Physician (Ophthalmology)  Missy Balderrama, [facility] as      Social History     Tobacco Use   • Smoking status: Former Smoker     Years: 30.00     Types: Cigars     Quit date: 2011     Years since quittin.1   • Smokeless tobacco: Never Used   • Tobacco comment:    Substance Use Topics   • Alcohol use: No   • Drug use: No     Family History   Problem Relation Age of Onset   • Heart Attack Mother    • Heart Disease Mother    • Cancer Mother         LYKIMA    • Dementia Mother      He  has a past medical history of ASTHMA, CAD (coronary artery disease) (2011), CATARACT, Colorblind, Dental disorder, Essential hypertension, Heart burn, High cholesterol,  "Hyperlipidemia (8/26/2011), Indigestion, Infectious disease, Myocardial infarct (HCC) (2005), Pain (11/14/11), Personal history of venous thrombosis and embolism (2006), Pneumonia (2009), Presence of stent in left circumflex coronary artery, Unspecified hemorrhagic conditions, Venous insufficiency of both lower extremities, and White coat syndrome with diagnosis of hypertension.   Past Surgical History:   Procedure Laterality Date   • FEMORAL ENDARTERECTOMY Left 9/20/2019    Procedure: ENDARTERECTOMY, FEMORAL;  Surgeon: Candelaria Crawford M.D.;  Location: SURGERY [facility];  Service: General   • VENTRAL HERNIA REPAIR  11/30/2011    Performed by TROY GONZALEZ at SURGERY [facility]   • INGUINAL HERNIA REPAIR  11/30/2011    Performed by TROY GONZALEZ at SURGERY [facility]   • CATARACT PHACO WITH IOL  11/23/2010    Performed by RM WILLIS at SURGERY SAME DAY [facility]   • CATARACT PHACO WITH IOL  11/2/2010    Performed by RM WILLIS at SURGERY SAME DAY [facility]   • OTHER CARDIAC SURGERY  2005    STENTS x 4   • SINUS TREPHINE FRONTAL  1971   • SINUSOTOMIES  08/1963   • INGUINAL HERNIA REPAIR  1962    WITH UNDESCENDED TESTICLE   • TONSILLECTOMY AND ADENOIDECTOMY  1957           Exam:     Ht 1.753 m (5' 9\")  Body mass index is 35.74 kg/m².    Hearing good.    Dentition fair  Alert, oriented in no acute distress.  Eye contact is good, speech goal directed, affect calm      Assessment and Plan. The following treatment and monitoring plan is recommended:         1. Medicare annual wellness visit, subsequent  Annual wellness topics discussed, review of chronic medical problems completed    - Subsequent Annual Wellness Visit - Includes PPPS ()    2. Peripheral artery disease (HCC)  Patient will continue to follow with Dr. Crawford as requested and report any change in circulation.  He will try to be active and he does work as a .    3. Stasis edema with ulcer, right " ([facility])  Patient reports no change in status and follows with the wound clinic as needed    4. Coronary artery disease due to lipid rich plaque  Patient on Crestor and aspirin states it is working well for him.  I did explain that over-the-counter fish oil has not been found to be beneficial for heart protection.    5. Dyslipidemia  Patient will continue on his statin    6. Essential hypertension  Blood pressure controlled currently without medication    7. Iron deficiency anemia due to chronic blood loss  Most recent CBC and iron levels were normal on iron supplementation and he reports no overt bleeding    8. Obesity (BMI 35.0-39.9 without comorbidity) ([facility])  Weight unchanged and continues to be elevated but he is not very active.    9. Presence of stent in LAD coronary artery  Patient on statin and aspirin and follows with cardiology    10. Presence of stent in left circumflex coronary artery  Patient will continue to follow with cardiology as required    11. Screening for prostate cancer  Patient will due for PSA testing in a few months  - PROSTATE SPECIFIC AG SCREENING; Future    12. Chronic pain of left knee  Patient requested referral to orthopedics for possible repeat steroid injection and referral placed  - REFERRAL TO ORTHOPEDICS    Services suggested: No services needed at this time  Health Care Screening recommendations as per orders if indicated.  Referrals offered: PT/OT/Nutrition counseling/Behavioral Health/Smoking cessation as per orders if indicated.    Discussion today about general wellness and lifestyle habits:    · Prevent falls and reduce trip hazards; Cautioned about securing or removing rugs.  · Have a working fire alarm and carbon monoxide detector;   · Engage in regular physical activity and social activities       Follow-up: No follow-ups on file.

## 2021-02-05 ENCOUNTER — OFFICE VISIT (OUTPATIENT)
Dept: URGENT CARE | Facility: PHYSICIAN GROUP | Age: 71
End: 2021-02-05
Payer: MEDICARE

## 2021-02-05 ENCOUNTER — HOSPITAL ENCOUNTER (OUTPATIENT)
Facility: MEDICAL CENTER | Age: 71
End: 2021-02-05
Attending: NURSE PRACTITIONER
Payer: MEDICARE

## 2021-02-05 VITALS
HEART RATE: 73 BPM | TEMPERATURE: 99.3 F | WEIGHT: 251 LBS | BODY MASS INDEX: 37.18 KG/M2 | RESPIRATION RATE: 20 BRPM | SYSTOLIC BLOOD PRESSURE: 122 MMHG | HEIGHT: 69 IN | DIASTOLIC BLOOD PRESSURE: 76 MMHG | OXYGEN SATURATION: 94 %

## 2021-02-05 DIAGNOSIS — R42 DIZZINESS: ICD-10-CM

## 2021-02-05 DIAGNOSIS — N39.498 OTHER URINARY INCONTINENCE: ICD-10-CM

## 2021-02-05 DIAGNOSIS — R30.0 DYSURIA: ICD-10-CM

## 2021-02-05 DIAGNOSIS — N39.0 URINARY TRACT INFECTION WITHOUT HEMATURIA, SITE UNSPECIFIED: ICD-10-CM

## 2021-02-05 LAB
APPEARANCE UR: NORMAL
BILIRUB UR STRIP-MCNC: NEGATIVE MG/DL
COLOR UR AUTO: NORMAL
GLUCOSE UR STRIP.AUTO-MCNC: NEGATIVE MG/DL
KETONES UR STRIP.AUTO-MCNC: NORMAL MG/DL
LEUKOCYTE ESTERASE UR QL STRIP.AUTO: NORMAL
NITRITE UR QL STRIP.AUTO: POSITIVE
PH UR STRIP.AUTO: 5.5 [PH] (ref 5–8)
PROT UR QL STRIP: 30 MG/DL
RBC UR QL AUTO: NORMAL
SP GR UR STRIP.AUTO: 1.03
UROBILINOGEN UR STRIP-MCNC: 0.2 MG/DL

## 2021-02-05 PROCEDURE — 87186 SC STD MICRODIL/AGAR DIL: CPT

## 2021-02-05 PROCEDURE — 87086 URINE CULTURE/COLONY COUNT: CPT

## 2021-02-05 PROCEDURE — 99213 OFFICE O/P EST LOW 20 MIN: CPT | Performed by: NURSE PRACTITIONER

## 2021-02-05 PROCEDURE — 81002 URINALYSIS NONAUTO W/O SCOPE: CPT | Performed by: NURSE PRACTITIONER

## 2021-02-05 PROCEDURE — 87077 CULTURE AEROBIC IDENTIFY: CPT

## 2021-02-05 RX ORDER — CIPROFLOXACIN 500 MG/1
500 TABLET, FILM COATED ORAL 2 TIMES DAILY
Qty: 14 TAB | Refills: 0 | Status: ON HOLD | OUTPATIENT
Start: 2021-02-05 | End: 2021-02-13

## 2021-02-05 ASSESSMENT — ENCOUNTER SYMPTOMS
CHILLS: 0
BACK PAIN: 0
FEVER: 0
DIARRHEA: 0
NAUSEA: 1
DIZZINESS: 1
ABDOMINAL PAIN: 0
BLURRED VISION: 0
DOUBLE VISION: 0

## 2021-02-05 ASSESSMENT — FIBROSIS 4 INDEX: FIB4 SCORE: 2.27

## 2021-02-05 NOTE — PROGRESS NOTES
Subjective:      Polo Pyle is a 70 y.o. male who presents with Dizziness (bladder leakage x 3 days dizziness x 2 days nausea and diarrhea x 1 days)            HPI New. 70 year old male with two issues today. First, he has burning with urination as well as some urinary leakage. States urine is dark in color. Denies fever, chills, back pain or abdominal pain. He denies nausea with this issue. No medications taken for this. Secondly he reports dizziness times two days. Worse with sudden movement of head back. Some occasional nausea with this. No headache, congestion or visual issues.   Mercury, Other environmental, Sulfa drugs, and Pravastatin  Current Outpatient Medications on File Prior to Visit   Medication Sig Dispense Refill   • niacin (NIASPAN) 1000 MG CR tablet TAKE TWO TABLETS BY MOUTH DAILY 180 Tab 1   • ferrous sulfate 325 (65 Fe) MG tablet Take 325 mg by mouth every day.     • rosuvastatin (CRESTOR) 20 MG Tab Take 1 Tab by mouth every evening. 90 Tab 3   • Coenzyme Q10 (CO Q-10) 300 MG Cap Take 1 Cap by mouth every day.     • acetaminophen (TYLENOL) 325 MG Tab Take 650 mg by mouth every four hours as needed.     • Ascorbic Acid (VITAMIN C PO) Take 6,000 mg by mouth every day.     • Multiple Vitamin (MULTIVITAMIN PO) Take 1 Tab by mouth every day.     • aspirin 81 MG tablet Take 81 mg by mouth every day.     • Diclofenac Sodium 1 % Gel Apply 2 g to skin as directed 3 times a day as needed. (Patient not taking: Reported on 12/18/2020) 1 Tube 11     No current facility-administered medications on file prior to visit.      Social History     Socioeconomic History   • Marital status:      Spouse name: Not on file   • Number of children: Not on file   • Years of education: Not on file   • Highest education level: Not on file   Occupational History   • Not on file   Social Needs   • Financial resource strain: Not on file   • Food insecurity     Worry: Not on file     Inability: Not on file   •  "Transportation needs     Medical: Not on file     Non-medical: Not on file   Tobacco Use   • Smoking status: Former Smoker     Years: 30.00     Types: Cigars     Quit date: 2011     Years since quittin.2   • Smokeless tobacco: Never Used   • Tobacco comment:    Substance and Sexual Activity   • Alcohol use: No   • Drug use: No   • Sexual activity: Not Currently     Partners: Female   Lifestyle   • Physical activity     Days per week: Not on file     Minutes per session: Not on file   • Stress: Not on file   Relationships   • Social connections     Talks on phone: Not on file     Gets together: Not on file     Attends Jewish service: Not on file     Active member of club or organization: Not on file     Attends meetings of clubs or organizations: Not on file     Relationship status: Not on file   • Intimate partner violence     Fear of current or ex partner: Not on file     Emotionally abused: Not on file     Physically abused: Not on file     Forced sexual activity: Not on file   Other Topics Concern   • Not on file   Social History Narrative   • Not on file     Breast Cancer-related family history is not on file.      Review of Systems   Constitutional: Negative for chills, fever and malaise/fatigue.   HENT: Negative for congestion and ear pain.    Eyes: Negative for blurred vision and double vision.   Gastrointestinal: Positive for nausea. Negative for abdominal pain and diarrhea.   Genitourinary: Positive for dysuria and urgency.   Musculoskeletal: Negative for back pain.   Neurological: Positive for dizziness.          Objective:     /76 (BP Location: Left arm, Patient Position: Sitting, BP Cuff Size: Adult)   Pulse 73   Temp 37.4 °C (99.3 °F) (Temporal)   Resp 20   Ht 1.753 m (5' 9\")   Wt 114 kg (251 lb)   SpO2 94%   BMI 37.07 kg/m²      Physical Exam  Vitals signs and nursing note reviewed.   Constitutional:       General: He is not in acute distress.     Appearance: He is " well-developed.   HENT:      Head: Normocephalic.      Right Ear: Tympanic membrane and external ear normal.      Left Ear: Tympanic membrane and external ear normal.      Nose: Mucosal edema present.      Mouth/Throat:      Pharynx: No posterior oropharyngeal erythema.   Eyes:      General:         Right eye: No discharge.         Left eye: No discharge.      Conjunctiva/sclera: Conjunctivae normal.   Neck:      Musculoskeletal: Normal range of motion and neck supple.   Cardiovascular:      Rate and Rhythm: Normal rate and regular rhythm.      Heart sounds: Normal heart sounds.   Pulmonary:      Effort: Pulmonary effort is normal.      Breath sounds: Normal breath sounds.   Abdominal:      General: Abdomen is protuberant.      Tenderness: There is abdominal tenderness in the suprapubic area. There is no right CVA tenderness or left CVA tenderness.   Musculoskeletal: Normal range of motion.   Lymphadenopathy:      Cervical: No cervical adenopathy.   Skin:     General: Skin is warm and dry.   Neurological:      Mental Status: He is alert and oriented to person, place, and time.   Psychiatric:         Behavior: Behavior normal.         Thought Content: Thought content normal.                 Assessment/Plan:        1. Urinary tract infection without hematuria, site unspecified  ciprofloxacin (CIPRO) 500 MG Tab   2. Dysuria  POCT Urinalysis    URINE CULTURE(NEW)   3. Other urinary incontinence     4. Dizziness       Urine with positive leuks and nitrites.  Cipro.  Urine culture.  Will monitor dizziness, could be related to infection.   Push fluid when he can.

## 2021-02-06 DIAGNOSIS — R30.0 DYSURIA: ICD-10-CM

## 2021-02-08 ENCOUNTER — HOSPITAL ENCOUNTER (EMERGENCY)
Facility: MEDICAL CENTER | Age: 71
End: 2021-02-08
Attending: EMERGENCY MEDICINE | Admitting: EMERGENCY MEDICINE
Payer: MEDICARE

## 2021-02-08 ENCOUNTER — APPOINTMENT (OUTPATIENT)
Dept: RADIOLOGY | Facility: MEDICAL CENTER | Age: 71
End: 2021-02-08
Attending: EMERGENCY MEDICINE
Payer: MEDICARE

## 2021-02-08 VITALS
BODY MASS INDEX: 38.79 KG/M2 | TEMPERATURE: 96.5 F | DIASTOLIC BLOOD PRESSURE: 81 MMHG | SYSTOLIC BLOOD PRESSURE: 133 MMHG | HEART RATE: 78 BPM | WEIGHT: 261.91 LBS | HEIGHT: 69 IN | RESPIRATION RATE: 16 BRPM | OXYGEN SATURATION: 95 %

## 2021-02-08 DIAGNOSIS — R74.8 ELEVATED LIPASE: ICD-10-CM

## 2021-02-08 DIAGNOSIS — R53.81 MALAISE AND FATIGUE: ICD-10-CM

## 2021-02-08 DIAGNOSIS — R53.83 MALAISE AND FATIGUE: ICD-10-CM

## 2021-02-08 DIAGNOSIS — D72.819 LEUKOPENIA, UNSPECIFIED TYPE: ICD-10-CM

## 2021-02-08 DIAGNOSIS — Z87.440 HISTORY OF UTI: ICD-10-CM

## 2021-02-08 DIAGNOSIS — U07.1 COVID-19: ICD-10-CM

## 2021-02-08 LAB
ALBUMIN SERPL BCP-MCNC: 3.7 G/DL (ref 3.2–4.9)
ALBUMIN/GLOB SERPL: 1.2 G/DL
ALP SERPL-CCNC: 64 U/L (ref 30–99)
ALT SERPL-CCNC: 37 U/L (ref 2–50)
ANION GAP SERPL CALC-SCNC: 12 MMOL/L (ref 7–16)
APPEARANCE UR: CLEAR
AST SERPL-CCNC: 71 U/L (ref 12–45)
BACTERIA #/AREA URNS HPF: ABNORMAL /HPF
BACTERIA UR CULT: ABNORMAL
BACTERIA UR CULT: ABNORMAL
BASOPHILS # BLD AUTO: 0 % (ref 0–1.8)
BASOPHILS # BLD: 0 K/UL (ref 0–0.12)
BILIRUB SERPL-MCNC: 1 MG/DL (ref 0.1–1.5)
BILIRUB UR QL STRIP.AUTO: ABNORMAL
BUN SERPL-MCNC: 21 MG/DL (ref 8–22)
CALCIUM SERPL-MCNC: 8.3 MG/DL (ref 8.4–10.2)
CHLORIDE SERPL-SCNC: 99 MMOL/L (ref 96–112)
CO2 SERPL-SCNC: 23 MMOL/L (ref 20–33)
COLOR UR: YELLOW
CREAT SERPL-MCNC: 1.17 MG/DL (ref 0.5–1.4)
EKG IMPRESSION: NORMAL
EOSINOPHIL # BLD AUTO: 0 K/UL (ref 0–0.51)
EOSINOPHIL NFR BLD: 0 % (ref 0–6.9)
EPI CELLS #/AREA URNS HPF: ABNORMAL /HPF
ERYTHROCYTE [DISTWIDTH] IN BLOOD BY AUTOMATED COUNT: 44.2 FL (ref 35.9–50)
FLUAV RNA SPEC QL NAA+PROBE: NEGATIVE
FLUBV RNA SPEC QL NAA+PROBE: NEGATIVE
GLOBULIN SER CALC-MCNC: 3.1 G/DL (ref 1.9–3.5)
GLUCOSE SERPL-MCNC: 107 MG/DL (ref 65–99)
GLUCOSE UR STRIP.AUTO-MCNC: NEGATIVE MG/DL
GRAN CASTS #/AREA URNS LPF: ABNORMAL /LPF
HCT VFR BLD AUTO: 46.7 % (ref 42–52)
HGB BLD-MCNC: 15.9 G/DL (ref 14–18)
HYALINE CASTS #/AREA URNS LPF: ABNORMAL /LPF
KETONES UR STRIP.AUTO-MCNC: ABNORMAL MG/DL
LEUKOCYTE ESTERASE UR QL STRIP.AUTO: NEGATIVE
LIPASE SERPL-CCNC: 150 U/L (ref 7–58)
LYMPHOCYTES # BLD AUTO: 0.74 K/UL (ref 1–4.8)
LYMPHOCYTES NFR BLD: 20 % (ref 22–41)
MANUAL DIFF BLD: NORMAL
MCH RBC QN AUTO: 31.2 PG (ref 27–33)
MCHC RBC AUTO-ENTMCNC: 34 G/DL (ref 33.7–35.3)
MCV RBC AUTO: 91.6 FL (ref 81.4–97.8)
MICRO URNS: ABNORMAL
MONOCYTES # BLD AUTO: 0.3 K/UL (ref 0–0.85)
MONOCYTES NFR BLD AUTO: 8 % (ref 0–13.4)
MUCOUS THREADS #/AREA URNS HPF: ABNORMAL /HPF
NEUTROPHILS # BLD AUTO: 2.66 K/UL (ref 1.82–7.42)
NEUTROPHILS NFR BLD: 68 % (ref 44–72)
NEUTS BAND NFR BLD MANUAL: 4 % (ref 0–10)
NITRITE UR QL STRIP.AUTO: POSITIVE
NRBC # BLD AUTO: 0 K/UL
NRBC BLD-RTO: 0 /100 WBC
PH UR STRIP.AUTO: 5.5 [PH] (ref 5–8)
PLATELET # BLD AUTO: 108 K/UL (ref 164–446)
PLATELET BLD QL SMEAR: NORMAL
PMV BLD AUTO: 10.2 FL (ref 9–12.9)
POTASSIUM SERPL-SCNC: 3.8 MMOL/L (ref 3.6–5.5)
PROT SERPL-MCNC: 6.8 G/DL (ref 6–8.2)
PROT UR QL STRIP: 100 MG/DL
RBC # BLD AUTO: 5.1 M/UL (ref 4.7–6.1)
RBC # URNS HPF: ABNORMAL /HPF
RBC BLD AUTO: NORMAL
RBC UR QL AUTO: NEGATIVE
RSV RNA SPEC QL NAA+PROBE: NEGATIVE
SARS-COV-2 RNA RESP QL NAA+PROBE: DETECTED
SIGNIFICANT IND 70042: ABNORMAL
SITE SITE: ABNORMAL
SODIUM SERPL-SCNC: 134 MMOL/L (ref 135–145)
SOURCE SOURCE: ABNORMAL
SP GR UR STRIP.AUTO: >=1.03
SPECIMEN SOURCE: ABNORMAL
TRANS CELLS URNS QL MICRO: ABNORMAL /HPF
WBC # BLD AUTO: 3.7 K/UL (ref 4.8–10.8)
WBC #/AREA URNS HPF: ABNORMAL /HPF

## 2021-02-08 PROCEDURE — C9803 HOPD COVID-19 SPEC COLLECT: HCPCS | Performed by: EMERGENCY MEDICINE

## 2021-02-08 PROCEDURE — 93005 ELECTROCARDIOGRAM TRACING: CPT

## 2021-02-08 PROCEDURE — 36415 COLL VENOUS BLD VENIPUNCTURE: CPT

## 2021-02-08 PROCEDURE — 99284 EMERGENCY DEPT VISIT MOD MDM: CPT

## 2021-02-08 PROCEDURE — 83690 ASSAY OF LIPASE: CPT

## 2021-02-08 PROCEDURE — 80053 COMPREHEN METABOLIC PANEL: CPT

## 2021-02-08 PROCEDURE — 0241U HCHG SARS-COV-2 COVID-19 NFCT DS RESP RNA 4 TRGT MIC: CPT

## 2021-02-08 PROCEDURE — 81001 URINALYSIS AUTO W/SCOPE: CPT

## 2021-02-08 PROCEDURE — 85027 COMPLETE CBC AUTOMATED: CPT

## 2021-02-08 PROCEDURE — 85007 BL SMEAR W/DIFF WBC COUNT: CPT

## 2021-02-08 PROCEDURE — 71045 X-RAY EXAM CHEST 1 VIEW: CPT

## 2021-02-08 ASSESSMENT — FIBROSIS 4 INDEX: FIB4 SCORE: 2.27

## 2021-02-08 NOTE — ED NOTES
Pt having inconsistent o2 readings. Sat will drop from 93% down to 80% appears to be good waveform. Pt denies shortness of breath or apnea or dozing off. Pt placed on 1L NC for good measure.

## 2021-02-08 NOTE — ED NOTES
romulo from Lab called with critical result of covid + at 1525. Critical lab result read back to romulo.   Dr. Cardoso notified of critical lab result at 1525.  Critical lab result read back by Dr. Cardoso.

## 2021-02-08 NOTE — ED PROVIDER NOTES
"ED Provider Note    CHIEF COMPLAINT  Chief Complaint   Patient presents with   • Weakness     generalized x 1 wk  Denies fever   • Painful Urination     Onset last Tues To PMD and Dx UTI Currently on Cipro C/O some urinary incontinence   • Dizziness     \" balance off \" Sometimes vertigo if he moves quickly Onset Wed       HPI  Polo Pyle is a 70 y.o. male who presents to the emergency department with a chief complaint of generalized weakness.  Patient says that about a week ago he started having symptoms consistent with a urinary tract infection.  He had some urinary incontinence, burning with urination.  He saw his primary care provider.  Had a urinalysis.  Was started on ciprofloxacin.  He has been taking this medication but he continues to have symptoms.  Today he says that he just, feels a little bit off.  Feels somewhat woozy and dizzy.  Has not had any fevers.  Burning with urination continues.  Urinary urgency and frequency continues.  Denies any vomiting.  No flank pain.  No back pain.  No chest pain.    REVIEW OF SYSTEMS  See HPI for further details. All other systems are negative.     PAST MEDICAL HISTORY  Past Medical History:   Diagnosis Date   • ASTHMA     as a youth   • CAD (coronary artery disease) 8/26/2011   • CATARACT     bilateral IOL   • Colorblind     problems with red/green   • Dental disorder     upper and partial lower   • Essential hypertension    • Heart burn    • High cholesterol    • Hyperlipidemia 8/26/2011   • Indigestion    • Infectious disease     Hx of staph infections- last infection 2018-    • Myocardial infarct (HCC) 2005    STENTS   • Pain 11/14/11    NECK 7.5/10; C5-C6   • Personal history of venous thrombosis and embolism 2006    DVT 01/2019   • Pneumonia 2009   • Presence of stent in left circumflex coronary artery    • Unspecified hemorrhagic conditions     TAKES ASA . PROLONGED BLEEDING, nosebleeds   • Venous insufficiency of both lower extremities    • White coat " syndrome with diagnosis of hypertension        FAMILY HISTORY  Family History   Problem Relation Age of Onset   • Heart Attack Mother    • Heart Disease Mother    • Cancer Mother         LYKIMA    • Dementia Mother        SOCIAL HISTORY  Social History     Socioeconomic History   • Marital status:      Spouse name: Not on file   • Number of children: Not on file   • Years of education: Not on file   • Highest education level: Not on file   Occupational History   • Not on file   Social Needs   • Financial resource strain: Not on file   • Food insecurity     Worry: Not on file     Inability: Not on file   • Transportation needs     Medical: Not on file     Non-medical: Not on file   Tobacco Use   • Smoking status: Former Smoker     Years: 30.00     Types: Cigars     Quit date: 2011     Years since quittin.2   • Smokeless tobacco: Never Used   • Tobacco comment:    Substance and Sexual Activity   • Alcohol use: No   • Drug use: No   • Sexual activity: Not Currently     Partners: Female   Lifestyle   • Physical activity     Days per week: Not on file     Minutes per session: Not on file   • Stress: Not on file   Relationships   • Social connections     Talks on phone: Not on file     Gets together: Not on file     Attends Mandaeism service: Not on file     Active member of club or organization: Not on file     Attends meetings of clubs or organizations: Not on file     Relationship status: Not on file   • Intimate partner violence     Fear of current or ex partner: Not on file     Emotionally abused: Not on file     Physically abused: Not on file     Forced sexual activity: Not on file   Other Topics Concern   • Not on file   Social History Narrative   • Not on file       SURGICAL HISTORY  Past Surgical History:   Procedure Laterality Date   • FEMORAL ENDARTERECTOMY Left 2019    Procedure: ENDARTERECTOMY, FEMORAL;  Surgeon: Candelaria Crawford M.D.;  Location: SURGERY [facility];  Service:  "General   • VENTRAL HERNIA REPAIR  11/30/2011    Performed by TROY GONZALEZ at SURGERY [facility] ORS   • INGUINAL HERNIA REPAIR  11/30/2011    Performed by TROY GONZALEZ at SURGERY [facility] ORS   • CATARACT PHACO WITH IOL  11/23/2010    Performed by RM WILLIS at SURGERY [facility]   • CATARACT PHACO WITH IOL  11/2/2010    Performed by RM WILLIS at SURGERY SAME DAY [facility] ORS   • OTHER CARDIAC SURGERY  2005    STENTS x 4   • SINUS TREPHINE FRONTAL  1971   • SINUSOTOMIES  08/1963   • INGUINAL HERNIA REPAIR  1962    WITH UNDESCENDED TESTICLE   • TONSILLECTOMY AND ADENOIDECTOMY  1957       CURRENT MEDICATIONS  Home Medications    **Home medications have not yet been reviewed for this encounter**         ALLERGIES  Allergies   Allergen Reactions   • Mercury Swelling     And mercury based preservatives-Thimerasol  Swelling, \"strange discharge from eyes\"   • Other Environmental Anaphylaxis     Insect toxins/ bees and wasps- Anaphylaxis   • Sulfa Drugs Anaphylaxis and Swelling   • Pravastatin Diarrhea     Diarrhea        PHYSICAL EXAM  VITAL SIGNS: /83   Pulse 76   Temp 35.8 °C (96.5 °F) (Temporal)   Resp 16   Ht 1.753 m (5' 9\")   Wt 119 kg (261 lb 14.5 oz)   SpO2 100%   BMI 38.68 kg/m²   Constitutional: Well developed, Well nourished, no acute distress, Non-toxic appearance.   HENT: Normocephalic, Atraumatic, Bilateral external ears normal, Oropharynx moist, No oral exudates, Nose normal.   Eyes: PERRL, EOMI, Conjunctiva normal, No discharge.   Neck: Normal range of motion, No tenderness, Supple, No stridor.   Lymphatic: No lymphadenopathy noted.   Cardiovascular: Normal heart rate, Normal rhythm, No murmurs, No rubs, No gallops.   Thorax & Lungs: Normal breath sounds, No respiratory distress, No wheezing, No chest tenderness.   Abdomen: Soft, nontender, nondistended, no organomegaly.  No CVA tenderness.  No rebound tenderness or guarding.  Skin: Warm, Dry, No erythema, No rash. "   Back: No tenderness, No CVA tenderness.   Extremities: Intact distal pulses, No edema, No tenderness, No cyanosis, No clubbing.   Neurologic: Alert & oriented x 3, Normal motor function, Normal sensory function, No focal deficits noted.       RADIOLOGY/PROCEDURES  No orders to display     Results for orders placed or performed during the hospital encounter of 02/08/21   CBC WITH DIFFERENTIAL   Result Value Ref Range    WBC 3.7 (L) 4.8 - 10.8 K/uL    RBC 5.10 4.70 - 6.10 M/uL    Hemoglobin 15.9 14.0 - 18.0 g/dL    Hematocrit 46.7 42.0 - 52.0 %    MCV 91.6 81.4 - 97.8 fL    MCH 31.2 27.0 - 33.0 pg    MCHC 34.0 33.7 - 35.3 g/dL    RDW 44.2 35.9 - 50.0 fL    Platelet Count 108 (L) 164 - 446 K/uL    MPV 10.2 9.0 - 12.9 fL    Neutrophils-Polys 68.00 44.00 - 72.00 %    Lymphocytes 20.00 (L) 22.00 - 41.00 %    Monocytes 8.00 0.00 - 13.40 %    Eosinophils 0.00 0.00 - 6.90 %    Basophils 0.00 0.00 - 1.80 %    Nucleated RBC 0.00 /100 WBC    Neutrophils (Absolute) 2.66 1.82 - 7.42 K/uL    Lymphs (Absolute) 0.74 (L) 1.00 - 4.80 K/uL    Monos (Absolute) 0.30 0.00 - 0.85 K/uL    Eos (Absolute) 0.00 0.00 - 0.51 K/uL    Baso (Absolute) 0.00 0.00 - 0.12 K/uL    NRBC (Absolute) 0.00 K/uL   COMP METABOLIC PANEL   Result Value Ref Range    Sodium 134 (L) 135 - 145 mmol/L    Potassium 3.8 3.6 - 5.5 mmol/L    Chloride 99 96 - 112 mmol/L    Co2 23 20 - 33 mmol/L    Anion Gap 12.0 7.0 - 16.0    Glucose 107 (H) 65 - 99 mg/dL    Bun 21 8 - 22 mg/dL    Creatinine 1.17 0.50 - 1.40 mg/dL    Calcium 8.3 (L) 8.4 - 10.2 mg/dL    AST(SGOT) 71 (H) 12 - 45 U/L    ALT(SGPT) 37 2 - 50 U/L    Alkaline Phosphatase 64 30 - 99 U/L    Total Bilirubin 1.0 0.1 - 1.5 mg/dL    Albumin 3.7 3.2 - 4.9 g/dL    Total Protein 6.8 6.0 - 8.2 g/dL    Globulin 3.1 1.9 - 3.5 g/dL    A-G Ratio 1.2 g/dL   LIPASE   Result Value Ref Range    Lipase 150 (H) 7 - 58 U/L   ESTIMATED GFR   Result Value Ref Range    GFR If African American >60 >60 mL/min/1.73 m 2    GFR If Non  African American >60 >60 mL/min/1.73 m 2   DIFFERENTIAL MANUAL   Result Value Ref Range    Bands-Stabs 4.00 0.00 - 10.00 %    Manual Diff Status PERFORMED    PLATELET ESTIMATE   Result Value Ref Range    Plt Estimation Decreased    MORPHOLOGY   Result Value Ref Range    RBC Morphology Normal    COV-2, FLU A/B, AND RSV BY PCR (2-4 HOURS CEPHEID): Collect NP swab in VTM    Specimen: Respirate   Result Value Ref Range    SARS-CoV-2 Source NP Swab    EKG   Result Value Ref Range    Report       [facility] Emergency Dept.    Test Date:  2021  Pt Name:    LYNN MORGAN             Department: [facility]  MRN:        [id number]                      Room:       -ROOM 6  Gender:     Male                         Technician: ALICIA  :        [id number]                   Requested By:ER TRIAGE PROTOCOL  Order #:    [id number]                    Reading MD: LITZY RICHARD MD    Measurements  Intervals                                Axis  Rate:       80                           P:          22  AZ:         145                          QRS:        10  QRSD:       96                           T:          78  QT:         378  QTc:        436    Interpretive Statements  Sinus rhythm  Abnormal R-wave progression, early transition  Compared to ECG 2019 14:25:54  qwaves in 1 aVL persist  Electronically Signed On 2021 13:34:17 PST by LITZY RICHARD MD           COURSE & MEDICAL DECISION MAKING  Pertinent Labs & Imaging studies reviewed. (See chart for details)    Patient presents today with fairly vague symptoms.  He was recently diagnosed with a urinary tract infection.  I reviewed the electronic medical record including the urinalysis.  This does show that the patient had a urine culture that grew out Klebsiella.  Should be sensitive to ciprofloxacin.    Laboratory studies are obtained.    Laboratory studies do not demonstrate any significant anemia.  No significant electrolyte abnormality.  CBC  is remarkable for a low white blood cell count.  Question possible viral syndrome.  COVID-19 screening swab is obtained.    Awaiting urinalysis to determine effectiveness of antibiotic treatment.    2:19 PM at this time the patient's urinalysis and COVID screen remains pending.  My partner Dr. Cardoso will follow up on the results of the urinalysis.  Further treatment and disposition pending.      FINAL IMPRESSION  1. Elevated lipase    2. Malaise and fatigue    3. History of UTI    4. Leukopenia, unspecified type            Electronically signed by: Zac Crawford M.D., 2/8/2021 2:20 PM

## 2021-02-10 ENCOUNTER — APPOINTMENT (OUTPATIENT)
Dept: RADIOLOGY | Facility: MEDICAL CENTER | Age: 71
DRG: 177 | End: 2021-02-10
Attending: EMERGENCY MEDICINE
Payer: MEDICARE

## 2021-02-10 ENCOUNTER — HOSPITAL ENCOUNTER (INPATIENT)
Facility: MEDICAL CENTER | Age: 71
LOS: 3 days | DRG: 177 | End: 2021-02-13
Attending: EMERGENCY MEDICINE | Admitting: INTERNAL MEDICINE
Payer: MEDICARE

## 2021-02-10 DIAGNOSIS — R79.89 ELEVATED D-DIMER: ICD-10-CM

## 2021-02-10 DIAGNOSIS — U07.1 COVID-19: ICD-10-CM

## 2021-02-10 DIAGNOSIS — J96.01 ACUTE RESPIRATORY FAILURE WITH HYPOXIA (HCC): ICD-10-CM

## 2021-02-10 DIAGNOSIS — A41.9 SEPSIS WITHOUT ACUTE ORGAN DYSFUNCTION, DUE TO UNSPECIFIED ORGANISM (HCC): ICD-10-CM

## 2021-02-10 DIAGNOSIS — N39.0 ACUTE UTI: ICD-10-CM

## 2021-02-10 PROBLEM — N30.00 ACUTE CYSTITIS: Status: ACTIVE | Noted: 2021-02-10

## 2021-02-10 LAB
ALBUMIN SERPL BCP-MCNC: 3.4 G/DL (ref 3.2–4.9)
ALBUMIN/GLOB SERPL: 1.1 G/DL
ALP SERPL-CCNC: 60 U/L (ref 30–99)
ALT SERPL-CCNC: 50 U/L (ref 2–50)
ANION GAP SERPL CALC-SCNC: 14 MMOL/L (ref 7–16)
APPEARANCE UR: ABNORMAL
AST SERPL-CCNC: 92 U/L (ref 12–45)
BACTERIA #/AREA URNS HPF: ABNORMAL /HPF
BASOPHILS # BLD AUTO: 0 % (ref 0–1.8)
BASOPHILS # BLD: 0 K/UL (ref 0–0.12)
BILIRUB SERPL-MCNC: 1 MG/DL (ref 0.1–1.5)
BILIRUB UR QL STRIP.AUTO: NEGATIVE
BUN SERPL-MCNC: 21 MG/DL (ref 8–22)
CALCIUM SERPL-MCNC: 8.2 MG/DL (ref 8.5–10.5)
CHLORIDE SERPL-SCNC: 100 MMOL/L (ref 96–112)
CO2 SERPL-SCNC: 21 MMOL/L (ref 20–33)
COLOR UR: ABNORMAL
CREAT SERPL-MCNC: 1.05 MG/DL (ref 0.5–1.4)
CRP SERPL HS-MCNC: 1.99 MG/DL (ref 0–0.75)
D DIMER PPP IA.FEU-MCNC: 7.52 UG/ML (FEU) (ref 0–0.5)
EOSINOPHIL # BLD AUTO: 0 K/UL (ref 0–0.51)
EOSINOPHIL NFR BLD: 0 % (ref 0–6.9)
EPI CELLS #/AREA URNS HPF: NEGATIVE /HPF
ERYTHROCYTE [DISTWIDTH] IN BLOOD BY AUTOMATED COUNT: 44.7 FL (ref 35.9–50)
GLOBULIN SER CALC-MCNC: 3.2 G/DL (ref 1.9–3.5)
GLUCOSE SERPL-MCNC: 109 MG/DL (ref 65–99)
GLUCOSE UR STRIP.AUTO-MCNC: 100 MG/DL
HCT VFR BLD AUTO: 45.3 % (ref 42–52)
HGB BLD-MCNC: 15.5 G/DL (ref 14–18)
HYALINE CASTS #/AREA URNS LPF: ABNORMAL /LPF
KETONES UR STRIP.AUTO-MCNC: 15 MG/DL
LACTATE BLD-SCNC: 2 MMOL/L (ref 0.5–2)
LEUKOCYTE ESTERASE UR QL STRIP.AUTO: ABNORMAL
LYMPHOCYTES # BLD AUTO: 0.46 K/UL (ref 1–4.8)
LYMPHOCYTES NFR BLD: 9.6 % (ref 22–41)
MANUAL DIFF BLD: NORMAL
MCH RBC QN AUTO: 31.8 PG (ref 27–33)
MCHC RBC AUTO-ENTMCNC: 34.2 G/DL (ref 33.7–35.3)
MCV RBC AUTO: 92.8 FL (ref 81.4–97.8)
MICRO URNS: ABNORMAL
MONOCYTES # BLD AUTO: 0.42 K/UL (ref 0–0.85)
MONOCYTES NFR BLD AUTO: 8.7 % (ref 0–13.4)
MORPHOLOGY BLD-IMP: NORMAL
MYELOCYTES NFR BLD MANUAL: 0.9 %
NEUTROPHILS # BLD AUTO: 3.88 K/UL (ref 1.82–7.42)
NEUTROPHILS NFR BLD: 78.3 % (ref 44–72)
NEUTS BAND NFR BLD MANUAL: 2.6 % (ref 0–10)
NITRITE UR QL STRIP.AUTO: POSITIVE
NRBC # BLD AUTO: 0 K/UL
NRBC BLD-RTO: 0 /100 WBC
PH UR STRIP.AUTO: 5 [PH] (ref 5–8)
PLATELET # BLD AUTO: 131 K/UL (ref 164–446)
PLATELET BLD QL SMEAR: NORMAL
PMV BLD AUTO: 10.5 FL (ref 9–12.9)
POTASSIUM SERPL-SCNC: 3.9 MMOL/L (ref 3.6–5.5)
PROCALCITONIN SERPL-MCNC: 0.17 NG/ML
PROT SERPL-MCNC: 6.6 G/DL (ref 6–8.2)
PROT UR QL STRIP: >=300 MG/DL
RBC # BLD AUTO: 4.88 M/UL (ref 4.7–6.1)
RBC # URNS HPF: >150 /HPF
RBC UR QL AUTO: ABNORMAL
SODIUM SERPL-SCNC: 135 MMOL/L (ref 135–145)
SP GR UR STRIP.AUTO: 1.02
TROPONIN T SERPL-MCNC: 23 NG/L (ref 6–19)
UROBILINOGEN UR STRIP.AUTO-MCNC: 2 MG/DL
WBC # BLD AUTO: 4.8 K/UL (ref 4.8–10.8)
WBC #/AREA URNS HPF: ABNORMAL /HPF

## 2021-02-10 PROCEDURE — 85027 COMPLETE CBC AUTOMATED: CPT

## 2021-02-10 PROCEDURE — 700105 HCHG RX REV CODE 258: Performed by: EMERGENCY MEDICINE

## 2021-02-10 PROCEDURE — 71045 X-RAY EXAM CHEST 1 VIEW: CPT

## 2021-02-10 PROCEDURE — 99222 1ST HOSP IP/OBS MODERATE 55: CPT | Mod: AI | Performed by: INTERNAL MEDICINE

## 2021-02-10 PROCEDURE — 86140 C-REACTIVE PROTEIN: CPT

## 2021-02-10 PROCEDURE — 85007 BL SMEAR W/DIFF WBC COUNT: CPT

## 2021-02-10 PROCEDURE — 80053 COMPREHEN METABOLIC PANEL: CPT

## 2021-02-10 PROCEDURE — 770006 HCHG ROOM/CARE - MED/SURG/GYN SEMI*

## 2021-02-10 PROCEDURE — 84145 PROCALCITONIN (PCT): CPT

## 2021-02-10 PROCEDURE — 83605 ASSAY OF LACTIC ACID: CPT

## 2021-02-10 PROCEDURE — 81001 URINALYSIS AUTO W/SCOPE: CPT

## 2021-02-10 PROCEDURE — 700102 HCHG RX REV CODE 250 W/ 637 OVERRIDE(OP): Performed by: EMERGENCY MEDICINE

## 2021-02-10 PROCEDURE — 99285 EMERGENCY DEPT VISIT HI MDM: CPT

## 2021-02-10 PROCEDURE — A9270 NON-COVERED ITEM OR SERVICE: HCPCS | Performed by: EMERGENCY MEDICINE

## 2021-02-10 PROCEDURE — 87040 BLOOD CULTURE FOR BACTERIA: CPT

## 2021-02-10 PROCEDURE — 700111 HCHG RX REV CODE 636 W/ 250 OVERRIDE (IP): Performed by: EMERGENCY MEDICINE

## 2021-02-10 PROCEDURE — 700102 HCHG RX REV CODE 250 W/ 637 OVERRIDE(OP): Performed by: INTERNAL MEDICINE

## 2021-02-10 PROCEDURE — 36415 COLL VENOUS BLD VENIPUNCTURE: CPT

## 2021-02-10 PROCEDURE — A9270 NON-COVERED ITEM OR SERVICE: HCPCS | Performed by: INTERNAL MEDICINE

## 2021-02-10 PROCEDURE — 84484 ASSAY OF TROPONIN QUANT: CPT

## 2021-02-10 PROCEDURE — 96365 THER/PROPH/DIAG IV INF INIT: CPT

## 2021-02-10 PROCEDURE — 85379 FIBRIN DEGRADATION QUANT: CPT

## 2021-02-10 RX ORDER — GUAIFENESIN 600 MG/1
600 TABLET, EXTENDED RELEASE ORAL 2 TIMES DAILY PRN
Status: DISCONTINUED | OUTPATIENT
Start: 2021-02-10 | End: 2021-02-13 | Stop reason: HOSPADM

## 2021-02-10 RX ORDER — ROSUVASTATIN CALCIUM 20 MG/1
20 TABLET, COATED ORAL EVERY EVENING
Status: DISCONTINUED | OUTPATIENT
Start: 2021-02-10 | End: 2021-02-13 | Stop reason: HOSPADM

## 2021-02-10 RX ORDER — ONDANSETRON 2 MG/ML
4 INJECTION INTRAMUSCULAR; INTRAVENOUS EVERY 6 HOURS PRN
Status: DISCONTINUED | OUTPATIENT
Start: 2021-02-10 | End: 2021-02-13 | Stop reason: HOSPADM

## 2021-02-10 RX ORDER — ASPIRIN 81 MG/1
81 TABLET, CHEWABLE ORAL DAILY
Status: DISCONTINUED | OUTPATIENT
Start: 2021-02-11 | End: 2021-02-13 | Stop reason: HOSPADM

## 2021-02-10 RX ORDER — ONDANSETRON 4 MG/1
4 TABLET, ORALLY DISINTEGRATING ORAL EVERY 6 HOURS PRN
Status: DISCONTINUED | OUTPATIENT
Start: 2021-02-10 | End: 2021-02-13 | Stop reason: HOSPADM

## 2021-02-10 RX ORDER — SODIUM CHLORIDE 9 MG/ML
1000 INJECTION, SOLUTION INTRAVENOUS ONCE
Status: COMPLETED | OUTPATIENT
Start: 2021-02-10 | End: 2021-02-10

## 2021-02-10 RX ORDER — ACETAMINOPHEN 325 MG/1
650 TABLET ORAL EVERY 6 HOURS PRN
Status: DISCONTINUED | OUTPATIENT
Start: 2021-02-10 | End: 2021-02-13 | Stop reason: HOSPADM

## 2021-02-10 RX ORDER — ACETAMINOPHEN 500 MG
1000 TABLET ORAL ONCE
Status: COMPLETED | OUTPATIENT
Start: 2021-02-10 | End: 2021-02-10

## 2021-02-10 RX ORDER — DEXAMETHASONE 6 MG/1
6 TABLET ORAL DAILY
Status: DISCONTINUED | OUTPATIENT
Start: 2021-02-11 | End: 2021-02-13 | Stop reason: HOSPADM

## 2021-02-10 RX ORDER — IBUPROFEN 200 MG
400 TABLET ORAL EVERY 6 HOURS PRN
Status: ON HOLD | COMMUNITY
End: 2021-02-13

## 2021-02-10 RX ADMIN — ROSUVASTATIN CALCIUM 20 MG: 5 TABLET, FILM COATED ORAL at 21:07

## 2021-02-10 RX ADMIN — CEFTRIAXONE SODIUM 2 G: 2 INJECTION, POWDER, FOR SOLUTION INTRAMUSCULAR; INTRAVENOUS at 11:10

## 2021-02-10 RX ADMIN — SODIUM CHLORIDE 1000 ML: 9 INJECTION, SOLUTION INTRAVENOUS at 16:00

## 2021-02-10 RX ADMIN — GUAIFENESIN 600 MG: 600 TABLET, EXTENDED RELEASE ORAL at 21:07

## 2021-02-10 RX ADMIN — ACETAMINOPHEN 1000 MG: 500 TABLET ORAL at 11:10

## 2021-02-10 ASSESSMENT — ENCOUNTER SYMPTOMS
EYE DISCHARGE: 0
CHILLS: 0
ORTHOPNEA: 0
PALPITATIONS: 0
WEIGHT LOSS: 0
COUGH: 1
HEADACHES: 0
EYE PAIN: 0
INSOMNIA: 0
SHORTNESS OF BREATH: 1
VOMITING: 0
BLURRED VISION: 0
NAUSEA: 0
FEVER: 0
MYALGIAS: 0
BACK PAIN: 0
DEPRESSION: 0
DIZZINESS: 0
NERVOUS/ANXIOUS: 0
FOCAL WEAKNESS: 0
SEIZURES: 0
STRIDOR: 0
HEARTBURN: 0
ABDOMINAL PAIN: 0
NECK PAIN: 0
DIARRHEA: 0
SPUTUM PRODUCTION: 0
EYE REDNESS: 0

## 2021-02-10 ASSESSMENT — COGNITIVE AND FUNCTIONAL STATUS - GENERAL
DAILY ACTIVITIY SCORE: 24
MOBILITY SCORE: 24
SUGGESTED CMS G CODE MODIFIER DAILY ACTIVITY: CH
SUGGESTED CMS G CODE MODIFIER MOBILITY: CH

## 2021-02-10 ASSESSMENT — LIFESTYLE VARIABLES
TOTAL SCORE: 0
CONSUMPTION TOTAL: NEGATIVE
HOW MANY TIMES IN THE PAST YEAR HAVE YOU HAD 5 OR MORE DRINKS IN A DAY: 0
DOES PATIENT WANT TO STOP DRINKING: NO
HAVE YOU EVER FELT YOU SHOULD CUT DOWN ON YOUR DRINKING: NO
ALCOHOL_USE: NO
AVERAGE NUMBER OF DAYS PER WEEK YOU HAVE A DRINK CONTAINING ALCOHOL: 0
EVER FELT BAD OR GUILTY ABOUT YOUR DRINKING: NO
EVER HAD A DRINK FIRST THING IN THE MORNING TO STEADY YOUR NERVES TO GET RID OF A HANGOVER: NO
ON A TYPICAL DAY WHEN YOU DRINK ALCOHOL HOW MANY DRINKS DO YOU HAVE: 0
TOTAL SCORE: 0
HAVE PEOPLE ANNOYED YOU BY CRITICIZING YOUR DRINKING: NO
TOTAL SCORE: 0

## 2021-02-10 ASSESSMENT — PATIENT HEALTH QUESTIONNAIRE - PHQ9
1. LITTLE INTEREST OR PLEASURE IN DOING THINGS: NOT AT ALL
2. FEELING DOWN, DEPRESSED, IRRITABLE, OR HOPELESS: NOT AT ALL
SUM OF ALL RESPONSES TO PHQ9 QUESTIONS 1 AND 2: 0

## 2021-02-10 ASSESSMENT — FIBROSIS 4 INDEX
FIB4 SCORE: 7.46
FIB4 SCORE: 6.85

## 2021-02-10 NOTE — ED TRIAGE NOTES
"Polo Pyle 70 y.o. male bib EMS from home for     Chief Complaint   Patient presents with   • Shortness of Breath   • Cough   • Urinary Pain     burning w/ urination and \"leaking\"     Pt seen at  on Friday for burning w/ urination and incontinence.  Pt was prescribed Cipro and returned to  on Monday after taking abx as prescribed and UTI symptoms persisting.  Pt was tested for covid at  which came back POSITIVE.  Pt reports cough and SOB since yesterday and continued UTI symptoms.  Pt a SBA.  Pt febrile, took IBU this morning w/ cipro.  Pt denies pain.  /75   Pulse 88   Temp (!) 38.1 °C (100.5 °F) (Oral)   Resp (!) 26   Ht 1.753 m (5' 9\")   Wt 118 kg (261 lb)   SpO2 91%   BMI 38.54 kg/m²     "

## 2021-02-10 NOTE — ED NOTES
ERP and RN bedside for re-evaluation.  Pt reports he does not feel the urge to void and does not feel like he has a full bladder.  Pt attempting to void via urinal at this time.

## 2021-02-10 NOTE — ED NOTES
Pt medicated per MAR.  Pt spo2 79% ra while sleeping.   Placed back on supplemental o2, ERP aware.

## 2021-02-10 NOTE — ED NOTES
Pt aware wesley catheter considered due to urinary retention and inability to provide urine sample.  Pt wanting to try w/ urinal again.  ERP aware.  VSS.

## 2021-02-10 NOTE — ED PROVIDER NOTES
"ED Provider Note    CHIEF COMPLAINT  Chief Complaint   Patient presents with    Shortness of Breath    Cough    Urinary Pain     burning w/ urination and \"leaking\"       HPI  Polo Pyle is a 70 y.o. male who presents for evaluation of fever and cough.  The symptoms are new as of the last 48 hours, currently being treated with ciprofloxacin for urinary tract infection that was diagnosed 6 days ago in urgent care.  At that time he had no fever, he does have discomfort with urination.  He was on ciprofloxacin for 3 days, on day 4 he went to the emergency department at [facility] with the onset of shortness of breath and fever.  Was found to have a positive urine test again but cultures from the original urine sample revealed good antibiotic sensitivity so he was continued on ciprofloxacin.  He also tested positive for COVID-19 at that visit, but at that point he had not experienced any shortness of breath or coughing.  After discharge in the hospital is when his shortness of breath and coughing again along with profound fatigue.  Has ongoing burning with urination.  No abdominal pain.  He had one episode of diarrhea last week but none since.  No nausea or vomiting.  No chest pain.  His cough is nonproductive.  He has chronic leg swelling worse on the left side secondary lymphedema after an endarterectomy, unchanged compared to his baseline.  He offers no other specific complaints at this time.    REVIEW OF SYSTEMS  Negative for rash, abdominal pain, nausea, vomiting, diarrhea, headache, focal weakness, focal numbness, focal tingling. All other systems are negative.     PAST MEDICAL HISTORY  Past Medical History:   Diagnosis Date    ASTHMA     as a youth    CAD (coronary artery disease) 8/26/2011    CATARACT     bilateral IOL    Colorblind     problems with red/green    Dental disorder     upper and partial lower    Essential hypertension     Heart burn     High cholesterol     Hyperlipidemia 8/26/2011    " Indigestion     Infectious disease     Hx of staph infections- last infection 2018-     Myocardial infarct (HCC) 2005    STENTS    Pain 11    NECK 7.5/10; C5-C6    Personal history of venous thrombosis and embolism     DVT 2019    Pneumonia 2009    Presence of stent in left circumflex coronary artery     Unspecified hemorrhagic conditions     TAKES ASA . PROLONGED BLEEDING, nosebleeds    Venous insufficiency of both lower extremities     White coat syndrome with diagnosis of hypertension        FAMILY HISTORY  Family History   Problem Relation Age of Onset    Heart Attack Mother     Heart Disease Mother     Cancer Mother         LYKIMA     Dementia Mother        SOCIAL HISTORY  Social History     Tobacco Use    Smoking status: Former Smoker     Years: 30.00     Types: Cigars     Quit date: 2011     Years since quittin.2    Smokeless tobacco: Never Used    Tobacco comment:    Substance Use Topics    Alcohol use: No    Drug use: No       SURGICAL HISTORY  Past Surgical History:   Procedure Laterality Date    FEMORAL ENDARTERECTOMY Left 2019    Procedure: ENDARTERECTOMY, FEMORAL;  Surgeon: Candelaria Crawford M.D.;  Location: SURGERY [facility];  Service: General    VENTRAL HERNIA REPAIR  2011    Performed by TROY GONZALEZ at SURGERY [facility]    INGUINAL HERNIA REPAIR  2011    Performed by TROY GONZALEZ at SURGERY [facility] ORS    CATARACT PHACO WITH IOL  2010    Performed by RM WILLIS at SURGERY SAME DAY [facility] ORS    CATARACT PHACO WITH IOL  2010    Performed by RM WILLIS at SURGERY SAME DAY [facility] ORS    OTHER CARDIAC SURGERY  2005    STENTS x 4    SINUS TREPHINE FRONTAL  1971    SINUSOTOMIES  1963    INGUINAL HERNIA REPAIR      WITH UNDESCENDED TESTICLE    TONSILLECTOMY AND ADENOIDECTOMY         CURRENT MEDICATIONS  I personally reviewed the medication list in the charting documentation.     ALLERGIES  Allergies  "  Allergen Reactions    Mercury Swelling     And mercury based preservatives-Thimerasol  Swelling, \"strange discharge from eyes\"    Other Environmental Anaphylaxis     Insect toxins/ bees and wasps- Anaphylaxis    Sulfa Drugs Anaphylaxis and Swelling    Pravastatin Diarrhea     Diarrhea        MEDICAL RECORD  I have reviewed patient's medical record and pertinent results are listed above.      PHYSICAL EXAM  VITAL SIGNS: /75   Pulse 88   Temp (!) 38.1 °C (100.5 °F) (Oral)   Resp (!) 26   Ht 1.753 m (5' 9\")   Wt 118 kg (261 lb)   SpO2 91%   BMI 38.54 kg/m²    Constitutional: Mildly ill-appearing, increased respiratory effort  HENT: Normocephalic, no evidence of acute trauma.  Eyes: No scleral icterus. Normal conjunctiva   Neck: Supple, comfortable, nonpainful range of motion.   Cardiovascular: Regular heart rate and rhythm.   Thorax & Lungs: Mildly labored respirations with 5-6 word sentences.  Abdomen: Soft, nondistended with no tenderness, rebound nor guarding.  No mass or pulsatile mass appreciated.  Skin: Warm, dry. No rash appreciated  Extremities/Musculoskeletal: Lower extremity edema bilaterally left greater than right with stasis dermatitis appreciated  Neurologic: Alert & oriented. No focal deficits observed.   Psychiatric: Normal affect appropriate for the clinical situation.    DIAGNOSTIC STUDIES / PROCEDURES    LABS/EKGs  Results for orders placed or performed during the hospital encounter of 02/10/21   CBC WITH DIFFERENTIAL   Result Value Ref Range    WBC 4.8 4.8 - 10.8 K/uL    RBC 4.88 4.70 - 6.10 M/uL    Hemoglobin 15.5 14.0 - 18.0 g/dL    Hematocrit 45.3 42.0 - 52.0 %    MCV 92.8 81.4 - 97.8 fL    MCH 31.8 27.0 - 33.0 pg    MCHC 34.2 33.7 - 35.3 g/dL    RDW 44.7 35.9 - 50.0 fL    Platelet Count 131 (L) 164 - 446 K/uL    MPV 10.5 9.0 - 12.9 fL    Neutrophils-Polys 78.30 (H) 44.00 - 72.00 %    Lymphocytes 9.60 (L) 22.00 - 41.00 %    Monocytes 8.70 0.00 - 13.40 %    Eosinophils 0.00 0.00 - " 6.90 %    Basophils 0.00 0.00 - 1.80 %    Nucleated RBC 0.00 /100 WBC    Neutrophils (Absolute) 3.88 1.82 - 7.42 K/uL    Lymphs (Absolute) 0.46 (L) 1.00 - 4.80 K/uL    Monos (Absolute) 0.42 0.00 - 0.85 K/uL    Eos (Absolute) 0.00 0.00 - 0.51 K/uL    Baso (Absolute) 0.00 0.00 - 0.12 K/uL    NRBC (Absolute) 0.00 K/uL   COMP METABOLIC PANEL   Result Value Ref Range    Sodium 135 135 - 145 mmol/L    Potassium 3.9 3.6 - 5.5 mmol/L    Chloride 100 96 - 112 mmol/L    Co2 21 20 - 33 mmol/L    Anion Gap 14.0 7.0 - 16.0    Glucose 109 (H) 65 - 99 mg/dL    Bun 21 8 - 22 mg/dL    Creatinine 1.05 0.50 - 1.40 mg/dL    Calcium 8.2 (L) 8.5 - 10.5 mg/dL    AST(SGOT) 92 (H) 12 - 45 U/L    ALT(SGPT) 50 2 - 50 U/L    Alkaline Phosphatase 60 30 - 99 U/L    Total Bilirubin 1.0 0.1 - 1.5 mg/dL    Albumin 3.4 3.2 - 4.9 g/dL    Total Protein 6.6 6.0 - 8.2 g/dL    Globulin 3.2 1.9 - 3.5 g/dL    A-G Ratio 1.1 g/dL   LACTIC ACID   Result Value Ref Range    Lactic Acid 2.0 0.5 - 2.0 mmol/L   TROPONIN   Result Value Ref Range    Troponin T 23 (H) 6 - 19 ng/L   URINALYSIS,CULTURE IF INDICATED    Specimen: Urine   Result Value Ref Range    Color Red (A)     Character Sl Cloudy (A)     Specific Gravity 1.025 <1.035    Ph 5.0 5.0 - 8.0    Glucose 100 (A) Negative mg/dL    Ketones 15 (A) Negative mg/dL    Protein >=300 (A) Negative mg/dL    Bilirubin Negative Negative    Urobilinogen, Urine 2.0 Negative    Nitrite Positive (A) Negative    Leukocyte Esterase Moderate (A) Negative    Occult Blood Large (A) Negative    Micro Urine Req Microscopic    ESTIMATED GFR   Result Value Ref Range    GFR If African American >60 >60 mL/min/1.73 m 2    GFR If Non African American >60 >60 mL/min/1.73 m 2   PERIPHERAL SMEAR REVIEW   Result Value Ref Range    Peripheral Smear Review see below    PLATELET ESTIMATE   Result Value Ref Range    Plt Estimation Decreased    DIFFERENTIAL MANUAL   Result Value Ref Range    Bands-Stabs 2.60 0.00 - 10.00 %    Myelocytes 0.90 %     Manual Diff Status PERFORMED    URINE MICROSCOPIC (W/UA)   Result Value Ref Range    WBC 0-2 (A) /hpf    RBC >150 (A) /hpf    Bacteria Rare (A) None /hpf    Epithelial Cells Negative /hpf    Hyaline Cast 0-2 /lpf        RADIOLOGY  DX-CHEST-PORTABLE (1 VIEW)   Final Result      Exam limited by patient body habitus and lordotic positioning with apparent mild interstitial prominence in the right upper lobe which could be due to Covid 19 pneumonia.            COURSE & MEDICAL DECISION MAKING  I have reviewed any medical record information, laboratory studies and radiographic results as noted above.  Differential diagnoses includes: Sepsis, UTI, pneumonia, COVID-19, anemia, dehydration, electrolyte abnormalities    Encounter Summary: This is a 70 y.o. male with new fever, shortness of breath and cough over the past 48 hours, just prior to onset he was diagnosed with COVID-19 albeit without symptoms at that point, 3 days prior to that he was diagnosed with a urinary tract infection and started on Cipro.  After 3 days of antibiotics his urine had not showed any improvement despite the sensitivities revealing a Klebsiella pneumonia that is susceptible to Cipro.  Currently he reports fever, shortness of breath and coughing, he arrives with oxygen saturations in the upper 80s, tachypneic and febrile he has no other complaints or specific findings on exam.  Given his tachypnea and fever, this meets sepsis criteria and with his urinary tract infection I am concerned that that is the etiology of his sepsis although COVID-19 could also be the case.  Septic work-up will be initiated, will obtain urinalysis and the regular septic work-up, will administer broad-spectrum antibiotics and he will be reevaluated ------ the patient has significant oxygen desaturations when sleeping got into the 70s, I do suspect this is secondary to his COVID-19 and his chest x-ray does have an appearance consistent with Covid pneumonia.  Additionally  his urinalysis eventually has resulted after great difficulty in obtaining the sample and seems to still be consistent with infection despite nearly a week of antibiotic treatment.  At this point he has received ceftriaxone.  He will be admitted to the hospital for sepsis secondary to UTI and/or COVID-19 pneumonia with hypoxic respiratory failure    CRITICAL CARE  The very real possibilty of a deterioration of this patient's condition required the highest level of my preparedness for sudden, emergent intervention.  I provided critical care services, which included medication orders, frequent reevaluations of the patient's condition and response to treatment, ordering and reviewing test results, and discussing the case with various consultants.  The critical care time associated with the care of the patient was 39 minutes. Review chart for interventions. This time is exclusive of any other billable procedures.         DISPOSITION: Admit in guarded condition      FINAL IMPRESSION  1. Acute respiratory failure with hypoxia (HCC)    2. COVID-19    3. Sepsis without acute organ dysfunction, due to unspecified organism (HCC)    4. Acute UTI           This dictation was created using voice recognition software. The accuracy of the dictation is limited to the abilities of the software. I expect there may be some errors of grammar and possibly content. The nursing notes were reviewed and certain aspects of this information were incorporated into this note.    Electronically signed by: Layo Escudero M.D., 2/10/2021 10:38 AM

## 2021-02-11 ENCOUNTER — APPOINTMENT (OUTPATIENT)
Dept: RADIOLOGY | Facility: MEDICAL CENTER | Age: 71
DRG: 177 | End: 2021-02-11
Attending: HOSPITALIST
Payer: MEDICARE

## 2021-02-11 PROBLEM — Z71.89 ADVANCED CARE PLANNING/COUNSELING DISCUSSION: Status: ACTIVE | Noted: 2021-02-11

## 2021-02-11 PROBLEM — N40.1 BENIGN PROSTATIC HYPERPLASIA WITH URINARY HESITANCY: Status: ACTIVE | Noted: 2021-02-11

## 2021-02-11 PROBLEM — J96.01 ACUTE RESPIRATORY FAILURE WITH HYPOXIA (HCC): Status: ACTIVE | Noted: 2021-02-11

## 2021-02-11 PROBLEM — R79.89 ELEVATED D-DIMER: Status: ACTIVE | Noted: 2021-02-11

## 2021-02-11 PROBLEM — R39.11 BENIGN PROSTATIC HYPERPLASIA WITH URINARY HESITANCY: Status: ACTIVE | Noted: 2021-02-11

## 2021-02-11 PROCEDURE — 93970 EXTREMITY STUDY: CPT

## 2021-02-11 PROCEDURE — 770006 HCHG ROOM/CARE - MED/SURG/GYN SEMI*

## 2021-02-11 PROCEDURE — 700105 HCHG RX REV CODE 258: Performed by: INTERNAL MEDICINE

## 2021-02-11 PROCEDURE — 99233 SBSQ HOSP IP/OBS HIGH 50: CPT | Mod: 25 | Performed by: HOSPITALIST

## 2021-02-11 PROCEDURE — 700102 HCHG RX REV CODE 250 W/ 637 OVERRIDE(OP): Performed by: INTERNAL MEDICINE

## 2021-02-11 PROCEDURE — 93970 EXTREMITY STUDY: CPT | Mod: 26 | Performed by: INTERNAL MEDICINE

## 2021-02-11 PROCEDURE — A9270 NON-COVERED ITEM OR SERVICE: HCPCS | Performed by: HOSPITALIST

## 2021-02-11 PROCEDURE — A9270 NON-COVERED ITEM OR SERVICE: HCPCS | Performed by: INTERNAL MEDICINE

## 2021-02-11 PROCEDURE — 700111 HCHG RX REV CODE 636 W/ 250 OVERRIDE (IP): Performed by: INTERNAL MEDICINE

## 2021-02-11 PROCEDURE — 700102 HCHG RX REV CODE 250 W/ 637 OVERRIDE(OP): Performed by: HOSPITALIST

## 2021-02-11 PROCEDURE — 99497 ADVNCD CARE PLAN 30 MIN: CPT | Performed by: HOSPITALIST

## 2021-02-11 RX ORDER — FINASTERIDE 5 MG/1
5 TABLET, FILM COATED ORAL DAILY
Status: DISCONTINUED | OUTPATIENT
Start: 2021-02-11 | End: 2021-02-13 | Stop reason: HOSPADM

## 2021-02-11 RX ADMIN — CEFTRIAXONE SODIUM 2 G: 2 INJECTION, POWDER, FOR SOLUTION INTRAMUSCULAR; INTRAVENOUS at 05:12

## 2021-02-11 RX ADMIN — ASPIRIN 81 MG: 81 TABLET, CHEWABLE ORAL at 05:11

## 2021-02-11 RX ADMIN — DEXAMETHASONE 6 MG: 6 TABLET ORAL at 05:11

## 2021-02-11 RX ADMIN — ENOXAPARIN SODIUM 40 MG: 40 INJECTION SUBCUTANEOUS at 05:12

## 2021-02-11 RX ADMIN — FINASTERIDE 5 MG: 5 TABLET, FILM COATED ORAL at 13:18

## 2021-02-11 ASSESSMENT — ENCOUNTER SYMPTOMS
CHILLS: 0
SPUTUM PRODUCTION: 0
SHORTNESS OF BREATH: 1
ABDOMINAL PAIN: 0
WHEEZING: 0
FEVER: 0
DIZZINESS: 0
HEADACHES: 0
PALPITATIONS: 0
VOMITING: 0
NAUSEA: 0
COUGH: 1

## 2021-02-11 ASSESSMENT — PAIN DESCRIPTION - PAIN TYPE: TYPE: ACUTE PAIN

## 2021-02-11 NOTE — PROGRESS NOTES
Hospital Medicine Daily Progress Note    Date of Service  2/11/2021    Chief Complaint  70 y.o. male admitted 2/10/2021 with pertinence of breath    Hospital Course  70-year-old male with past medical history of hypertension, CAD, PAD presenting with shortness of breath.  Patient was recently diagnosed with a UTI on 2/5 at which time he was started on ciprofloxacin.  Patient had also presented to emergency room on 2/8 with fatigue and continued dysuria and he was found to be Covid positive.  Presented to emergency room again on 2/10 at which time he was noted to be hypoxic requiring 2 L of supplemental oxygen.  He has been started on Decadron and his antibiotics have been changed to Rocephin for his UTI      Interval Problem Update  He remains on 2 L of oxygen  His shortness of breath today is improved  He is having a cough but does feel some congestion in his chest  His dysuria is resolved  Urine culture from 2/5 growing Klebsiella pneumoniae which is sensitive to ciprofloxacin.  D-dimer elevated at 7.2, I have ordered bilateral lower extremity Dopplers  His son was at bedside and all questions were answered    Consultants/Specialty  None    Code Status  DNAR/DNI    Disposition  TBD    Review of Systems  Review of Systems   Constitutional: Negative for chills and fever.   Respiratory: Positive for cough and shortness of breath. Negative for sputum production and wheezing.    Cardiovascular: Negative for chest pain and palpitations.   Gastrointestinal: Negative for abdominal pain, nausea and vomiting.   Genitourinary: Negative for dysuria and urgency.   Neurological: Negative for dizziness and headaches.   All other systems reviewed and are negative.       Physical Exam  Temp:  [36.1 °C (96.9 °F)-37.2 °C (98.9 °F)] 36.3 °C (97.4 °F)  Pulse:  [] 92  Resp:  [15-36] 18  BP: ()/(55-84) 128/75  SpO2:  [91 %-100 %] 92 %    Physical Exam  Vitals and nursing note reviewed.   Constitutional:       Appearance:  Normal appearance.   Cardiovascular:      Rate and Rhythm: Normal rate and regular rhythm.      Heart sounds: No murmur.   Pulmonary:      Effort: Pulmonary effort is normal. No respiratory distress.      Breath sounds: Normal breath sounds.   Neurological:      General: No focal deficit present.      Mental Status: He is alert and oriented to person, place, and time.         Fluids  No intake or output data in the 24 hours ending 02/11/21 1238    Laboratory  Recent Labs     02/10/21  0945   WBC 4.8   RBC 4.88   HEMOGLOBIN 15.5   HEMATOCRIT 45.3   MCV 92.8   MCH 31.8   MCHC 34.2   RDW 44.7   PLATELETCT 131*   MPV 10.5     Recent Labs     02/10/21  0945   SODIUM 135   POTASSIUM 3.9   CHLORIDE 100   CO2 21   GLUCOSE 109*   BUN 21   CREATININE 1.05   CALCIUM 8.2*                   Imaging  DX-CHEST-PORTABLE (1 VIEW)   Final Result      Exam limited by patient body habitus and lordotic positioning with apparent mild interstitial prominence in the right upper lobe which could be due to Covid 19 pneumonia.      US-EXTREMITY VENOUS LOWER BILAT    (Results Pending)        Assessment/Plan  * COVID-19- (present on admission)  Assessment & Plan  - patient is high risk for severe COVID-19  -cont Decadron.    - continue supportive care, with RT protocol, oxygen supplement. Keep sats above 89%, oxygen supplement as needed. Awake proning as tolerated.   - limit IVFs. Avoid NSAIDS.   - Initial procalcitonin is negative  - continue symptom control. Monitor for fevers.  - continue contact and droplet isolation with eye protection.  -D-dimer markedly elevated, will check lower extremity Doppler        Elevated d-dimer  Assessment & Plan  We will check bilateral lower extremity Doppler    Advanced care planning/counseling discussion  Assessment & Plan  I spent a greater than 17 minutes at bedside discussing with patient and his son about CODE STATUS. Patient is filling out a POLST form and would like to change his CODE STATUS to  DNR/DNI    Benign prostatic hyperplasia with urinary hesitancy  Assessment & Plan  Patient has anaphylactic reaction to sulfa drugs unable to start tamsulosin  I have started finasteride    Acute respiratory failure with hypoxia (HCC)  Assessment & Plan  Secondary to Covid  Wean oxygen as tolerated    Acute cystitis- (present on admission)  Assessment & Plan  His urine culture from 2/5 growing Klebsiella sensitive to fluoroquinolones and patient completed a 5-day course of this however he still having some dysuria  He has received 2 dose of Rocephin now and will give 1 additional dose       VTE prophylaxis: Lovenox

## 2021-02-11 NOTE — ED NOTES
Pt transferred to [facility]/01 via [facility] w/ transport, pt A&Ox4, 2L O2 via [place], given dinner prior to transport. Belongings in possession, NAD noted.

## 2021-02-11 NOTE — ASSESSMENT & PLAN NOTE
His urine culture from 2/5 growing Klebsiella sensitive to fluoroquinolones and patient completed a 5-day course of this however he still having some dysuria  S/p rocephin 3 days

## 2021-02-11 NOTE — ASSESSMENT & PLAN NOTE
Patient has anaphylactic reaction to sulfa drugs unable to start tamsulosin  I have started finasteride

## 2021-02-11 NOTE — PROGRESS NOTES
2 RN skin check completed  Discoloration/dry skin to BLE  Devices in place: Nasal cannula  Skin is intact besides scratch to R wrist.

## 2021-02-11 NOTE — H&P
"Hospital Medicine History & Physical Note    Date of Service  2/10/2021    Primary Care Physician  YASMINE Lennon.    Consultants  none    Code Status  Full Code    Chief Complaint  Chief Complaint   Patient presents with   • Shortness of Breath   • Cough   • Urinary Pain     burning w/ urination and \"leaking\"       History of Presenting Illness  70 y.o. male with recently diagnosed Covid infection and urinary tract infection a few days ago who presented 2/10/2021 with worsening shortness of breath started earlier today.  Associated with cough, dyspnea on exertion and chills.  In addition he continued to have dysuria despite being on ciprofloxacin for recently diagnosed UTI.  In the ER he was found to have hypoxic respiratory failure requiring 2 L oxygen due to Covid infection.  He will be admitted for further management.    Review of Systems  Review of Systems   Constitutional: Negative for chills, fever and weight loss.   HENT: Negative for congestion and nosebleeds.    Eyes: Negative for blurred vision, pain, discharge and redness.   Respiratory: Positive for cough and shortness of breath. Negative for sputum production and stridor.    Cardiovascular: Negative for chest pain, palpitations and orthopnea.   Gastrointestinal: Negative for abdominal pain, diarrhea, heartburn, nausea and vomiting.   Genitourinary: Negative for dysuria, frequency and urgency.   Musculoskeletal: Negative for back pain, myalgias and neck pain.   Skin: Negative for itching and rash.   Neurological: Negative for dizziness, focal weakness, seizures and headaches.   Psychiatric/Behavioral: Negative for depression. The patient is not nervous/anxious and does not have insomnia.        Past Medical History   has a past medical history of ASTHMA, CAD (coronary artery disease) (8/26/2011), CATARACT, Colorblind, Dental disorder, Essential hypertension, Heart burn, High cholesterol, Hyperlipidemia (8/26/2011), Indigestion, Infectious " "disease, Myocardial infarct (HCC) (2005), Pain (11/14/11), Personal history of venous thrombosis and embolism (2006), Pneumonia (2009), Presence of stent in left circumflex coronary artery, Unspecified hemorrhagic conditions, Venous insufficiency of both lower extremities, and White coat syndrome with diagnosis of hypertension.    Surgical History   has a past surgical history that includes tonsillectomy and adenoidectomy (1957); sinus trephine frontal (1971); cataract phaco with iol (11/2/2010); cataract phaco with iol (11/23/2010); ventral hernia repair (11/30/2011); sinusotomies (08/1963); other cardiac surgery (2005); inguinal hernia repair (1962); inguinal hernia repair (11/30/2011); and femoral endarterectomy (Left, 9/20/2019).     Family History  family history includes Cancer in his mother; Dementia in his mother; Heart Attack in his mother; Heart Disease in his mother.     Social History   reports that he quit smoking about 9 years ago. His smoking use included cigars. He quit after 30.00 years of use. He has never used smokeless tobacco. He reports that he does not drink alcohol and does not use drugs.    Allergies  Allergies   Allergen Reactions   • Mercury Swelling     And mercury based preservatives-Thimerasol  Swelling, \"strange discharge from eyes\"   • Other Environmental Anaphylaxis     Insect toxins/ bees and wasps- Anaphylaxis   • Sulfa Drugs Anaphylaxis and Swelling   • Pravastatin Diarrhea     Diarrhea        Medications  Prior to Admission Medications   Prescriptions Last Dose Informant Patient Reported? Taking?   Ascorbic Acid (VITAMIN C PO) 2/9/2021 at AM Patient Yes No   Sig: Take 6,000 mg by mouth every day.   [employer] OMEGA-3 KRILL OIL PO 2/9/2021 at AM Patient Yes Yes   Sig: Take 1 capsule by mouth every day.   Coenzyme Q10 (CO Q-10) 300 MG Cap 2/9/2021 at AM Patient Yes No   Sig: Take 1 Cap by mouth every day.   Diclofenac Sodium 1 % Gel Not Taking at Unknown time Patient No No   Sig: Apply 2 " g to skin as directed 3 times a day as needed.   Patient not taking: Reported on 2/10/2021   Multiple Vitamin (MULTIVITAMIN PO) 2/9/2021 at AM Patient Yes No   Sig: Take 1 Tab by mouth every day.   VITAMIN D PO 2/9/2021 at AM Patient Yes Yes   Sig: Take 1 capsule by mouth every day.   aspirin 81 MG tablet 2/9/2021 at AM Patient Yes No   Sig: Take 81 mg by mouth every day.   ciprofloxacin (CIPRO) 500 MG Tab 2/10/2021 at AM Patient No No   Sig: Take 1 Tab by mouth 2 times a day for 7 days.   Patient taking differently: Take 500 mg by mouth 2 times a day. Started 02/06/21   ferrous sulfate 325 (65 Fe) MG tablet 2/9/2021 at AM Patient Yes No   Sig: Take 325 mg by mouth every day.   ibuprofen (MOTRIN) 200 MG Tab >2 days ago at unknown Patient Yes Yes   Sig: Take 400 mg by mouth every 6 hours as needed.   niacin (NIASPAN) 1000 MG CR tablet 2/9/2021 at PM Patient No No   Sig: TAKE TWO TABLETS BY MOUTH DAILY   rosuvastatin (CRESTOR) 20 MG Tab 2/9/2021 at PM Patient No No   Sig: Take 1 Tab by mouth every evening.      Facility-Administered Medications: None       Physical Exam  Temp:  [36.5 °C (97.7 °F)-38.1 °C (100.5 °F)] 36.5 °C (97.7 °F)  Pulse:  [] 57  Resp:  [19-36] 21  BP: ()/(55-76) 133/75  SpO2:  [79 %-98 %] 92 %    Physical Exam  Vitals reviewed.   Constitutional:       General: He is not in acute distress.     Appearance: Normal appearance.   HENT:      Head: Normocephalic and atraumatic.      Nose: No congestion or rhinorrhea.   Eyes:      Extraocular Movements: Extraocular movements intact.      Pupils: Pupils are equal, round, and reactive to light.   Cardiovascular:      Rate and Rhythm: Normal rate and regular rhythm.      Pulses: Normal pulses.   Pulmonary:      Effort: Pulmonary effort is normal. No respiratory distress.      Breath sounds: Normal breath sounds.   Abdominal:      General: Bowel sounds are normal. There is no distension.      Palpations: Abdomen is soft.      Tenderness: There is  no abdominal tenderness.   Musculoskeletal:         General: No swelling or tenderness.      Cervical back: Normal range of motion and neck supple.   Skin:     General: Skin is warm.      Findings: No erythema.   Neurological:      General: No focal deficit present.      Mental Status: He is alert and oriented to person, place, and time.         Laboratory:  Recent Labs     02/08/21  1217 02/10/21  0945   WBC 3.7* 4.8   RBC 5.10 4.88   HEMOGLOBIN 15.9 15.5   HEMATOCRIT 46.7 45.3   MCV 91.6 92.8   MCH 31.2 31.8   MCHC 34.0 34.2   RDW 44.2 44.7   PLATELETCT 108* 131*   MPV 10.2 10.5     Recent Labs     02/08/21  1217 02/10/21  0945   SODIUM 134* 135   POTASSIUM 3.8 3.9   CHLORIDE 99 100   CO2 23 21   GLUCOSE 107* 109*   BUN 21 21   CREATININE 1.17 1.05   CALCIUM 8.3* 8.2*     Recent Labs     02/08/21  1217 02/10/21  0945   ALTSGPT 37 50   ASTSGOT 71* 92*   ALKPHOSPHAT 64 60   TBILIRUBIN 1.0 1.0   LIPASE 150*  --    GLUCOSE 107* 109*         No results for input(s): NTPROBNP in the last 72 hours.      Recent Labs     02/10/21  0945   TROPONINT 23*       Imaging:  DX-CHEST-PORTABLE (1 VIEW)   Final Result      Exam limited by patient body habitus and lordotic positioning with apparent mild interstitial prominence in the right upper lobe which could be due to Covid 19 pneumonia.            Assessment/Plan:  I anticipate this patient will require at least two midnights for appropriate medical management, necessitating inpatient admission.    Acute cystitis- (present on admission)  Assessment & Plan  Started on IV ceftriaxone  History of Klebsiella UTI  Follow cultures    COVID-19- (present on admission)  Assessment & Plan  Ordered inflammatory markers ESR, CRP, interleukin-6, D-dimer, procalcitonin  Started on Decadron  Supportive care  Droplet precaution

## 2021-02-11 NOTE — ED NOTES
Report from JOSE LUIS Duran. Assumed care of pt, pt resting in gurney w/ eyes closed, respirations even and unlabored. Chest rise noted, NAD noted .

## 2021-02-11 NOTE — HOSPITAL COURSE
70-year-old male with past medical history of hypertension, CAD, PAD presenting with shortness of breath.  Patient was recently diagnosed with a UTI on 2/5 at which time he was started on ciprofloxacin.  Patient had also presented to emergency room on 2/8 with fatigue and continued dysuria and he was found to be Covid positive.  Presented to emergency room again on 2/10 at which time he was noted to be hypoxic requiring 2 L of supplemental oxygen.  He has been started on Decadron and his antibiotics have been changed to Rocephin for his UTI

## 2021-02-11 NOTE — ED NOTES
Med rec complete via interview with pt at bedside. Allergies reviewed. Pt started a 7 day course of ciprofloxacin 500 mg 1 tab bid on 02/06/21 and most recent dose was this morning.

## 2021-02-11 NOTE — ASSESSMENT & PLAN NOTE
- patient is high risk for severe COVID-19  -cont Decadron.    - continue supportive care, with RT protocol, oxygen supplement. Keep sats above 89%, oxygen supplement as needed. Awake proning as tolerated.   - limit IVFs. Avoid NSAIDS.   - Initial procalcitonin is negative  - continue symptom control. Monitor for fevers.  - continue contact and droplet isolation with eye protection.  -D-dimer markedly elevated,  lower extremity Doppler negative

## 2021-02-11 NOTE — PROGRESS NOTES
Patients son Samy is at bedside and has been updated. MD discussed code status with patient and patient has a POLST form that was placed in chart.

## 2021-02-11 NOTE — ASSESSMENT & PLAN NOTE
I spent a greater than 17 minutes at bedside discussing with patient and his son about CODE STATUS. Patient completed POLST form

## 2021-02-12 ENCOUNTER — APPOINTMENT (OUTPATIENT)
Dept: RADIOLOGY | Facility: MEDICAL CENTER | Age: 71
DRG: 177 | End: 2021-02-12
Attending: HOSPITALIST
Payer: MEDICARE

## 2021-02-12 PROBLEM — I82.90 THROMBUS: Status: ACTIVE | Noted: 2021-02-12

## 2021-02-12 LAB
ALBUMIN SERPL BCP-MCNC: 3.2 G/DL (ref 3.2–4.9)
ALBUMIN/GLOB SERPL: 1 G/DL
ALP SERPL-CCNC: 59 U/L (ref 30–99)
ALT SERPL-CCNC: 37 U/L (ref 2–50)
ANION GAP SERPL CALC-SCNC: 12 MMOL/L (ref 7–16)
AST SERPL-CCNC: 58 U/L (ref 12–45)
BASOPHILS # BLD AUTO: 0 % (ref 0–1.8)
BASOPHILS # BLD: 0 K/UL (ref 0–0.12)
BILIRUB SERPL-MCNC: 0.7 MG/DL (ref 0.1–1.5)
BUN SERPL-MCNC: 23 MG/DL (ref 8–22)
CALCIUM SERPL-MCNC: 8.8 MG/DL (ref 8.5–10.5)
CHLORIDE SERPL-SCNC: 105 MMOL/L (ref 96–112)
CO2 SERPL-SCNC: 24 MMOL/L (ref 20–33)
CREAT SERPL-MCNC: 0.77 MG/DL (ref 0.5–1.4)
EOSINOPHIL # BLD AUTO: 0 K/UL (ref 0–0.51)
EOSINOPHIL NFR BLD: 0 % (ref 0–6.9)
ERYTHROCYTE [DISTWIDTH] IN BLOOD BY AUTOMATED COUNT: 45 FL (ref 35.9–50)
GLOBULIN SER CALC-MCNC: 3.3 G/DL (ref 1.9–3.5)
GLUCOSE SERPL-MCNC: 164 MG/DL (ref 65–99)
HCT VFR BLD AUTO: 45.6 % (ref 42–52)
HGB BLD-MCNC: 15.4 G/DL (ref 14–18)
LYMPHOCYTES # BLD AUTO: 0.51 K/UL (ref 1–4.8)
LYMPHOCYTES NFR BLD: 7.9 % (ref 22–41)
MANUAL DIFF BLD: NORMAL
MCH RBC QN AUTO: 31.5 PG (ref 27–33)
MCHC RBC AUTO-ENTMCNC: 33.8 G/DL (ref 33.7–35.3)
MCV RBC AUTO: 93.3 FL (ref 81.4–97.8)
MONOCYTES # BLD AUTO: 0.4 K/UL (ref 0–0.85)
MONOCYTES NFR BLD AUTO: 6.1 % (ref 0–13.4)
MORPHOLOGY BLD-IMP: NORMAL
NEUTROPHILS # BLD AUTO: 5.59 K/UL (ref 1.82–7.42)
NEUTROPHILS NFR BLD: 80.7 % (ref 44–72)
NEUTS BAND NFR BLD MANUAL: 5.3 % (ref 0–10)
NRBC # BLD AUTO: 0 K/UL
NRBC BLD-RTO: 0 /100 WBC
PLATELET # BLD AUTO: 149 K/UL (ref 164–446)
PLATELET BLD QL SMEAR: NORMAL
PMV BLD AUTO: 9.8 FL (ref 9–12.9)
POTASSIUM SERPL-SCNC: 3.9 MMOL/L (ref 3.6–5.5)
PROT SERPL-MCNC: 6.5 G/DL (ref 6–8.2)
RBC # BLD AUTO: 4.89 M/UL (ref 4.7–6.1)
RBC BLD AUTO: NORMAL
SODIUM SERPL-SCNC: 141 MMOL/L (ref 135–145)
WBC # BLD AUTO: 6.5 K/UL (ref 4.8–10.8)

## 2021-02-12 PROCEDURE — 700111 HCHG RX REV CODE 636 W/ 250 OVERRIDE (IP): Performed by: HOSPITALIST

## 2021-02-12 PROCEDURE — 99233 SBSQ HOSP IP/OBS HIGH 50: CPT | Performed by: HOSPITALIST

## 2021-02-12 PROCEDURE — 700102 HCHG RX REV CODE 250 W/ 637 OVERRIDE(OP): Performed by: HOSPITALIST

## 2021-02-12 PROCEDURE — 770006 HCHG ROOM/CARE - MED/SURG/GYN SEMI*

## 2021-02-12 PROCEDURE — A9270 NON-COVERED ITEM OR SERVICE: HCPCS | Performed by: INTERNAL MEDICINE

## 2021-02-12 PROCEDURE — A9270 NON-COVERED ITEM OR SERVICE: HCPCS | Performed by: HOSPITALIST

## 2021-02-12 PROCEDURE — 700105 HCHG RX REV CODE 258: Performed by: HOSPITALIST

## 2021-02-12 PROCEDURE — 700111 HCHG RX REV CODE 636 W/ 250 OVERRIDE (IP): Performed by: INTERNAL MEDICINE

## 2021-02-12 PROCEDURE — 36415 COLL VENOUS BLD VENIPUNCTURE: CPT

## 2021-02-12 PROCEDURE — 93926 LOWER EXTREMITY STUDY: CPT | Mod: 26 | Performed by: INTERNAL MEDICINE

## 2021-02-12 PROCEDURE — 85007 BL SMEAR W/DIFF WBC COUNT: CPT

## 2021-02-12 PROCEDURE — 80053 COMPREHEN METABOLIC PANEL: CPT

## 2021-02-12 PROCEDURE — 85027 COMPLETE CBC AUTOMATED: CPT

## 2021-02-12 PROCEDURE — 700102 HCHG RX REV CODE 250 W/ 637 OVERRIDE(OP): Performed by: INTERNAL MEDICINE

## 2021-02-12 PROCEDURE — 93926 LOWER EXTREMITY STUDY: CPT | Mod: LT

## 2021-02-12 RX ADMIN — ROSUVASTATIN CALCIUM 20 MG: 5 TABLET, FILM COATED ORAL at 17:18

## 2021-02-12 RX ADMIN — FINASTERIDE 5 MG: 5 TABLET, FILM COATED ORAL at 05:30

## 2021-02-12 RX ADMIN — ENOXAPARIN SODIUM 40 MG: 40 INJECTION SUBCUTANEOUS at 05:31

## 2021-02-12 RX ADMIN — DEXAMETHASONE 6 MG: 6 TABLET ORAL at 05:31

## 2021-02-12 RX ADMIN — CEFTRIAXONE SODIUM 2 G: 2 INJECTION, POWDER, FOR SOLUTION INTRAMUSCULAR; INTRAVENOUS at 05:31

## 2021-02-12 RX ADMIN — ASPIRIN 81 MG: 81 TABLET, CHEWABLE ORAL at 05:30

## 2021-02-12 ASSESSMENT — ENCOUNTER SYMPTOMS
FEVER: 0
PALPITATIONS: 0
HEADACHES: 0
ABDOMINAL PAIN: 0
SHORTNESS OF BREATH: 1
WHEEZING: 0
DIZZINESS: 0
VOMITING: 0
COUGH: 1
NAUSEA: 0
CHILLS: 0
SPUTUM PRODUCTION: 0

## 2021-02-12 ASSESSMENT — PATIENT HEALTH QUESTIONNAIRE - PHQ9
1. LITTLE INTEREST OR PLEASURE IN DOING THINGS: NOT AT ALL
SUM OF ALL RESPONSES TO PHQ9 QUESTIONS 1 AND 2: 0
2. FEELING DOWN, DEPRESSED, IRRITABLE, OR HOPELESS: NOT AT ALL

## 2021-02-12 ASSESSMENT — PAIN DESCRIPTION - PAIN TYPE: TYPE: ACUTE PAIN

## 2021-02-12 NOTE — DOCUMENTATION QUERY
[facility]                                                                       Query Response Note      PATIENT:               LYNN MORGAN  ACCT #:                  [id number]  MRN:                     [id number]  :                      1950  ADMIT DATE:       2/10/2021 9:36 AM  DISCH DATE:          RESPONDING  PROVIDER #:        [id number]           QUERY TEXT:    Sepsis is documented in the ED Provider Note but was not addressed in the H&P. Please clarify status of this condition:    NOTE:  If an appropriate response is not listed below, please respond with a new note.    The patient's Clinical Indicators include:  2/4 SIRS Criteria POA- Temp: 38.1C and RR: 26 on admission. WBC: 4.8, PCT: 0.17, CRP: 1.99, and SARS-CoV-2 detected by PCR on admission. Final Impression: Acute respiratory failure w/hypoxia, COVID-19, Sepsis, and Acute UTI are documented in the ED Provider Note.    Treatments include: Rocephin and Decadron.    Risk factors include: dx Acute Respiratory Failure w/Hypoxia 2/2 COVID-19, dx UTI, and Advanced Age.    Thank you,  Aman Potts RN, BSN  Clinical   Connect via [employer]  Options provided:   -- Sepsis 2/2 COVID-19 without acute organ dysfunction was present on admission   -- Severe sepsis 2/2 COVID with acute organ dysfunction was present on admission, Please specify the acute organ dysfunction present (e.g. Acute Respiratory Failure, MIKAYLA, Metabolic Encephalopathy)   -- Sepsis d/t other or unknown etiology without acute organ dysfunction was present on admission   -- Severe sepsis d/t other or unknown etiology with acute organ dysfunction was present on admission, Please specify the etiology, if known, and the acute organ dysfunction present (e.g. Acute Respiratory Failure, MIKAYLA, Metabolic Encephalopathy)   -- Sepsis is ruled out   -- Unable to determine      Query created by:  Aman Potts on 2/12/2021 10:54 AM    RESPONSE TEXT:    Sepsis is ruled out          Electronically signed by:  MUNIR ALFARO MD 2/12/2021 11:02 AM

## 2021-02-12 NOTE — PROGRESS NOTES
Updated patient's stepson at bedside. All questions were answered and no other questions were voiced.

## 2021-02-12 NOTE — ASSESSMENT & PLAN NOTE
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

## 2021-02-12 NOTE — PROGRESS NOTES
Hospital Medicine Daily Progress Note    Date of Service  2/12/2021    Chief Complaint  70 y.o. male admitted 2/10/2021 with pertinence of breath    Hospital Course  70-year-old male with past medical history of hypertension, CAD, PAD presenting with shortness of breath.  Patient was recently diagnosed with a UTI on 2/5 at which time he was started on ciprofloxacin.  Patient had also presented to emergency room on 2/8 with fatigue and continued dysuria and he was found to be Covid positive.  Presented to emergency room again on 2/10 at which time he was noted to be hypoxic requiring 2 L of supplemental oxygen.  He has been started on Decadron and his antibiotics have been changed to Rocephin for his UTI      Interval Problem Update  NICK overnight  DVT us showing arterial thrombus, arterial US ordered  His dysuria is resolved    Consultants/Specialty  None    Code Status  DNAR/DNI    Disposition  Poss dc home tomorrow    Review of Systems  Review of Systems   Constitutional: Negative for chills and fever.   Respiratory: Positive for cough and shortness of breath. Negative for sputum production and wheezing.    Cardiovascular: Negative for chest pain and palpitations.   Gastrointestinal: Negative for abdominal pain, nausea and vomiting.   Genitourinary: Negative for dysuria and urgency.   Neurological: Negative for dizziness and headaches.   All other systems reviewed and are negative.       Physical Exam  Temp:  [36.1 °C (97 °F)-36.9 °C (98.4 °F)] 36.1 °C (97 °F)  Pulse:  [64-84] 64  Resp:  [18-20] 18  BP: (128-137)/(76-83) 134/83  SpO2:  [90 %-92 %] 92 %    Physical Exam  Vitals and nursing note reviewed.   Constitutional:       Appearance: Normal appearance.   Cardiovascular:      Rate and Rhythm: Normal rate and regular rhythm.      Heart sounds: No murmur.   Pulmonary:      Effort: Pulmonary effort is normal. No respiratory distress.      Breath sounds: Normal breath sounds.   Neurological:      General: No focal  deficit present.      Mental Status: He is alert and oriented to person, place, and time.         Fluids    Intake/Output Summary (Last 24 hours) at 2/12/2021 1256  Last data filed at 2/12/2021 1000  Gross per 24 hour   Intake 360 ml   Output --   Net 360 ml       Laboratory  Recent Labs     02/10/21  0945 02/12/21  1001   WBC 4.8 6.5   RBC 4.88 4.89   HEMOGLOBIN 15.5 15.4   HEMATOCRIT 45.3 45.6   MCV 92.8 93.3   MCH 31.8 31.5   MCHC 34.2 33.8   RDW 44.7 45.0   PLATELETCT 131* 149*   MPV 10.5 9.8     Recent Labs     02/10/21  0945 02/12/21  1001   SODIUM 135 141   POTASSIUM 3.9 3.9   CHLORIDE 100 105   CO2 21 24   GLUCOSE 109* 164*   BUN 21 23*   CREATININE 1.05 0.77   CALCIUM 8.2* 8.8                   Imaging  US-EXTREMITY VENOUS LOWER BILAT   Final Result      DX-CHEST-PORTABLE (1 VIEW)   Final Result      Exam limited by patient body habitus and lordotic positioning with apparent mild interstitial prominence in the right upper lobe which could be due to Covid 19 pneumonia.      US-EXTREMITY ARTERY LOWER UNILAT LEFT    (Results Pending)        Assessment/Plan  * COVID-19- (present on admission)  Assessment & Plan  - patient is high risk for severe COVID-19  -cont Decadron.    - continue supportive care, with RT protocol, oxygen supplement. Keep sats above 89%, oxygen supplement as needed. Awake proning as tolerated.   - limit IVFs. Avoid NSAIDS.   - Initial procalcitonin is negative  - continue symptom control. Monitor for fevers.  - continue contact and droplet isolation with eye protection.  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 Patient completed POLST form    Benign prostatic hyperplasia with urinary hesitancy  Assessment & Plan  Patient has anaphylactic reaction to sulfa drugs unable to start tamsulosin  I have started finasteride    Acute respiratory failure with hypoxia (HCC)  Assessment & Plan  Secondary to Covid  Wean oxygen as tolerated    Acute cystitis- (present on admission)  Assessment & Plan  His urine culture from 2/5 growing Klebsiella sensitive to fluoroquinolones and patient completed a 5-day course of this however he still having some dysuria  S/p rocephin 3 days       VTE prophylaxis: Lovenox

## 2021-02-12 NOTE — CARE PLAN
Pt progressing as expected, alert and verbally responsive, able to make his needs known, ambulating with standby assist to br, gait is steady, extension tubing provided tonight to allow pt to ambulate more freely without the 02 tank. ABT- Rocephin  in progress with no s/s of adverse or delayed reactions noted. Increased po fluids encouraged and taken well. Lovenox for VTE in progress, no visible signs of unusual bruising or bleeding noted. Discussed POC with pt, answered his questions and addressed his concerns. Wanted to know about his current order Verbalized understanding and approval.

## 2021-02-12 NOTE — DOCUMENTATION QUERY
"                                                                         [facility]                                                                       Query Response Note      PATIENT:               LYNN MORGAN  ACCT #:                  [id number]  MRN:                     [id number]  :                      1950  ADMIT DATE:       2/10/2021 9:36 AM  DISCH DATE:          RESPONDING  PROVIDER #:        [id number]           QUERY TEXT:    \"Apparent mild interstitial prominence in the RUL which could be d/t COVID-19 PNA\" is documented in the CXR Impression on 2/10/21. Please clarify the status of this condition.    NOTE:  If an appropriate response is not listed below, please respond with a new note.       The patient's Clinical Indicators include:  2/4 SIRS Criteria POA- Temp: 38.1C and RR: 26 on admission. WBC: 4.8, PCT: 0.17, CRP: 1.99, and SARS-CoV-2 detected by PCR on admission.    Treatments include: Rocephin and Decadron.    Risk factors include: dx Acute Respiratory Failure w/Hypoxia 2/2 COVID-19 and Advanced Age.    Thank you,  Aman Potts RN, BSN  Clinical   Connect via [employer]  Options provided:   -- COVID-19 PNA is ruled in   -- COVID-19 PNA is ruled out   -- Unable to determine      Query created by: Aman Potts on 2021 10:57 AM    RESPONSE TEXT:    COVID-19 PNA is ruled in          Electronically signed by:  EDDA PAYNE MD 2021 11:40 AM              "

## 2021-02-12 NOTE — PROGRESS NOTES
Received bedside shift change report and assumed care of pt from day shift nurse JOSE LUIS Garcia. Pt awake, alert and verbally responsive in no apparent distress. Denies pain or discomfort, offers no complaints at this time. 02 in progress, no signs of respiratory distress. Bed at low level, call bell within reach, droplet, contact, eye and safety precautions maintained. Will continue to monitor

## 2021-02-13 VITALS
WEIGHT: 247.14 LBS | HEIGHT: 69 IN | DIASTOLIC BLOOD PRESSURE: 77 MMHG | BODY MASS INDEX: 36.6 KG/M2 | TEMPERATURE: 97.3 F | HEART RATE: 61 BPM | OXYGEN SATURATION: 95 % | SYSTOLIC BLOOD PRESSURE: 130 MMHG | RESPIRATION RATE: 16 BRPM

## 2021-02-13 PROCEDURE — 700102 HCHG RX REV CODE 250 W/ 637 OVERRIDE(OP): Performed by: HOSPITALIST

## 2021-02-13 PROCEDURE — 700111 HCHG RX REV CODE 636 W/ 250 OVERRIDE (IP): Performed by: INTERNAL MEDICINE

## 2021-02-13 PROCEDURE — A9270 NON-COVERED ITEM OR SERVICE: HCPCS | Performed by: HOSPITALIST

## 2021-02-13 PROCEDURE — 99453 REM MNTR PHYSIOL PARAM SETUP: CPT

## 2021-02-13 PROCEDURE — 99454 REM MNTR PHYSIOL PARAM 16-30: CPT

## 2021-02-13 PROCEDURE — 700102 HCHG RX REV CODE 250 W/ 637 OVERRIDE(OP): Performed by: INTERNAL MEDICINE

## 2021-02-13 PROCEDURE — 99239 HOSP IP/OBS DSCHRG MGMT >30: CPT | Performed by: HOSPITALIST

## 2021-02-13 PROCEDURE — A9270 NON-COVERED ITEM OR SERVICE: HCPCS | Performed by: INTERNAL MEDICINE

## 2021-02-13 RX ORDER — OMEPRAZOLE 20 MG/1
20 CAPSULE, DELAYED RELEASE ORAL DAILY
Qty: 90 CAPSULE | Refills: 0 | Status: SHIPPED
Start: 2021-02-13 | End: 2021-06-11

## 2021-02-13 RX ORDER — FINASTERIDE 5 MG/1
5 TABLET, FILM COATED ORAL DAILY
Qty: 90 TABLET | Refills: 0 | Status: SHIPPED
Start: 2021-02-14 | End: 2021-06-11

## 2021-02-13 RX ORDER — DEXAMETHASONE 6 MG/1
6 TABLET ORAL DAILY
Qty: 6 TABLET | Refills: 0 | Status: SHIPPED | OUTPATIENT
Start: 2021-02-14 | End: 2021-02-20

## 2021-02-13 RX ADMIN — DEXAMETHASONE 6 MG: 6 TABLET ORAL at 05:09

## 2021-02-13 RX ADMIN — FINASTERIDE 5 MG: 5 TABLET, FILM COATED ORAL at 05:10

## 2021-02-13 RX ADMIN — ASPIRIN 81 MG: 81 TABLET, CHEWABLE ORAL at 05:09

## 2021-02-13 RX ADMIN — ENOXAPARIN SODIUM 40 MG: 40 INJECTION SUBCUTANEOUS at 05:10

## 2021-02-13 ASSESSMENT — PAIN DESCRIPTION - PAIN TYPE: TYPE: ACUTE PAIN

## 2021-02-13 NOTE — CARE PLAN
Problem: Discharge Barriers/Planning  Goal: Patient's continuum of care needs will be met  Outcome: PROGRESSING AS EXPECTED     Problem: Safety  Goal: Will remain free from injury  Outcome: PROGRESSING AS EXPECTED     Pt A+Ox4. VSS. Pt is up independently in room, steady gait. Pt on 3L NC with Sp02 >90%. Safety maintained with call light in reach bed locked in lowest position.

## 2021-02-13 NOTE — DISCHARGE SUMMARY
"Discharge Summary    CHIEF COMPLAINT ON ADMISSION  Chief Complaint   Patient presents with   • Shortness of Breath   • Cough   • Urinary Pain     burning w/ urination and \"leaking\"       Reason for Admission  EMS     Admission Date  2/10/2021    CODE STATUS  DNAR/DNI    HPI & HOSPITAL COURSE    70-year-old male with past medical history of hypertension, CAD, PAD presenting with shortness of breath.  Patient was recently diagnosed with a UTI on 2/5 at which time he was started on ciprofloxacin.  Patient had also presented to emergency room on 2/8 with fatigue and continued dysuria and he was found to be Covid positive.  Presented to emergency room again on 2/10 at which time he was noted to be hypoxic requiring 2 L of supplemental oxygen.  He h he has been started on Decadron.  Due to elevated D-dimer lower extremity Dopplers were checked which showed left femoral arterial thrombosis.  Arterial duplex completed showed occlusion of left popliteal artery and partial occlusion of left femoral artery.  Vascular surgeon Dr. Gonzalez was consulted and stated these are all chronic issues and normal changes status post endarterectomy and recommends starting patient on novel anticoagulant given current Covid diagnosis as well.  This has been discussed with patient and he has been started on Xarelto.  Patient has also completed a 3-day course of Rocephin and started on Proscar for his BPH.  All of this was discussed with patient and his son at bedside.  Oxygen monitoring has been arranged for patient.    Therefore, he is discharged in good and stable condition to home with close outpatient follow-up.    The patient met 2-midnight criteria for an inpatient stay at the time of discharge.    Discharge Date  2/13/2021    FOLLOW UP ITEMS POST DISCHARGE  Follow-up with vascular surgery    DISCHARGE DIAGNOSES  Principal Problem:    COVID-19 POA: Yes  Active Problems:    Acute cystitis POA: Yes    Acute respiratory failure with hypoxia " (HCC) POA: Unknown    Benign prostatic hyperplasia with urinary hesitancy POA: Unknown    Advanced care planning/counseling discussion POA: Unknown    Elevated d-dimer POA: Unknown    Thrombus POA: Unknown  Resolved Problems:    * No resolved hospital problems. *      FOLLOW UP  Future Appointments   Date Time Provider Department Center   2/14/2021 11:00 AM [facility]RG[facility]   2/15/2021 11:00 AM [facility]   2/16/2021 11:00 AM [facility]   2/17/2021 11:00 AM [facility]   2/18/2021 11:00 AM [facility]   2/19/2021 11:00 AM [facility]   2/20/2021 11:00 AM [facility]RG[facility]   5/3/2021 10:00 AM Manuel Flores M.D. [facility] None     Candelaria Crawford M.D.  [address]  [facility] [id number]  [phone]            MEDICATIONS ON DISCHARGE     Medication List      START taking these medications      Instructions   dexamethasone 6 MG Tabs  Start taking on: February 14, 2021  Commonly known as: DECADRON   Take 1 tablet by mouth every day for 6 days.  Dose: 6 mg     finasteride 5 MG Tabs  Start taking on: February 14, 2021  Commonly known as: PROSCAR   Take 1 tablet by mouth every day.  Dose: 5 mg     omeprazole 20 MG delayed-release capsule  Commonly known as: PRILOSEC   Take 1 capsule by mouth every day.  Dose: 20 mg     rivaroxaban 20 MG Tabs tablet  Commonly known as: Xarelto   Take 1 tablet by mouth with dinner.  Dose: 20 mg        CONTINUE taking these medications      Instructions   aspirin 81 MG tablet   Take 81 mg by mouth every day.  Dose: 81 mg     Co Q-10 300 MG Caps   Take 1 Cap by mouth every day.  Dose: 1 capsule     [employer] OMEGA-3 KRILL OIL PO   Take 1 capsule by mouth every day.  Dose: 1 capsule     ferrous sulfate 325 (65 Fe) MG tablet   Take 325 mg by mouth every day.  Dose: 325 mg     MULTIVITAMIN PO   Take 1 Tab by mouth every day.  Dose: 1 tablet     niacin 1000 MG CR  "tablet  Commonly known as: NIASPAN   TAKE TWO TABLETS BY MOUTH DAILY     rosuvastatin 20 MG Tabs  Commonly known as: CRESTOR   Take 1 Tab by mouth every evening.  Dose: 20 mg     VITAMIN C PO   Take 6,000 mg by mouth every day.  Dose: 6,000 mg     VITAMIN D PO   Take 1 capsule by mouth every day.  Dose: 1 capsule        STOP taking these medications    ciprofloxacin 500 MG Tabs  Commonly known as: CIPRO     Diclofenac Sodium 1 % Gel     ibuprofen 200 MG Tabs  Commonly known as: MOTRIN            Allergies  Allergies   Allergen Reactions   • Mercury Swelling     And mercury based preservatives-Thimerasol  Swelling, \"strange discharge from eyes\"   • Other Environmental Anaphylaxis     Insect toxins/ bees and wasps- Anaphylaxis   • Sulfa Drugs Anaphylaxis and Swelling   • Pravastatin Diarrhea     Diarrhea        DIET  Orders Placed This Encounter   Procedures   • Diet Order Diet: Regular     Standing Status:   Standing     Number of Occurrences:   1     Order Specific Question:   Diet:     Answer:   Regular [1]       ACTIVITY  As tolerated.  Weight bearing as tolerated    CONSULTATIONS  Vascular surgery    PROCEDURES  None    LABORATORY  Lab Results   Component Value Date    SODIUM 141 02/12/2021    POTASSIUM 3.9 02/12/2021    CHLORIDE 105 02/12/2021    CO2 24 02/12/2021    GLUCOSE 164 (H) 02/12/2021    BUN 23 (H) 02/12/2021    CREATININE 0.77 02/12/2021    CREATININE 1.4 11/28/2006        Lab Results   Component Value Date    WBC 6.5 02/12/2021    HEMOGLOBIN 15.4 02/12/2021    HEMATOCRIT 45.6 02/12/2021    PLATELETCT 149 (L) 02/12/2021        Total time of the discharge process exceeds 34 minutes.  "

## 2021-02-13 NOTE — DISCHARGE PLANNING
Anticipated Discharge Disposition: home with O2    Action: Home O2 order received. Choice form faxed to [facility]. Home monitoring requested. RT notified. LSW scheduled virtual visits. Patient to begin tomorrow 2/14 at 11 am. All appointments on patient's AVS.    Barriers to Discharge: O2 acceptance and delivery    Plan: Care coordination to follow up on O2 acceptance

## 2021-02-13 NOTE — CONSULTS
DATE OF CONSULTATION: 2/12/2021     REFERRING PHYSICIAN: Hospitalist, MD.     CONSULTING PHYSICIAN: Mohinder Crandall M.D.      REASON FOR CONSULTATION: Evaluate patient with suspected vascular occlusions.     HISTORY OF PRESENT ILLNESS: The patient is a 70-year-old gentleman who is a established patient of Dr. Crawford.  The patient was recently admitted to the hospital for shortness of breath and was diagnosed with COVID-19.  A venous ultrasound was obtained to rule out a deep vein thrombosis and they identified the left superficial femoral artery to be occluded as well as some mural thrombus noted within the left common femoral artery.  The patient has a history of peripheral arterial disease and back in 2019 underwent a left common femoral endarterectomy and patch.  At that time the patient was documented to have a distal superficial femoral artery chronic occlusion.  I reviewed the patient's angiogram from that visit.  Patient has no new symptoms.  This was an incidental finding.    PAST MEDICAL HISTORY:  has a past medical history of ASTHMA, CAD (coronary artery disease) (8/26/2011), CATARACT, Colorblind, Dental disorder, Essential hypertension, Heart burn, High cholesterol, Hyperlipidemia (8/26/2011), Indigestion, Infectious disease, Myocardial infarct (HCC) (2005), Pain (11/14/11), Personal history of venous thrombosis and embolism (2006), Pneumonia (2009), Presence of stent in left circumflex coronary artery, Unspecified hemorrhagic conditions, Venous insufficiency of both lower extremities, and White coat syndrome with diagnosis of hypertension.     PAST SURGICAL HISTORY:  has a past surgical history that includes tonsillectomy and adenoidectomy (1957); sinus trephine frontal (1971); cataract phaco with iol (11/2/2010); cataract phaco with iol (11/23/2010); ventral hernia repair (11/30/2011); sinusotomies (08/1963); other cardiac surgery (2005); inguinal hernia repair (1962); inguinal hernia repair  "(2011); and femoral endarterectomy (Left, 2019).     ALLERGIES:   Allergies   Allergen Reactions   • Mercury Swelling     And mercury based preservatives-Thimerasol  Swelling, \"strange discharge from eyes\"   • Other Environmental Anaphylaxis     Insect toxins/ bees and wasps- Anaphylaxis   • Sulfa Drugs Anaphylaxis and Swelling   • Pravastatin Diarrhea     Diarrhea         CURRENT MEDICATIONS:   Home Medications     Reviewed by Louann Oconnor (Pharmacy Tech) on 02/10/21 at 1736  Med List Status: Complete   Medication Last Dose Status   Ascorbic Acid (VITAMIN C PO) 2021 Active   aspirin 81 MG tablet 2021 Active   ciprofloxacin (CIPRO) 500 MG Tab 2/10/2021 Active   Coenzyme Q10 (CO Q-10) 300 MG Cap 2021 Active   [employer] OMEGA-3 KRILL OIL PO 2021 Active   Diclofenac Sodium 1 % Gel Not Taking Active   ferrous sulfate 325 (65 Fe) MG tablet 2021 Active   ibuprofen (MOTRIN) 200 MG Tab >2 days ago Active   Multiple Vitamin (MULTIVITAMIN PO) 2021 Active   niacin (NIASPAN) 1000 MG CR tablet 2021 Active   rosuvastatin (CRESTOR) 20 MG Tab 2021 Active   VITAMIN D PO 2021 Active                FAMILY HISTORY:   Family History   Problem Relation Age of Onset   • Heart Attack Mother    • Heart Disease Mother    • Cancer Mother         LYKIMA    • Dementia Mother         SOCIAL HISTORY:   Social History     Tobacco Use   • Smoking status: Former Smoker     Years: 30.00     Types: Cigars     Quit date: 2011     Years since quittin.2   • Smokeless tobacco: Never Used   • Tobacco comment:    Substance and Sexual Activity   • Alcohol use: No   • Drug use: No   • Sexual activity: Not Currently     Partners: Female       Review of Systems:      Constitutional: Denies fevers, Denies weight changes  Eyes: Denies changes in vision, no eye pain  Ears/Nose/Throat/Mouth: Denies nasal congestion or sore throat   Cardiovascular: Denies chest pain or palpitations.  Respiratory: " Shortness of breath  Gastrointestinal/Hepatic: Denies abdominal pain, nausea, vomiting, diarrhea, constipation or GI bleeding   Genitourinary: Denies dysuria or frequency  Musculoskeletal/Rheum: Denies  joint pain and swelling, moderate edema  Skin: Denies rash  Neurological: Denies headache, confusion, memory loss or focal weakness/parasthesias  Psychiatric: denies mood disorder   Endocrine: Ana thyroid problems  Heme/Oncology/Lymph Nodes: Denies enlarged lymph nodes, denies brusing or known bleeding disorder  All other systems were reviewed and are negative (AMA/CMS criteria)                  PHYSICAL EXAMINATION:       Constitutional:   Well developed, Well nourished, No acute distress  HENMT:  Normocephalic, Atraumatic, Oropharynx moist mucous membranes, No oral exudates, Nose normal.  No thyromegaly.  Eyes:  EOMI, Conjunctiva normal, No discharge.  Neck:  Normal range of motion, No cervical tenderness,  no JVD.  Cardiovascular:  Normal heart rate, Normal rhythm, No murmurs, No rubs, No gallops.   Extremitites patient is has evidence of lymphedema bilaterally.  Patient may also have a component of ambulatory venous hypertension.  Patient has absent pedal pulses but his feet are warm and adequately perfused.  Lungs:  Normal breath sounds, breath sounds clear to auscultation bilaterally,  no crackles, no wheezing.   Abdomen: Bowel sounds normal, Soft, No tenderness, No guarding, No rebound, No masses, No hepatosplenomegaly.  Skin: Warm, Dry, No erythema, No rash, no induration.  Neurologic: Alert & oriented x 3, No focal deficits noted, cranial nerves II through X are grossly intact.  Psychiatric: Affect normal, Judgment normal, Mood normal.        LABORATORY VALUES:   Recent Labs     02/10/21  0945 02/12/21  1001   WBC 4.8 6.5   RBC 4.88 4.89   HEMOGLOBIN 15.5 15.4   HEMATOCRIT 45.3 45.6   MCV 92.8 93.3   MCH 31.8 31.5   MCHC 34.2 33.8   RDW 44.7 45.0   PLATELETCT 131* 149*   MPV 10.5 9.8     Recent Labs      02/10/21  0945 02/12/21  1001   SODIUM 135 141   POTASSIUM 3.9 3.9   CHLORIDE 100 105   CO2 21 24   GLUCOSE 109* 164*   BUN 21 23*   CREATININE 1.05 0.77   CALCIUM 8.2* 8.8     Recent Labs     02/10/21  0945 02/12/21  1001   ASTSGOT 92* 58*   ALTSGPT 50 37   TBILIRUBIN 1.0 0.7   ALKPHOSPHAT 60 59   GLOBULIN 3.2 3.3            IMAGING:   US-EXTREMITY ARTERY LOWER UNILAT LEFT   Final Result      US-EXTREMITY VENOUS LOWER BILAT   Final Result      DX-CHEST-PORTABLE (1 VIEW)   Final Result      Exam limited by patient body habitus and lordotic positioning with apparent mild interstitial prominence in the right upper lobe which could be due to Covid 19 pneumonia.          IMPRESSION AND PLAN:     Active Hospital Problems    Diagnosis    • Thrombus [I82.90]    • Acute respiratory failure with hypoxia (HCC) [J96.01]    • Benign prostatic hyperplasia with urinary hesitancy [N40.1, R39.11]    • Advanced care planning/counseling discussion [Z71.89]    • Elevated d-dimer [R79.89]    • COVID-19 [U07.1]    • Acute cystitis [N30.00]      Based on the angiogram images from 2019 it does not appear as though there has been any significant acute change.  There has been some dilation of the left femoral artery as a result of the endarterectomy.  Often times mural thrombus is identified within these endarterectomized vessels.  This was an incidental finding and the patient has no new symptoms.  Based on the fact that the patient is COVID-19 positive which has been associated with vascular thrombotic events I have recommended that the patient be placed on a novel anticoagulant.  No further work-up or interventions warranted at this time for the findings seen on duplex.  Once discharged the patient can follow-up with Dr. Crawford.  I will follow along while the patient is in the hospital.  ____________________________________   Mohinder Crandall M.D.          DD: 2/12/2021   DT: 6:57 PM

## 2021-02-13 NOTE — DISCHARGE PLANNING
Agency/Facility Name: [facility]  Spoke To: Nataly  Outcome: O2 will be delivered within the hour. Nataly requested case management reach out to pt to have them call and pay the copay.  RUBY Humphrey notified.

## 2021-02-13 NOTE — DISCHARGE PLANNING
Anticipated Discharge Disposition: Home with O2 and home monitoring    Action: LSW spoke to patient via cell phone and informed him that [facility] needs copay and that they will be delivering his O2 to bedside. Patient to call [facility] to discuss options. LSW voalte messaged preferred number to bedside RN to provide to patient.    Barriers to Discharge: O2 delivery    Plan: DC home once tanks have been delivered    1033: Bedside RN confirmed O2 delivered

## 2021-02-13 NOTE — RESPIRATORY CARE
REMOTE MONITORING PROGRAM by RESPIRATORY THERAPY  2/13/2021 at 9:19 AM by Laurel Fong     Patient interviewed by RT. Patient agrees to Remote Monitoring program. Device instruction performed with welcome packet, consent signed and placed at bedside chart. Patient instructed on how to use MyChart and Zoom. No questions at this time.

## 2021-02-13 NOTE — DISCHARGE PLANNING
Received Choice form at 3953  Agency/Facility Name: [facility]  Referral sent per Choice form @ 0852

## 2021-02-13 NOTE — DISCHARGE PLANNING
Care Transition Team Assessment       Called pt to complete assessment and discuss discharge planning. Confirmed information listed on facesheet. PT rpeorts he lives alone in a single story home. Prior to admission pt was completely independent with ADL's and IADL's. Pt doesn't utilize any DME. Pt states he is a part time . Support system includes his son and daughter in law.   Discussed that MD anticipates pt will need home O2. Pt consented to referral to [facility].   Choice form completed. MD will place home O2 order.     Elopement Risk  Legal Hold: No  Ambulatory or Self Mobile in Wheelchair: Yes  Disoriented: No  Psychiatric Symptoms: None  History of Wandering: No  Elopement this Admit: No  Vocalizing Wanting to Leave: No  Displays Behaviors, Body Language Wanting to Leave: No-Not at Risk for Elopement  Elopement Risk: Not at Risk for Elopement    Interdisciplinary Discharge Planning  Patient or legal guardian wants to designate a caregiver: No      Vision / Hearing Impairment  Right Eye Vision: Impaired, Wears Glasses  Left Eye Vision: Impaired, Wears Glasses    Domestic Abuse  Have you ever been the victim of abuse or violence?: No  Physical Abuse or Sexual Abuse: No  Verbal Abuse or Emotional Abuse: No  Possible Abuse/Neglect Reported to:: Not Applicable

## 2021-02-13 NOTE — FACE TO FACE
"Face to Face Note  -  Durable Medical Equipment    Nenita Jon M.D. - NPI: [id number]  I certify that this patient is under my care and that they had a durable medical equipment(DME)face to face encounter by myself that meets the physician DME face-to-face encounter requirements with this patient on:    Date of encounter:   Patient:                    MRN:                       YOB: 2020  Polo Pyle  [id number]  1950     The encounter with the patient was in whole, or in part, for the following medical condition, which is the primary reason for durable medical equipment:  Other - covid    I certify that, based on my findings, the following durable medical equipment is medically necessary:  Oxygen.    HOME O2 Saturation Measurements:(Values must be present for Home Oxygen orders)  Room air sat at rest: 89  Room air sat with amb: 82  With liters of O2: 3, O2 sat at rest with O2: 94  With Liters of O2: 3, O2 sat with amb with O2 : 92  Is the patient mobile?: Yes    My Clinical findings support the need for the above equipment due to:  Hypoxia    Supporting Symptoms: The patient requires supplemental oxygen, as the following interventions have been tried with limited or no improvement: \"Bronchodilators and/or steroid inhalers    If patient feels more short of breath, they can go up to 6 liters per minute and contact healthcare provider.  "

## 2021-02-13 NOTE — DISCHARGE INSTRUCTIONS
Discharge Instructions    Discharged to home by car with relative. Discharged via wheelchair, hospital escort: Yes.  Special equipment needed: Oxygen    Be sure to schedule a follow-up appointment with your primary care doctor or any specialists as instructed.     Discharge Plan:   Influenza Vaccine Indication: Not indicated: Previously immunized this influenza season and > 8 years of age    I understand that a diet low in cholesterol, fat, and sodium is recommended for good health. Unless I have been given specific instructions below for another diet, I accept this instruction as my diet prescription.   Other diet: Cardiac    Special Instructions:   Deep Vein Thrombosis Discharge Instructions    A deep vein thrombosis (DVT) is a blood clot (thrombus) that develops in a deep vein. A DVT is a clot in the deep, larger veins of the leg, arm, or pelvis. These are more dangerous than clots that might form in veins on the surface of the body. Deep vein thrombosis can lead to complications if the clot breaks off and travels in the bloodstream to the lungs.     CAUSES  Blood clots form in a vein for different reasons. Usually several things cause blood clots. They include:   · The flow of blood slows down.   · The inside of the vein is damaged in some way.   · The person has a condition that makes blood clot more easily. These conditions may include:  · Older age (especially over 75 years old).  · Having a history of blood clots.  · Having major or lengthy surgery. Hip surgery is particularly high-risk.   · Breaking a hip or leg.  · Sitting or lying still for a long time.  · Cancer or cancer treatment.  · Having a long, thin tube (catheter) placed inside a vein during a medical procedure.   · Being overweight (obese).  · Pregnancy and childbirth.  · Medicines with estrogen.  · Smoking.  · Other circulation or heart problems.     SYMPTOMS  When a clot forms, it can either partially or totally block the blood flow in that  vein. Symptoms of a DVT can include:  · Swelling of the leg or arm, especially if one side is much worse.  · Warmth and redness of the leg or arm, especially if one side is much worse.   · Pain in an arm or leg. If the clot is in the leg, symptoms may be more noticeable or worse when standing or walking.  If the blood clot travels to the lung, it may cause:  · Shortness of breath.  · Chest pain. The pain may be worsened by deep breaths.   · Coughing up thick mucus (phlegm), possibly flecked with blood.   Anyone with these symptoms should get emergency medical treatment right away. Call your local emergency  Services (911 in [place] if you have these symptoms.     DIAGNOSIS  If a DVT is suspected, your caregiver will take a full medical history. He or she will also perform a physical exam. Tests that also may be required include:   · Studies of the clotting properties of the blood.   · An ultrasound scan.   · X-rays to show the flow of blood when special dye is injected into the veins (venography).   · Studies of your lungs if you have any chest symptoms.     PREVENTION  · Exercise the legs regularly. Take a brisk 30 minute walk every day.   · Maintain a weight that is appropriate for your height.  · Avoid sitting or lying in bed for long periods of time without moving your legs.   · Women, particularly those over the age of 35, should consider the risks and benefits of taking estrogen medicine, including birth control pills.   · Do not smoke, especially if you take estrogen medicines.   · Long-distance travel can increase your risk. You should exercise your legs by walking or pumping the muscles every hour.   · In hospital prevention: Prevention may include medical and non medical measures.     TREATMENT  · The most common treatment for DVT is blood thinning (anticoagulant) medicine, which reduces the blood's tendency to clot. Anticoagulants can stop new blood clots from forming and old ones from growing. They cannot  dissolve existing clots. Your body does this by itself over time. Anticoagulants can be given by mouth, by intravenous (IV) access, or by injection. Your caregiver will determine the best program for you.   · Less commonly, clot-dissolving drugs (thrombolytics) are used to dissolve a DVT. They carry a high risk of bleeding, so they are used mainly in severe cases.   · Very rarely, a blood clot in the leg needs to be removed surgically.   · If you are unable to take anticoagulants, your caregiver may arrange for you to have a filter placed in a main vein in your belly (abdomen). This filter prevents clots from traveling to your lungs.     HOME CARE INSTRUCTIONS  Take all medicines prescribed by your caregiver. Follow the directions carefully.   · You will most likely continue taking anticoagulants after you leave the hospital. Your caregiver will advise you on the length of treatment (usually 3 to 5 months, sometimes for life).   · Taking too much or too little of an anticoagulant is dangerous. While taking this type of medicine, you will need to have regular blood tests to be sure the dose is correct. The dose can change for many reasons. It is critically important that you take this medicine exactly as prescribed, and that you have blood tests exactly as directed.   · Many foods can interfere with anticoagulants. These include foods high in vitamin K, such as spinach, kale, broccoli, cabbage, padmini and turnip greens, [place] sprouts, peas, cauliflower, seaweed, parsley, beef and pork liver, green tea, and soybean oil. Your caregiver should discuss limits on these foods with you or you should arrange a visit with a dietician to answer your questions.   · Many medicines can interfere with anticoagulants. You must tell your caregiver about any and all medicines you take. This includes all vitamins and supplements. Be especially cautious with aspirin and anti-inflammatory medicines. Ask your caregiver before taking  these.   · Anticoagulants can have side effects, mostly excessive bruising or bleeding. You will need to hold pressure over cuts for longer than usual. Avoid alcoholic drinks or consume only very small amounts while taking this medicine.    If you are taking an anticoagulant:  · Wear a medical alert bracelet.  · Notify your dentist or other caregivers before procedures.  · Avoid contact sports.    · Ask your caregiver how soon you can go back to normal activities. Not being active can lead to new clots. Ask for a list of what you should and should not do.   · Exercise your lower leg muscles. This is important while traveling.   · You may need to wear compression stockings. These are tight elastic stockings that apply pressure to the lower legs. This can help keep the blood in the legs from clotting.   · If you are a smoker, you should quit.   · Learn as much as you can about DVT.     SEEK MEDICAL CARE IF:  · You have unusual bruising or any bleeding problems.  · The swelling or pain in your affected arm or leg is not gradually improving.   · You anticipate surgery or long-distance travel. You should get specific advice on DVT prevention.   · You discover other family members with blood clots. This may require further testing for inherited diseases or conditions.     SEEK IMMEDIATE MEDICAL CARE IF:  · You develop chest pain.  · You develop severe shortness of breath.  · You begin to cough up bloody mucus or phlegm (sputum).  · You feel dizzy or faint.   · You develop swelling or pain in the leg.  · You have breathing problems after traveling.    MAKE SURE YOU:  · Understand these instructions.  · Will watch your condition.  · Will get help right away if you are not doing well or get worse.       · Is patient discharged on Warfarin / Coumadin?   No     Depression / Suicide Risk    As you are discharged from this Critical access hospital facility, it is important to learn how to keep safe from harming yourself.    Recognize the  warning signs:  · Abrupt changes in personality, positive or negative- including increase in energy   · Giving away possessions  · Change in eating patterns- significant weight changes-  positive or negative  · Change in sleeping patterns- unable to sleep or sleeping all the time   · Unwillingness or inability to communicate  · Depression  · Unusual sadness, discouragement and loneliness  · Talk of wanting to die  · Neglect of personal appearance   · Rebelliousness- reckless behavior  · Withdrawal from people/activities they love  · Confusion- inability to concentrate     If you or a loved one observes any of these behaviors or has concerns about self-harm, here's what you can do:  · Talk about it- your feelings and reasons for harming yourself  · Remove any means that you might use to hurt yourself (examples: pills, rope, extension cords, firearm)  · Get professional help from the community (Mental Health, Substance Abuse, psychological counseling)  · Do not be alone:Call your Safe Contact- someone whom you trust who will be there for you.  · Call your local CRISIS HOTLINE 212-7924 or 951-475-9560  · Call your local Children's Mobile Crisis Response Team [place] (083) 672-3536 or www.[employer]  · Call the toll free National Suicide Prevention Hotlines   · National Suicide Prevention Lifeline 786-531-EHPD (1331)  · National Hope Line Network 800-SUICIDE (356-0853)

## 2021-02-14 ENCOUNTER — TELEMEDICINE (OUTPATIENT)
Dept: URGENT CARE | Facility: CLINIC | Age: 71
End: 2021-02-14
Payer: MEDICARE

## 2021-02-14 DIAGNOSIS — J96.01 ACUTE RESPIRATORY FAILURE WITH HYPOXIA (HCC): ICD-10-CM

## 2021-02-14 DIAGNOSIS — U07.1 COVID-19: ICD-10-CM

## 2021-02-14 PROCEDURE — 99457 RPM TX MGMT 1ST 20 MIN: CPT | Mod: 95,CR | Performed by: STUDENT IN AN ORGANIZED HEALTH CARE EDUCATION/TRAINING PROGRAM

## 2021-02-14 PROCEDURE — 99458 RPM TX MGMT EA ADDL 20 MIN: CPT | Mod: 95,CR | Performed by: STUDENT IN AN ORGANIZED HEALTH CARE EDUCATION/TRAINING PROGRAM

## 2021-02-14 NOTE — PROGRESS NOTES
Subjective:   [facility]    The patient's identity was confirmed and verbal consent was obtained for this virtual visit.    HPI  Polo Pyle is a 70 y.o. male who presents with Covid-19 Home Monitoring Video Visit    Unfortunately the patient was unable to connect on Zoom and is not able to have a face-to-face encounter with him.  I spoke with him over the phone.  He has been having issues with masimo and there are no data points for him on telemetry since being discharged home.  The patient is requesting someone come to his house and help him resolve the issues.    The patient has a pulse ox at home and says he is currently on 4 L at 96%.  He was discharged home on 3 L but says he dropped down to 88% with exertion so bumped oxygen up to 4 L.    Additionally patient says he has chronic sinus congestion and is having issues with his nasal cannula.  Says he has been putting the cannula in his mouth instead of his nose.  Says he was having similar issues while admitted to the hospital and was also putting the cannula in his mouth at that time.    Overall the patient reports he does feel better since being discharged home.    REVIEW OF SYSTEMS  General: no fever or chills  Resp: Admits SOB, cough  GI: Denies nausea, vomiting or diarrhea    [facility] Flowsheet   1. Record COVID-19 Severity Index (qCSI): Unknown  2.Confirm appropriate patient and chart with two identifiers: Yes  3. Days Since Onset of Symptoms: 6  4. Does patient have another adult at home to help take care of you: No, but has family   5. Confirm O2 supply is working and there are no cannula problems: Having nasal cannula problems as described above.  6. Is your breathing today better or worse? A little better  7. Are you able to tolerate fluids? yes  8. Any vomiting or diarrhea in the last 24 hours? no  9. Are you able to control your fevers? NA  10. Are you able to control your  "cough? Yes  11. Current O2 Flow Rate: 4L    PAST MEDICAL HISTORY  Patient Active Problem List    Diagnosis Date Noted   • Thrombus 02/12/2021   • Acute respiratory failure with hypoxia ([facility]) 02/11/2021   • Benign prostatic hyperplasia with urinary hesitancy 02/11/2021   • Advanced care planning/counseling discussion 02/11/2021   • Elevated d-dimer 02/11/2021   • COVID-19 02/10/2021   • Acute cystitis 02/10/2021   • Iron deficiency anemia due to chronic blood loss 11/25/2019   • Obesity (BMI 35.0-39.9 without comorbidity) ([facility]) 08/13/2019   • Peripheral artery disease ([facility]) 08/01/2019   • Stasis edema with ulcer, right ([facility]) 05/20/2019   • Essential hypertension    • Presence of stent in LAD coronary artery    • Presence of stent in left circumflex coronary artery    • Dyslipidemia 08/26/2011   • Coronary artery disease due to lipid rich plaque 08/26/2011       SURGICAL HISTORY   has a past surgical history that includes tonsillectomy and adenoidectomy (1957); sinus trephine frontal (1971); cataract phaco with iol (11/2/2010); cataract phaco with iol (11/23/2010); ventral hernia repair (11/30/2011); sinusotomies (08/1963); other cardiac surgery (2005); inguinal hernia repair (1962); inguinal hernia repair (11/30/2011); and femoral endarterectomy (Left, 9/20/2019).    ALLERGIES  Allergies   Allergen Reactions   • Mercury Swelling     And mercury based preservatives-Thimerasol  Swelling, \"strange discharge from eyes\"   • Other Environmental Anaphylaxis     Insect toxins/ bees and wasps- Anaphylaxis   • Sulfa Drugs Anaphylaxis and Swelling   • Pravastatin Diarrhea     Diarrhea        CURRENT MEDICATIONS  aspirin  Co Q-10 Caps  [employer] OMEGA-3 KRILL OIL PO  dexamethasone Tabs  ferrous sulfate  finasteride Tabs  MULTIVITAMIN PO  niacin  omeprazole  rivaroxaban Tabs  rosuvastatin Tabs  VITAMIN C PO  VITAMIN D PO    SOCIAL HISTORY  Social History     Tobacco Use   • Smoking status: Former Smoker     Years: 30.00     Types: " Cigars     Quit date: 2011     Years since quittin.2   • Smokeless tobacco: Never Used   • Tobacco comment:    Substance and Sexual Activity   • Alcohol use: No   • Drug use: No   • Sexual activity: Not Currently     Partners: Female       FAMILY HISTORY  Family History   Problem Relation Age of Onset   • Heart Attack Mother    • Heart Disease Mother    • Cancer Mother         GENET    • Dementia Mother           Objective:   PHYSICAL EXAM  VITAL SIGNS: Unable to obtain vitals.  There was no telemetry monitoring    Unable to perform a virtual physical exam as patient was unable to connect to [facility].  I also made 2 attempts to connect via video conference by Elva but was unsuccessful.  Ultimately spoke to the patient over the phone.     Assessment/Plan:     1. COVID-19     2. Acute respiratory failure with hypoxia (HCC)     Overall the patient states he is feeling better however unable to objectively assess the patient as his telemetry monitoring has not been working and was unable to connect via zoom. The patient's son is an RN and is going to try help the patient get connect to [employer]. Patient has his own personal pulse ox,     In the meantime he was instructed to continue 3 L nasal cannula and increase to 4 L with exertion.  Goal is to have oxygen above 88%.       Reviewed ER precautions: present to ED or call 911 if experiencing severe shortness of breath   Confirm next VV: 2/15/21   Final disposition: Stable at home  Time Spent: >20 minutes       Differential diagnosis, natural history, supportive care, and indications for immediate follow-up discussed. All questions answered. Patient agrees with the plan of care.      Please note that this dictation was created using voice recognition software. I have made a reasonable attempt to correct obvious errors, but I expect that there are errors of grammar and possibly content that I did not discover before finalizing the note.

## 2021-02-15 ENCOUNTER — TELEMEDICINE (OUTPATIENT)
Dept: URGENT CARE | Facility: CLINIC | Age: 71
End: 2021-02-15
Payer: MEDICARE

## 2021-02-15 VITALS — RESPIRATION RATE: 16 BRPM | OXYGEN SATURATION: 95 % | HEART RATE: 80 BPM

## 2021-02-15 DIAGNOSIS — Z99.81 SUPPLEMENTAL OXYGEN DEPENDENT: ICD-10-CM

## 2021-02-15 DIAGNOSIS — U07.1 COVID-19: ICD-10-CM

## 2021-02-15 LAB
BACTERIA BLD CULT: NORMAL
BACTERIA BLD CULT: NORMAL
SIGNIFICANT IND 70042: NORMAL
SIGNIFICANT IND 70042: NORMAL
SITE SITE: NORMAL
SITE SITE: NORMAL
SOURCE SOURCE: NORMAL
SOURCE SOURCE: NORMAL

## 2021-02-15 PROCEDURE — 99213 OFFICE O/P EST LOW 20 MIN: CPT | Mod: 95 | Performed by: PHYSICIAN ASSISTANT

## 2021-02-15 ASSESSMENT — ENCOUNTER SYMPTOMS
SHORTNESS OF BREATH: 0
COUGH: 1
FEVER: 0
CHILLS: 0

## 2021-02-15 NOTE — PROGRESS NOTES
Telemedicine Visit:      Subjective:   Polo Pyle is a 70 y.o. male presenting for evaluation and management of Covid-19 Home Monitoring   The patient was in a private location in the state of [place].    The patient's identity was confirmed and verbal consent was obtained for telephone visit.     Unable to connect via ZO[facility] or [facility]- telephone encounter conducted today.     Home Monitoring Patient Triage  COVID-19 Monitoring Level:       Visit #2 today.   Improving everyday.   Having some issues with Ashly[facility]- as they have been in contact with him as they are having difficulty with his data. Denies any SOB or difficulty breathing today. Taking Xarelto and Dexa.     [facility] Flowsheet   1. Record COVID-19 Severity Index (qCSI):  4  2.Confirm appropriate patient and chart with two identifiers: Yes - continue to question #3   3. Days Since Onset of Symptoms: 7  4. Does patient have another adult at home to help take care of you: (No- but does have family checking on his daily. )  5. Confirm O2 supply is working and there are no cannula problems: Yes - continue to question #6  6. Is your breathing today better or worse? Better - continue to question #7  7. Are you able to tolerate fluids? Yes - Continue to question #8  8. Any vomiting or diarrhea in the last 24 hours? No - continue to question #9  9. Are you able to control your fevers? N/A  10. Are you able to control your cough? Yes - continue to question #11  11. Current O2 Flow Rate: 3.5  12. Review ER precautions: present to ED or call 911 if experiencing severe shortness of breath: Yes  13. Confirm next VV: Appointment scheduled  14. Final disposition: Stable at home  15. Time Spent: 20      Review of Systems   Constitutional: Negative for chills and fever.   HENT: Negative for congestion.    Respiratory: Positive for cough. Negative for shortness of breath.    All other systems reviewed and are negative.      Current medicines (including  changes today)  Medications:    • aspirin  • Co Q-10 Caps  • [employer] OMEGA-3 KRILL OIL PO  • dexamethasone Tabs  • ferrous sulfate  • finasteride Tabs  • MULTIVITAMIN PO  • niacin  • omeprazole  • rivaroxaban Tabs  • rosuvastatin Tabs  • VITAMIN C PO  • VITAMIN D PO    Allergies: Mercury, Other environmental, Sulfa drugs, and Pravastatin    Problem List: Polo Pyle has Dyslipidemia; Coronary artery disease due to lipid rich plaque; Essential hypertension; Presence of stent in LAD coronary artery; Presence of stent in left circumflex coronary artery; Stasis edema with ulcer, right (HCC); Peripheral artery disease (HCC); Obesity (BMI 35.0-39.9 without comorbidity) ([facility]); Iron deficiency anemia due to chronic blood loss; COVID-19; Acute cystitis; Acute respiratory failure with hypoxia ([facility]); Benign prostatic hyperplasia with urinary hesitancy; Advanced care planning/counseling discussion; Elevated d-dimer; and Thrombus on their problem list.    Surgical History:  Past Surgical History:   Procedure Laterality Date   • FEMORAL ENDARTERECTOMY Left 9/20/2019    Procedure: ENDARTERECTOMY, FEMORAL;  Surgeon: Candelaria Crawford M.D.;  Location: SURGERY [facility];  Service: General   • VENTRAL HERNIA REPAIR  11/30/2011    Performed by TROY GONZALEZ at SURGERY [facility]   • INGUINAL HERNIA REPAIR  11/30/2011    Performed by TROY GONZALEZ at SURGERY [facility]   • CATARACT PHACO WITH IOL  11/23/2010    Performed by RM WILLIS at SURGERY SAME DAY [facility] ORS   • CATARACT PHACO WITH IOL  11/2/2010    Performed by RM WILLIS at SURGERY SAME DAY [facility] ORS   • OTHER CARDIAC SURGERY  2005    STENTS x 4   • SINUS TREPHINE FRONTAL  1971   • SINUSOTOMIES  08/1963   • INGUINAL HERNIA REPAIR  1962    WITH UNDESCENDED TESTICLE   • TONSILLECTOMY AND ADENOIDECTOMY  1957       Past Social Hx: Polo Pyle  reports that he quit smoking about 9 years ago. His smoking use included cigars. He quit  after 30.00 years of use. He has never used smokeless tobacco. He reports that he does not drink alcohol and does not use drugs.     Past Family Hx:  Polo Pyle family history includes Cancer in his mother; Dementia in his mother; Heart Attack in his mother; Heart Disease in his mother.     Problem list, medications, and allergies reviewed by myself today in Epic.     Objective:   Pulse 80   Resp 16   SpO2 95% Comment: 3.5 L. (from [employer] home monitor)    Visit conducted via telephone today.   Physical Exam:  Constitutional:       General: Awake.   Neck:      Trachea: Phonation normal.   Pulmonary:      Effort: Pulmonary effort is normal- does not appear that the patient is SOB  Neurological:      Mental Status: Alert and oriented to person, place, and time.   Psychiatric:         Mood and Affect: Mood and affect normal.         Speech: Speech normal.         Behavior: Behavior is cooperative.       Assessment and Plan:   The following treatment plan was discussed:     There are no diagnoses linked to this encounter.    CSI:4  Day of Symptoms: 7  Decadron:yes  Remdesivir: no  Anticoagulation: yes  O2 Day: 3.5L  O2 Night: 3.5 L  Next appointment: 2/16/2021  Time Spent: 20  Additional comments:    Pt. Is doing well- he will decreased to 3L while at rest today- and will increase if feeling winded- denies any SOB or noted difficulty breathing. Appt. Tomorrow.           1. COVID-19  2. Supplemental oxygen dependent    Please note that this dictation was created using voice recognition software. I have made every reasonable attempt to correct obvious errors, but I expect that there are errors of grammar and possibly content that I did not discover before finalizing the note.    Note electronically signed by Frandy Duncan PA-C

## 2021-02-16 ENCOUNTER — TELEMEDICINE (OUTPATIENT)
Dept: URGENT CARE | Facility: CLINIC | Age: 71
End: 2021-02-16
Payer: MEDICARE

## 2021-02-16 VITALS — OXYGEN SATURATION: 95 % | RESPIRATION RATE: 18 BRPM | HEART RATE: 95 BPM | TEMPERATURE: 97.4 F

## 2021-02-16 DIAGNOSIS — J12.82 PNEUMONIA DUE TO COVID-19 VIRUS: ICD-10-CM

## 2021-02-16 DIAGNOSIS — U07.1 PNEUMONIA DUE TO COVID-19 VIRUS: ICD-10-CM

## 2021-02-16 DIAGNOSIS — Z99.81 ON SUPPLEMENTAL OXYGEN THERAPY: ICD-10-CM

## 2021-02-16 PROCEDURE — 99214 OFFICE O/P EST MOD 30 MIN: CPT | Mod: 95 | Performed by: PHYSICIAN ASSISTANT

## 2021-02-16 ASSESSMENT — ENCOUNTER SYMPTOMS
COUGH: 1
FEVER: 0
DIARRHEA: 0
VOMITING: 0
MUSCULOSKELETAL NEGATIVE: 1
CHILLS: 0
SPUTUM PRODUCTION: 0
SORE THROAT: 0
SHORTNESS OF BREATH: 0
NAUSEA: 0
ABDOMINAL PAIN: 0
DIZZINESS: 0

## 2021-02-16 NOTE — PROGRESS NOTES
Subjective:      Polo Pyle is a 70 y.o. male who presents with covid-19.         This evaluation was conducted via Zoom using secure and encrypted videoconferencing technology. The patient was in a private location in the [facility].    The patient's identity was confirmed and verbal consent was obtained for this virtual visit.      HPI    Covid-19 Home Monitoring Program:    Date of Discharge: 2/13/21  Current O2 flow rate: 3 L  Symptom improvement: Yes   On Dexamethasone: Yes  On anticoagulation therapy: Yes - Xarelto       Home Monitoring Patient Triage  COVID-19 Monitoring Level: Home with basic monitoring       [facility] Flowsheet   1. Record COVID-19 Severity Index (qCSI):  4  2.Confirm appropriate patient and chart with two identifiers: Yes - continue to question #3   3. Days Since Onset of Symptoms: 8  4. Does patient have another adult at home to help take care of you: Yes - continue to question #5  5. Confirm O2 supply is working and there are no cannula problems: Yes - continue to question #6  6. Is your breathing today better or worse? Better - continue to question #7  7. Are you able to tolerate fluids? Yes - Continue to question #8  8. Any vomiting or diarrhea in the last 24 hours? No - continue to question #9  9. Are you able to control your fevers? N/A  10. Are you able to control your cough? Yes - continue to question #11  11. Current O2 Flow Rate: 3  12. Review ER precautions: present to ED or call 911 if experiencing severe shortness of breath: Yes  13. Confirm next VV: Appointment scheduled  14. Final disposition: Stable at home  15. Time Spent: 10      Review of Systems   Constitutional: Positive for malaise/fatigue. Negative for chills and fever.   HENT: Negative for congestion, ear pain and sore throat.    Respiratory: Positive for cough. Negative for sputum production and shortness of breath.    Cardiovascular: Negative for chest pain.   Gastrointestinal: Negative  for abdominal pain, diarrhea, nausea and vomiting.   Genitourinary: Negative.    Musculoskeletal: Negative.    Neurological: Negative for dizziness.          Objective:     Pulse 95   Temp 36.3 °C (97.4 °F)   Resp 18   SpO2 95%        Physical Exam  Vitals and nursing note reviewed.   Constitutional:       Appearance: Normal appearance. He is well-developed.      Comments: Patient speaking in full sentences   Eyes:      Conjunctiva/sclera: Conjunctivae normal.   Cardiovascular:      Rate and Rhythm: Normal rate.   Pulmonary:      Effort: Pulmonary effort is normal.   Musculoskeletal:         General: Normal range of motion.      Cervical back: Normal range of motion.   Neurological:      Mental Status: He is alert and oriented to person, place, and time.   Psychiatric:         Behavior: Behavior normal.              PMH:  has a past medical history of ASTHMA, CAD (coronary artery disease) (8/26/2011), CATARACT, Colorblind, Dental disorder, Essential hypertension, Heart burn, High cholesterol, Hyperlipidemia (8/26/2011), Indigestion, Infectious disease, Myocardial infarct (HCC) (2005), Pain (11/14/11), Personal history of venous thrombosis and embolism (2006), Pneumonia (2009), Presence of stent in left circumflex coronary artery, Unspecified hemorrhagic conditions, Venous insufficiency of both lower extremities, and White coat syndrome with diagnosis of hypertension.  MEDS:   Current Outpatient Medications:   •  dexamethasone (DECADRON) 6 MG Tab, Take 1 tablet by mouth every day for 6 days., Disp: 6 tablet, Rfl: 0  •  finasteride (PROSCAR) 5 MG Tab, Take 1 tablet by mouth every day., Disp: 90 tablet, Rfl: 0  •  rivaroxaban (XARELTO) 20 MG Tab tablet, Take 1 tablet by mouth with dinner., Disp: 30 tablet, Rfl: 0  •  omeprazole (PRILOSEC) 20 MG delayed-release capsule, Take 1 capsule by mouth every day., Disp: 90 capsule, Rfl: 0  •  VITAMIN D PO, Take 1 capsule by mouth every day., Disp: , Rfl:   •  [employer] OMEGA-3  "KRILL OIL PO, Take 1 capsule by mouth every day., Disp: , Rfl:   •  niacin (NIASPAN) 1000 MG CR tablet, TAKE TWO TABLETS BY MOUTH DAILY, Disp: 180 Tab, Rfl: 1  •  ferrous sulfate 325 (65 Fe) MG tablet, Take 325 mg by mouth every day., Disp: , Rfl:   •  rosuvastatin (CRESTOR) 20 MG Tab, Take 1 Tab by mouth every evening., Disp: 90 Tab, Rfl: 3  •  Coenzyme Q10 (CO Q-10) 300 MG Cap, Take 1 Cap by mouth every day., Disp: , Rfl:   •  Ascorbic Acid (VITAMIN C PO), Take 6,000 mg by mouth every day., Disp: , Rfl:   •  Multiple Vitamin (MULTIVITAMIN PO), Take 1 Tab by mouth every day., Disp: , Rfl:   •  aspirin 81 MG tablet, Take 81 mg by mouth every day., Disp: , Rfl:   ALLERGIES:   Allergies   Allergen Reactions   • Mercury Swelling     And mercury based preservatives-Thimerasol  Swelling, \"strange discharge from eyes\"   • Other Environmental Anaphylaxis     Insect toxins/ bees and wasps- Anaphylaxis   • Sulfa Drugs Anaphylaxis and Swelling   • Pravastatin Diarrhea     Diarrhea      SURGHX:   Past Surgical History:   Procedure Laterality Date   • FEMORAL ENDARTERECTOMY Left 9/20/2019    Procedure: ENDARTERECTOMY, FEMORAL;  Surgeon: Candelaria Crawford M.D.;  Location: [facility];  Service: General   • VENTRAL HERNIA REPAIR  11/30/2011    Performed by TROY GONZALEZ at [facility]   • INGUINAL HERNIA REPAIR  11/30/2011    Performed by TROY GONZALEZ at [facility]   • CATARACT PHACO WITH IOL  11/23/2010    Performed by RM WILLIS at SURGERY SAME DAY [facility]   • CATARACT PHACO WITH IOL  11/2/2010    Performed by RM WILLIS at SURGERY SAME DAY [facility]   • OTHER CARDIAC SURGERY  2005    STENTS x 4   • SINUS TREPHINE FRONTAL  1971   • SINUSOTOMIES  08/1963   • INGUINAL HERNIA REPAIR  1962    WITH UNDESCENDED TESTICLE   • TONSILLECTOMY AND ADENOIDECTOMY  1957     SOCHX:  reports that he quit smoking about 9 years ago. His smoking use included cigars. He quit after 30.00 " years of use. He has never used smokeless tobacco. He reports that he does not drink alcohol and does not use drugs.  FH: family history includes Cancer in his mother; Dementia in his mother; Heart Attack in his mother; Heart Disease in his mother.       Assessment/Plan:        1. Pneumonia due to COVID-19 virus      2. On supplemental oxygen therapy    - Continue home monitoring   - Covid Severity Index (CSI) of 4 at today's visit  - Patient is currently on 3 L oxygen supplementation and is maintaining adequate oxygen saturation > 92%  - Encouraged continued breathing exercises throughout the day  - Increased fluids and rest  - Continue current course of medrol  - Continue current course of Xarelto  - Follow up tomorrow, 2/17

## 2021-02-17 ENCOUNTER — TELEMEDICINE (OUTPATIENT)
Dept: URGENT CARE | Facility: CLINIC | Age: 71
End: 2021-02-17
Payer: MEDICARE

## 2021-02-17 DIAGNOSIS — U07.1 PNEUMONIA DUE TO COVID-19 VIRUS: ICD-10-CM

## 2021-02-17 DIAGNOSIS — Z99.81 ON SUPPLEMENTAL OXYGEN THERAPY: ICD-10-CM

## 2021-02-17 DIAGNOSIS — J12.82 PNEUMONIA DUE TO COVID-19 VIRUS: ICD-10-CM

## 2021-02-17 DIAGNOSIS — U07.1 COVID-19: ICD-10-CM

## 2021-02-17 PROCEDURE — 99457 RPM TX MGMT 1ST 20 MIN: CPT | Mod: 95 | Performed by: PHYSICIAN ASSISTANT

## 2021-02-17 RX ORDER — DEXAMETHASONE 2 MG/1
TABLET ORAL
COMMUNITY
Start: 2021-02-13 | End: 2021-02-17

## 2021-02-17 ASSESSMENT — ENCOUNTER SYMPTOMS
COUGH: 1
SHORTNESS OF BREATH: 0
CHILLS: 0
DIARRHEA: 0
CONSTIPATION: 0
ABDOMINAL PAIN: 0
FEVER: 0
VOMITING: 0
NAUSEA: 0

## 2021-02-17 NOTE — PROGRESS NOTES
Subjective:   Polo Pyle is a 70 y.o. male who presents for Covid-19 Home Monitoring Video Visit    This evaluation was conducted via wooju using secure and encrypted conferencing technology. The patient was in a private location in the state [facility].    The patient's identity was confirmed and verbal consent was obtained for this virtual visit.      HPI    Date of Discharge:  2/13/21    Current O2 flow rate:  2L  Symptom improvement: Yes       Home Monitoring Patient Triage  COVID-19 Monitoring Level: Home with basic monitoring       [facility] Flowsheet   1. Record COVID-19 Severity Index (qCSI):  ([facility] )  2.Confirm appropriate patient and chart with two identifiers: Yes - continue to question #3   3. Days Since Onset of Symptoms: 9  4. Does patient have another adult at home to help take care of you: Yes - continue to question #5  5. Confirm O2 supply is working and there are no cannula problems: Yes - continue to question #6  6. Is your breathing today better or worse? Better - continue to question #7  7. Are you able to tolerate fluids? Yes - Continue to question #8  8. Any vomiting or diarrhea in the last 24 hours? Yes - encourage trial of fluids, if concerns for dehydration contact [facility] for direct admit  9. Are you able to control your fevers? Yes - continue to question #10  10. Are you able to control your cough? Yes - continue to question #11  11. Current O2 Flow Rate: 2  12. Review ER precautions: present to ED or call 911 if experiencing severe shortness of breath: Yes  13. Confirm next VV: Appointment scheduled  14. Final disposition: Stable at home  15. Time Spent: 20      His monitors stopped working around 10 am yesterday. He does not feel like he has had any episodes of SOB. He feels slightly better from yesterday. He is currently on 2L. He did receive more monitors today and is waiting for his step son to help connect them.     Review of  Systems   Constitutional: Negative for chills, fever and malaise/fatigue.   Respiratory: Positive for cough. Negative for shortness of breath.    Gastrointestinal: Negative for abdominal pain, constipation, diarrhea, nausea and vomiting.   All other systems reviewed and are negative.      PMH:  has a past medical history of ASTHMA, CAD (coronary artery disease) (8/26/2011), CATARACT, Colorblind, Dental disorder, Essential hypertension, Heart burn, High cholesterol, Hyperlipidemia (8/26/2011), Indigestion, Infectious disease, Myocardial infarct (HCC) (2005), Pain (11/14/11), Personal history of venous thrombosis and embolism (2006), Pneumonia (2009), Presence of stent in left circumflex coronary artery, Unspecified hemorrhagic conditions, Venous insufficiency of both lower extremities, and White coat syndrome with diagnosis of hypertension.  MEDS:   Current Outpatient Medications:   •  dexamethasone (DECADRON) 6 MG Tab, Take 1 tablet by mouth every day for 6 days., Disp: 6 tablet, Rfl: 0  •  finasteride (PROSCAR) 5 MG Tab, Take 1 tablet by mouth every day., Disp: 90 tablet, Rfl: 0  •  rivaroxaban (XARELTO) 20 MG Tab tablet, Take 1 tablet by mouth with dinner., Disp: 30 tablet, Rfl: 0  •  omeprazole (PRILOSEC) 20 MG delayed-release capsule, Take 1 capsule by mouth every day., Disp: 90 capsule, Rfl: 0  •  VITAMIN D PO, Take 1 capsule by mouth every day., Disp: , Rfl:   •  CVS OMEGA-3 KRILL OIL PO, Take 1 capsule by mouth every day., Disp: , Rfl:   •  niacin (NIASPAN) 1000 MG CR tablet, TAKE TWO TABLETS BY MOUTH DAILY, Disp: 180 Tab, Rfl: 1  •  ferrous sulfate 325 (65 Fe) MG tablet, Take 325 mg by mouth every day., Disp: , Rfl:   •  rosuvastatin (CRESTOR) 20 MG Tab, Take 1 Tab by mouth every evening., Disp: 90 Tab, Rfl: 3  •  Coenzyme Q10 (CO Q-10) 300 MG Cap, Take 1 Cap by mouth every day., Disp: , Rfl:   •  Ascorbic Acid (VITAMIN C PO), Take 6,000 mg by mouth every day., Disp: , Rfl:   •  Multiple Vitamin  "(MULTIVITAMIN PO), Take 1 Tab by mouth every day., Disp: , Rfl:   •  aspirin 81 MG tablet, Take 81 mg by mouth every day., Disp: , Rfl:   ALLERGIES:   Allergies   Allergen Reactions   • Mercury Swelling     And mercury based preservatives-Thimerasol  Swelling, \"strange discharge from eyes\"   • Other Environmental Anaphylaxis     Insect toxins/ bees and wasps- Anaphylaxis   • Sulfa Drugs Anaphylaxis and Swelling   • Pravastatin Diarrhea     Diarrhea      SURGHX:   Past Surgical History:   Procedure Laterality Date   • FEMORAL ENDARTERECTOMY Left 9/20/2019    Procedure: ENDARTERECTOMY, FEMORAL;  Surgeon: Candelaria Crawford M.D.;  Location: SURGERY [facility];  Service: General   • VENTRAL HERNIA REPAIR  11/30/2011    Performed by TROY GONZALEZ at SURGERY [facility]   • INGUINAL HERNIA REPAIR  11/30/2011    Performed by TROY GONZALEZ at SURGERY [facility]   • CATARACT PHACO WITH IOL  11/23/2010    Performed by RM WILLIS at SURGERY SAME DAY [facility]   • CATARACT PHACO WITH IOL  11/2/2010    Performed by RM WILLIS at SURGERY SAME DAY [facility]   • OTHER CARDIAC SURGERY  2005    STENTS x 4   • SINUS TREPHINE FRONTAL  1971   • SINUSOTOMIES  08/1963   • INGUINAL HERNIA REPAIR  1962    WITH UNDESCENDED TESTICLE   • TONSILLECTOMY AND ADENOIDECTOMY  1957     SOCHX:  reports that he quit smoking about 9 years ago. His smoking use included cigars. He quit after 30.00 years of use. He has never used smokeless tobacco. He reports that he does not drink alcohol and does not use drugs.  Family History   Problem Relation Age of Onset   • Heart Attack Mother    • Heart Disease Mother    • Cancer Mother         LYKIMA    • Dementia Mother         Objective:   There were no vitals taken for this visit.  Physical Exam:  Constitutional: Alert, no distress, well-groomed.  Skin: No rashes in visible areas.  Eye: Round. Conjunctiva clear, lids normal. No icterus.   ENMT: Lips pink without lesions, good " dentition, moist mucous membranes. Phonation normal.  Neck: No masses, no thyromegaly. Moves freely without pain.  CV: Pulse as reported by patient  Respiratory: Unlabored respiratory effort, no cough or audible wheeze  Psych: Alert and oriented x3, normal affect and mood.         Assessment/Plan:     1. Pneumonia due to COVID-19 virus     2. On supplemental oxygen therapy     3. COVID-19         - Covid Severity Index (CSI) - unable to calculate today   - We will continue 2 L of supplemental oxygen for today. We will consider weaning down tomorrow after we have [employer] data restored.  - Encouraged increased fluids and rest, discussed continued breathing exercises   - ED precautions discussed  - Follow up tomorrow     Please note that this dictation was created using voice recognition software. I have made every reasonable attempt to correct obvious errors, but I expect that there are errors of grammar and possibly content that I did not discover before finalizing the note.

## 2021-02-18 ENCOUNTER — TELEMEDICINE (OUTPATIENT)
Dept: URGENT CARE | Facility: CLINIC | Age: 71
End: 2021-02-18
Payer: MEDICARE

## 2021-02-18 VITALS — OXYGEN SATURATION: 92 % | RESPIRATION RATE: 14 BRPM

## 2021-02-18 DIAGNOSIS — Z99.81 ON SUPPLEMENTAL OXYGEN THERAPY: ICD-10-CM

## 2021-02-18 DIAGNOSIS — J12.82 PNEUMONIA DUE TO COVID-19 VIRUS: ICD-10-CM

## 2021-02-18 DIAGNOSIS — U07.1 COVID-19: ICD-10-CM

## 2021-02-18 DIAGNOSIS — U07.1 PNEUMONIA DUE TO COVID-19 VIRUS: ICD-10-CM

## 2021-02-18 PROCEDURE — 99457 RPM TX MGMT 1ST 20 MIN: CPT | Mod: 95 | Performed by: PHYSICIAN ASSISTANT

## 2021-02-18 NOTE — PROGRESS NOTES
Telemedicine Visit:      Subjective:   Polo Pyle is a 70 y.o. male presenting for evaluation and management of Covid-19 Home Monitoring     This evaluation was conducted via Zoom using secure and encrypted videoconferencing technology. The patient was in a private location in the [facility].    The patient's identity was confirmed and verbal consent was obtained for this virtual visit.    Home Monitoring Patient Triage  COVID-19 Monitoring Level: Home with basic monitoring       [facility] Flowsheet   1. Record COVID-19 Severity Index (qCSI):  2  2.Confirm appropriate patient and chart with two identifiers: Yes - continue to question #3   3. Days Since Onset of Symptoms: 10  4. Does patient have another adult at home to help take care of you: Yes - continue to question #5  5. Confirm O2 supply is working and there are no cannula problems: Yes - continue to question #6  6. Is your breathing today better or worse? Better - continue to question #7  7. Are you able to tolerate fluids? Yes - Continue to question #8  8. Any vomiting or diarrhea in the last 24 hours? No - continue to question #9  9. Are you able to control your fevers? Yes - continue to question #10  10. Are you able to control your cough? Yes - continue to question #11  11. Current O2 Flow Rate: 2  12. Review ER precautions: present to ED or call 911 if experiencing severe shortness of breath: Yes  13. Confirm next VV: Appointment scheduled  14. Final disposition: Stable at home  15. Time Spent: 15      ROS    Current medicines (including changes today)  Medications:    • aspirin  • Co Q-10 Caps  • CVS OMEGA-3 KRILL OIL PO  • dexamethasone Tabs  • ferrous sulfate  • finasteride Tabs  • MULTIVITAMIN PO  • niacin  • omeprazole  • rivaroxaban Tabs  • rosuvastatin Tabs  • VITAMIN C PO  • VITAMIN D PO    Allergies: Mercury, Other environmental, Sulfa drugs, and Pravastatin    Problem List: Polo Pyle has Dyslipidemia;  Coronary artery disease due to lipid rich plaque; Essential hypertension; Presence of stent in LAD coronary artery; Presence of stent in left circumflex coronary artery; Stasis edema with ulcer, right (HCC); Peripheral artery disease (HCC); Obesity (BMI 35.0-39.9 without comorbidity) ([facility]); Iron deficiency anemia due to chronic blood loss; COVID-19; Acute cystitis; Acute respiratory failure with hypoxia ([facility]); Benign prostatic hyperplasia with urinary hesitancy; Advanced care planning/counseling discussion; Elevated d-dimer; and Thrombus on their problem list.    Surgical History:  Past Surgical History:   Procedure Laterality Date   • FEMORAL ENDARTERECTOMY Left 9/20/2019    Procedure: ENDARTERECTOMY, FEMORAL;  Surgeon: Candelaria Crawford M.D.;  Location: SURGERY [facility];  Service: General   • VENTRAL HERNIA REPAIR  11/30/2011    Performed by TROY GONZALEZ at SURGERY [facility]   • INGUINAL HERNIA REPAIR  11/30/2011    Performed by TROY GONZALEZ at SURGERY [facility]   • CATARACT PHACO WITH IOL  11/23/2010    Performed by RM WILLIS at SURGERY SAME DAY [facility]   • CATARACT PHACO WITH IOL  11/2/2010    Performed by RM WILLIS at SURGERY SAME DAY [facility]   • OTHER CARDIAC SURGERY  2005    STENTS x 4   • SINUS TREPHINE FRONTAL  1971   • SINUSOTOMIES  08/1963   • INGUINAL HERNIA REPAIR  1962    WITH UNDESCENDED TESTICLE   • TONSILLECTOMY AND ADENOIDECTOMY  1957       Past Social Hx: Polo Pyle  reports that he quit smoking about 9 years ago. His smoking use included cigars. He quit after 30.00 years of use. He has never used smokeless tobacco. He reports that he does not drink alcohol and does not use drugs.     Past Family Hx:  Polo Pyle family history includes Cancer in his mother; Dementia in his mother; Heart Attack in his mother; Heart Disease in his mother.     Problem list, medications, and allergies reviewed by myself today in Epic.     Objective:    Resp 14   SpO2 92%  (from [employer] home monitor)    Physical Exam:  Constitutional:       General: Awake.      Appearance: Not toxic-appearing.   HENT:      Nose: No signs of injury.      Mouth/Throat: Voice clear.     Lips: Pink.   Eyes:      General: Lids are normal.      Conjunctiva/sclera: Conjunctivae normal.   Neck:      Trachea: Phonation normal.   Pulmonary:      Effort: Pulmonary effort is normal.   Skin:     Coloration: Skin is not cyanotic or pale.   Neurological:      Mental Status: Alert and oriented to person, place, and time.   Psychiatric:         Mood and Affect: Mood and affect normal.         Speech: Speech normal.         Behavior: Behavior is cooperative.       Assessment and Plan:   The following treatment plan was discussed:     There are no diagnoses linked to this encounter.    CSI:2  Day of Symptoms: 10  Next appointment: 2/19/2021  Time Spent: 15  Additional comments:  Stable on 2L, patient troubleshooting monitoring, got system established this am and numbers for last 2 hours look good.  With O2 at 92% on 2L will wait another 24 hours before titrating lower - especially given patient's technical difficulties as he cannot visualise his current oximetry.  Pt agrees.    Supportive care and indications for immediate follow-up discussed with patient.    Pathogenesis of diagnosis discussed including typical length and natural progression. Patient expresses understanding and agrees to plan.     I personally reviewed prior external notes and test results pertinent to today's visit in EHR and [employer] monitoring data.  I have independently reviewed and interpreted all data.

## 2021-02-19 ENCOUNTER — TELEMEDICINE (OUTPATIENT)
Dept: URGENT CARE | Facility: CLINIC | Age: 71
End: 2021-02-19
Payer: MEDICARE

## 2021-02-19 VITALS — HEART RATE: 86 BPM | OXYGEN SATURATION: 92 %

## 2021-02-19 DIAGNOSIS — J12.82 PNEUMONIA DUE TO COVID-19 VIRUS: ICD-10-CM

## 2021-02-19 DIAGNOSIS — Z99.81 OXYGEN DEPENDENT: ICD-10-CM

## 2021-02-19 DIAGNOSIS — U07.1 COVID-19: ICD-10-CM

## 2021-02-19 DIAGNOSIS — U07.1 PNEUMONIA DUE TO COVID-19 VIRUS: ICD-10-CM

## 2021-02-19 PROCEDURE — 99213 OFFICE O/P EST LOW 20 MIN: CPT | Mod: 95 | Performed by: NURSE PRACTITIONER

## 2021-02-19 NOTE — PROGRESS NOTES
Virtual Visit: Established Patient   This evaluation was conducted via Zoom using secure and encrypted videoconferencing technology. The patient was in a private location in the state of [place].    The patient's identity was confirmed and verbal consent was obtained for this virtual visit.    Subjective:   CC:   Chief Complaint   Patient presents with   • Covid-19 Home Monitoring Video Visit       Polo Pyle is a 70 y.o. male presenting for evaluation and management of:      Patients masimo system is down awaiting battery check  Patient is currently on 3L oxygen [place]. Home pulse oximeter reading 92%.  He is feeling stable. Son states when he tries to turn down the oxygen by .5 liters patient desaturates to 88-90%.        Home Monitoring Patient Triage  COVID-19 Monitoring Level: Home with basic monitoring       [facility] Flowsheet   1. Record COVID-19 Severity Index (qCSI):  4  2.Confirm appropriate patient and chart with two identifiers: Yes - continue to question #3   3. Days Since Onset of Symptoms: 11  4. Does patient have another adult at home to help take care of you: Yes - continue to question #5  5. Confirm O2 supply is working and there are no cannula problems: Yes - continue to question #6  6. Is your breathing today better or worse? Better - continue to question #7  7. Are you able to tolerate fluids? Yes - Continue to question #8  8. Any vomiting or diarrhea in the last 24 hours? No - continue to question #9  9. Are you able to control your fevers? N/A  10. Are you able to control your cough? Yes - continue to question #11  11. Current O2 Flow Rate: 3  12. Review ER precautions: present to ED or call 911 if experiencing severe shortness of breath: Yes  13. Confirm next VV: Appointment scheduled  14. Final disposition: Stable at home  15. Time Spent: 20    ROS   Denies any recent fevers or chills. No nausea or vomiting. No chest pains or shortness of breath.     Allergies   Allergen  "Reactions   • Mercury Swelling     And mercury based preservatives-Thimerasol  Swelling, \"strange discharge from eyes\"   • Other Environmental Anaphylaxis     Insect toxins/ bees and wasps- Anaphylaxis   • Sulfa Drugs Anaphylaxis and Swelling   • Pravastatin Diarrhea     Diarrhea        Current medicines (including changes today)  Current Outpatient Medications   Medication Sig Dispense Refill   • dexamethasone (DECADRON) 6 MG Tab Take 1 tablet by mouth every day for 6 days. 6 tablet 0   • finasteride (PROSCAR) 5 MG Tab Take 1 tablet by mouth every day. 90 tablet 0   • rivaroxaban (XARELTO) 20 MG Tab tablet Take 1 tablet by mouth with dinner. 30 tablet 0   • omeprazole (PRILOSEC) 20 MG delayed-release capsule Take 1 capsule by mouth every day. 90 capsule 0   • VITAMIN D PO Take 1 capsule by mouth every day.     • [employer] OMEGA-3 KRILL OIL PO Take 1 capsule by mouth every day.     • niacin (NIASPAN) 1000 MG CR tablet TAKE TWO TABLETS BY MOUTH DAILY 180 Tab 1   • ferrous sulfate 325 (65 Fe) MG tablet Take 325 mg by mouth every day.     • rosuvastatin (CRESTOR) 20 MG Tab Take 1 Tab by mouth every evening. 90 Tab 3   • Coenzyme Q10 (CO Q-10) 300 MG Cap Take 1 Cap by mouth every day.     • Ascorbic Acid (VITAMIN C PO) Take 6,000 mg by mouth every day.     • Multiple Vitamin (MULTIVITAMIN PO) Take 1 Tab by mouth every day.     • aspirin 81 MG tablet Take 81 mg by mouth every day.       No current facility-administered medications for this visit.       Patient Active Problem List    Diagnosis Date Noted   • Thrombus 02/12/2021   • Acute respiratory failure with hypoxia (HCC) 02/11/2021   • Benign prostatic hyperplasia with urinary hesitancy 02/11/2021   • Advanced care planning/counseling discussion 02/11/2021   • Elevated d-dimer 02/11/2021   • COVID-19 02/10/2021   • Acute cystitis 02/10/2021   • Iron deficiency anemia due to chronic blood loss 11/25/2019   • Obesity (BMI 35.0-39.9 without comorbidity) ([facility]) 08/13/2019   • " Peripheral artery disease ([facility]) 08/01/2019   • Stasis edema with ulcer, right ([facility]) 05/20/2019   • Essential hypertension    • Presence of stent in LAD coronary artery    • Presence of stent in left circumflex coronary artery    • Dyslipidemia 08/26/2011   • Coronary artery disease due to lipid rich plaque 08/26/2011       Family History   Problem Relation Age of Onset   • Heart Attack Mother    • Heart Disease Mother    • Cancer Mother         LYKIMA    • Dementia Mother        He  has a past medical history of ASTHMA, CAD (coronary artery disease) (8/26/2011), CATARACT, Colorblind, Dental disorder, Essential hypertension, Heart burn, High cholesterol, Hyperlipidemia (8/26/2011), Indigestion, Infectious disease, Myocardial infarct ([facility]) (2005), Pain (11/14/11), Personal history of venous thrombosis and embolism (2006), Pneumonia (2009), Presence of stent in left circumflex coronary artery, Unspecified hemorrhagic conditions, Venous insufficiency of both lower extremities, and White coat syndrome with diagnosis of hypertension.  He  has a past surgical history that includes tonsillectomy and adenoidectomy (1957); sinus trephine frontal (1971); cataract phaco with iol (11/2/2010); cataract phaco with iol (11/23/2010); ventral hernia repair (11/30/2011); sinusotomies (08/1963); other cardiac surgery (2005); inguinal hernia repair (1962); inguinal hernia repair (11/30/2011); and femoral endarterectomy (Left, 9/20/2019).       Objective:   Pulse 86   SpO2 92% Comment: 3 Liters nasal canula  Unlabored breathing.     Physical Exam:  Constitutional: Alert, no distress, well-groomed.  Skin: No rashes in visible areas.  Eye: Round. Conjunctiva clear, lids normal. No icterus.   ENMT: Lips pink without lesions, good dentition, moist mucous membranes. Phonation normal.  Neck: No masses, no thyromegaly. Moves freely without pain.  Respiratory: Unlabored respiratory effort, no cough or audible wheeze  Psych: Alert and oriented  x3, normal affect and mood.       Assessment and Plan:   The following treatment plan was discussed:     1. Pneumonia due to COVID-19 virus    2. COVID-19    3. Oxygen dependent    CSI:4  Day of Symptoms: 11  Next appointment: 2/20/2021  Time Spent: 15  Additional comments:  Patient is currently on 3L (he mistakenly told provider 2 yesterday).  He desaturates when son tries to turn down oxygen.  They are awaiting new batteries from [employer].  We will leave at 3L until he gets back up and running on [employer] for further evaluation.      Supportive care and indications for immediate follow-up discussed with patient.    Pathogenesis of diagnosis discussed including typical length and natural progression. Patient expresses understanding and agrees to plan.     I personally reviewed prior external notes and test results pertinent to today's visit in EHR and Stamped monitoring data.  I have independently reviewed and interpreted all data.

## 2021-02-20 ENCOUNTER — TELEMEDICINE (OUTPATIENT)
Dept: URGENT CARE | Facility: CLINIC | Age: 71
End: 2021-02-20
Payer: MEDICARE

## 2021-02-20 DIAGNOSIS — Z99.81 SUPPLEMENTAL OXYGEN DEPENDENT: ICD-10-CM

## 2021-02-20 DIAGNOSIS — J12.82 PNEUMONIA DUE TO COVID-19 VIRUS: ICD-10-CM

## 2021-02-20 DIAGNOSIS — U07.1 PNEUMONIA DUE TO COVID-19 VIRUS: ICD-10-CM

## 2021-02-20 PROCEDURE — 99213 OFFICE O/P EST LOW 20 MIN: CPT | Mod: 95 | Performed by: NURSE PRACTITIONER

## 2021-02-20 NOTE — PROGRESS NOTES
Virtual Visit: Established Patient   This visit was conducted via Zoom using secure and encrypted videoconferencing technology. The patient was in a private location in the state [facility].    The patient's identity was confirmed and verbal consent was obtained for this virtual visit.    This evaluation was conducted via Zoom using secure and encrypted videoconferencing technology. The patient was in a private location in the [facility].    The patient's identity was confirmed and verbal consent was obtained for this virtual visit.    Home Monitoring Patient Triage  COVID-19 Monitoring Level:     home with basic monitoring    [facility] Flowsheet   1. Record COVID-19 Severity Index (qCSI):  2  2.Confirm appropriate patient and chart with two identifiers: Yes - continue to question #3   3. Days Since Onset of Symptoms: 12  4. Does patient have another adult at home to help take care of you: Yes - continue to question #5  5. Confirm O2 supply is working and there are no cannula problems: Yes - continue to question #6  6. Is your breathing today better or worse? Same  7. Are you able to tolerate fluids? Yes - Continue to question #8  8. Any vomiting or diarrhea in the last 24 hours? No - continue to question #9  9. Are you able to control your fevers? N/A  10. Are you able to control your cough? Yes - continue to question #11  11. Current O2 Flow Rate: 0  12. Review ER precautions: present to ED or call 911 if experiencing severe shortness of breath: Yes  13. Confirm next VV: Appointment scheduled  14. Final disposition: Discharge from Program  15. Time Spent: 30      Subjective:   CC: Follow up visit for Covid-19    Polo Pyle is a 70 y.o. male presenting for evaluation and management of:    Post hospitalization for COVID-19 pneumonia    ROS   Denies any recent fevers or chills. No nausea or vomiting. No chest pains or shortness of breath.     Allergies   Allergen Reactions   • Mercury Swelling  "    And mercury based preservatives-Thimerasol  Swelling, \"strange discharge from eyes\"   • Other Environmental Anaphylaxis     Insect toxins/ bees and wasps- Anaphylaxis   • Sulfa Drugs Anaphylaxis and Swelling   • Pravastatin Diarrhea     Diarrhea        Current medicines (including changes today)  Current Outpatient Medications   Medication Sig Dispense Refill   • dexamethasone (DECADRON) 6 MG Tab Take 1 tablet by mouth every day for 6 days. 6 tablet 0   • finasteride (PROSCAR) 5 MG Tab Take 1 tablet by mouth every day. 90 tablet 0   • rivaroxaban (XARELTO) 20 MG Tab tablet Take 1 tablet by mouth with dinner. 30 tablet 0   • omeprazole (PRILOSEC) 20 MG delayed-release capsule Take 1 capsule by mouth every day. 90 capsule 0   • VITAMIN D PO Take 1 capsule by mouth every day.     • CVS OMEGA-3 KRILL OIL PO Take 1 capsule by mouth every day.     • niacin (NIASPAN) 1000 MG CR tablet TAKE TWO TABLETS BY MOUTH DAILY 180 Tab 1   • ferrous sulfate 325 (65 Fe) MG tablet Take 325 mg by mouth every day.     • rosuvastatin (CRESTOR) 20 MG Tab Take 1 Tab by mouth every evening. 90 Tab 3   • Coenzyme Q10 (CO Q-10) 300 MG Cap Take 1 Cap by mouth every day.     • Ascorbic Acid (VITAMIN C PO) Take 6,000 mg by mouth every day.     • Multiple Vitamin (MULTIVITAMIN PO) Take 1 Tab by mouth every day.     • aspirin 81 MG tablet Take 81 mg by mouth every day.       No current facility-administered medications for this visit.       Patient Active Problem List    Diagnosis Date Noted   • Thrombus 02/12/2021   • Acute respiratory failure with hypoxia (HCC) 02/11/2021   • Benign prostatic hyperplasia with urinary hesitancy 02/11/2021   • Advanced care planning/counseling discussion 02/11/2021   • Elevated d-dimer 02/11/2021   • COVID-19 02/10/2021   • Acute cystitis 02/10/2021   • Iron deficiency anemia due to chronic blood loss 11/25/2019   • Obesity (BMI 35.0-39.9 without comorbidity) ([facility]) 08/13/2019   • Peripheral artery disease (HCC) " 08/01/2019   • Stasis edema with ulcer, right (HCC) 05/20/2019   • Essential hypertension    • Presence of stent in LAD coronary artery    • Presence of stent in left circumflex coronary artery    • Dyslipidemia 08/26/2011   • Coronary artery disease due to lipid rich plaque 08/26/2011       Family History   Problem Relation Age of Onset   • Heart Attack Mother    • Heart Disease Mother    • Cancer Mother         LYKIMA    • Dementia Mother        He  has a past medical history of ASTHMA, CAD (coronary artery disease) (8/26/2011), CATARACT, Colorblind, Dental disorder, Essential hypertension, Heart burn, High cholesterol, Hyperlipidemia (8/26/2011), Indigestion, Infectious disease, Myocardial infarct (HCC) (2005), Pain (11/14/11), Personal history of venous thrombosis and embolism (2006), Pneumonia (2009), Presence of stent in left circumflex coronary artery, Unspecified hemorrhagic conditions, Venous insufficiency of both lower extremities, and White coat syndrome with diagnosis of hypertension.  He  has a past surgical history that includes tonsillectomy and adenoidectomy (1957); sinus trephine frontal (1971); cataract phaco with iol (11/2/2010); cataract phaco with iol (11/23/2010); ventral hernia repair (11/30/2011); sinusotomies (08/1963); other cardiac surgery (2005); inguinal hernia repair (1962); inguinal hernia repair (11/30/2011); and femoral endarterectomy (Left, 9/20/2019).      Patient has been having difficulty with his monitoring equipment.  He has been on the phone several times with the [facility] call center for troubleshooting.  Ultimately, they are going to bring him a new battery pack for his monitor, however he does not have that yet.  I currently do not have any monitoring information on the patient's dashboard.  Patient has been still on oxygen at 2 L/min.  He is able to tell me that at rest his oxygen saturation is 90% on 2 L/min.    Patient had expressed desire to the call center to be removed  from the program secondary to difficulty with monitoring equipment.  I did discuss with the patient that I would like to do a walk test on him to see how he responds to activity challenge.  Patient was willing to do this.    Ultimately, at the end of the visit, patient states that he is willing to remain in the program at this time pending receiving a new battery pack for his monitoring equipment.  He states if they can get the equipment to function properly then he is willing to stay in the program.  He will not receive the new battery pack until Monday, however.  Patient is willing to do telephone follow-up tomorrow again and I did verify his appointment.     Objective:   There were no vitals taken for this visit.  Sats: 90-92% RA at rest    Physical Exam:    Respiratory: Unlabored respiratory effort, no cough or audible wheeze  Psych: Alert and oriented x3, normal affect and mood.       Walk challenge:    Patient marched in place for 60 seconds.  I did have him remove his oxygen completely, in order to gauge his oxygen saturation without assistance from supplemental oxygen.  During the march in place exercise, patient's oxygen saturation ranged from 90 to 92% on room air.  Immediately following completion of the exercise, with patient at rest, oxygen saturation initially dipped to 86% for approximately 1 minute and then increased back up to 90 to 92% and was maintained at this level for the duration of the visit without supplemental oxygen.  Patient subjectively states that he did not feel short of breath or lightheaded.    It does appear that the patient is maintaining his oxygen saturation at 90 to 92% without supplemental oxygen at this time.  I discussed with patient that I would have him leave the oxygen off as long as he can maintain his saturation greater than 90% and subjectively does not feel short of breath or lightheaded.  I did advise patient that if his oxygen saturation drops under 90% for prolonged  period of time or if he does subjectively feel short of breath that he may put the oxygen back on and start at 1 L/min.  I also advised patient that if he is going to be active in his home that he may also have to put the supplemental oxygen on again if he notices prolonged saturation under 90%.    Patient verbalized understanding and agreement with plan of care.  States he is willing to do this.  I did verify patient's follow-up visit for tomorrow.  Patient has no further questions at this time.    Assessment and Plan:   The following treatment plan was discussed:     1. Pneumonia due to COVID-19 virus    2. Supplemental oxygen dependent    Patient weaned off of oxygen completely  May use oxygen as needed for saturation less than 90%  ER precautions reiterated for severe shortness of breath, chest pain, or lightheadedness  Verified appointment for tomorrow via phone    Follow-up: Follow-up verified for 2/21/2021

## 2021-02-21 ENCOUNTER — TELEMEDICINE (OUTPATIENT)
Dept: URGENT CARE | Facility: CLINIC | Age: 71
End: 2021-02-21
Payer: MEDICARE

## 2021-02-21 VITALS — HEART RATE: 70 BPM | RESPIRATION RATE: 16 BRPM | OXYGEN SATURATION: 94 %

## 2021-02-21 DIAGNOSIS — J12.82 PNEUMONIA DUE TO COVID-19 VIRUS: ICD-10-CM

## 2021-02-21 DIAGNOSIS — U07.1 PNEUMONIA DUE TO COVID-19 VIRUS: ICD-10-CM

## 2021-02-21 DIAGNOSIS — Z99.81 SUPPLEMENTAL OXYGEN DEPENDENT: ICD-10-CM

## 2021-02-21 PROCEDURE — 99457 RPM TX MGMT 1ST 20 MIN: CPT | Mod: 95 | Performed by: PHYSICIAN ASSISTANT

## 2021-02-21 ASSESSMENT — ENCOUNTER SYMPTOMS
COUGH: 0
SHORTNESS OF BREATH: 0
CHILLS: 0
VOMITING: 0
CONSTIPATION: 0
ABDOMINAL PAIN: 0
NAUSEA: 0
FEVER: 0
DIARRHEA: 0
CLAUDICATION: 1

## 2021-02-21 NOTE — PROGRESS NOTES
Subjective:   Polo Pyle is a 70 y.o. male who presents for Covid-19 Home Monitoring Video Visit    This evaluation was conducted via Zoom using secure and encrypted videoconferencing technology. The patient was in a private location in the state [facility].    The patient's identity was confirmed and verbal consent was obtained for this virtual visit.      HPI    Date of Discharge:  2/13/21    Current O2 flow rate:  1L  Symptom improvement: Yes       The pt has had issues with monitors. He recently applied a new set but notes it has been blinking with a blue light. He should receive a new battery tomorrow.       Home Monitoring Patient Triage  COVID-19 Monitoring Level: Home with basic monitoring       [facility] Flowsheet   1. Record COVID-19 Severity Index (qCSI):  0  2.Confirm appropriate patient and chart with two identifiers: Yes - continue to question #3   3. Days Since Onset of Symptoms: 13  4. Does patient have another adult at home to help take care of you: Yes - continue to question #5  5. Confirm O2 supply is working and there are no cannula problems: Yes - continue to question #6  6. Is your breathing today better or worse? Better - continue to question #7  7. Are you able to tolerate fluids? Yes - Continue to question #8  8. Any vomiting or diarrhea in the last 24 hours? No - continue to question #9  9. Are you able to control your fevers? Yes - continue to question #10  10. Are you able to control your cough? Yes - continue to question #11  11. Current O2 Flow Rate: 1  12. Review ER precautions: present to ED or call 911 if experiencing severe shortness of breath: Yes  13. Confirm next VV: Appointment scheduled  14. Final disposition: Stable at home  15. Time Spent: 15    Review of Systems   Constitutional: Negative for chills, fever and malaise/fatigue.   Respiratory: Negative for cough and shortness of breath.    Cardiovascular: Positive for claudication.   Gastrointestinal:  "Negative for abdominal pain, constipation, diarrhea, nausea and vomiting.   All other systems reviewed and are negative.      PMH:  has a past medical history of ASTHMA, CAD (coronary artery disease) (8/26/2011), CATARACT, Colorblind, Dental disorder, Essential hypertension, Heart burn, High cholesterol, Hyperlipidemia (8/26/2011), Indigestion, Infectious disease, Myocardial infarct (HCC) (2005), Pain (11/14/11), Personal history of venous thrombosis and embolism (2006), Pneumonia (2009), Presence of stent in left circumflex coronary artery, Unspecified hemorrhagic conditions, Venous insufficiency of both lower extremities, and White coat syndrome with diagnosis of hypertension.  MEDS:   Current Outpatient Medications:   •  finasteride (PROSCAR) 5 MG Tab, Take 1 tablet by mouth every day., Disp: 90 tablet, Rfl: 0  •  rivaroxaban (XARELTO) 20 MG Tab tablet, Take 1 tablet by mouth with dinner., Disp: 30 tablet, Rfl: 0  •  omeprazole (PRILOSEC) 20 MG delayed-release capsule, Take 1 capsule by mouth every day., Disp: 90 capsule, Rfl: 0  •  VITAMIN D PO, Take 1 capsule by mouth every day., Disp: , Rfl:   •  CVS OMEGA-3 KRILL OIL PO, Take 1 capsule by mouth every day., Disp: , Rfl:   •  niacin (NIASPAN) 1000 MG CR tablet, TAKE TWO TABLETS BY MOUTH DAILY, Disp: 180 Tab, Rfl: 1  •  ferrous sulfate 325 (65 Fe) MG tablet, Take 325 mg by mouth every day., Disp: , Rfl:   •  rosuvastatin (CRESTOR) 20 MG Tab, Take 1 Tab by mouth every evening., Disp: 90 Tab, Rfl: 3  •  Coenzyme Q10 (CO Q-10) 300 MG Cap, Take 1 Cap by mouth every day., Disp: , Rfl:   •  Ascorbic Acid (VITAMIN C PO), Take 6,000 mg by mouth every day., Disp: , Rfl:   •  Multiple Vitamin (MULTIVITAMIN PO), Take 1 Tab by mouth every day., Disp: , Rfl:   •  aspirin 81 MG tablet, Take 81 mg by mouth every day., Disp: , Rfl:   ALLERGIES:   Allergies   Allergen Reactions   • Mercury Swelling     And mercury based preservatives-Thimerasol  Swelling, \"strange discharge from " "eyes\"   • Other Environmental Anaphylaxis     Insect toxins/ bees and wasps- Anaphylaxis   • Sulfa Drugs Anaphylaxis and Swelling   • Pravastatin Diarrhea     Diarrhea      SURGHX:   Past Surgical History:   Procedure Laterality Date   • FEMORAL ENDARTERECTOMY Left 9/20/2019    Procedure: ENDARTERECTOMY, FEMORAL;  Surgeon: Candelaria Crawford M.D.;  Location: SURGERY [facility];  Service: General   • VENTRAL HERNIA REPAIR  11/30/2011    Performed by TROY GONZALEZ at SURGERY [facility]   • INGUINAL HERNIA REPAIR  11/30/2011    Performed by TROY GONZALEZ at SURGERY [facility]   • CATARACT PHACO WITH IOL  11/23/2010    Performed by RM WILLIS at SURGERY SAME DAY [facility] ORS   • CATARACT PHACO WITH IOL  11/2/2010    Performed by RM WILLIS at SURGERY SAME DAY [facility] ORS   • OTHER CARDIAC SURGERY  2005    STENTS x 4   • SINUS TREPHINE FRONTAL  1971   • SINUSOTOMIES  08/1963   • INGUINAL HERNIA REPAIR  1962    WITH UNDESCENDED TESTICLE   • TONSILLECTOMY AND ADENOIDECTOMY  1957     SOCHX:  reports that he quit smoking about 9 years ago. His smoking use included cigars. He quit after 30.00 years of use. He has never used smokeless tobacco. He reports that he does not drink alcohol and does not use drugs.  Family History   Problem Relation Age of Onset   • Heart Attack Mother    • Heart Disease Mother    • Cancer Mother         GENET    • Dementia Mother         Objective:   Pulse 70   Resp 16   SpO2 94%   Physical Exam:  Constitutional: Alert, no distress, well-groomed.  Skin: No rashes in visible areas.  Eye: Round. Conjunctiva clear, lids normal. No icterus.   ENMT: Lips pink without lesions, good dentition, moist mucous membranes. Phonation normal.  Neck: No masses, no thyromegaly. Moves freely without pain.  CV: Pulse as reported by patient  Respiratory: Unlabored respiratory effort, no cough or audible wheeze  Psych: Alert and oriented x3, normal affect and mood.         Assessment/Plan: "     1. Pneumonia due to COVID-19 virus     2. Supplemental oxygen dependent         - Covid Severity Index (CSI) of 0 at today's visit   - Patient is currently on 1 L oxygen supplementation with what seems to be adequate oxygen >93%. We do not have any data from masimo monitors today. We will attempt to wean down tomorrow.   - Encouraged increased fluids and rest, discussed continued breathing exercises   - ED precautions discussed  - Follow up tomorrow     Please note that this dictation was created using voice recognition software. I have made every reasonable attempt to correct obvious errors, but I expect that there are errors of grammar and possibly content that I did not discover before finalizing the note.

## 2021-02-22 ENCOUNTER — TELEMEDICINE (OUTPATIENT)
Dept: URGENT CARE | Facility: CLINIC | Age: 71
End: 2021-02-22
Payer: MEDICARE

## 2021-02-22 VITALS — HEART RATE: 64 BPM | OXYGEN SATURATION: 97 %

## 2021-02-22 DIAGNOSIS — U07.1 PNEUMONIA DUE TO COVID-19 VIRUS: ICD-10-CM

## 2021-02-22 DIAGNOSIS — J12.82 PNEUMONIA DUE TO COVID-19 VIRUS: ICD-10-CM

## 2021-02-22 DIAGNOSIS — Z99.81 SUPPLEMENTAL OXYGEN DEPENDENT: ICD-10-CM

## 2021-02-22 DIAGNOSIS — I82.90 THROMBUS: ICD-10-CM

## 2021-02-22 PROCEDURE — 99203 OFFICE O/P NEW LOW 30 MIN: CPT | Mod: 95 | Performed by: NURSE PRACTITIONER

## 2021-02-22 ASSESSMENT — ENCOUNTER SYMPTOMS
CHILLS: 0
COUGH: 0
MYALGIAS: 0
HEADACHES: 0
DIZZINESS: 0
SHORTNESS OF BREATH: 0
NAUSEA: 0
FEVER: 0
DIARRHEA: 0
ABDOMINAL PAIN: 0
WHEEZING: 0
VOMITING: 0

## 2021-02-22 NOTE — PROGRESS NOTES
Telemedicine Visit:      Subjective:   Polo Pyle is a 70 y.o. male presenting for evaluation and management of Covid-19 Home Monitoring     This evaluation was conducted via [employer] using secure and encrypted videoconferencing technology. The patient was in a private location in the [facility].    The patient's identity was confirmed and verbal consent was obtained for this virtual visit.    Home Monitoring Patient Triage  COVID-19 Monitoring Level: Monitoring no longer needed Home or Self Care     [facility] Oxygen Flowsheet   1. Record COVID-19 Severity Index (qCSI):  0  2.Confirm appropriate patient and chart with two identifiers: Yes - continue to question #3   3. Days Since Onset of Symptoms:  14  4. Does patient have another adult at home to help take care of you: Yes - continue to question #5  5. Confirm O2 supply is working and there are no cannula problems: Yes - continue to question #6  6. Is your breathing today better or worse? Same  7. Are you able to tolerate fluids? Yes - Continue to question #8  8. Any vomiting or diarrhea in the last 24 hours? No - continue to question #9  9. Are you able to control your fevers? No - review appropriate acetaminophen or ibuprofen dosing  10. Are you able to control your cough? No - Review appropriate use of OTC cough suppressants  11. Current O2 Flow Rate: 1  12. Review ER precautions: present to ED or call 911 if experiencing severe shortness of breath: Yes  13. Confirm next VV: N/A  14. Final disposition: Stable at home  15. Time Spent: 15      Review of Systems   Constitutional: Negative for chills, fever and malaise/fatigue.   Respiratory: Negative for cough, shortness of breath and wheezing.    Gastrointestinal: Negative for abdominal pain, diarrhea, nausea and vomiting.   Musculoskeletal: Negative for myalgias.   Neurological: Negative for dizziness and headaches.      Patient states he is about the same as yesterday and is still having a  hard time connecting but has been getting calls every 4 hours from [facility] and he spot checks his SpO2 which hasn't gone below 92%. He is currently on 1L. Is taking his xarelto and dexamethasone as prescribed     Current medicines (including changes today)  Medications:    • aspirin  • Co Q-10 Caps  • [employer] OMEGA-3 KRILL OIL PO  • ferrous sulfate  • finasteride Tabs  • MULTIVITAMIN PO  • niacin  • omeprazole  • rivaroxaban Tabs  • rosuvastatin Tabs  • VITAMIN C PO  • VITAMIN D PO    Allergies: Mercury, Other environmental, Sulfa drugs, and Pravastatin    Problem List: Polo Pyle has Dyslipidemia; Coronary artery disease due to lipid rich plaque; Essential hypertension; Presence of stent in LAD coronary artery; Presence of stent in left circumflex coronary artery; Stasis edema with ulcer, right ([facility]); Peripheral artery disease ([facility]); Obesity (BMI 35.0-39.9 without comorbidity) ([facility]); Iron deficiency anemia due to chronic blood loss; COVID-19; Acute cystitis; Acute respiratory failure with hypoxia ([facility]); Benign prostatic hyperplasia with urinary hesitancy; Advanced care planning/counseling discussion; Elevated d-dimer; and Thrombus on their problem list.    Surgical History:  Past Surgical History:   Procedure Laterality Date   • FEMORAL ENDARTERECTOMY Left 9/20/2019    Procedure: ENDARTERECTOMY, FEMORAL;  Surgeon: Candelaria Crawford M.D.;  Location: [facility];  Service: General   • VENTRAL HERNIA REPAIR  11/30/2011    Performed by TROY GONZALEZ at SURGERY [facility]   • INGUINAL HERNIA REPAIR  11/30/2011    Performed by TROY GONZALEZ at [facility]   • CATARACT PHACO WITH IOL  11/23/2010    Performed by RM WILLIS at SURGERY [facility] DAY [facility]   • CATARACT PHACO WITH IOL  11/2/2010    Performed by RM WILLIS at SURGERY SAME DAY [facility]   • OTHER CARDIAC SURGERY  2005    STENTS x 4   • SINUS TREPHINE FRONTAL  1971   • SINUSOTOMIES  08/1963   • INGUINAL HERNIA  REPAIR  1962    WITH UNDESCENDED TESTICLE   • TONSILLECTOMY AND ADENOIDECTOMY  1957       Past Social Hx: Polo Pyle  reports that he quit smoking about 9 years ago. His smoking use included cigars. He quit after 30.00 years of use. He has never used smokeless tobacco. He reports that he does not drink alcohol and does not use drugs.     Past Family Hx:  Polo Pyle family history includes Cancer in his mother; Dementia in his mother; Heart Attack in his mother; Heart Disease in his mother.     Problem list, medications, and allergies reviewed by myself today in Epic.     Objective:   Pulse 64   SpO2 97%  (from [employer] home monitor)    Physical Exam:  Constitutional:       General: Awake.      Appearance: Not toxic-appearing.   HENT:      Nose: No signs of injury.      Mouth/Throat: Voice clear.     Lips: Pink.   Eyes:      General: Lids are normal.      Conjunctiva/sclera: Conjunctivae normal.   Neck:      Trachea: Phonation normal.   Pulmonary:      Effort: Pulmonary effort is normal. Able to speak on complete sentences   Walk test performed without oxygen for 5 minutes. Patient reported O2 saturation was 90% and pulse was 86  Skin:     Coloration: Skin is not cyanotic or pale.   Neurological:      Mental Status: Alert and oriented to person, place, and time.   Psychiatric:         Mood and Affect: Mood and affect normal.         Speech: Speech normal.         Behavior: Behavior is cooperative.       Assessment and Plan:   The following treatment plan was discussed:     1. Pneumonia due to COVID-19 virus    2. Supplemental oxygen dependent    3. Thrombus      CSI:0  Day of Symptoms:  14  Decadron: Discontinue   Anticoagulation: continue with prescribed Xarelto D/T personal hx chronic thrombosis post endarterectomy  O2 Day: 0   O2 Night: 0  Next appointment: Visit date not found - Patient discharged  Time Spent: 15  Additional comments:  Patient discharged from [facility] program. Advised to make FU  appointment with PCP for evaluation for ongoing anticoagulation. Advised patient he may stop taking his dexamethasone but should continue taking his Xarelto. Advised that we will no longer be calling every 4 hours to monitor him and provided Strict ER precautions if his symptoms worsen again. Advised patient that the DME company will be along to collect his monitoring equipment and concentrator within the next 2 weeks.    Supportive care and indications for immediate follow-up discussed with patient.    Pathogenesis of diagnosis discussed including typical length and natural progression. Patient expresses understanding and agrees to plan.              Please note that this dictation was created using voice recognition software. I have made every reasonable attempt to correct obvious errors, but I expect that there are errors of grammar and possibly content that I did not discover before finalizing the note.    Note electronically signed by SALUD Jara

## 2021-02-25 NOTE — ED PROVIDER NOTES
Addendum: This 70-year-old male who is initially evaluated by Dr. Crawford, and has asked to review the results of some of his diagnostic work-up.  Apparently had forgotten to dictate a note or it was lost, so this is dictated on this date of 2/24.  Patient was pending results of urinalysis which was negative.  This was expected as the culture from the initial visit was showing sensitivity to Cipro which he is currently on.  In addition the patient feel just generally worse with some fatigue.  Covid test was pending which proves to be positive.  Patient did have a questionable drop of O2 saturations down to the 80s but repeat evaluation several times revealed pulse ox remained stable in the 90s.  I discussed findings with the patient and he is elected to go home.  I do not think he needs to be hospitalized for urinary tract infection or COVID-19 will be given instructions to return if increasing shortness of breath or otherwise worsening or for recurrence of urinary tract and symptoms.    Final impression: UTI, resolving                                COVID-19 infection

## 2021-03-03 DIAGNOSIS — Z23 NEED FOR VACCINATION: ICD-10-CM

## 2021-03-09 DIAGNOSIS — J96.01 ACUTE RESPIRATORY FAILURE WITH HYPOXIA (HCC): ICD-10-CM

## 2021-03-22 ENCOUNTER — OFFICE VISIT (OUTPATIENT)
Dept: MEDICAL GROUP | Facility: MEDICAL CENTER | Age: 71
End: 2021-03-22
Payer: MEDICARE

## 2021-03-22 VITALS
HEIGHT: 69 IN | TEMPERATURE: 97.6 F | WEIGHT: 252 LBS | DIASTOLIC BLOOD PRESSURE: 76 MMHG | HEART RATE: 92 BPM | SYSTOLIC BLOOD PRESSURE: 124 MMHG | OXYGEN SATURATION: 92 % | BODY MASS INDEX: 37.33 KG/M2 | RESPIRATION RATE: 16 BRPM

## 2021-03-22 DIAGNOSIS — I73.9 PERIPHERAL ARTERY DISEASE (HCC): Chronic | ICD-10-CM

## 2021-03-22 DIAGNOSIS — J96.01 ACUTE RESPIRATORY FAILURE WITH HYPOXIA (HCC): ICD-10-CM

## 2021-03-22 DIAGNOSIS — I70.213 ATHEROSCLER OF NATIVE ARTERY OF BOTH LEGS WITH INTERMIT CLAUDICATION (HCC): ICD-10-CM

## 2021-03-22 DIAGNOSIS — I71.40 ABDOMINAL AORTIC ANEURYSM WITHOUT RUPTURE (HCC): ICD-10-CM

## 2021-03-22 DIAGNOSIS — E66.9 OBESITY (BMI 35.0-39.9 WITHOUT COMORBIDITY): ICD-10-CM

## 2021-03-22 DIAGNOSIS — I10 ESSENTIAL HYPERTENSION: Chronic | ICD-10-CM

## 2021-03-22 DIAGNOSIS — K21.9 GASTROESOPHAGEAL REFLUX DISEASE WITHOUT ESOPHAGITIS: ICD-10-CM

## 2021-03-22 PROBLEM — I87.311 STASIS EDEMA WITH ULCER, RIGHT (HCC): Status: RESOLVED | Noted: 2019-05-20 | Resolved: 2021-03-22

## 2021-03-22 PROBLEM — L97.919 STASIS EDEMA WITH ULCER, RIGHT (HCC): Status: RESOLVED | Noted: 2019-05-20 | Resolved: 2021-03-22

## 2021-03-22 PROCEDURE — 99214 OFFICE O/P EST MOD 30 MIN: CPT | Performed by: NURSE PRACTITIONER

## 2021-03-22 RX ORDER — OMEPRAZOLE 40 MG/1
40 CAPSULE, DELAYED RELEASE ORAL DAILY
Qty: 21 CAPSULE | Refills: 0 | Status: SHIPPED
Start: 2021-03-22 | End: 2021-06-11

## 2021-03-22 ASSESSMENT — FIBROSIS 4 INDEX: FIB4 SCORE: 4.42

## 2021-03-22 ASSESSMENT — PATIENT HEALTH QUESTIONNAIRE - PHQ9: CLINICAL INTERPRETATION OF PHQ2 SCORE: 0

## 2021-03-22 ASSESSMENT — ENCOUNTER SYMPTOMS
SHORTNESS OF BREATH: 1
COUGH: 1
HEARTBURN: 1

## 2021-03-22 NOTE — LETTER
March 22, 2021       Patient: Polo Pyle   YOB: 1950   Date of Visit: 3/22/2021         To Whom It May Concern:    In my medical opinion, I recommend that Polo Pyle return to full duty, no restrictions as of 3/30/21.    If you have any questions or concerns, please don't hesitate to call [phone]          Sincerely,          NIDA Lennon.P.ABBY.  Electronically Signed

## 2021-03-22 NOTE — PROGRESS NOTES
"Subjective:      Polo Pyle is a 70 y.o. male who presents with Medical Clearance        CC: Patient is here today requesting a letter for work because of acute respiratory failure with hypoxia secondary to recent Covid 19.  He also is having recent issues with his throat.    HPI       1. Acute respiratory failure with hypoxia (HCC)  Patient was diagnosed with COVID-19 a month ago and was being followed by the COVID-19 follow-up clinic.  He was on oxygen at 1 L and was starting to improve up until last week when he states he got \"food poisoning\".  He states with the vomiting it made his breathing more difficult and therefore requested and received an extension of his oxygen.  He feels he is improving but needs a letter before he can return to work next week.    2. Gastroesophageal reflux disease without esophagitis  Patient states a week ago he developed about 2 days of diarrhea and 1 day of frequent vomiting.  This went away and he is feeling well after that except he feels sometimes like there is irritation in his throat.  He does have acid reflux for which he takes omeprazole 20 mg and is on chronic anticoagulation with an aspirin.  He finished his Xarelto prescribed at the hospital last month.    3. Peripheral artery disease (HCC)  Patient just recently seen by his vascular clinic and they are doing more imaging on him and he was placed on baby aspirin.    4. Essential hypertension  Blood pressure appears controlled without current medication    5. 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 intermit claudication (HCC)  Patient followed by vascular surgery and notes are scanned into his chart.  He is taking his statin and aspirin regularly.    6. Abdominal aortic aneurysm without rupture (HCC)  Patient apparently has an aneurysm but not large enough to require surgery.    7. Obesity (BMI 35.0-39.9 without comorbidity) (HCC)  Patient in the V obesity range  Past Medical History:   Diagnosis Date   • " ASTHMA     as a youth   • CAD (coronary artery disease) 2011   • CATARACT     bilateral IOL   • Colorblind     problems with red/green   • Dental disorder     upper and partial lower   • Essential hypertension    • Heart burn    • High cholesterol    • Hyperlipidemia 2011   • Indigestion    • Infectious disease     Hx of staph infections- last infection -    • Myocardial infarct (HCC) 2005    STENTS   • Pain 11    NECK 7.5/10; C5-C6   • Personal history of venous thrombosis and embolism     DVT 2019   • Pneumonia    • Presence of stent in left circumflex coronary artery    • Unspecified hemorrhagic conditions     TAKES ASA . PROLONGED BLEEDING, nosebleeds   • Venous insufficiency of both lower extremities    • White coat syndrome with diagnosis of hypertension      Social History     Socioeconomic History   • Marital status:      Spouse name: Not on file   • Number of children: Not on file   • Years of education: Not on file   • Highest education level: Not on file   Occupational History   • Not on file   Tobacco Use   • Smoking status: Former Smoker     Years: 30.00     Types: Cigars     Quit date: 2011     Years since quittin.3   • Smokeless tobacco: Never Used   • Tobacco comment:    Substance and Sexual Activity   • Alcohol use: No   • Drug use: No   • Sexual activity: Not Currently     Partners: Female   Other Topics Concern   • Not on file   Social History Narrative   • Not on file     Social Determinants of Health     Financial Resource Strain:    • Difficulty of Paying Living Expenses:    Food Insecurity:    • Worried About Running Out of Food in the Last Year:    • Ran Out of Food in the Last Year:    Transportation Needs:    • Lack of Transportation (Medical):    • Lack of Transportation (Non-Medical):    Physical Activity:    • Days of Exercise per Week:    • Minutes of Exercise per Session:    Stress:    • Feeling of Stress :    Social Connections:    •  Frequency of Communication with Friends and Family:    • Frequency of Social Gatherings with Friends and Family:    • Attends Gnosticism Services:    • Active Member of Clubs or Organizations:    • Attends Club or Organization Meetings:    • Marital Status:    Intimate Partner Violence:    • Fear of Current or Ex-Partner:    • Emotionally Abused:    • Physically Abused:    • Sexually Abused:      Current Outpatient Medications   Medication Sig Dispense Refill   • omeprazole (PRILOSEC) 40 MG delayed-release capsule Take 1 capsule by mouth every day. 21 capsule 0   • Misc. Devices Misc Patient needs home oxygen extended for another 30 days due to hypoxia 1 Each 11   • finasteride (PROSCAR) 5 MG Tab Take 1 tablet by mouth every day. 90 tablet 0   • omeprazole (PRILOSEC) 20 MG delayed-release capsule Take 1 capsule by mouth every day. 90 capsule 0   • VITAMIN D PO Take 1 capsule by mouth every day.     • CVS OMEGA-3 KRILL OIL PO Take 1 capsule by mouth every day.     • niacin (NIASPAN) 1000 MG CR tablet TAKE TWO TABLETS BY MOUTH DAILY 180 Tab 1   • ferrous sulfate 325 (65 Fe) MG tablet Take 325 mg by mouth every day.     • rosuvastatin (CRESTOR) 20 MG Tab Take 1 Tab by mouth every evening. 90 Tab 3   • Coenzyme Q10 (CO Q-10) 300 MG Cap Take 1 Cap by mouth every day.     • Ascorbic Acid (VITAMIN C PO) Take 6,000 mg by mouth every day.     • Multiple Vitamin (MULTIVITAMIN PO) Take 1 Tab by mouth every day.     • aspirin 81 MG tablet Take 81 mg by mouth every day.       No current facility-administered medications for this visit.     Family History   Problem Relation Age of Onset   • Heart Attack Mother    • Heart Disease Mother    • Cancer Mother         LYKIMA    • Dementia Mother          Review of Systems   Respiratory: Positive for cough and shortness of breath.    Gastrointestinal: Positive for heartburn.   All other systems reviewed and are negative.         Objective:     /76 (BP Location: Left arm, Patient  "Position: Sitting, BP Cuff Size: Adult)   Pulse 92   Temp 36.4 °C (97.6 °F) (Temporal)   Resp 16   Ht 1.753 m (5' 9\")   Wt 114 kg (252 lb)   SpO2 92%   BMI 37.21 kg/m²      Physical Exam  Vitals and nursing note reviewed.   Constitutional:       General: He is not in acute distress.     Appearance: He is well-developed. He is not diaphoretic.   HENT:      Head: Normocephalic and atraumatic.      Right Ear: External ear normal.      Left Ear: External ear normal.      Nose: Nose normal.   Eyes:      General:         Right eye: No discharge.         Left eye: No discharge.      Conjunctiva/sclera: Conjunctivae normal.   Neck:      Thyroid: No thyromegaly.      Trachea: No tracheal deviation.   Cardiovascular:      Rate and Rhythm: Normal rate and regular rhythm.      Heart sounds: Normal heart sounds. No murmur.   Pulmonary:      Effort: Pulmonary effort is normal. No respiratory distress.      Breath sounds: Normal breath sounds. No wheezing or rales.   Musculoskeletal:      Cervical back: Normal range of motion and neck supple.   Lymphadenopathy:      Cervical: No cervical adenopathy.   Skin:     General: Skin is warm and dry.      Findings: No erythema or rash.   Neurological:      Mental Status: He is alert and oriented to person, place, and time.      Coordination: Coordination normal.   Psychiatric:         Behavior: Behavior normal.         Thought Content: Thought content normal.         Judgment: Judgment normal.                 Assessment/Plan:        1. Acute respiratory failure with hypoxia (HCC)  Patient is improving and he has had extension of his oxygen for another 3 weeks.  He will contact me at that time to have his oxygen picked up if needed.  He was almost totally improved until his recent vomiting issue last week but he feels he will be back to normal by next week and a letter was given for him today since his lungs are clear and his vital signs are normal    2. Gastroesophageal reflux " disease without esophagitis  Patient appears to have some esophageal irritation possibly from the episode of vomiting he had.  I will increase his omeprazole to 40 mg temporarily but if he does not improve he will need an EGD.  - omeprazole (PRILOSEC) 40 MG delayed-release capsule; Take 1 capsule by mouth every day.  Dispense: 21 capsule; Refill: 0    3. Peripheral artery disease (HCC)  Patient following with the vascular surgery clinic for this.    4. Essential hypertension  Blood pressure controlled without medication    5. 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 intermit claudication ([facility])  Patient states he feels unchanged but is scheduled for multiple imaging studies    6. Abdominal aortic aneurysm without rupture ([facility])  This is being monitored by the vascular surgery group.    7. Obesity (BMI 35.0-39.9 without comorbidity) ([facility])    - Patient identified as having weight management issue.  Appropriate orders and counseling given.

## 2021-04-29 ENCOUNTER — PATIENT MESSAGE (OUTPATIENT)
Dept: CARDIOLOGY | Facility: MEDICAL CENTER | Age: 71
End: 2021-04-29

## 2021-05-01 ENCOUNTER — PATIENT MESSAGE (OUTPATIENT)
Dept: CARDIOLOGY | Facility: MEDICAL CENTER | Age: 71
End: 2021-05-01

## 2021-05-01 DIAGNOSIS — E78.5 DYSLIPIDEMIA: ICD-10-CM

## 2021-05-28 ENCOUNTER — TELEPHONE (OUTPATIENT)
Dept: CARDIOLOGY | Facility: MEDICAL CENTER | Age: 71
End: 2021-05-28

## 2021-05-28 NOTE — TELEPHONE ENCOUNTER
Called patient and left  on 5/28/2021 for next upcoming appointment on June 11, 2021 at 9:45AM and remind him that he needs to do his labs before his upcoming appointment.

## 2021-06-04 ENCOUNTER — HOSPITAL ENCOUNTER (OUTPATIENT)
Dept: LAB | Facility: MEDICAL CENTER | Age: 71
End: 2021-06-04
Attending: INTERNAL MEDICINE
Payer: MEDICARE

## 2021-06-04 DIAGNOSIS — E78.5 DYSLIPIDEMIA: ICD-10-CM

## 2021-06-04 LAB
CHOLEST SERPL-MCNC: 154 MG/DL (ref 100–199)
FASTING STATUS PATIENT QL REPORTED: NORMAL
HDLC SERPL-MCNC: 66 MG/DL
LDLC SERPL CALC-MCNC: 70 MG/DL
TRIGL SERPL-MCNC: 91 MG/DL (ref 0–149)

## 2021-06-04 PROCEDURE — 80061 LIPID PANEL: CPT

## 2021-06-04 PROCEDURE — 36415 COLL VENOUS BLD VENIPUNCTURE: CPT

## 2021-06-11 ENCOUNTER — OFFICE VISIT (OUTPATIENT)
Dept: CARDIOLOGY | Facility: MEDICAL CENTER | Age: 71
End: 2021-06-11
Payer: MEDICARE

## 2021-06-11 VITALS
HEART RATE: 86 BPM | WEIGHT: 255.4 LBS | HEIGHT: 69 IN | BODY MASS INDEX: 37.83 KG/M2 | DIASTOLIC BLOOD PRESSURE: 86 MMHG | RESPIRATION RATE: 18 BRPM | OXYGEN SATURATION: 98 % | SYSTOLIC BLOOD PRESSURE: 182 MMHG

## 2021-06-11 DIAGNOSIS — I10 ESSENTIAL HYPERTENSION: Chronic | ICD-10-CM

## 2021-06-11 DIAGNOSIS — I25.83 CORONARY ARTERY DISEASE DUE TO LIPID RICH PLAQUE: ICD-10-CM

## 2021-06-11 DIAGNOSIS — E78.5 DYSLIPIDEMIA: ICD-10-CM

## 2021-06-11 DIAGNOSIS — R03.0 WHITE COAT SYNDROME WITHOUT HYPERTENSION: Chronic | ICD-10-CM

## 2021-06-11 DIAGNOSIS — Z95.5 PRESENCE OF STENT IN LEFT CIRCUMFLEX CORONARY ARTERY: Chronic | ICD-10-CM

## 2021-06-11 DIAGNOSIS — Z95.5 PRESENCE OF STENT IN LAD CORONARY ARTERY: ICD-10-CM

## 2021-06-11 DIAGNOSIS — I25.10 CORONARY ARTERY DISEASE DUE TO LIPID RICH PLAQUE: ICD-10-CM

## 2021-06-11 DIAGNOSIS — I71.40 ABDOMINAL AORTIC ANEURYSM WITHOUT RUPTURE (HCC): ICD-10-CM

## 2021-06-11 PROCEDURE — 99214 OFFICE O/P EST MOD 30 MIN: CPT | Performed by: INTERNAL MEDICINE

## 2021-06-11 RX ORDER — CHLORAL HYDRATE 500 MG
1000 CAPSULE ORAL 2 TIMES DAILY
COMMUNITY
End: 2023-09-18

## 2021-06-11 ASSESSMENT — ENCOUNTER SYMPTOMS
BRUISES/BLEEDS EASILY: 0
SORE THROAT: 0
PALPITATIONS: 0
SHORTNESS OF BREATH: 0
PND: 0
COUGH: 0
ABDOMINAL PAIN: 0
FALLS: 0
DIZZINESS: 0
CLAUDICATION: 0
BLURRED VISION: 0
WEAKNESS: 0
CHILLS: 0
FOCAL WEAKNESS: 0
NAUSEA: 0
FEVER: 0

## 2021-06-11 ASSESSMENT — FIBROSIS 4 INDEX: FIB4 SCORE: 4.48

## 2021-06-11 NOTE — PROGRESS NOTES
Chief Complaint   Patient presents with   • Peripheral Vascular Disease (PVD)     Dx: Peripheral artery disease (HCC)   • Dyslipidemia     Dx: Dyslipidemia   • Coronary Artery Disease     Dx: Presence of stent in left circumflex coronary artery       Subjective:   Polo Pyle is a 71 y.o. male who presents today for follow-up of his history of coronary disease status post remote stenting    Is been doing well he did have Covid back in February and then food poisoning    Past Medical History:   Diagnosis Date   • ASTHMA     as a youth   • CAD (coronary artery disease) 8/26/2011   • CATARACT     bilateral IOL   • Colorblind     problems with red/green   • Dental disorder     upper and partial lower   • Essential hypertension    • Heart burn    • High cholesterol    • Hyperlipidemia 8/26/2011   • Indigestion    • Infectious disease     Hx of staph infections- last infection 2018-    • Myocardial infarct (HCC) 2005    STENTS   • Pain 11/14/11    NECK 7.5/10; C5-C6   • Personal history of venous thrombosis and embolism 2006    DVT 01/2019   • Pneumonia 2009   • Presence of stent in left circumflex coronary artery    • Unspecified hemorrhagic conditions     TAKES ASA . PROLONGED BLEEDING, nosebleeds   • Venous insufficiency of both lower extremities    • White coat syndrome with diagnosis of hypertension      Past Surgical History:   Procedure Laterality Date   • FEMORAL ENDARTERECTOMY Left 9/20/2019    Procedure: ENDARTERECTOMY, FEMORAL;  Surgeon: Candelaria Crawford M.D.;  Location: SURGERY [facility];  Service: General   • VENTRAL HERNIA REPAIR  11/30/2011    Performed by TROY GONZALEZ at SURGERY [facility]   • INGUINAL HERNIA REPAIR  11/30/2011    Performed by TROY GONZALEZ at SURGERY [facility]   • CATARACT PHACO WITH IOL  11/23/2010    Performed by RM WILLIS at SURGERY SAME DAY [facility]   • CATARACT PHACO WITH IOL  11/2/2010    Performed by RM WILLIS at SURGERY SAME DAY  [facility]   • OTHER CARDIAC SURGERY  2005    STENTS x 4   • SINUS TREPHINE FRONTAL  1971   • SINUSOTOMIES  1963   • INGUINAL HERNIA REPAIR      WITH UNDESCENDED TESTICLE   • TONSILLECTOMY AND ADENOIDECTOMY       Family History   Problem Relation Age of Onset   • Heart Attack Mother    • Heart Disease Mother    • Cancer Mother         LYKIMA    • Dementia Mother      Social History     Socioeconomic History   • Marital status:      Spouse name: Not on file   • Number of children: Not on file   • Years of education: Not on file   • Highest education level: Not on file   Occupational History   • Not on file   Tobacco Use   • Smoking status: Former Smoker     Years: 30.00     Types: Cigars     Quit date: 2011     Years since quittin.6   • Smokeless tobacco: Never Used   • Tobacco comment:    Vaping Use   • Vaping Use: Never used   Substance and Sexual Activity   • Alcohol use: No   • Drug use: No   • Sexual activity: Not Currently     Partners: Female   Other Topics Concern   • Not on file   Social History Narrative   • Not on file     Social Determinants of Health     Financial Resource Strain:    • Difficulty of Paying Living Expenses:    Food Insecurity:    • Worried About Running Out of Food in the Last Year:    • Ran Out of Food in the Last Year:    Transportation Needs:    • Lack of Transportation (Medical):    • Lack of Transportation (Non-Medical):    Physical Activity:    • Days of Exercise per Week:    • Minutes of Exercise per Session:    Stress:    • Feeling of Stress :    Social Connections:    • Frequency of Communication with Friends and Family:    • Frequency of Social Gatherings with Friends and Family:    • Attends [employer] Services:    • Active Member of Clubs or Organizations:    • Attends Club or Organization Meetings:    • Marital Status:    Intimate Partner Violence:    • Fear of Current or Ex-Partner:    • Emotionally Abused:    • Physically Abused:    •  "Sexually Abused:      Allergies   Allergen Reactions   • Mercury Swelling     And mercury based preservatives-Thimerasol  Swelling, \"strange discharge from eyes\"   • Other Environmental Anaphylaxis     Insect toxins/ bees and wasps- Anaphylaxis   • Sulfa Drugs Anaphylaxis and Swelling   • Pravastatin Diarrhea     Diarrhea      Outpatient Encounter Medications as of 6/11/2021   Medication Sig Dispense Refill   • Misc. Devices Misc Patient needs home oxygen extended for another 30 days due to hypoxia 1 Each 11   • VITAMIN D PO Take 1 capsule by mouth every day.     • [employer] OMEGA-3 KRILL OIL PO Take 1 capsule by mouth every day.     • niacin (NIASPAN) 1000 MG CR tablet TAKE TWO TABLETS BY MOUTH DAILY 180 Tab 1   • rosuvastatin (CRESTOR) 20 MG Tab Take 1 Tab by mouth every evening. 90 Tab 3   • Coenzyme Q10 (CO Q-10) 300 MG Cap Take 1 Cap by mouth every day.     • Ascorbic Acid (VITAMIN C PO) Take 6,000 mg by mouth every day.     • Multiple Vitamin (MULTIVITAMIN PO) Take 1 Tab by mouth every day.     • aspirin 81 MG tablet Take 81 mg by mouth every day.     • [DISCONTINUED] omeprazole (PRILOSEC) 40 MG delayed-release capsule Take 1 capsule by mouth every day. (Patient not taking: Reported on 6/11/2021) 21 capsule 0   • [DISCONTINUED] finasteride (PROSCAR) 5 MG Tab Take 1 tablet by mouth every day. (Patient not taking: Reported on 6/11/2021) 90 tablet 0   • [DISCONTINUED] omeprazole (PRILOSEC) 20 MG delayed-release capsule Take 1 capsule by mouth every day. (Patient not taking: Reported on 6/11/2021) 90 capsule 0   • [DISCONTINUED] ferrous sulfate 325 (65 Fe) MG tablet Take 325 mg by mouth every day.       No facility-administered encounter medications on file as of 6/11/2021.     Review of Systems   Constitutional: Negative for chills and fever.   HENT: Negative for sore throat.    Eyes: Negative for blurred vision.   Respiratory: Negative for cough and shortness of breath.    Cardiovascular: Negative for chest pain, " "palpitations, claudication, leg swelling and PND.   Gastrointestinal: Negative for abdominal pain and nausea.   Musculoskeletal: Negative for falls and joint pain.   Skin: Negative for rash.   Neurological: Negative for dizziness, focal weakness and weakness.   Endo/Heme/Allergies: Does not bruise/bleed easily.        Objective:   BP (!) 182/86 (BP Location: Left arm, Patient Position: Sitting, BP Cuff Size: Adult)   Pulse 86   Resp 18   Ht 1.753 m (5' 9\")   Wt 116 kg (255 lb 6.4 oz)   SpO2 98%   BMI 37.72 kg/m²     Physical Exam   Constitutional: No distress.   Eyes: No scleral icterus.   Neck: No JVD present.   Cardiovascular: Normal rate and normal heart sounds. Exam reveals no gallop and no friction rub.   No murmur heard.  Pulmonary/Chest: No respiratory distress. He has no wheezes. He has no rales.   Abdominal: Soft. Bowel sounds are normal.   Neurological: He is alert.   Skin: No rash noted. He is not diaphoretic.     We reviewed in person the most recent labs  Recent Results (from the past 5040 hour(s))   POCT Urinalysis    Collection Time: 02/05/21  1:36 PM   Result Value Ref Range    POC Color Hiral Negative    POC Appearance Cloudy Negative    POC Leukocyte Esterase Trace Negative    POC Nitrites Positive Negative    POC Urobiligen 0.2 Negative (0.2) mg/dL    POC Protein 30 Negative mg/dL    POC Urine PH 5.5 5.0 - 8.0    POC Blood Trace-intact Negative    POC Specific Gravity 1.030 <1.005 - >1.030    POC Ketones Negatie Negative mg/dL    POC Bilirubin Negative Negative mg/dL    POC Glucose Negative Negative mg/dL   URINE CULTURE(NEW)    Collection Time: 02/05/21  1:54 PM    Specimen: Urine   Result Value Ref Range    Significant Indicator POS (POS)     Source UR     Site -     Culture Result - (A)     Culture Result Klebsiella pneumoniae  >100,000 cfu/mL   (A)        Susceptibility    Klebsiella pneumoniae - ANTONINA     Ampicillin >16 Resistant mcg/mL     Ceftriaxone <=1 Sensitive mcg/mL     Ceftazidime " <=1 Sensitive mcg/mL     Cefotaxime <=2 Sensitive mcg/mL     Cefazolin <=2 Sensitive mcg/mL     Ciprofloxacin <=0.25 Sensitive mcg/mL     Cefepime <=2 Sensitive mcg/mL     Cefuroxime <=4 Sensitive mcg/mL     Ampicillin/sulbactam <=4/2 Sensitive mcg/mL     Tobramycin <=2 Sensitive mcg/mL     Nitrofurantoin <=32 Sensitive mcg/mL     Gentamicin <=2 Sensitive mcg/mL     Levofloxacin <=0.5 Sensitive mcg/mL     Pip/Tazobactam <=8 Sensitive mcg/mL     Trimeth/Sulfa <=0.5/9.5 Sensitive mcg/mL     Tigecycline <=2 Sensitive mcg/mL   EKG    Collection Time: 21 11:54 AM   Result Value Ref Range    Report       [facility] Emergency Dept.    Test Date:  2021  Pt Name:    LYNN MORGAN             Department: [facility]  MRN:        [id number]                      [facility]  Gender:     Male                         Technician: ALICIA  :        1950                   Requested By:ER TRIAGE PROTOCOL  Order #:    [id number]                    Reading MD: LITZY RICHARD MD    Measurements  Intervals                                Axis  Rate:       80                           P:          22  DE:         145                          QRS:        10  QRSD:       96                           T:          78  QT:         378  QTc:        436    Interpretive Statements  Sinus rhythm  Abnormal R-wave progression, early transition  Compared to ECG 2019 14:25:54  qwaves in 1 aVL persist  Electronically Signed On 2021 13:34:17 PST by LITZY RICHARD MD     URINALYSIS,CULTURE IF INDICATED    Collection Time: 21 12:15 PM    Specimen: Urine   Result Value Ref Range    Color Yellow     Character Clear     Specific Gravity >=1.030 <1.035    Ph 5.5 5.0 - 8.0    Glucose Negative Negative mg/dL    Ketones Trace (A) Negative mg/dL    Protein 100 (A) Negative mg/dL    Bilirubin Small (A) Negative    Nitrite Positive (A) Negative    Leukocyte Esterase Negative Negative    Occult Blood  Negative Negative    Micro Urine Req Microscopic    URINE MICROSCOPIC (W/UA)    Collection Time: 02/08/21 12:15 PM   Result Value Ref Range    WBC 5-10 (A) /hpf    RBC 2-5 (A) /hpf    Bacteria Few (A) None /hpf    Epithelial Cells Moderate (A) Few /hpf    Trans Epithelial Cells Few /hpf    Mucous Threads Many /hpf    Hyaline Cast 6-10 (A) /lpf    Granular Casts 0-2 /lpf   CBC WITH DIFFERENTIAL    Collection Time: 02/08/21 12:17 PM   Result Value Ref Range    WBC 3.7 (L) 4.8 - 10.8 K/uL    RBC 5.10 4.70 - 6.10 M/uL    Hemoglobin 15.9 14.0 - 18.0 g/dL    Hematocrit 46.7 42.0 - 52.0 %    MCV 91.6 81.4 - 97.8 fL    MCH 31.2 27.0 - 33.0 pg    MCHC 34.0 33.7 - 35.3 g/dL    RDW 44.2 35.9 - 50.0 fL    Platelet Count 108 (L) 164 - 446 K/uL    MPV 10.2 9.0 - 12.9 fL    Neutrophils-Polys 68.00 44.00 - 72.00 %    Lymphocytes 20.00 (L) 22.00 - 41.00 %    Monocytes 8.00 0.00 - 13.40 %    Eosinophils 0.00 0.00 - 6.90 %    Basophils 0.00 0.00 - 1.80 %    Nucleated RBC 0.00 /100 WBC    Neutrophils (Absolute) 2.66 1.82 - 7.42 K/uL    Lymphs (Absolute) 0.74 (L) 1.00 - 4.80 K/uL    Monos (Absolute) 0.30 0.00 - 0.85 K/uL    Eos (Absolute) 0.00 0.00 - 0.51 K/uL    Baso (Absolute) 0.00 0.00 - 0.12 K/uL    NRBC (Absolute) 0.00 K/uL   COMP METABOLIC PANEL    Collection Time: 02/08/21 12:17 PM   Result Value Ref Range    Sodium 134 (L) 135 - 145 mmol/L    Potassium 3.8 3.6 - 5.5 mmol/L    Chloride 99 96 - 112 mmol/L    Co2 23 20 - 33 mmol/L    Anion Gap 12.0 7.0 - 16.0    Glucose 107 (H) 65 - 99 mg/dL    Bun 21 8 - 22 mg/dL    Creatinine 1.17 0.50 - 1.40 mg/dL    Calcium 8.3 (L) 8.4 - 10.2 mg/dL    AST(SGOT) 71 (H) 12 - 45 U/L    ALT(SGPT) 37 2 - 50 U/L    Alkaline Phosphatase 64 30 - 99 U/L    Total Bilirubin 1.0 0.1 - 1.5 mg/dL    Albumin 3.7 3.2 - 4.9 g/dL    Total Protein 6.8 6.0 - 8.2 g/dL    Globulin 3.1 1.9 - 3.5 g/dL    A-G Ratio 1.2 g/dL   LIPASE    Collection Time: 02/08/21 12:17 PM   Result Value Ref Range    Lipase 150 (H) 7 - 58  U/L   ESTIMATED GFR    Collection Time: 02/08/21 12:17 PM   Result Value Ref Range    GFR If African American >60 >60 mL/min/1.73 m 2    GFR If Non African American >60 >60 mL/min/1.73 m 2   DIFFERENTIAL MANUAL    Collection Time: 02/08/21 12:17 PM   Result Value Ref Range    Bands-Stabs 4.00 0.00 - 10.00 %    Manual Diff Status PERFORMED    PLATELET ESTIMATE    Collection Time: 02/08/21 12:17 PM   Result Value Ref Range    Plt Estimation Decreased    MORPHOLOGY    Collection Time: 02/08/21 12:17 PM   Result Value Ref Range    RBC Morphology Normal    COV-2, FLU A/B, AND RSV BY PCR (2-4 HOURS CEPHEID): Collect NP swab in VTM    Collection Time: 02/08/21  1:45 PM    Specimen: Respirate   Result Value Ref Range    Influenza virus A RNA Negative Negative    Influenza virus B, PCR Negative Negative    RSV, PCR Negative Negative    SARS-CoV-2 by PCR DETECTED (AA)     SARS-CoV-2 Source NP Swab    CBC WITH DIFFERENTIAL    Collection Time: 02/10/21  9:45 AM   Result Value Ref Range    WBC 4.8 4.8 - 10.8 K/uL    RBC 4.88 4.70 - 6.10 M/uL    Hemoglobin 15.5 14.0 - 18.0 g/dL    Hematocrit 45.3 42.0 - 52.0 %    MCV 92.8 81.4 - 97.8 fL    MCH 31.8 27.0 - 33.0 pg    MCHC 34.2 33.7 - 35.3 g/dL    RDW 44.7 35.9 - 50.0 fL    Platelet Count 131 (L) 164 - 446 K/uL    MPV 10.5 9.0 - 12.9 fL    Neutrophils-Polys 78.30 (H) 44.00 - 72.00 %    Lymphocytes 9.60 (L) 22.00 - 41.00 %    Monocytes 8.70 0.00 - 13.40 %    Eosinophils 0.00 0.00 - 6.90 %    Basophils 0.00 0.00 - 1.80 %    Nucleated RBC 0.00 /100 WBC    Neutrophils (Absolute) 3.88 1.82 - 7.42 K/uL    Lymphs (Absolute) 0.46 (L) 1.00 - 4.80 K/uL    Monos (Absolute) 0.42 0.00 - 0.85 K/uL    Eos (Absolute) 0.00 0.00 - 0.51 K/uL    Baso (Absolute) 0.00 0.00 - 0.12 K/uL    NRBC (Absolute) 0.00 K/uL   COMP METABOLIC PANEL    Collection Time: 02/10/21  9:45 AM   Result Value Ref Range    Sodium 135 135 - 145 mmol/L    Potassium 3.9 3.6 - 5.5 mmol/L    Chloride 100 96 - 112 mmol/L    Co2 21  20 - 33 mmol/L    Anion Gap 14.0 7.0 - 16.0    Glucose 109 (H) 65 - 99 mg/dL    Bun 21 8 - 22 mg/dL    Creatinine 1.05 0.50 - 1.40 mg/dL    Calcium 8.2 (L) 8.5 - 10.5 mg/dL    AST(SGOT) 92 (H) 12 - 45 U/L    ALT(SGPT) 50 2 - 50 U/L    Alkaline Phosphatase 60 30 - 99 U/L    Total Bilirubin 1.0 0.1 - 1.5 mg/dL    Albumin 3.4 3.2 - 4.9 g/dL    Total Protein 6.6 6.0 - 8.2 g/dL    Globulin 3.2 1.9 - 3.5 g/dL    A-G Ratio 1.1 g/dL   LACTIC ACID    Collection Time: 02/10/21  9:45 AM   Result Value Ref Range    Lactic Acid 2.0 0.5 - 2.0 mmol/L   TROPONIN    Collection Time: 02/10/21  9:45 AM   Result Value Ref Range    Troponin T 23 (H) 6 - 19 ng/L   BLOOD CULTURE    Collection Time: 02/10/21  9:45 AM    Specimen: Peripheral; Blood   Result Value Ref Range    Significant Indicator NEG     Source BLD     Site PERIPHERAL     Culture Result No growth after 5 days of incubation.    ESTIMATED GFR    Collection Time: 02/10/21  9:45 AM   Result Value Ref Range    GFR If African American >60 >60 mL/min/1.73 m 2    GFR If Non African American >60 >60 mL/min/1.73 m 2   PERIPHERAL SMEAR REVIEW    Collection Time: 02/10/21  9:45 AM   Result Value Ref Range    Peripheral Smear Review see below    PLATELET ESTIMATE    Collection Time: 02/10/21  9:45 AM   Result Value Ref Range    Plt Estimation Decreased    DIFFERENTIAL MANUAL    Collection Time: 02/10/21  9:45 AM   Result Value Ref Range    Bands-Stabs 2.60 0.00 - 10.00 %    Myelocytes 0.90 %    Manual Diff Status PERFORMED    D-Dimer    Collection Time: 02/10/21  9:45 AM   Result Value Ref Range    D-Dimer Screen 7.52 (H) 0.00 - 0.50 ug/mL (FEU)   CRP Quantitative (Non-Cardiac)    Collection Time: 02/10/21  9:45 AM   Result Value Ref Range    Stat C-Reactive Protein 1.99 (H) 0.00 - 0.75 mg/dL   Procalcitonin    Collection Time: 02/10/21  9:45 AM   Result Value Ref Range    Procalcitonin 0.17 <0.25 ng/mL   BLOOD CULTURE    Collection Time: 02/10/21 10:20 AM    Specimen: Peripheral;  Blood   Result Value Ref Range    Significant Indicator NEG     Source BLD     Site PERIPHERAL     Culture Result No growth after 5 days of incubation.    URINALYSIS,CULTURE IF INDICATED    Collection Time: 02/10/21  4:38 PM    Specimen: Urine   Result Value Ref Range    Color Red (A)     Character Sl Cloudy (A)     Specific Gravity 1.025 <1.035    Ph 5.0 5.0 - 8.0    Glucose 100 (A) Negative mg/dL    Ketones 15 (A) Negative mg/dL    Protein >=300 (A) Negative mg/dL    Bilirubin Negative Negative    Urobilinogen, Urine 2.0 Negative    Nitrite Positive (A) Negative    Leukocyte Esterase Moderate (A) Negative    Occult Blood Large (A) Negative    Micro Urine Req Microscopic    URINE MICROSCOPIC (W/UA)    Collection Time: 02/10/21  4:38 PM   Result Value Ref Range    WBC 0-2 (A) /hpf    RBC >150 (A) /hpf    Bacteria Rare (A) None /hpf    Epithelial Cells Negative /hpf    Hyaline Cast 0-2 /lpf   CBC WITH DIFFERENTIAL    Collection Time: 02/12/21 10:01 AM   Result Value Ref Range    WBC 6.5 4.8 - 10.8 K/uL    RBC 4.89 4.70 - 6.10 M/uL    Hemoglobin 15.4 14.0 - 18.0 g/dL    Hematocrit 45.6 42.0 - 52.0 %    MCV 93.3 81.4 - 97.8 fL    MCH 31.5 27.0 - 33.0 pg    MCHC 33.8 33.7 - 35.3 g/dL    RDW 45.0 35.9 - 50.0 fL    Platelet Count 149 (L) 164 - 446 K/uL    MPV 9.8 9.0 - 12.9 fL    Neutrophils-Polys 80.70 (H) 44.00 - 72.00 %    Lymphocytes 7.90 (L) 22.00 - 41.00 %    Monocytes 6.10 0.00 - 13.40 %    Eosinophils 0.00 0.00 - 6.90 %    Basophils 0.00 0.00 - 1.80 %    Nucleated RBC 0.00 /100 WBC    Neutrophils (Absolute) 5.59 1.82 - 7.42 K/uL    Lymphs (Absolute) 0.51 (L) 1.00 - 4.80 K/uL    Monos (Absolute) 0.40 0.00 - 0.85 K/uL    Eos (Absolute) 0.00 0.00 - 0.51 K/uL    Baso (Absolute) 0.00 0.00 - 0.12 K/uL    NRBC (Absolute) 0.00 K/uL   Comp Metabolic Panel    Collection Time: 02/12/21 10:01 AM   Result Value Ref Range    Sodium 141 135 - 145 mmol/L    Potassium 3.9 3.6 - 5.5 mmol/L    Chloride 105 96 - 112 mmol/L    Co2 24  20 - 33 mmol/L    Anion Gap 12.0 7.0 - 16.0    Glucose 164 (H) 65 - 99 mg/dL    Bun 23 (H) 8 - 22 mg/dL    Creatinine 0.77 0.50 - 1.40 mg/dL    Calcium 8.8 8.5 - 10.5 mg/dL    AST(SGOT) 58 (H) 12 - 45 U/L    ALT(SGPT) 37 2 - 50 U/L    Alkaline Phosphatase 59 30 - 99 U/L    Total Bilirubin 0.7 0.1 - 1.5 mg/dL    Albumin 3.2 3.2 - 4.9 g/dL    Total Protein 6.5 6.0 - 8.2 g/dL    Globulin 3.3 1.9 - 3.5 g/dL    A-G Ratio 1.0 g/dL   ESTIMATED GFR    Collection Time: 02/12/21 10:01 AM   Result Value Ref Range    GFR If African American >60 >60 mL/min/1.73 m 2    GFR If Non African American >60 >60 mL/min/1.73 m 2   DIFFERENTIAL MANUAL    Collection Time: 02/12/21 10:01 AM   Result Value Ref Range    Bands-Stabs 5.30 0.00 - 10.00 %    Manual Diff Status PERFORMED    PERIPHERAL SMEAR REVIEW    Collection Time: 02/12/21 10:01 AM   Result Value Ref Range    Peripheral Smear Review see below    PLATELET ESTIMATE    Collection Time: 02/12/21 10:01 AM   Result Value Ref Range    Plt Estimation Decreased    MORPHOLOGY    Collection Time: 02/12/21 10:01 AM   Result Value Ref Range    RBC Morphology Normal    Lipid Profile    Collection Time: 06/04/21 10:40 AM   Result Value Ref Range    Cholesterol,Tot 154 100 - 199 mg/dL    Triglycerides 91 0 - 149 mg/dL    HDL 66 >=40 mg/dL    LDL 70 <100 mg/dL   FASTING STATUS    Collection Time: 06/04/21 10:40 AM   Result Value Ref Range    Fasting Status Fasting        Assessment:     1. Abdominal aortic aneurysm without rupture (HCC)     2. Dyslipidemia     3. Essential hypertension     4. Presence of stent in LAD coronary artery     5. Presence of stent in left circumflex coronary artery     6. Coronary artery disease due to lipid rich plaque         Medical Decision Making:  Today's Assessment / Status / Plan:     It was my pleasure to meet with Mr. Pyle.    We addressed the management of hypertension at today's visit. Blood pressure is well controlled.  We specifically assessed the  labs on hypertension treatment    We addressed the management of dyslipidemia at today's visit. He is on appropriate statin.    I will see Mr. Pyle back in 2-3 year time and encouraged him to follow up with us over the phone or electronically using my MyChart as issues arise.    It is my pleasure to participate in the care of Mr. Pyle.  Please do not hesitate to contact me with questions or concerns.    Manuel Flores MD PhD [facility]  Cardiologist [facility]    Please note that this dictation was created using voice recognition software. There may be errors I did not discover before finalizing the note.     .

## 2021-07-06 ENCOUNTER — OFFICE VISIT (OUTPATIENT)
Dept: MEDICAL GROUP | Facility: MEDICAL CENTER | Age: 71
End: 2021-07-06
Payer: MEDICARE

## 2021-07-06 ENCOUNTER — HOSPITAL ENCOUNTER (OUTPATIENT)
Dept: RADIOLOGY | Facility: MEDICAL CENTER | Age: 71
End: 2021-07-06
Attending: FAMILY MEDICINE
Payer: MEDICARE

## 2021-07-06 VITALS
TEMPERATURE: 98.7 F | SYSTOLIC BLOOD PRESSURE: 138 MMHG | BODY MASS INDEX: 38.8 KG/M2 | DIASTOLIC BLOOD PRESSURE: 88 MMHG | WEIGHT: 262 LBS | RESPIRATION RATE: 16 BRPM | HEART RATE: 116 BPM | OXYGEN SATURATION: 98 % | HEIGHT: 69 IN

## 2021-07-06 DIAGNOSIS — R05.9 COUGH: ICD-10-CM

## 2021-07-06 DIAGNOSIS — R06.02 SOB (SHORTNESS OF BREATH): ICD-10-CM

## 2021-07-06 DIAGNOSIS — Z11.52 ENCOUNTER FOR SCREENING FOR COVID-19: ICD-10-CM

## 2021-07-06 PROCEDURE — 71046 X-RAY EXAM CHEST 2 VIEWS: CPT

## 2021-07-06 PROCEDURE — 99214 OFFICE O/P EST MOD 30 MIN: CPT | Performed by: FAMILY MEDICINE

## 2021-07-06 RX ORDER — ALBUTEROL SULFATE 90 UG/1
2 AEROSOL, METERED RESPIRATORY (INHALATION) EVERY 4 HOURS PRN
Qty: 1 EACH | Refills: 1 | Status: SHIPPED
Start: 2021-07-06 | End: 2021-08-27

## 2021-07-06 ASSESSMENT — FIBROSIS 4 INDEX: FIB4 SCORE: 4.48

## 2021-07-06 NOTE — PROGRESS NOTES
CC: Cough/shortness of breath    HPI:   Polo presents today because he has been having cough and shortness of breath for a week.  For the past 3 days it has been getting worse.  Cough is dry with no phlegm, shortness of breath has been getting worse.  However his oxygen saturation is always above 90% at home.  However patient has been on oxygen at 2 L since recent diagnosis with COVID-19 in February.  Patient denies any fever or chills.  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 intermit claudication ([facility]) 03/22/2021   • Abdominal aortic aneurysm without rupture ([facility]) 03/22/2021   • Thrombus 02/12/2021   • Acute respiratory failure with hypoxia ([facility]) 02/11/2021   • Benign prostatic hyperplasia with urinary hesitancy 02/11/2021   • Advanced care planning/counseling discussion 02/11/2021   • Elevated d-dimer 02/11/2021   • COVID-19 02/10/2021   • Acute cystitis 02/10/2021   • Iron deficiency anemia due to chronic blood loss 11/25/2019   • Obesity (BMI 35.0-39.9 without comorbidity) ([facility]) 08/13/2019   • Peripheral artery disease ([facility]) 08/01/2019   • Essential hypertension    • Presence of stent in LAD coronary artery    • Presence of stent in left circumflex coronary artery    • Dyslipidemia 08/26/2011   • Coronary artery disease due to lipid rich plaque 08/26/2011       Current Outpatient Medications   Medication Sig Dispense Refill   • albuterol 108 (90 Base) MCG/ACT Aero Soln inhalation aerosol Inhale 2 Puffs every four hours as needed for Shortness of Breath. 1 Each 1   • Omega-3 Fatty Acids (FISH OIL) 1000 MG Cap capsule Take 1 capsule by mouth 2 times a day.     • Misc. Devices Misc Patient needs home oxygen extended for another 30 days due to hypoxia 1 Each 11   • VITAMIN D PO Take 1 capsule by mouth every day.     • niacin  "(NIASPAN) 1000 MG CR tablet TAKE TWO TABLETS BY MOUTH DAILY 180 Tab 1   • rosuvastatin (CRESTOR) 20 MG Tab Take 1 Tab by mouth every evening. 90 Tab 3   • Coenzyme Q10 (CO Q-10) 300 MG Cap Take 1 Cap by mouth every day.     • Ascorbic Acid (VITAMIN C PO) Take 6,000 mg by mouth every day.     • Multiple Vitamin (MULTIVITAMIN PO) Take 1 Tab by mouth every day.     • aspirin 81 MG tablet Take 81 mg by mouth every day.       No current facility-administered medications for this visit.         Allergies as of 07/06/2021 - Reviewed 07/06/2021   Allergen Reaction Noted   • Mercury Swelling 11/01/2010   • Other environmental Anaphylaxis 11/23/2010   • Sulfa drugs Anaphylaxis and Swelling 11/01/2010   • Pravastatin Diarrhea 04/03/2020        ROS: Denies any chest pain, Shortness of breath, Changes bowel or bladder, Lower extremity edema.    Physical Exam:  /88 (BP Location: Left arm)   Pulse (!) 116   Temp 37.1 °C (98.7 °F)   Resp 16   Ht 1.753 m (5' 9\")   Wt 119 kg (262 lb)   SpO2 98%   BMI 38.69 kg/m²   Gen.: Well-developed, well-nourished, no apparent distress,pleasant and cooperative with the examination  Skin:  Warm and dry with good turgor. No rashes or suspicious lesions in visible areas  HEENT:Sinuses nontender with palpation, TMs clear, nares patent with pink mucosa and clear rhinorrhea,no septal deviation ,polyps or lesions. lips without lesions, oropharynx clear.  Neck: Trachea midline,no masses or adenopathy. No JVD.  Cor: Regular rate and rhythm without murmur, gallop or rub.  Lungs: Respirations unlabored.Clear to auscultation with equal breath sounds bilaterally. No wheezes, rhonchi.  Extremities: No cyanosis, clubbing or edema.        Assessment and Plan.   71 y.o. male     1. Encounter for screening for COVID-19  Cough, shortness of breath, low-grade fever.  Rule out Covid    - DX-CHEST-2 VIEWS; Future  - SARS-CoV-2 PCR (24 hour In-House): Collect NP swab in [facility]; Future    2. SOB (shortness of " breath)/ Cough  Rule out Covid.  Patient stated that albuterol has been helping medication refilled  Advised to go to ER if shortness of breath gets worse.    - DX-CHEST-2 VIEWS; Future  - albuterol 108 (90 Base) MCG/ACT Aero Soln inhalation aerosol; Inhale 2 Puffs every four hours as needed for Shortness of Breath.  Dispense: 1 Each; Refill: 1  - SARS-CoV-2 PCR (24 hour In-House): Collect NP swab in [facility]; Future           no

## 2021-07-07 ENCOUNTER — PATIENT MESSAGE (OUTPATIENT)
Dept: CARDIOLOGY | Facility: MEDICAL CENTER | Age: 71
End: 2021-07-07

## 2021-07-07 ENCOUNTER — TELEPHONE (OUTPATIENT)
Dept: CARDIOLOGY | Facility: MEDICAL CENTER | Age: 71
End: 2021-07-07

## 2021-07-07 DIAGNOSIS — I25.83 CORONARY ARTERY DISEASE DUE TO LIPID RICH PLAQUE: ICD-10-CM

## 2021-07-07 DIAGNOSIS — R07.89 OTHER CHEST PAIN: ICD-10-CM

## 2021-07-07 DIAGNOSIS — Z95.5 PRESENCE OF STENT IN LEFT CIRCUMFLEX CORONARY ARTERY: ICD-10-CM

## 2021-07-07 DIAGNOSIS — I25.10 CORONARY ARTERY DISEASE DUE TO LIPID RICH PLAQUE: ICD-10-CM
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
## 2021-07-08 NOTE — PATIENT COMMUNICATION
Attempted to contact pt by phone to discuss recommendations but unable to reach. As we discussed yesterday, [employer] message sent with details.

## 2021-07-08 NOTE — TELEPHONE ENCOUNTER
----- Message from Omer Rhodes Ass't sent at 7/7/2021  4:46 PM PDT -----  Regarding: FW: Test Result Question  Contact: [phone]    ----- Message -----  From: Polo Pyle  Sent: 7/7/2021   4:28 PM PDT  To: Volodymyr Meza/Murray  Subject: Test Result Question                             I have just received the results from a chest x-ray and there is a reference to CHF; can you schedule me for a cardiac echo soon?    Polo Pyle

## 2021-07-08 NOTE — TELEPHONE ENCOUNTER
[employer] message sent to pt with details on tests ordered and contact information for [facility] image scheduling. Pt prefers [employer] communication
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
## 2021-07-08 NOTE — TELEPHONE ENCOUNTER
We can order exercise nuclear MPI.   So he will get CT (SPECT) pictures, do the exercise and then more Ct scan pictures.     We can also schedule the echo, however, these are booked out for months it seems.   The MPI should give us some estimate of EF and cardiac function as well as estimating if there could be new blockages in the heart arteries.    Will place orders.     Please let pt know and help get scheduled.    Thanks,   Elidia Hairston PA-C  7/8/2021

## 2021-07-09 ENCOUNTER — PATIENT MESSAGE (OUTPATIENT)
Dept: CARDIOLOGY | Facility: MEDICAL CENTER | Age: 71
End: 2021-07-09

## 2021-07-09 ENCOUNTER — HOSPITAL ENCOUNTER (INPATIENT)
Facility: MEDICAL CENTER | Age: 71
LOS: 4 days | DRG: 175 | End: 2021-07-14
Attending: EMERGENCY MEDICINE | Admitting: INTERNAL MEDICINE
Payer: MEDICARE

## 2021-07-09 ENCOUNTER — APPOINTMENT (OUTPATIENT)
Dept: RADIOLOGY | Facility: MEDICAL CENTER | Age: 71
DRG: 175 | End: 2021-07-09
Attending: EMERGENCY MEDICINE
Payer: MEDICARE

## 2021-07-09 LAB
ALBUMIN SERPL BCP-MCNC: 4 G/DL (ref 3.2–4.9)
ALBUMIN/GLOB SERPL: 1.3 G/DL
ALP SERPL-CCNC: 93 U/L (ref 30–99)
ALT SERPL-CCNC: 21 U/L (ref 2–50)
ANION GAP SERPL CALC-SCNC: 14 MMOL/L (ref 7–16)
AST SERPL-CCNC: 34 U/L (ref 12–45)
BASOPHILS # BLD AUTO: 0.6 % (ref 0–1.8)
BASOPHILS # BLD: 0.06 K/UL (ref 0–0.12)
BILIRUB SERPL-MCNC: 1.5 MG/DL (ref 0.1–1.5)
BUN SERPL-MCNC: 22 MG/DL (ref 8–22)
CALCIUM SERPL-MCNC: 9.2 MG/DL (ref 8.5–10.5)
CHLORIDE SERPL-SCNC: 107 MMOL/L (ref 96–112)
CO2 SERPL-SCNC: 20 MMOL/L (ref 20–33)
CREAT SERPL-MCNC: 1.34 MG/DL (ref 0.5–1.4)
EOSINOPHIL # BLD AUTO: 0.12 K/UL (ref 0–0.51)
EOSINOPHIL NFR BLD: 1.2 % (ref 0–6.9)
ERYTHROCYTE [DISTWIDTH] IN BLOOD BY AUTOMATED COUNT: 47.1 FL (ref 35.9–50)
GLOBULIN SER CALC-MCNC: 3.1 G/DL (ref 1.9–3.5)
GLUCOSE SERPL-MCNC: 112 MG/DL (ref 65–99)
HCT VFR BLD AUTO: 46.6 % (ref 42–52)
HGB BLD-MCNC: 15.3 G/DL (ref 14–18)
IMM GRANULOCYTES # BLD AUTO: 0.06 K/UL (ref 0–0.11)
IMM GRANULOCYTES NFR BLD AUTO: 0.6 % (ref 0–0.9)
LYMPHOCYTES # BLD AUTO: 1.85 K/UL (ref 1–4.8)
LYMPHOCYTES NFR BLD: 18.9 % (ref 22–41)
MCH RBC QN AUTO: 30.8 PG (ref 27–33)
MCHC RBC AUTO-ENTMCNC: 32.8 G/DL (ref 33.7–35.3)
MCV RBC AUTO: 93.8 FL (ref 81.4–97.8)
MONOCYTES # BLD AUTO: 1.28 K/UL (ref 0–0.85)
MONOCYTES NFR BLD AUTO: 13 % (ref 0–13.4)
NEUTROPHILS # BLD AUTO: 6.44 K/UL (ref 1.82–7.42)
NEUTROPHILS NFR BLD: 65.7 % (ref 44–72)
NRBC # BLD AUTO: 0 K/UL
NRBC BLD-RTO: 0 /100 WBC
PLATELET # BLD AUTO: 127 K/UL (ref 164–446)
PMV BLD AUTO: 10.1 FL (ref 9–12.9)
POTASSIUM SERPL-SCNC: 4.5 MMOL/L (ref 3.6–5.5)
PROT SERPL-MCNC: 7.1 G/DL (ref 6–8.2)
RBC # BLD AUTO: 4.97 M/UL (ref 4.7–6.1)
SODIUM SERPL-SCNC: 141 MMOL/L (ref 135–145)
TROPONIN T SERPL-MCNC: 91 NG/L (ref 6–19)
WBC # BLD AUTO: 9.8 K/UL (ref 4.8–10.8)

## 2021-07-09 PROCEDURE — 80053 COMPREHEN METABOLIC PANEL: CPT

## 2021-07-09 PROCEDURE — 99285 EMERGENCY DEPT VISIT HI MDM: CPT

## 2021-07-09 PROCEDURE — 71045 X-RAY EXAM CHEST 1 VIEW: CPT

## 2021-07-09 PROCEDURE — 83880 ASSAY OF NATRIURETIC PEPTIDE: CPT

## 2021-07-09 PROCEDURE — 36415 COLL VENOUS BLD VENIPUNCTURE: CPT

## 2021-07-09 PROCEDURE — 85025 COMPLETE CBC W/AUTO DIFF WBC: CPT

## 2021-07-09 PROCEDURE — 84484 ASSAY OF TROPONIN QUANT: CPT

## 2021-07-09 ASSESSMENT — FIBROSIS 4 INDEX: FIB4 SCORE: 4.48

## 2021-07-09 NOTE — PATIENT COMMUNICATION
"Called and spoke to the patient. I asked about his symptoms and stated I can hear he is having labored breathing, able to talk in short sentences, a few words per breath.  He stated \"If I am perfectly still it is fine\", with any activity though he has SOB and it takes a while for the pain to go away.  I asked if his symptoms have progressively worsened over the last few days and he stated yes, I advised at this time he needs to go to the ER and be evaluated.     He then stated he has 2 issues with that , he needs to find someone to watch his dog and he needs to make sure he has enough oxygen to get to the hospital.  I advised he can call 911 and he then stated no because he just got the bill for the last ambulance ride.  He asked what they would do at the ER and I explained the imaging, labs and process of admission based on his assessment and results.  Answered all questions and concerns, appreciative of call.     Routed to .     "

## 2021-07-10 ENCOUNTER — APPOINTMENT (OUTPATIENT)
Dept: RADIOLOGY | Facility: MEDICAL CENTER | Age: 71
DRG: 175 | End: 2021-07-10
Attending: EMERGENCY MEDICINE
Payer: MEDICARE

## 2021-07-10 ENCOUNTER — APPOINTMENT (OUTPATIENT)
Dept: RADIOLOGY | Facility: MEDICAL CENTER | Age: 71
DRG: 175 | End: 2021-07-10
Attending: INTERNAL MEDICINE
Payer: MEDICARE

## 2021-07-10 ENCOUNTER — APPOINTMENT (OUTPATIENT)
Dept: CARDIOLOGY | Facility: MEDICAL CENTER | Age: 71
DRG: 175 | End: 2021-07-10
Attending: INTERNAL MEDICINE
Payer: MEDICARE

## 2021-07-10 PROBLEM — J96.00 ACUTE RESPIRATORY FAILURE (HCC): Status: ACTIVE | Noted: 2021-02-11

## 2021-07-10 PROBLEM — R07.89 OTHER CHEST PAIN: Status: ACTIVE | Noted: 2021-07-10

## 2021-07-10 PROBLEM — I50.9 ACUTE EXACERBATION OF CHF (CONGESTIVE HEART FAILURE) (HCC): Status: ACTIVE | Noted: 2021-07-10

## 2021-07-10 PROBLEM — Z86.16 HISTORY OF COVID-19: Status: ACTIVE | Noted: 2021-02-10

## 2021-07-10 PROBLEM — N17.9 AKI (ACUTE KIDNEY INJURY) (HCC): Status: ACTIVE | Noted: 2021-07-10

## 2021-07-10 PROBLEM — I26.99 PE (PULMONARY THROMBOEMBOLISM) (HCC): Status: ACTIVE | Noted: 2021-07-10

## 2021-07-10 PROBLEM — I50.9 CHRONIC CHF (CONGESTIVE HEART FAILURE) (HCC): Status: RESOLVED | Noted: 2021-07-10 | Resolved: 2021-07-10

## 2021-07-10 PROBLEM — I82.409 ACUTE DVT (DEEP VENOUS THROMBOSIS) (HCC): Status: ACTIVE | Noted: 2021-07-10

## 2021-07-10 PROBLEM — I24.9 ACUTE CORONARY SYNDROME (HCC): Status: ACTIVE | Noted: 2021-07-10

## 2021-07-10 LAB
ANION GAP SERPL CALC-SCNC: 13 MMOL/L (ref 7–16)
APTT PPP: 32 SEC (ref 24.7–36)
BUN SERPL-MCNC: 20 MG/DL (ref 8–22)
CALCIUM SERPL-MCNC: 8.6 MG/DL (ref 8.5–10.5)
CHLORIDE SERPL-SCNC: 104 MMOL/L (ref 96–112)
CO2 SERPL-SCNC: 24 MMOL/L (ref 20–33)
CREAT SERPL-MCNC: 1.07 MG/DL (ref 0.5–1.4)
D DIMER PPP IA.FEU-MCNC: >20 UG/ML (FEU) (ref 0–0.5)
EKG IMPRESSION: NORMAL
EST. AVERAGE GLUCOSE BLD GHB EST-MCNC: 117 MG/DL
FLUAV RNA SPEC QL NAA+PROBE: NEGATIVE
FLUBV RNA SPEC QL NAA+PROBE: NEGATIVE
GLUCOSE SERPL-MCNC: 171 MG/DL (ref 65–99)
HBA1C MFR BLD: 5.7 % (ref 4–5.6)
INR PPP: 1.22 (ref 0.87–1.13)
LACTATE BLD-SCNC: 2.1 MMOL/L (ref 0.5–2)
NT-PROBNP SERPL IA-MCNC: 2479 PG/ML (ref 0–125)
NT-PROBNP SERPL IA-MCNC: 6807 PG/ML (ref 0–125)
POTASSIUM SERPL-SCNC: 3.8 MMOL/L (ref 3.6–5.5)
PROTHROMBIN TIME: 15 SEC (ref 12–14.6)
RSV RNA SPEC QL NAA+PROBE: NEGATIVE
SARS-COV-2 RNA RESP QL NAA+PROBE: NOTDETECTED
SODIUM SERPL-SCNC: 141 MMOL/L (ref 135–145)
SPECIMEN SOURCE: NORMAL
TROPONIN T SERPL-MCNC: 61 NG/L (ref 6–19)
TROPONIN T SERPL-MCNC: 76 NG/L (ref 6–19)
UFH PPP CHRO-ACNC: 0.64 IU/ML
UFH PPP CHRO-ACNC: 0.76 IU/ML
UFH PPP CHRO-ACNC: <0.1 IU/ML

## 2021-07-10 PROCEDURE — 700102 HCHG RX REV CODE 250 W/ 637 OVERRIDE(OP): Performed by: INTERNAL MEDICINE

## 2021-07-10 PROCEDURE — 93306 TTE W/DOPPLER COMPLETE: CPT

## 2021-07-10 PROCEDURE — 700117 HCHG RX CONTRAST REV CODE 255: Performed by: INTERNAL MEDICINE

## 2021-07-10 PROCEDURE — 99223 1ST HOSP IP/OBS HIGH 75: CPT | Mod: AI | Performed by: INTERNAL MEDICINE

## 2021-07-10 PROCEDURE — A9270 NON-COVERED ITEM OR SERVICE: HCPCS | Performed by: INTERNAL MEDICINE

## 2021-07-10 PROCEDURE — 0241U HCHG SARS-COV-2 COVID-19 NFCT DS RESP RNA 4 TRGT MIC: CPT

## 2021-07-10 PROCEDURE — 83605 ASSAY OF LACTIC ACID: CPT

## 2021-07-10 PROCEDURE — 85520 HEPARIN ASSAY: CPT

## 2021-07-10 PROCEDURE — C9803 HOPD COVID-19 SPEC COLLECT: HCPCS | Performed by: EMERGENCY MEDICINE

## 2021-07-10 PROCEDURE — 80048 BASIC METABOLIC PNL TOTAL CA: CPT

## 2021-07-10 PROCEDURE — 93306 TTE W/DOPPLER COMPLETE: CPT | Mod: 26 | Performed by: INTERNAL MEDICINE

## 2021-07-10 PROCEDURE — 93005 ELECTROCARDIOGRAM TRACING: CPT | Performed by: INTERNAL MEDICINE

## 2021-07-10 PROCEDURE — 71275 CT ANGIOGRAPHY CHEST: CPT | Mod: ME

## 2021-07-10 PROCEDURE — 700111 HCHG RX REV CODE 636 W/ 250 OVERRIDE (IP): Performed by: INTERNAL MEDICINE

## 2021-07-10 PROCEDURE — 83036 HEMOGLOBIN GLYCOSYLATED A1C: CPT

## 2021-07-10 PROCEDURE — 700101 HCHG RX REV CODE 250: Performed by: INTERNAL MEDICINE

## 2021-07-10 PROCEDURE — 93970 EXTREMITY STUDY: CPT

## 2021-07-10 PROCEDURE — 85379 FIBRIN DEGRADATION QUANT: CPT

## 2021-07-10 PROCEDURE — 99233 SBSQ HOSP IP/OBS HIGH 50: CPT | Performed by: INTERNAL MEDICINE

## 2021-07-10 PROCEDURE — 99999 PR NO CHARGE: CPT | Performed by: INTERNAL MEDICINE

## 2021-07-10 PROCEDURE — 85610 PROTHROMBIN TIME: CPT

## 2021-07-10 PROCEDURE — 36415 COLL VENOUS BLD VENIPUNCTURE: CPT

## 2021-07-10 PROCEDURE — 83880 ASSAY OF NATRIURETIC PEPTIDE: CPT

## 2021-07-10 PROCEDURE — 99223 1ST HOSP IP/OBS HIGH 75: CPT | Performed by: INTERNAL MEDICINE

## 2021-07-10 PROCEDURE — 770020 HCHG ROOM/CARE - TELE (206)

## 2021-07-10 PROCEDURE — 85730 THROMBOPLASTIN TIME PARTIAL: CPT

## 2021-07-10 PROCEDURE — 99291 CRITICAL CARE FIRST HOUR: CPT | Performed by: INTERNAL MEDICINE

## 2021-07-10 PROCEDURE — 93970 EXTREMITY STUDY: CPT | Mod: 26 | Performed by: INTERNAL MEDICINE

## 2021-07-10 PROCEDURE — 84484 ASSAY OF TROPONIN QUANT: CPT

## 2021-07-10 RX ORDER — ASPIRIN 325 MG
325 TABLET ORAL ONCE
Status: COMPLETED | OUTPATIENT
Start: 2021-07-10 | End: 2021-07-10

## 2021-07-10 RX ORDER — ONDANSETRON 4 MG/1
4 TABLET, ORALLY DISINTEGRATING ORAL EVERY 4 HOURS PRN
Status: DISCONTINUED | OUTPATIENT
Start: 2021-07-10 | End: 2021-07-14 | Stop reason: HOSPADM

## 2021-07-10 RX ORDER — BISACODYL 10 MG
10 SUPPOSITORY, RECTAL RECTAL
Status: DISCONTINUED | OUTPATIENT
Start: 2021-07-10 | End: 2021-07-14 | Stop reason: HOSPADM

## 2021-07-10 RX ORDER — HYDRALAZINE HYDROCHLORIDE 20 MG/ML
10 INJECTION INTRAMUSCULAR; INTRAVENOUS EVERY 6 HOURS PRN
Status: DISCONTINUED | OUTPATIENT
Start: 2021-07-10 | End: 2021-07-14 | Stop reason: HOSPADM

## 2021-07-10 RX ORDER — LISINOPRIL 10 MG/1
10 TABLET ORAL
Status: DISCONTINUED | OUTPATIENT
Start: 2021-07-10 | End: 2021-07-10

## 2021-07-10 RX ORDER — HEPARIN SODIUM 1000 [USP'U]/ML
40 INJECTION, SOLUTION INTRAVENOUS; SUBCUTANEOUS PRN
Status: DISCONTINUED | OUTPATIENT
Start: 2021-07-10 | End: 2021-07-13

## 2021-07-10 RX ORDER — LABETALOL HYDROCHLORIDE 5 MG/ML
10 INJECTION, SOLUTION INTRAVENOUS EVERY 4 HOURS PRN
Status: DISCONTINUED | OUTPATIENT
Start: 2021-07-10 | End: 2021-07-10

## 2021-07-10 RX ORDER — HYDRALAZINE HYDROCHLORIDE 20 MG/ML
20 INJECTION INTRAMUSCULAR; INTRAVENOUS EVERY 6 HOURS PRN
Status: DISCONTINUED | OUTPATIENT
Start: 2021-07-10 | End: 2021-07-10

## 2021-07-10 RX ORDER — HEPARIN SODIUM 5000 [USP'U]/100ML
0-30 INJECTION, SOLUTION INTRAVENOUS CONTINUOUS
Status: DISCONTINUED | OUTPATIENT
Start: 2021-07-10 | End: 2021-07-13

## 2021-07-10 RX ORDER — LISINOPRIL 5 MG/1
2.5 TABLET ORAL
Status: DISCONTINUED | OUTPATIENT
Start: 2021-07-10 | End: 2021-07-10

## 2021-07-10 RX ORDER — ACETAMINOPHEN 325 MG/1
650 TABLET ORAL EVERY 6 HOURS PRN
Status: DISCONTINUED | OUTPATIENT
Start: 2021-07-10 | End: 2021-07-14 | Stop reason: HOSPADM

## 2021-07-10 RX ORDER — AMOXICILLIN 250 MG
2 CAPSULE ORAL 2 TIMES DAILY
Status: DISCONTINUED | OUTPATIENT
Start: 2021-07-10 | End: 2021-07-14 | Stop reason: HOSPADM

## 2021-07-10 RX ORDER — ENALAPRILAT 1.25 MG/ML
1.25 INJECTION INTRAVENOUS EVERY 6 HOURS PRN
Status: DISCONTINUED | OUTPATIENT
Start: 2021-07-10 | End: 2021-07-10

## 2021-07-10 RX ORDER — ONDANSETRON 2 MG/ML
4 INJECTION INTRAMUSCULAR; INTRAVENOUS EVERY 4 HOURS PRN
Status: DISCONTINUED | OUTPATIENT
Start: 2021-07-10 | End: 2021-07-14 | Stop reason: HOSPADM

## 2021-07-10 RX ORDER — FUROSEMIDE 10 MG/ML
40 INJECTION INTRAMUSCULAR; INTRAVENOUS EVERY 12 HOURS
Status: DISCONTINUED | OUTPATIENT
Start: 2021-07-10 | End: 2021-07-10

## 2021-07-10 RX ORDER — ALBUTEROL SULFATE 90 UG/1
2 AEROSOL, METERED RESPIRATORY (INHALATION) EVERY 4 HOURS PRN
Status: DISCONTINUED | OUTPATIENT
Start: 2021-07-10 | End: 2021-07-14 | Stop reason: HOSPADM

## 2021-07-10 RX ORDER — ROSUVASTATIN CALCIUM 20 MG/1
20 TABLET, COATED ORAL EVERY EVENING
Status: DISCONTINUED | OUTPATIENT
Start: 2021-07-10 | End: 2021-07-14 | Stop reason: HOSPADM

## 2021-07-10 RX ORDER — POLYETHYLENE GLYCOL 3350 17 G/17G
1 POWDER, FOR SOLUTION ORAL
Status: DISCONTINUED | OUTPATIENT
Start: 2021-07-10 | End: 2021-07-14 | Stop reason: HOSPADM

## 2021-07-10 RX ORDER — HEPARIN SODIUM 1000 [USP'U]/ML
80 INJECTION, SOLUTION INTRAVENOUS; SUBCUTANEOUS ONCE
Status: COMPLETED | OUTPATIENT
Start: 2021-07-10 | End: 2021-07-10

## 2021-07-10 RX ADMIN — HEPARIN SODIUM 16 UNITS/KG/HR: 5000 INJECTION, SOLUTION INTRAVENOUS at 19:41

## 2021-07-10 RX ADMIN — HUMAN ALBUMIN MICROSPHERES AND PERFLUTREN 3 ML: 10; .22 INJECTION, SOLUTION INTRAVENOUS at 14:15

## 2021-07-10 RX ADMIN — ALBUTEROL SULFATE 2 PUFF: 90 AEROSOL, METERED RESPIRATORY (INHALATION) at 07:34

## 2021-07-10 RX ADMIN — IOHEXOL 80 ML: 350 INJECTION, SOLUTION INTRAVENOUS at 05:08

## 2021-07-10 RX ADMIN — HEPARIN SODIUM 18 UNITS/KG/HR: 5000 INJECTION, SOLUTION INTRAVENOUS at 03:34

## 2021-07-10 RX ADMIN — LABETALOL HYDROCHLORIDE 10 MG: 5 INJECTION, SOLUTION INTRAVENOUS at 16:35

## 2021-07-10 RX ADMIN — ROSUVASTATIN CALCIUM 20 MG: 20 TABLET, FILM COATED ORAL at 18:19

## 2021-07-10 RX ADMIN — HEPARIN SODIUM 7300 UNITS: 1000 INJECTION, SOLUTION INTRAVENOUS; SUBCUTANEOUS at 03:26

## 2021-07-10 RX ADMIN — ASPIRIN 325 MG ORAL TABLET 325 MG: 325 PILL ORAL at 18:19

## 2021-07-10 ASSESSMENT — ENCOUNTER SYMPTOMS
TINGLING: 1
HEADACHES: 0
ORTHOPNEA: 1
BACK PAIN: 0
FEVER: 0
MUSCULOSKELETAL NEGATIVE: 1
NAUSEA: 0
WEIGHT LOSS: 0
PND: 1
ABDOMINAL PAIN: 0
SPUTUM PRODUCTION: 0
DIARRHEA: 0
RESPIRATORY NEGATIVE: 1
VOMITING: 0
CONSTIPATION: 0
CARDIOVASCULAR NEGATIVE: 1
CHILLS: 0
COUGH: 0
CONSTITUTIONAL NEGATIVE: 1
CLAUDICATION: 1
SHORTNESS OF BREATH: 1
WHEEZING: 0
NEUROLOGICAL NEGATIVE: 1
EYES NEGATIVE: 1
GASTROINTESTINAL NEGATIVE: 1
PALPITATIONS: 0
DIZZINESS: 0
PSYCHIATRIC NEGATIVE: 1

## 2021-07-10 ASSESSMENT — LIFESTYLE VARIABLES
AVERAGE NUMBER OF DAYS PER WEEK YOU HAVE A DRINK CONTAINING ALCOHOL: 0
ALCOHOL_USE: NO
TOTAL SCORE: 0
ON A TYPICAL DAY WHEN YOU DRINK ALCOHOL HOW MANY DRINKS DO YOU HAVE: 0
EVER FELT BAD OR GUILTY ABOUT YOUR DRINKING: NO
TOTAL SCORE: 0
CONSUMPTION TOTAL: NEGATIVE
HOW MANY TIMES IN THE PAST YEAR HAVE YOU HAD 5 OR MORE DRINKS IN A DAY: 0
HAVE YOU EVER FELT YOU SHOULD CUT DOWN ON YOUR DRINKING: NO
EVER HAD A DRINK FIRST THING IN THE MORNING TO STEADY YOUR NERVES TO GET RID OF A HANGOVER: NO
DOES PATIENT WANT TO STOP DRINKING: NO
HAVE PEOPLE ANNOYED YOU BY CRITICIZING YOUR DRINKING: NO
TOTAL SCORE: 0

## 2021-07-10 ASSESSMENT — COGNITIVE AND FUNCTIONAL STATUS - GENERAL
MOBILITY SCORE: 24
SUGGESTED CMS G CODE MODIFIER MOBILITY: CH
DAILY ACTIVITIY SCORE: 24
SUGGESTED CMS G CODE MODIFIER DAILY ACTIVITY: CH

## 2021-07-10 ASSESSMENT — PAIN DESCRIPTION - PAIN TYPE
TYPE: ACUTE PAIN
TYPE: ACUTE PAIN

## 2021-07-10 ASSESSMENT — FIBROSIS 4 INDEX: FIB4 SCORE: 4.09

## 2021-07-10 NOTE — PROGRESS NOTES
Received report from Flex JOSE LUIS Mike. Patient arrived on floor and assumed care at 0245.  Tele box placed on patient and monitor room notified.This pt is AOx4, voiding adequately, 0/10 pain. Patient and RN discussed plan of care: questions answered. Labs noted, assessment complete, patient NPO. Pt is on 2 L of O2 via nasal cannula. Call light in place, fall precautions in place, patient educated on importance of calling for assistance. No additional needs at this time. VSS

## 2021-07-10 NOTE — CARE PLAN
Problem: Pain - Standard  Goal: Alleviation of pain or a reduction in pain to the patient’s comfort goal  Outcome: Progressing  Flowsheets (Taken 7/10/2021 0400)  Pain Rating Scale (NPRS): 0  Note: Patient has not complained of any pain throughout shift.  Will continue to re-assess and keep an eye out for any signs of return of chest pain     Problem: Knowledge Deficit - Standard  Goal: Patient and family/care givers will demonstrate understanding of plan of care, disease process/condition, diagnostic tests and medications  Outcome: Progressing  Note: Patient verbally demonstrated full understanding of plan of care.  Will continue to educate patient and re-assess understanding.   The patient is Watcher - Medium risk of patient condition declining or worsening    Shift Goals  Clinical Goals: Maintain O2 sat about 93%  Patient Goals: Rest  Family Goals: N/A    Progress made toward(s) clinical / shift goals:  Patient maintained an oxygen saturation about 93%.  Patient slept comfortably.  Patient did not complain of any pain throughout night. Patient tolerating heparin drip    Patient is not progressing towards the following goals: N/A

## 2021-07-10 NOTE — ASSESSMENT & PLAN NOTE
PREETI score around 6  Typical chest pain on exertion with changes in the EKG; T inversion on anterior leads  Troponin elevation better explained due to PE.  Cardiology was consulted, patient is not a candidate for catheterization or stress test at this time due to his acute PE.  Chest pain due to acute extensive bilateral PE    Continue Heparin drip

## 2021-07-10 NOTE — PROGRESS NOTES
Received bedside report from RN, pt care assumed, VSS, pt assessment complete. Pt AAOx4, with no complaint of pain at this time. On 2L NC O2, SPO2 95%, no signs of acute distress noted at this time. Plan of care discussed with pt and verbalizes no questions. Pt denies any additional needs at this time. Bed locked/in lowest position, pt educated on fall risk and verbalized understanding, call light within reach, hourly rounding initiated.

## 2021-07-10 NOTE — PROGRESS NOTES
Cardiology Follow Up Progress Note    Date of Service  7/10/2021    Attending Physician  DANIELA García M.D.    Chief Complaint     Chest pain and dyspnea, progressive leg swelling, found to have PE/DVT, RV strain by echo      HPI  Polo Pyle is a 71 y.o. with PMH hypertension, hyperlipidemia, CAD with PCI, venous insufficiency, peripheral vascular disease (AAA/left CFA aneurysm with nonocclusive thrombus) PAD, former smoker    Interim Events    No overnight cardiac events  Telemetry-SR  BP appropriate  Reports feeling better  Labs reviewed  CMP unremarkable      Review of Systems  Review of Systems   Respiratory: Negative for apnea, cough, choking, chest tightness, shortness of breath, wheezing and stridor.        Vital signs in last 24 hours  Temp:  [36.1 °C (96.9 °F)-36.9 °C (98.5 °F)] 36.9 °C (98.5 °F)  Pulse:  [] 82  Resp:  [16-20] 18  BP: (122-151)/(71-97) 151/97  SpO2:  [88 %-97 %] 95 %    Physical Exam  Physical Exam  Cardiovascular:      Rate and Rhythm: Normal rate and regular rhythm.   Skin:     General: Skin is warm.      Comments: Lower extremity edema, venous stasis   Neurological:      Mental Status: He is alert. Mental status is at baseline.   Psychiatric:         Mood and Affect: Mood normal.         Lab Review  Lab Results   Component Value Date/Time    WBC 9.8 07/09/2021 06:30 PM    RBC 4.97 07/09/2021 06:30 PM    HEMOGLOBIN 15.3 07/09/2021 06:30 PM    HEMATOCRIT 46.6 07/09/2021 06:30 PM    MCV 93.8 07/09/2021 06:30 PM    MCH 30.8 07/09/2021 06:30 PM    MCHC 32.8 (L) 07/09/2021 06:30 PM    MPV 10.1 07/09/2021 06:30 PM      Lab Results   Component Value Date/Time    SODIUM 141 07/09/2021 06:30 PM    POTASSIUM 4.5 07/09/2021 06:30 PM    CHLORIDE 107 07/09/2021 06:30 PM    CO2 20 07/09/2021 06:30 PM    GLUCOSE 112 (H) 07/09/2021 06:30 PM    BUN 22 07/09/2021 06:30 PM    CREATININE 1.34 07/09/2021 06:30 PM    CREATININE 1.4 11/28/2006 03:42 AM      Lab Results   Component Value  Date/Time    ASTSGOT 34 07/09/2021 06:30 PM    ALTSGPT 21 07/09/2021 06:30 PM     Lab Results   Component Value Date/Time    CHOLSTRLTOT 154 06/04/2021 10:40 AM    LDL 70 06/04/2021 10:40 AM    HDL 66 06/04/2021 10:40 AM    TRIGLYCERIDE 91 06/04/2021 10:40 AM    TROPONINT 61 (H) 07/10/2021 05:19 AM       Recent Labs     07/09/21  1830   NTPROBNP 6807*       Cardiac Imaging and Procedures Review  EKG: Sinus rhythm, nonspecific T wave abnormalities    Echocardiogram:      Cardiac Catheterization:      Imaging  Chest X-Ray:      Stress Test:      Venous ultrasound of lower extremity 7/10/2021  -Evidence of acute occlusive deep vein thrombosis in the right superficial    femoral vein extending to popliteal veins.    Evidence of acute partial deep vein thrombosis in one of the paired right    posterior tibial vein and peroneal veins.    Evidence of subacute/chronic, non-occlusive deep vein thrombosis in the    left proximal-mid superficial femoral vein.    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      CTA chest 7/10/21  1. Extensive acute bilateral pulmonary emboli.  2. Elevation of the RV/LV ratio.  3. Small wedge-shaped opacity in the left lower lobe, likely infarct.  4. Moderate hiatal hernia.    Assessment/Plan    Acute bilateral pulmonary emboli  -Continue with IV heparin.  -Continue monitoring  -If any change in clinical status, move to ICU and consult CC/ IR.  -Plan for OAC on DC    Acute right-sided DVT/subacute/chronic left  -IV heparin  -History of left DVT, provoked post left common femoral endarterectomy  -Reports works as a  with long shift 12 hours [employer].    RV strain  -Echo showing reduced RV systolic function, hypokinetic RV free wall, RV systolic pressure ~28 mmHg  -NT proBNP 6800-->2400      MIKAYLA  Improved  -CR 1. 03  -Close monitoring    Hypertension   -128/77      history of CAD  -Continue with Crestor    Cardiology will follow along          Thank you for  allowing me to participate in the care of this patient.      Please contact me with any questions.    YASMINE Ward.   Cardiologist, [facility]  [phone]

## 2021-07-10 NOTE — ED PROVIDER NOTES
ED Provider Note    CHIEF COMPLAINT  Chief Complaint   Patient presents with   • Chest Pain     Pt c/o symptoms for about 1 wk. sent by MD for further eval, new onset CHF. chest pressure, dull ache.   • Fatigue   • Shortness of Breath   • Leg Swelling       HPI  Polo Pyle is a 71 y.o. male who presents complaining of chest pain.  The chest pain is located in the substernal region of the chest.  It is 5/10 in severity.  It is worse with exertion better at rest.  Patient states he is had PND as well as 3 pillow orthopnea getting progressively worse over the past 1 month.  He was diagnosed with COVID-19 this past winter and has had some persistent dyspnea since then but his symptoms over the past week have now included chest pain.  The chest pain is unusual for him.  When he had his MI in 2005 his wife stated that he just turned gray and had neck pain.  The chest pain is worse with exertion.  Patient states he is typically able to walk only about 20 feet before he has to sit down and rest for 10 to 15 minutes.    REVIEW OF SYSTEMS  See HPI for further details.  Positive cough and dyspnea.  Positive chest pain.  All other systems are negative.    PAST MEDICAL HISTORY  Past Medical History:   Diagnosis Date   • ASTHMA     as a youth   • CAD (coronary artery disease) 8/26/2011   • CATARACT     bilateral IOL   • Colorblind     problems with red/green   • Dental disorder     upper and partial lower   • Essential hypertension    • Heart burn    • High cholesterol    • Hyperlipidemia 8/26/2011   • Indigestion    • Infectious disease     Hx of staph infections- last infection 2018-    • Myocardial infarct (HCC) 2005    STENTS   • Pain 11/14/11    NECK 7.5/10; C5-C6   • Personal history of venous thrombosis and embolism 2006    DVT 01/2019   • Pneumonia 2009   • Presence of stent in left circumflex coronary artery    • Unspecified hemorrhagic conditions     TAKES ASA . PROLONGED BLEEDING, nosebleeds   • Venous  insufficiency of both lower extremities    • White coat syndrome with diagnosis of hypertension    • White coat syndrome without hypertension        FAMILY HISTORY  Family History   Problem Relation Age of Onset   • Heart Attack Mother    • Heart Disease Mother    • Cancer Mother         LYKIMA    • Dementia Mother        SOCIAL HISTORY  Social History     Socioeconomic History   • Marital status:      Spouse name: Not on file   • Number of children: Not on file   • Years of education: Not on file   • Highest education level: Not on file   Occupational History   • Not on file   Tobacco Use   • Smoking status: Former Smoker     Years: 30.00     Types: Cigars     Quit date: 2011     Years since quittin.6   • Smokeless tobacco: Never Used   • Tobacco comment:    Vaping Use   • Vaping Use: Never used   Substance and Sexual Activity   • Alcohol use: No   • Drug use: No   • Sexual activity: Not Currently     Partners: Female   Other Topics Concern   • Not on file   Social History Narrative   • Not on file     Social Determinants of Health     Financial Resource Strain:    • Difficulty of Paying Living Expenses:    Food Insecurity:    • Worried About Running Out of Food in the Last Year:    • Ran Out of Food in the Last Year:    Transportation Needs:    • Lack of Transportation (Medical):    • Lack of Transportation (Non-Medical):    Physical Activity:    • Days of Exercise per Week:    • Minutes of Exercise per Session:    Stress:    • Feeling of Stress :    Social Connections:    • Frequency of Communication with Friends and Family:    • Frequency of Social Gatherings with Friends and Family:    • Attends [employer] Services:    • Active Member of Clubs or Organizations:    • Attends Club or Organization Meetings:    • Marital Status:    Intimate Partner Violence:    • Fear of Current or Ex-Partner:    • Emotionally Abused:    • Physically Abused:    • Sexually Abused:        SURGICAL HISTORY  Past  "Surgical History:   Procedure Laterality Date   • FEMORAL ENDARTERECTOMY Left 9/20/2019    Procedure: ENDARTERECTOMY, FEMORAL;  Surgeon: Candelaria Crawford M.D.;  Location: SURGERY [facility];  Service: General   • VENTRAL HERNIA REPAIR  11/30/2011    Performed by TROY GONZALEZ at SURGERY [facility]   • INGUINAL HERNIA REPAIR  11/30/2011    Performed by TROY GONZALEZ at SURGERY [facility]   • CATARACT PHACO WITH IOL  11/23/2010    Performed by RM WILLIS at SURGERY [facility] DAY [facility]   • CATARACT PHACO WITH IOL  11/2/2010    Performed by RM WILLIS at SURGERY SAME DAY [facility]   • OTHER CARDIAC SURGERY  2005    STENTS x 4   • SINUS TREPHINE FRONTAL  1971   • SINUSOTOMIES  08/1963   • INGUINAL HERNIA REPAIR  1962    WITH UNDESCENDED TESTICLE   • TONSILLECTOMY AND ADENOIDECTOMY  1957       CURRENT MEDICATIONS  Home Medications    **Home medications have not yet been reviewed for this encounter**         ALLERGIES  Allergies   Allergen Reactions   • Mercury Swelling     And mercury based preservatives-Thimerasol  Swelling, \"strange discharge from eyes\"   • Other Environmental Anaphylaxis     Insect toxins/ bees and wasps- Anaphylaxis   • Sulfa Drugs Anaphylaxis and Swelling   • Pravastatin Diarrhea     Diarrhea        PHYSICAL EXAM  VITAL SIGNS: /88   Pulse 83   Temp 36.9 °C (98.5 °F) (Oral)   Resp 16   Ht 1.778 m (5' 10\")   Wt 119 kg (262 lb)   SpO2 97% Comment: 2L NC  BMI 37.59 kg/m²   Constitutional: Chronically ill-appearing elderly male  HENT: Normocephalic, Atraumatic, Bilateral external ears normal, Oropharynx moist, No oral exudates, Nose normal.   Eyes: ELISA, EOMI, Conjunctiva normal, No discharge.   Neck: Normal range of motion, No tenderness, Supple, No stridor. No nuchal rigidity  Lymphatic: No lymphadenopathy noted.   Cardiovascular: Regular rate and rhythm  Thorax & Lungs: Bibasilar rales  Abdomen: Bowel sounds normal, Soft, No tenderness,   Skin: Warm, Dry, " No erythema, No rash.   Back: No tenderness, No CVA tenderness.   Extremities: No edema, No tenderness, No cyanosis, No clubbing. Dorsalis pedis pulses 2+ equal bilaterally. Radial pulses 2+ equal bilaterally.  Neurologic: Alert & oriented x 3, Normal motor function, Normal sensory function, No focal deficits noted.     EKG  Isinus tachycardia rate 110.  Normal P waves.  Abnormal QRS with LVH.  There is also early R wave progression.    RADIOLOGY/PROCEDURES  DX-CHEST-PORTABLE (1 VIEW)   Final Result      Probable mild pulmonary interstitial edema and the cardiac silhouette is enlarged      US-EXTREMITY VENOUS LOWER BILAT    (Results Pending)   NM-CARDIAC STRESS TEST    (Results Pending)     Results for orders placed or performed during the hospital encounter of 07/09/21   CBC with Differential   Result Value Ref Range    WBC 9.8 4.8 - 10.8 K/uL    RBC 4.97 4.70 - 6.10 M/uL    Hemoglobin 15.3 14.0 - 18.0 g/dL    Hematocrit 46.6 42.0 - 52.0 %    MCV 93.8 81.4 - 97.8 fL    MCH 30.8 27.0 - 33.0 pg    MCHC 32.8 (L) 33.7 - 35.3 g/dL    RDW 47.1 35.9 - 50.0 fL    Platelet Count 127 (L) 164 - 446 K/uL    MPV 10.1 9.0 - 12.9 fL    Neutrophils-Polys 65.70 44.00 - 72.00 %    Lymphocytes 18.90 (L) 22.00 - 41.00 %    Monocytes 13.00 0.00 - 13.40 %    Eosinophils 1.20 0.00 - 6.90 %    Basophils 0.60 0.00 - 1.80 %    Immature Granulocytes 0.60 0.00 - 0.90 %    Nucleated RBC 0.00 /100 WBC    Neutrophils (Absolute) 6.44 1.82 - 7.42 K/uL    Lymphs (Absolute) 1.85 1.00 - 4.80 K/uL    Monos (Absolute) 1.28 (H) 0.00 - 0.85 K/uL    Eos (Absolute) 0.12 0.00 - 0.51 K/uL    Baso (Absolute) 0.06 0.00 - 0.12 K/uL    Immature Granulocytes (abs) 0.06 0.00 - 0.11 K/uL    NRBC (Absolute) 0.00 K/uL   Complete Metabolic Panel (CMP)   Result Value Ref Range    Sodium 141 135 - 145 mmol/L    Potassium 4.5 3.6 - 5.5 mmol/L    Chloride 107 96 - 112 mmol/L    Co2 20 20 - 33 mmol/L    Anion Gap 14.0 7.0 - 16.0    Glucose 112 (H) 65 - 99 mg/dL    Bun 22 8  - 22 mg/dL    Creatinine 1.34 0.50 - 1.40 mg/dL    Calcium 9.2 8.5 - 10.5 mg/dL    AST(SGOT) 34 12 - 45 U/L    ALT(SGPT) 21 2 - 50 U/L    Alkaline Phosphatase 93 30 - 99 U/L    Total Bilirubin 1.5 0.1 - 1.5 mg/dL    Albumin 4.0 3.2 - 4.9 g/dL    Total Protein 7.1 6.0 - 8.2 g/dL    Globulin 3.1 1.9 - 3.5 g/dL    A-G Ratio 1.3 g/dL   Troponin   Result Value Ref Range    Troponin T 91 (H) 6 - 19 ng/L   ESTIMATED GFR   Result Value Ref Range    GFR If African American >60 >60 mL/min/1.73 m 2    GFR If Non  53 (A) >60 mL/min/1.73 m 2   COV-2, FLU A/B, AND RSV BY PCR (2-4 HOURS CEPHEID): Collect NP swab in VTM    Specimen: Nasopharyngeal; Respirate   Result Value Ref Range    SARS-CoV-2 Source NP Swab          COURSE & MEDICAL DECISION MAKING  Pertinent Labs & Imaging studies reviewed. (See chart for details)  Patient appears to have exertional dyspnea with chest pain quite concerning for angina given his prior history of MI.  Patient will be admitted by the hospitalist.  I discussed this with the admitting hospitalist at 1 AM.  He knows that a D-dimer is currently pending.  Patient will require a stress test, echocardiogram, and to be ruled out for pulmonary embolus.      FINAL IMPRESSION  1.  Chest pain  2.  Rule out pulmonary embolus  3.  Unstable angina      Please note that this dictation was created using voice recognition software. I have worked with consultants from the vendor as well as technical experts from [facility] [employer] to optimize the interface. I have made every reasonable attempt to correct obvious errors, but I expect that there are errors of grammar and possibly content that I did not discover before finalizing the note.      Electronically signed by: Mohinder Talbot M.D., 7/10/2021 12:58 AM

## 2021-07-10 NOTE — CONSULTS
Procedure Requested: PE lysis  Date of request: 7/10/21  Date of consultation: 7/10/21  Requesting Provider: Paxton AGUILERA    Summary:  71M Bilateral PE  Elevated biomarkers of heart strain  DVT  No echo  No intensivist consulted   Order not placed STAT  No call placed to IR        Interventional Radiology Recommendation:  PE lysis pathway requires intensivist consultation  CT has poor accuracy for right heart strain, which is why we generally require echo  Would be reasonable to expedite if patient has massive PE but presumably that would have generated a STAT order and phone call to me  Call [phone] with questions  Have sent Voalte message and paged Dr Matta

## 2021-07-10 NOTE — PROGRESS NOTES
Hospital Medicine Daily Progress Note    Date of Service  7/10/2021     Patient was just assigned to my services at 10:25AM, spoke with previous Attending assigned this morning, Dr. Mitesh García with [facility], received sign-out.    Reviewed patient's chart, appears patient has extensive B/L PE. Ordered for IR consult as recommended by cardiology.  Full progress note to follow.    Addendum:  Have been messaged by Dr. Meek and Dr. Hazel.  Consulting ICU for thrombolysis protocol, voalte messaged Dr. Nicole, gave my cell at 11:54AM.  awaiting call. I re-ordered echo stat.  Patient has been on heparin gtt.     NA score = 5, on initial vitals and labs, score without echo.  NA score  = 2, on most recent vitals and labs, score without echo.

## 2021-07-10 NOTE — ASSESSMENT & PLAN NOTE
Using oxygen 2 L nasal cannula, due to previous Covid infection.  Presenting with lower extremity edema, crackles, and reports of ALICIA.  Chest x-ray showed pulmonary edema  Elevated BNP  Due to acute extensive bilateral PE  -Continue oxygen supplementation  -Patient is on nocturnal oxygen home order, will need home O2 eval when patient is more appropriate.  Currently using 2 L continuous.

## 2021-07-10 NOTE — ASSESSMENT & PLAN NOTE
Patient has history of DVT on the left side years ago. Patient was on Xarelto before, he mentioned he was on a triangle shaped medication, pt is color blind and does not know if it was red colored pill.    D-dimer was very elevated, repeat ultrasound showed acute DVT on the right side  CTA CONFIRMED PE  Continue heparin drip and switch to oral later  Patient is not a candidate for thrombectomy as per critical care, Veronica score 2 after improvement

## 2021-07-10 NOTE — ED TRIAGE NOTES
Chief Complaint   Patient presents with   • Chest Pain     Pt c/o symptoms for about 1 wk. sent by MD for further eval, new onset CHF. chest pressure, dull ache.   • Fatigue   • Shortness of Breath   • Leg Swelling     Explained to pt triage process, made pt aware to tell this RN/staff of any changes/concerns, pt verbalized understanding of process and instructions given. Pt to ER kavitha.

## 2021-07-10 NOTE — CARE PLAN
The patient is Watcher - Medium risk of patient condition declining or worsening    Shift Goals  Clinical Goals: Maintain O2 sat about 90%  Patient Goals: Rest  Family Goals: N/A    Progress made toward(s) clinical / shift goals:      Patient is not progressing towards the following goals:      Problem: Pain - Standard  Goal: Alleviation of pain or a reduction in pain to the patient’s comfort goal  Outcome: Progressing     Problem: Knowledge Deficit - Standard  Goal: Patient and family/care givers will demonstrate understanding of plan of care, disease process/condition, diagnostic tests and medications  Outcome: Progressing

## 2021-07-10 NOTE — ASSESSMENT & PLAN NOTE
History of coronary artery disease with stent more than 10 years ago  We will continue patient on rosuvastatin

## 2021-07-10 NOTE — H&P
Hospital Medicine History & Physical Note    Date of Service  7/10/2021    Primary Care Physician  YASMINE Lennon.    Consultants  Cardio  Dr Hazel      Code Status  Full Code    Chief Complaint  Chief Complaint   Patient presents with   • Chest Pain     Pt c/o symptoms for about 1 wk. sent by MD for further eval, new onset CHF. chest pressure, dull ache.   • Fatigue   • Shortness of Breath   • Leg Swelling       History of Presenting Illness    71-year-old male with history of coronary artery disease s/p stent peripheral vascular disease hypertension and obesity presented 7/10 with chest pain, patient has history of coronary artery disease was seen by cardiologist today who sent him to the emergency for chest pain, patient has had pressure chest pain on exertion, releasing on rest, not radiated, associated with shortness of breath, patient also noticed swelling on his legs and using more pillows at night, no syncope or fainting no palpitation or urinary or bowel symptoms, on admission labs showed elevated troponin 91 and proBNP 6807, also he had crackles on exam with lower extremity edema, a chest x-ray showed mild pulmonary edema, EKG showed T inversion on anterior leads Lasix was started, heparin drip was started, patient will be admitted for possible heart failure and acute coronary syndrome, case was discussed with cardiologist.    Ultrasound lower extremity showed acute DVT on the right side and chronic on the left side, Heparin drip is on, CTA was ordered    I discussed the plan of care with patient and family.    Review of Systems  Review of Systems   Constitutional: Positive for malaise/fatigue. Negative for chills, fever and weight loss.   HENT: Negative.    Eyes: Negative.    Respiratory: Positive for shortness of breath. Negative for cough, sputum production and wheezing.    Cardiovascular: Positive for chest pain, orthopnea, claudication, leg swelling and PND. Negative for palpitations.  "  Gastrointestinal: Negative.    Genitourinary: Negative.    Musculoskeletal: Negative.    Skin: Negative.    Neurological: Negative.    Psychiatric/Behavioral: Negative.        Past Medical History   has a past medical history of ASTHMA, CAD (coronary artery disease) (8/26/2011), CATARACT, Colorblind, Dental disorder, Essential hypertension, Heart burn, High cholesterol, Hyperlipidemia (8/26/2011), Indigestion, Infectious disease, Myocardial infarct (HCC) (2005), Pain (11/14/11), Personal history of venous thrombosis and embolism (2006), Pneumonia (2009), Presence of stent in left circumflex coronary artery, Unspecified hemorrhagic conditions, Venous insufficiency of both lower extremities, White coat syndrome with diagnosis of hypertension, and White coat syndrome without hypertension.    Surgical History   has a past surgical history that includes tonsillectomy and adenoidectomy (1957); sinus trephine frontal (1971); cataract phaco with iol (11/2/2010); cataract phaco with iol (11/23/2010); ventral hernia repair (11/30/2011); sinusotomies (08/1963); other cardiac surgery (2005); inguinal hernia repair (1962); inguinal hernia repair (11/30/2011); and femoral endarterectomy (Left, 9/20/2019).     Family History  family history includes Cancer in his mother; Dementia in his mother; Heart Attack in his mother; Heart Disease in his mother.   Family history reviewed with patient. There is family history that is pertinent to the chief complaint.     Social History   reports that he quit smoking about 9 years ago. His smoking use included cigars. He quit after 30.00 years of use. He has never used smokeless tobacco. He reports that he does not drink alcohol and does not use drugs.    Allergies  Allergies   Allergen Reactions   • Mercury Swelling     And mercury based preservatives-Thimerasol  Swelling, \"strange discharge from eyes\"   • Other Environmental Anaphylaxis     Insect toxins/ bees and wasps- Anaphylaxis   • " Sulfa Drugs Anaphylaxis and Swelling   • Pravastatin Diarrhea     Diarrhea        Medications  Prior to Admission Medications   Prescriptions Last Dose Informant Patient Reported? Taking?   Ascorbic Acid (VITAMIN C PO)  Patient Yes No   Sig: Take 6,000 mg by mouth every day.   Coenzyme Q10 (CO Q-10) 300 MG Cap  Patient Yes No   Sig: Take 1 Cap by mouth every day.   Misc. Devices Misc   No No   Sig: Patient needs home oxygen extended for another 30 days due to hypoxia   Multiple Vitamin (MULTIVITAMIN PO)  Patient Yes No   Sig: Take 1 Tab by mouth every day.   Omega-3 Fatty Acids (FISH OIL) 1000 MG Cap capsule   Yes No   Sig: Take 1 capsule by mouth 2 times a day.   VITAMIN D PO  Patient Yes No   Sig: Take 1 capsule by mouth every day.   albuterol 108 (90 Base) MCG/ACT Aero Soln inhalation aerosol   No No   Sig: Inhale 2 Puffs every four hours as needed for Shortness of Breath.   aspirin 81 MG tablet  Patient Yes No   Sig: Take 81 mg by mouth every day.   niacin (NIASPAN) 1000 MG CR tablet  Patient No No   Sig: TAKE TWO TABLETS BY MOUTH DAILY   rosuvastatin (CRESTOR) 20 MG Tab  Patient No No   Sig: Take 1 Tab by mouth every evening.      Facility-Administered Medications: None       Physical Exam  Temp:  [36.1 °C (97 °F)-36.9 °C (98.5 °F)] 36.1 °C (97 °F)  Pulse:  [] 88  Resp:  [16-20] 18  BP: (122-147)/(71-97) 130/91  SpO2:  [88 %-97 %] 94 %    Physical Exam  Constitutional:       Appearance: He is obese. He is not ill-appearing.   HENT:      Head: Normocephalic and atraumatic.   Eyes:      General: No scleral icterus.  Cardiovascular:      Rate and Rhythm: Normal rate.      Heart sounds: No murmur heard.     Pulmonary:      Effort: Pulmonary effort is normal. No respiratory distress.      Breath sounds: Rales present. No wheezing.   Abdominal:      General: Bowel sounds are normal. There is no distension.      Palpations: Abdomen is soft.      Tenderness: There is no abdominal tenderness. There is no guarding.    Musculoskeletal:         General: Swelling present. No deformity.      Right lower leg: Edema present.      Left lower leg: Edema present.   Skin:     Coloration: Skin is not jaundiced.      Findings: No bruising, lesion or rash.   Neurological:      General: No focal deficit present.      Mental Status: He is alert and oriented to person, place, and time. Mental status is at baseline.      Cranial Nerves: No cranial nerve deficit.      Motor: No weakness.   Psychiatric:         Mood and Affect: Mood normal.         Laboratory:  Recent Labs     07/09/21  1830   WBC 9.8   RBC 4.97   HEMOGLOBIN 15.3   HEMATOCRIT 46.6   MCV 93.8   MCH 30.8   MCHC 32.8*   RDW 47.1   PLATELETCT 127*   MPV 10.1     Recent Labs     07/09/21 1830   SODIUM 141   POTASSIUM 4.5   CHLORIDE 107   CO2 20   GLUCOSE 112*   BUN 22   CREATININE 1.34   CALCIUM 9.2     Recent Labs     07/09/21  1830   ALTSGPT 21   ASTSGOT 34   ALKPHOSPHAT 93   TBILIRUBIN 1.5   GLUCOSE 112*     Recent Labs     07/10/21  0035   APTT 32.0   INR 1.22*     Recent Labs     07/09/21  1830   NTPROBNP 6807*         Recent Labs     07/09/21  1830 07/10/21  0035   TROPONINT 91* 76*       Imaging:  US-EXTREMITY VENOUS LOWER BILAT   Final Result      DX-CHEST-PORTABLE (1 VIEW)   Final Result      Probable mild pulmonary interstitial edema and the cardiac silhouette is enlarged      CT-CTA CHEST PULMONARY ARTERY W/ RECONS    (Results Pending)   EC-ECHOCARDIOGRAM COMPLETE W/O CONT    (Results Pending)       X-Ray:  I have personally reviewed the images and compared with prior images.  EKG:  I have personally reviewed the images and compared with prior images.    Assessment/Plan:  I anticipate this patient will require at least two midnights for appropriate medical management, necessitating inpatient admission.    * Acute coronary syndrome (HCC)  Assessment & Plan  PREETI score around 6  Typical chest pain on exertion with changes in the EKG; T inversion on anterior leads  Elevated  troponin  Echo is pending  Possible related to PE, MI type II, CTA is pending  Cardiology was consulted for possible cardiac cath, we will hold for stress test at this time  Heparin drip  We will keep the patient n.p.o. at this time    Acute DVT (deep venous thrombosis) ([facility])  Assessment & Plan  Patient has history of DVT on the left side years ago  D-dimer was very elevated, repeat ultrasound showed acute DVT on the right side  CTA to rule out or to confirm PE  Start with heparin drip and switch to oral later.    MIKAYLA (acute kidney injury) ([facility])  Assessment & Plan  Came with cr 1.3 and 0.6   Bladder scan to rule out retention  Lasix for possible cardiorenal syndrome  Labs tomorrow    Acute respiratory failure ([facility])  Assessment & Plan  Using oxygen 2 L nasal cannula 24/7   with lower extremity edema, and crackles   With shortness of breath on exertion  Chest x-ray showed pulmonary edema  Elevated BNP  Likely heart failure however will order CTA to rule out PE, since D-dimer is very high  Lasix with metoprolol and small dose of lisinopril  Aspirin and rosuvastatin  Echo and stress test  Consider cardiac consult tomorrow morning if needed    Coronary artery disease due to lipid rich plaque- (present on admission)  Assessment & Plan  History of coronary artery disease with stent more than 10 years ago  Has chest pain on exertion  EKG showed T inversion on anterior leads with elevated troponin 91  Cardiology was consulted, appreciate the recommendation  Echo and CTA are pending  Aspirin and atorvastatin  heparin drip at this time.   Metoprolol and lisinopril    Acute exacerbation of CHF (congestive heart failure) ([facility])  Assessment & Plan  With crackles and lower extremity edema  Chest x-ray pulmonary edema  Start with Lasix 40 IV twice daily  Weight daily and I's and O's  Start with metoprolol and lisinopril    Essential hypertension- (present on admission)  Assessment & Plan  Start with metoprolol and lisinopril due to  possible heart failure  Close monitoring and adjust the dose if needed      VTE prophylaxis: SCDs/TEDs and enoxaparin ppx       Interventions to be considered in all patients in order to minimize the risk of delirium.   -do not disturb patient (vitals or lab draws) between the hours of 10 PM and 6 AM.  -ideally the patient should not sleep during the day and we should avoid day time naps.   -up in chair for meals  -ambulate at least three times daily, as able  -watch for constipation  -timed voiding - ask patient is she would like to go to the bathroom q 2-3 hours, except during the do not disturb hours.   -remove all necessary lines (central lines, peripheral IVs, feeding tubes, wesley catheters)  -unless patient has shown harm to self or others I would recommend against use of restraints - either chemical or physical (antipsychotics)   -minimize polypharmacy, do not dose medication during sleep hours

## 2021-07-10 NOTE — ED NOTES
Report from Destini RN. Pt sitting up in [facility] talking to family at bedside, NAD. Pt denies chest pain or discomfort, denies SOB. Blood drawn, sent to lab with covid swab. Updated pt and family on POC. Call light in reach.

## 2021-07-10 NOTE — ASSESSMENT & PLAN NOTE
Close monitoring and adjust the dose if needed  Hydralazine IV as needed we will hold oral antimedication at this time due to possible decompensation

## 2021-07-10 NOTE — PROGRESS NOTES
4 Eyes Skin Assessment Completed by JOSE LUIS Zapata and JOSE LUIS Mike.    Head WDL  Ears WDL  Nose WDL  Mouth WDL  Neck WDL  Breast/Chest WDL  Shoulder Blades WDL  Spine WDL  (R) Arm/Elbow/Hand Bruising   (L) Arm/Elbow/Hand Bruising  Abdomen WDL  Groin WDL  Scrotum/Coccyx/Buttocks WDL  (R) Leg Swelling, redness, dusky legs, varicose veins  (L) Leg Swelling, dusky legs, redness, varicose veins  (R) Heel/Foot/Toe WDL. Calloused/dryness  (L) Heel/Foot/Toe WDL Calloused/dryness          Devices In Places Tele Box, Pulse Ox and Nasal Cannula      Interventions In Place Gray Ear Foams, NC W/Ear Foams, Pillows and Pressure Redistribution Mattress    Possible Skin Injury No    Pictures Uploaded Into Epic N/A  Wound Consult Placed N/A  RN Wound Prevention Protocol Ordered No

## 2021-07-10 NOTE — ASSESSMENT & PLAN NOTE
With crackles and lower extremity edema.    Symptoms are better explained due to acute PE.  Patient does not meet criteria for acute CHF given this new finding.    continue Lasix 40 IV twice daily  Weight daily and I's and O's  Continue metoprolol and lisinopril

## 2021-07-10 NOTE — CONSULTS
"Reason for Consult:  Asked by Dr Hannah Saavedra M.D. to see this patient with chest pain and shortness of breath    CC:   Chief Complaint   Patient presents with   • Chest Pain     Pt c/o symptoms for about 1 wk. sent by MD for further eval, new onset CHF. chest pressure, dull ache.   • Fatigue   • Shortness of Breath   • Leg Swelling       HPI:      71 year old man with PMH HTN, HLD, CAD/PCI, venous insufficiency, AAA/ L CFA anneurysm w non-occlusive thrombus, PAD, former smoker presents with chest pain and shortness of breath. Describes last two weeks symptomatic and came on suddenly at first and persisted. He works as  Tuesday-Thursday and did not go in as a result of symptoms so that would not cause accident. He typically wears oxygen only at night but has had to use throughout the day during this period. Progressive leg swelling. Shortness of breath more pronounced with ambulation and limited physical stamina as a result. Denies dizziness or syncope. Describes orthopnea and leg edema worse than baseline. Denies toxic social habits. He called outpatient cardiology office who appropriately recommended ED visit. He had to arrange for care of his dog first. He maintains sense of humor throughout and jokes about \"liking her movies\" when being described he will start heparin - he means to allude to beltran, as in lei gong. Prior to this, described \"blood thinner\" to which he says \"good, no one likes a fat vein.\"     Medications / Drug list prior to admission:  No current facility-administered medications on file prior to encounter.     Current Outpatient Medications on File Prior to Encounter   Medication Sig Dispense Refill   • albuterol 108 (90 Base) MCG/ACT Aero Soln inhalation aerosol Inhale 2 Puffs every four hours as needed for Shortness of Breath. 1 Each 1   • Omega-3 Fatty Acids (FISH OIL) 1000 MG Cap capsule Take 1 capsule by mouth 2 times a day.     • VITAMIN D PO Take 1 capsule by " mouth every day.     • niacin (NIASPAN) 1000 MG CR tablet TAKE TWO TABLETS BY MOUTH DAILY 180 Tab 1   • rosuvastatin (CRESTOR) 20 MG Tab Take 1 Tab by mouth every evening. 90 Tab 3   • Coenzyme Q10 (CO Q-10) 300 MG Cap Take 1 Cap by mouth every day.     • Ascorbic Acid (VITAMIN C PO) Take 6,000 mg by mouth every day.     • Multiple Vitamin (MULTIVITAMIN PO) Take 1 Tab by mouth every day.     • aspirin 81 MG tablet Take 81 mg by mouth every day.         Current list of administered Medications:    Current Facility-Administered Medications:   •  albuterol inhaler 2 Puff, 2 Puff, Inhalation, Q4HRS PRN, Hannah Saavedra M.D.  •  aspirin EC (ECOTRIN) tablet 81 mg, 81 mg, Oral, DAILY, Hannah Saavedra M.D.  •  rosuvastatin (CRESTOR) tablet 20 mg, 20 mg, Oral, Q EVENING, Hannah Saavedra M.D.  •  senna-docusate (PERICOLACE or SENOKOT S) 8.6-50 MG per tablet 2 tablet, 2 tablet, Oral, BID **AND** polyethylene glycol/lytes (MIRALAX) PACKET 1 Packet, 1 Packet, Oral, QDAY PRN **AND** magnesium hydroxide (MILK OF MAGNESIA) suspension 30 mL, 30 mL, Oral, QDAY PRN **AND** bisacodyl (DULCOLAX) suppository 10 mg, 10 mg, Rectal, QDAY PRN, Hannah Saavedra M.D.  •  enalaprilat (VASOTEC) injection 1.25 mg, 1.25 mg, Intravenous, Q6HRS PRN, Hannah Saavedra M.D.  •  labetalol (NORMODYNE/TRANDATE) injection 10 mg, 10 mg, Intravenous, Q4HRS PRN, Hannah Saavedra M.D.  •  acetaminophen (Tylenol) tablet 650 mg, 650 mg, Oral, Q6HRS PRN, Hannah Saavedra M.D.  •  metoprolol tartrate (LOPRESSOR) tablet 12.5 mg, 12.5 mg, Oral, TWICE DAILY, Solaiman Algharbi, M.D.  •  ondansetron (ZOFRAN) syringe/vial injection 4 mg, 4 mg, Intravenous, Q4HRS PRN, Hannah Saavedra M.D.  •  ondansetron (ZOFRAN ODT) dispertab 4 mg, 4 mg, Oral, Q4HRS PRN, Hannah Saavedra M.D.  •  lisinopril (PRINIVIL) tablet 2.5 mg, 2.5 mg, Oral, Q DAY, Hannah Saavedra M.D.  •  heparin infusion 25,000 units in 500 mL 0.45% NACL, 0-30 Units/kg/hr (Adjusted),  Intravenous, Continuous, Hannah Saavedra M.D.  •  heparin injection 3,700 Units, 40 Units/kg (Adjusted), Intravenous, PRN, Hannah Saavedra M.D.    Past Medical History:   Diagnosis Date   • ASTHMA     as a youth   • CAD (coronary artery disease) 8/26/2011   • CATARACT     bilateral IOL   • Colorblind     problems with red/green   • Dental disorder     upper and partial lower   • Essential hypertension    • Heart burn    • High cholesterol    • Hyperlipidemia 8/26/2011   • Indigestion    • Infectious disease     Hx of staph infections- last infection 2018-    • Myocardial infarct (HCC) 2005    STENTS   • Pain 11/14/11    NECK 7.5/10; C5-C6   • Personal history of venous thrombosis and embolism 2006    DVT 01/2019   • Pneumonia 2009   • Presence of stent in left circumflex coronary artery    • Unspecified hemorrhagic conditions     TAKES ASA . PROLONGED BLEEDING, nosebleeds   • Venous insufficiency of both lower extremities    • White coat syndrome with diagnosis of hypertension    • White coat syndrome without hypertension        Past Surgical History:   Procedure Laterality Date   • FEMORAL ENDARTERECTOMY Left 9/20/2019    Procedure: ENDARTERECTOMY, FEMORAL;  Surgeon: Candelaria Crawford M.D.;  Location: SURGERY [facility];  Service: General   • VENTRAL HERNIA REPAIR  11/30/2011    Performed by TROY GONZALEZ at SURGERY [facility]   • INGUINAL HERNIA REPAIR  11/30/2011    Performed by TROY GONZALEZ at SURGERY [facility]   • CATARACT PHACO WITH IOL  11/23/2010    Performed by RM WILLIS at SURGERY SAME DAY [facility]   • CATARACT PHACO WITH IOL  11/2/2010    Performed by RM WILLIS at SURGERY SAME DAY [facility]   • OTHER CARDIAC SURGERY  2005    STENTS x 4   • SINUS TREPHINE FRONTAL  1971   • SINUSOTOMIES  08/1963   • INGUINAL HERNIA REPAIR  1962    WITH UNDESCENDED TESTICLE   • TONSILLECTOMY AND ADENOIDECTOMY  1957       Family History   Problem Relation Age of Onset   • Heart  "Attack Mother    • Heart Disease Mother    • Cancer Mother         GENET    • Dementia Mother      Patient family history was personally reviewed, no pertinent family history to current presentation    Social History     Socioeconomic History   • Marital status:      Spouse name: Not on file   • Number of children: Not on file   • Years of education: Not on file   • Highest education level: Not on file   Occupational History   • Not on file   Tobacco Use   • Smoking status: Former Smoker     Years: 30.00     Types: Cigars     Quit date: 2011     Years since quittin.6   • Smokeless tobacco: Never Used   • Tobacco comment:    Vaping Use   • Vaping Use: Never used   Substance and Sexual Activity   • Alcohol use: No   • Drug use: No   • Sexual activity: Not Currently     Partners: Female   Other Topics Concern   • Not on file   Social History Narrative   • Not on file     Social Determinants of Health     Financial Resource Strain:    • Difficulty of Paying Living Expenses:    Food Insecurity:    • Worried About Running Out of Food in the Last Year:    • Ran Out of Food in the Last Year:    Transportation Needs:    • Lack of Transportation (Medical):    • Lack of Transportation (Non-Medical):    Physical Activity:    • Days of Exercise per Week:    • Minutes of Exercise per Session:    Stress:    • Feeling of Stress :    Social Connections:    • Frequency of Communication with Friends and Family:    • Frequency of Social Gatherings with Friends and Family:    • Attends Nondenominational Services:    • Active Member of Clubs or Organizations:    • Attends Club or Organization Meetings:    • Marital Status:    Intimate Partner Violence:    • Fear of Current or Ex-Partner:    • Emotionally Abused:    • Physically Abused:    • Sexually Abused:        ALLERGIES:  Allergies   Allergen Reactions   • Mercury Swelling     And mercury based preservatives-Thimerasol  Swelling, \"strange discharge from eyes\"   • Other " "Environmental Anaphylaxis     Insect toxins/ bees and wasps- Anaphylaxis   • Sulfa Drugs Anaphylaxis and Swelling   • Pravastatin Diarrhea     Diarrhea        Review of systems:  A complete review of symptoms was reviewed with patient. This is reviewed in H&P and PMH. ALL OTHERS reviewed and negative    Physical exam:  Patient Vitals for the past 24 hrs:   BP Temp Temp src Pulse Resp SpO2 Height Weight   07/10/21 0250 130/91 36.1 °C (97 °F) Temporal 88 18 94 % -- 116 kg (255 lb 1.2 oz)   07/10/21 0200 122/71 -- -- 91 16 93 % -- --   07/10/21 0100 143/83 -- -- 87 20 91 % -- --   07/10/21 0000 147/97 -- -- 84 18 97 % -- --   07/09/21 2153 128/88 36.9 °C (98.5 °F) Oral 83 16 97 % -- --   07/09/21 1949 137/82 36.8 °C (98.3 °F) Oral 96 16 96 % -- --   07/09/21 1649 -- -- -- -- -- 94 % -- --   07/09/21 1648 134/81 36.7 °C (98 °F) Temporal (!) 110 20 88 % 1.778 m (5' 10\") 119 kg (262 lb)     General: No acute distress.   EYES: no jaundice  HEENT: OP clear   Neck: No bruits No JVD.   CVS:   RRR. S1 + S2. No M/R/G. 1+ edema.  Resp: CTAB. No wheezing or crackles/rhonchi.  Abdomen: Soft, NT, ND,  Skin: Grossly nothing acute no obvious rashes  Neurological: Alert, Moves all extremities, no cranial nerve defects on limited exam  Extremities: Pulse 2+ in b/l LE. No cyanosis.     Data:  Laboratory studies personally reviewed by me:  Recent Results (from the past 24 hour(s))   CBC with Differential    Collection Time: 07/09/21  6:30 PM   Result Value Ref Range    WBC 9.8 4.8 - 10.8 K/uL    RBC 4.97 4.70 - 6.10 M/uL    Hemoglobin 15.3 14.0 - 18.0 g/dL    Hematocrit 46.6 42.0 - 52.0 %    MCV 93.8 81.4 - 97.8 fL    MCH 30.8 27.0 - 33.0 pg    MCHC 32.8 (L) 33.7 - 35.3 g/dL    RDW 47.1 35.9 - 50.0 fL    Platelet Count 127 (L) 164 - 446 K/uL    MPV 10.1 9.0 - 12.9 fL    Neutrophils-Polys 65.70 44.00 - 72.00 %    Lymphocytes 18.90 (L) 22.00 - 41.00 %    Monocytes 13.00 0.00 - 13.40 %    Eosinophils 1.20 0.00 - 6.90 %    Basophils 0.60 " 0.00 - 1.80 %    Immature Granulocytes 0.60 0.00 - 0.90 %    Nucleated RBC 0.00 /100 WBC    Neutrophils (Absolute) 6.44 1.82 - 7.42 K/uL    Lymphs (Absolute) 1.85 1.00 - 4.80 K/uL    Monos (Absolute) 1.28 (H) 0.00 - 0.85 K/uL    Eos (Absolute) 0.12 0.00 - 0.51 K/uL    Baso (Absolute) 0.06 0.00 - 0.12 K/uL    Immature Granulocytes (abs) 0.06 0.00 - 0.11 K/uL    NRBC (Absolute) 0.00 K/uL   Complete Metabolic Panel (CMP)    Collection Time: 07/09/21  6:30 PM   Result Value Ref Range    Sodium 141 135 - 145 mmol/L    Potassium 4.5 3.6 - 5.5 mmol/L    Chloride 107 96 - 112 mmol/L    Co2 20 20 - 33 mmol/L    Anion Gap 14.0 7.0 - 16.0    Glucose 112 (H) 65 - 99 mg/dL    Bun 22 8 - 22 mg/dL    Creatinine 1.34 0.50 - 1.40 mg/dL    Calcium 9.2 8.5 - 10.5 mg/dL    AST(SGOT) 34 12 - 45 U/L    ALT(SGPT) 21 2 - 50 U/L    Alkaline Phosphatase 93 30 - 99 U/L    Total Bilirubin 1.5 0.1 - 1.5 mg/dL    Albumin 4.0 3.2 - 4.9 g/dL    Total Protein 7.1 6.0 - 8.2 g/dL    Globulin 3.1 1.9 - 3.5 g/dL    A-G Ratio 1.3 g/dL   Troponin    Collection Time: 07/09/21  6:30 PM   Result Value Ref Range    Troponin T 91 (H) 6 - 19 ng/L   ESTIMATED GFR    Collection Time: 07/09/21  6:30 PM   Result Value Ref Range    GFR If African American >60 >60 mL/min/1.73 m 2    GFR If Non  53 (A) >60 mL/min/1.73 m 2   proBrain Natriuretic Peptide, NT    Collection Time: 07/09/21  6:30 PM   Result Value Ref Range    NT-proBNP 6807 (H) 0 - 125 pg/mL   D-DIMER    Collection Time: 07/10/21 12:35 AM   Result Value Ref Range    D-Dimer Screen >20.00 (H) 0.00 - 0.50 ug/mL (FEU)   Troponin - STAT Once    Collection Time: 07/10/21 12:35 AM   Result Value Ref Range    Troponin T 76 (H) 6 - 19 ng/L   HEMOGLOBIN A1C    Collection Time: 07/10/21 12:35 AM   Result Value Ref Range    Glycohemoglobin 5.7 (H) 4.0 - 5.6 %    Est Avg Glucose 117 mg/dL   aPTT    Collection Time: 07/10/21 12:35 AM   Result Value Ref Range    APTT 32.0 24.7 - 36.0 sec   Prothrombin  Time    Collection Time: 07/10/21 12:35 AM   Result Value Ref Range    PT 15.0 (H) 12.0 - 14.6 sec    INR 1.22 (H) 0.87 - 1.13   Heparin Xa (Unfractionated)    Collection Time: 07/10/21 12:35 AM   Result Value Ref Range    Heparin Xa (UFH) <0.10 IU/mL   COV-2, FLU A/B, AND RSV BY PCR (2-4 HOURS CEPHEID): Collect NP swab in VTM    Collection Time: 07/10/21 12:37 AM    Specimen: Nasopharyngeal; Respirate   Result Value Ref Range    Influenza virus A RNA Negative Negative    Influenza virus B, PCR Negative Negative    RSV, PCR Negative Negative    SARS-CoV-2 by PCR NotDetected     SARS-CoV-2 Source NP Swab    EKG    Collection Time: 07/10/21  1:24 AM   Result Value Ref Range    Report       [facility] Emergency Dept.    Test Date:  2021-07-10  Pt Name:    LYNN MORGAN             Department: ER  MRN:        [id number]                      Room:       [facility]  Gender:     Male                         Technician: [id number]  :        1950                   Requested By:MESSI ATKINS  Order #:    [id number]                    Reading MD:    Measurements  Intervals                                Axis  Rate:       81                           P:          43  NV:         168                          QRS:        -4  QRSD:       94                           T:          -35  QT:         420  QTc:        488    Interpretive Statements  SINUS RHYTHM  NONSPECIFIC T ABNORMALITIES, ANTERIOR LEADS  BORDERLINE PROLONGED QT INTERVAL  Compared to ECG 2021 16:45:59  Sinus tachycardia no longer present  Ventricular premature complex(es) no longer present  T-wave abnormality still present         All pertinent features of laboratory and imaging reviewed including primary images where applicable    US LE venous 7/10/21   CONCLUSIONS   Evidence of acute occlusive deep vein thrombosis in the right superficial    femoral vein extending to popliteal veins.    Evidence of acute partial deep vein thrombosis in one  of the paired right    posterior tibial vein and peroneal veins.    Evidence of subacute/chronic, non-occlusive deep vein thrombosis in the    left proximal-mid superficial femoral vein.    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    Principal Problem:    Acute coronary syndrome (HCC) POA: Unknown  Active Problems:    Coronary artery disease due to lipid rich plaque POA: Yes    Essential hypertension (Chronic) POA: Yes    Acute respiratory failure (HCC) POA: Unknown    Acute exacerbation of CHF (congestive heart failure) (HCC) POA: Unknown    MIKAYLA (acute kidney injury) (HCC) POA: Unknown    Acute DVT (deep venous thrombosis) (HCC) POA: Unknown  Resolved Problems:    * No resolved hospital problems. *      Assessment / Plan:  71 year old man with PMH HTN, HLD, CAD/PCI, venous insufficiency, AAA/ L CFA anneurysm w non-occlusive thrombus, PAD, former smoker presents with chest pain and shortness of breath. Found DVT on ultrasound. Likely high risk PE with RV strain.    -start heparin gtt  -check CTA chest to rule out PE  -trend troponin  -check TTE  -if results as expected, please consider catheter directed thrombolysis with IR  -increase in creatinine, can hold off lasix and acei for now. Can use hydralazine for BP control if needed. Reconsider should clinical suspicion change.  -would avoid negative inotropes such as metoprolol until CT resulted and RV strain ruled out  -can hold off on aspirin for now unless clinical suspicion should shift to CAD  -continue statin    I personally discussed his case with Dr Saavedra    It is my pleasure to participate in the care of Mr. Pyle.  Please do not hesitate to contact me with questions or concerns.    Ruy Hazel MD  Cardiologist [facility]    Addendum:  CTA chest 7/10/21  1. Extensive acute bilateral pulmonary emboli.  2. Elevation of the RV/LV ratio.  3. Small wedge-shaped opacity in the left  lower lobe, likely infarct.  4. Moderate hiatal hernia.    Impressive imaging. Please consult IR for catheter directed thrombolysis. If any issues, discuss with vascular surgery for more distal lysis.    Statement Selected

## 2021-07-10 NOTE — DIETARY
"NUTRITION SERVICES - Alert received for newly identified wound. No wound team consult pending or wound documented in flowsheets.     Update: Spoke with JOSE LUIS Luo d/t concern for wound note being put in under wrong pt. This RD explained that pt's nutrition admit screen w/ answer \"yes\" to pt having current wound/hx of pressure wounds sends consult to RD. RN confirms pt does not have any current wounds.      RD is not following this pt.   RD will monitor per dept policy.      "

## 2021-07-11 LAB
ALBUMIN SERPL BCP-MCNC: 3.8 G/DL (ref 3.2–4.9)
BUN SERPL-MCNC: 20 MG/DL (ref 8–22)
CALCIUM SERPL-MCNC: 8.8 MG/DL (ref 8.5–10.5)
CHLORIDE SERPL-SCNC: 106 MMOL/L (ref 96–112)
CHOLEST SERPL-MCNC: 121 MG/DL (ref 100–199)
CO2 SERPL-SCNC: 24 MMOL/L (ref 20–33)
CREAT SERPL-MCNC: 1.03 MG/DL (ref 0.5–1.4)
ERYTHROCYTE [DISTWIDTH] IN BLOOD BY AUTOMATED COUNT: 47 FL (ref 35.9–50)
GLUCOSE SERPL-MCNC: 94 MG/DL (ref 65–99)
HCT VFR BLD AUTO: 41.1 % (ref 42–52)
HDLC SERPL-MCNC: 57 MG/DL
HGB BLD-MCNC: 13.7 G/DL (ref 14–18)
LACTATE BLD-SCNC: 1.4 MMOL/L (ref 0.5–2)
LACTATE BLD-SCNC: 1.4 MMOL/L (ref 0.5–2)
LACTATE BLD-SCNC: 2 MMOL/L (ref 0.5–2)
LDLC SERPL CALC-MCNC: 51 MG/DL
MAGNESIUM SERPL-MCNC: 2.2 MG/DL (ref 1.5–2.5)
MCH RBC QN AUTO: 30.9 PG (ref 27–33)
MCHC RBC AUTO-ENTMCNC: 33.3 G/DL (ref 33.7–35.3)
MCV RBC AUTO: 92.8 FL (ref 81.4–97.8)
PHOSPHATE SERPL-MCNC: 4.2 MG/DL (ref 2.5–4.5)
PLATELET # BLD AUTO: 128 K/UL (ref 164–446)
PMV BLD AUTO: 9.4 FL (ref 9–12.9)
POTASSIUM SERPL-SCNC: 4.3 MMOL/L (ref 3.6–5.5)
RBC # BLD AUTO: 4.43 M/UL (ref 4.7–6.1)
SODIUM SERPL-SCNC: 140 MMOL/L (ref 135–145)
TRIGL SERPL-MCNC: 66 MG/DL (ref 0–149)
TROPONIN T SERPL-MCNC: 53 NG/L (ref 6–19)
UFH PPP CHRO-ACNC: 0.39 IU/ML
WBC # BLD AUTO: 10.4 K/UL (ref 4.8–10.8)

## 2021-07-11 PROCEDURE — 36415 COLL VENOUS BLD VENIPUNCTURE: CPT

## 2021-07-11 PROCEDURE — 85520 HEPARIN ASSAY: CPT

## 2021-07-11 PROCEDURE — 93005 ELECTROCARDIOGRAM TRACING: CPT | Performed by: EMERGENCY MEDICINE

## 2021-07-11 PROCEDURE — 80069 RENAL FUNCTION PANEL: CPT

## 2021-07-11 PROCEDURE — A9270 NON-COVERED ITEM OR SERVICE: HCPCS | Performed by: INTERNAL MEDICINE

## 2021-07-11 PROCEDURE — 83735 ASSAY OF MAGNESIUM: CPT

## 2021-07-11 PROCEDURE — 84484 ASSAY OF TROPONIN QUANT: CPT

## 2021-07-11 PROCEDURE — 80061 LIPID PANEL: CPT

## 2021-07-11 PROCEDURE — 700102 HCHG RX REV CODE 250 W/ 637 OVERRIDE(OP): Performed by: INTERNAL MEDICINE

## 2021-07-11 PROCEDURE — 85027 COMPLETE CBC AUTOMATED: CPT

## 2021-07-11 PROCEDURE — 83605 ASSAY OF LACTIC ACID: CPT

## 2021-07-11 PROCEDURE — 99233 SBSQ HOSP IP/OBS HIGH 50: CPT | Performed by: INTERNAL MEDICINE

## 2021-07-11 PROCEDURE — 700111 HCHG RX REV CODE 636 W/ 250 OVERRIDE (IP): Performed by: INTERNAL MEDICINE

## 2021-07-11 PROCEDURE — 770020 HCHG ROOM/CARE - TELE (206)

## 2021-07-11 RX ADMIN — ROSUVASTATIN CALCIUM 20 MG: 20 TABLET, FILM COATED ORAL at 17:31

## 2021-07-11 RX ADMIN — ALBUTEROL SULFATE 2 PUFF: 90 AEROSOL, METERED RESPIRATORY (INHALATION) at 22:07

## 2021-07-11 RX ADMIN — HEPARIN SODIUM 16 UNITS/KG/HR: 5000 INJECTION, SOLUTION INTRAVENOUS at 14:00

## 2021-07-11 ASSESSMENT — ENCOUNTER SYMPTOMS
DIZZINESS: 0
BACK PAIN: 0
CONSTIPATION: 0
CHOKING: 0
DIARRHEA: 0
CHILLS: 0
HEADACHES: 0
PALPITATIONS: 0
NAUSEA: 0
FEVER: 0
WHEEZING: 0
VOMITING: 0
STRIDOR: 0
CHEST TIGHTNESS: 0
COUGH: 0
APNEA: 0
SHORTNESS OF BREATH: 1
SHORTNESS OF BREATH: 0
ABDOMINAL PAIN: 0

## 2021-07-11 ASSESSMENT — FIBROSIS 4 INDEX: FIB4 SCORE: 4.06

## 2021-07-11 NOTE — ASSESSMENT & PLAN NOTE
COVID-19 (2/8/2021) on nocturnal oxygen at home  Will need home oxygen evaluation for continuous use

## 2021-07-11 NOTE — PROGRESS NOTES
Bedside reports received from day shift RN. Patient A&Ox4. No signs of respiratory distress noted or reported, on 2L NC. Patient denies pain at this time. Bed locked in lowest position, 2 side rails up. Call light within reach. Fall precautions in place. Patient reports no additional needs at this time.

## 2021-07-11 NOTE — PROGRESS NOTES
Echo resulted. 7/10/21  CONCLUSIONS  Technically difficult due to body habitus and positioning but adequate   study for interpretation.   Mildly reduced left ventricular systolic function.  Left ventricular ejection fraction is visually estimated to be 50%.  Paradoxical septal motion.  Mildly dilated right ventricle.  Reduced right ventricular systolic function.  Hypokinetic right ventricular free wall.  Right ventricular systolic pressure is estimated to be 28 mmHg.    This is consistent with ongoing clinical suspicion of high risk submassive PE causing RV failure.    By TTE, measured LVOT diameter 2.0cm, LVOT VTI 12, cardiac index 1.1. Therefore, also in cardiogenic shock. This is supported by MIKAYLA and inadequate documented urine output.    These findings were communicated to Dr Lopez and Dr Matta. Ongoing effort to get catheter directed thrombolysis for hemodynamic improvement.     Addendum:  [place] from Dr Nicole. Describes calculated VICKEY score and since low did not feel need to see patient. He describes hospital's policy score of 2 or lower can be on tele and 3 or more would require ICU and higher level of care. Since BP over 100, cardiac enzymes elevated (including enormous BNP which is unusual for thin RV), RV dysfunction on TTE, with heart rate under 110 he gets a score of VICKEY 4, making him stage II, with an 18% risk of complications including death, hemodynamic collapse, or recurrent nonfatal PE. I wonder if the hospital policy is mixing vickey score with vickey stage. Of note, tachy on presentation to over 110 bpm.

## 2021-07-11 NOTE — PROGRESS NOTES
RCC     Asked to review case by Dr. Matta.  Patient presented with chest pain and found to have pulmonary embolus.  Initial vital signs from around 5 PM yesterday until now are reviewed.  CT scan with 1.3:1 ratio c/w strain noted and troponin/BNP elevated.  Heart rate in the 80s.  Systolic BPs in the 130s.  Respiratory rate in the teens on 2 Lw/O2 sats in mid to high 90s.  Patient admitted to telemetry and started on heparin infusion.  CT scan chest consistent with bilateral pulmonary emboli.  Veronica score calculated and patient with 4 points puts him in stage II.  Patient with low work of breathing and respiratory rate about 20 hours since initial presentation and good vital signs.  Would transition from subcu heparin to Lovenox and then Warfarin or NOAC at some point.  No EKOS at this point. ECHO pending at this time.  Please call if any questioins       6 PM:  Case reviewed again with Dr. Matta and Dr. Hazel.  Chart reviewed again as well.  Heart rate now in the 70s, respiratory rate in the teens, O2 sats good on 2 L, blood pressure in the 110-140 range.  Urine output adequate per Dr. Matta who is looking at the patient now and the patient is speaking in full sentences unlabored and in no distress.  Renal function normal with BUN 22 and creatinine 1.34.  Anion gap normal.  Labs are from late yesterday and will need to be repeated.  Echocardiogram noted and reviewed with internal medicine and cardiology.  I spoke with Dr. Gaines who is on-call and she will go look at the patient and reassess him out an abundance of caution.

## 2021-07-11 NOTE — ASSESSMENT & PLAN NOTE
-Patient is improving on heparin drip at this time and does not show any signs of hemodynamic compromise, BP wnl, not tachycardic, and saturation is wnl on baseline home O2 of 2L  -We will continue to monitor this patient with the primary team and move him to ICU and consult IR accordingly if patient has any changes in his clinical status or vital signs  -Continue heparin drip protocol  -Fall precaution  -Trend troponin   -Oral anticoagulation prior to DC   -Will benefit from cancer screening as outpatient

## 2021-07-11 NOTE — CARE PLAN
Problem: Pain - Standard  Goal: Alleviation of pain or a reduction in pain to the patient’s comfort goal  7/11/2021 0910 by ZI CormierN.  Outcome: Progressing  7/11/2021 0910 by ZI CormierN.  Outcome: Progressing     Problem: Knowledge Deficit - Standard  Goal: Patient and family/care givers will demonstrate understanding of plan of care, disease process/condition, diagnostic tests and medications  7/11/2021 0910 by ZI CormierN.  Outcome: Progressing  7/11/2021 0910 by ZI CormierN.  Outcome: Progressing     Problem: Communication  Goal: The ability to communicate needs accurately and effectively will improve  7/11/2021 0910 by Caitlin Ferrara R.N.  Outcome: Progressing  7/11/2021 0910 by ZI CormierN.  Outcome: Progressing     Problem: Hemodynamics  Goal: Patient's hemodynamics, fluid balance and neurologic status will be stable or improve  7/11/2021 0910 by JORDAN Cormier.N.  Outcome: Progressing  7/11/2021 0910 by JORDAN Cormier.N.  Outcome: Progressing     Problem: Respiratory  Goal: Patient will achieve/maintain optimum respiratory ventilation and gas exchange  7/11/2021 0910 by Caitlin Ferrara R.N.  Outcome: Progressing  7/11/2021 0910 by JORDAN Cormier.N.  Outcome: Progressing   The patient is Stable - Low risk of patient condition declining or worsening    Shift Goals  Clinical Goals: Maintain O2 sats above 90%, no chest pain/SOB  Patient Goals: rest  Family Goals: N/A    Progress made toward(s) clinical / shift goals:  Progressing    Patient is not progressing towards the following goals:

## 2021-07-11 NOTE — PROGRESS NOTES
[facility] Daily Progress Note    Date of Service  7/11/2021    Chief Complaint  Polo Pyle is a 71 y.o. male admitted 7/9/2021 with chest pain, shortness of breath, dyspnea on exertion, leg swelling    Hospital Course  71-year-old male with a past medical history of coronary artery disease, status post x5 stent last in 2005, COVID-19 (2/8/2021) on nocturnal oxygen at home, history of left leg endarterectomy and DVT was on Xarelto before, prior tobacco smoker, CHF, hypertension admitted 7/9/2021 for significant dyspnea on exertion, shortness of breath, leg swelling and chest pain.  Patient had symptoms since July 4, worsened in the past couple days.  Patient had seen his cardiologist today who sent the patient to the emergency room for chest pain.  Troponin 91, proBNP 6807.  ECG were showing T wave inversions.  Ultrasound of the lower extremity did show acute DVT on the right side, chronic on the left side.  CTA was positive for extensive PE.  Patient was started on heparin drip on admission.    Interval Problem Update  7/10 -- Patient was just assigned to my services at 10:25AM, spoke with previous Attending assigned this morning, Dr. Mitesh García with [facility], received sign-out.    Evaluated at 1030AM- patient stated he was not experiencing any chest pain or SOB while resting, but worsens when trying to ambulate.  He was using [place] supplemental oxygen.    Patient is on heparin gtt since admission.   Spoke with several specialist including ICU Dr. Nicole 12:56PM. Given patient's improved NA score of 2, patient's stable vitals, at this time he did not recommend thrombectomy for the patient. Spoke with Dr. Meek (IR) and Dr. Hazel (cardiology) about patient case.    Echocardiogram was obtained  CONCLUSIONS (by Elvin Rabago M.D.)  Technically difficult due to body habitus and positioning but adequate   study for interpretation.   Mildly reduced left ventricular systolic function.  Left  ventricular ejection fraction is visually estimated to be 50%.  Paradoxical septal motion.  Mildly dilated right ventricle.  Reduced right ventricular systolic function.  Hypokinetic right ventricular free wall.  Right ventricular systolic pressure is estimated to be 28 mmHg.    5:00PM - spoke with patient's family members, updated them on the patient's case, discussions with specialist.   Spoke with Dr. Hazel again based on his updated note.  Called Dr. Nicole about patient's case, reiterated patient not requiring EKOS at this time.  No note written in chart.    7/11 --patient stated he was doing well today, denied any increase in work of breathing, chest pain.  Explained to patient given his clot burden he will likely need heparin drip for couple days at this time and then we can proceed with trialing Xarelto.  Patient was in agreement with this treatment plan.  Patient explained he was on Xarelto for COVID-19 DVT prophylaxis, he was only on it for about 1 week when the prescription fell off and was not reordered.  Patient did not fail outpatient Xarelto.  The fact patient had COVID-19 and had short-term Xarelto may have prompted patient's original DVTs to worsen.    I have personally seen and examined the patient at bedside. I discussed the plan of care with patient, bedside RN, charge RN, , pharmacy and cardiology, critical care and IR.    Consultants/Specialty  cardiology, critical care and IR    Code Status  DNAR/DNI    Disposition  Patient is not medically cleared.   Anticipate discharge to to home with organized home healthcare and close outpatient follow-up.  I have placed the appropriate orders for post-discharge needs.    Review of Systems  Review of Systems   Constitutional: Negative for chills, fever and malaise/fatigue.   Respiratory: Positive for shortness of breath. Negative for cough.    Cardiovascular: Positive for leg swelling. Negative for chest pain and palpitations.    Gastrointestinal: Negative for abdominal pain, constipation, diarrhea, nausea and vomiting.   Musculoskeletal: Negative for back pain and joint pain.        Leg pain   Neurological: Negative for dizziness and headaches.   All other systems reviewed and are negative.     Physical Exam  Temp:  [36.2 °C (97.1 °F)-37.1 °C (98.8 °F)] 37.1 °C (98.8 °F)  Pulse:  [66-75] 70  Resp:  [17-18] 18  BP: (114-140)/(64-89) 140/89  SpO2:  [93 %-97 %] 97 %    Physical Exam  Vitals and nursing note reviewed.   Constitutional:       Appearance: He is obese. He is ill-appearing. He is not diaphoretic.   Cardiovascular:      Rate and Rhythm: Normal rate and regular rhythm.      Pulses: Normal pulses.      Heart sounds: No murmur heard.     Pulmonary:      Effort: Respiratory distress present.      Comments: Tachypnea  Using supplemental oxygen  Abdominal:      General: Bowel sounds are normal. There is no distension.      Palpations: Abdomen is soft.   Musculoskeletal:         General: No swelling or tenderness. Normal range of motion.   Skin:     General: Skin is warm.      Capillary Refill: Capillary refill takes less than 2 seconds.      Coloration: Skin is not jaundiced or pale.      Comments: BLE evidence of Venous stasis   Neurological:      General: No focal deficit present.      Mental Status: He is alert and oriented to person, place, and time. Mental status is at baseline.   Psychiatric:         Mood and Affect: Mood normal.         Behavior: Behavior normal.         Thought Content: Thought content normal.         Judgment: Judgment normal.       Fluids    Intake/Output Summary (Last 24 hours) at 7/11/2021 1636  Last data filed at 7/11/2021 0600  Gross per 24 hour   Intake --   Output 250 ml   Net -250 ml       Laboratory  Recent Labs     07/09/21  1830 07/11/21  0150   WBC 9.8 10.4   RBC 4.97 4.43*   HEMOGLOBIN 15.3 13.7*   HEMATOCRIT 46.6 41.1*   MCV 93.8 92.8   MCH 30.8 30.9   MCHC 32.8* 33.3*   RDW 47.1 47.0   PLATELETCT  127* 128*   MPV 10.1 9.4     Recent Labs     07/09/21  1830 07/10/21  2012 07/11/21  0150   SODIUM 141 141 140   POTASSIUM 4.5 3.8 4.3   CHLORIDE 107 104 106   CO2 20 24 24   GLUCOSE 112* 171* 94   BUN 22 20 20   CREATININE 1.34 1.07 1.03   CALCIUM 9.2 8.6 8.8     Recent Labs     07/10/21  0035   APTT 32.0   INR 1.22*         Recent Labs     07/11/21  0150   TRIGLYCERIDE 66   HDL 57   LDL 51       Imaging  EC-ECHOCARDIOGRAM COMPLETE W/ CONT   Final Result      CT-CTA CHEST PULMONARY ARTERY W/ RECONS   Final Result         1. Extensive acute bilateral pulmonary emboli.      2. Elevation of the RV/LV ratio.      3. Small wedge-shaped opacity in the left lower lobe, likely infarct.      4. Moderate hiatal hernia.      CRITICAL RESULT READ BACK: Preliminary findings discussed with and critical read back performed by Dr. Giang via telephone on 7/10/2021 5:19 AM         US-EXTREMITY VENOUS LOWER BILAT   Final Result      DX-CHEST-PORTABLE (1 VIEW)   Final Result      Probable mild pulmonary interstitial edema and the cardiac silhouette is enlarged      IR-CONSULT AND TREAT    (Results Pending)        Assessment/Plan  * Other chest pain- (present on admission)  Assessment & Plan  PREETI score around 6  Typical chest pain on exertion with changes in the EKG; T inversion on anterior leads  Troponin elevation better explained due to PE.  Cardiology was consulted, patient is not a candidate for catheterization or stress test at this time due to his acute PE.  Chest pain due to acute extensive bilateral PE    Continue Heparin drip    PE (pulmonary thromboembolism) (HCC)- (present on admission)  Assessment & Plan  CT confirmed acute extensive bilateral pulmonary embolism  Spoke with ICU about patient, currently not a candidate for thrombectomy.   Patient is currently stable, blood pressure is maintaining and not hypotensive.  Patient is not experiencing pleuritic or other chest pains.    patient had COVID-19 and had short-term  Xarelto for DVT prophylaxis, which may have prompted patient's original DVTs to worsen and develop PE.    -Continue heparin drip, as per cardiology recommending at least 5 days then transition to Xarelto.  -We will need to transition to oral anticoagulation, patient has had Xarelto in the past but did not fail medication.  -I have spoken directly with critical care, cardiologist, interventional radiologist.  At this time patient will be managed with heparin drip and supportive measures.    Acute DVT (deep venous thrombosis) ([facility])- (present on admission)  Assessment & Plan  Patient has history of DVT on the left side years ago. Patient was on Xarelto before, he mentioned he was on a triangle shaped medication, pt is color blind and does not know if it was red colored pill.    D-dimer was very elevated, repeat ultrasound showed acute DVT on the right side  CTA CONFIRMED PE  Continue heparin drip and switch to oral later  Patient is not a candidate for thrombectomy as per critical care, Veronica score 2 after improvement    Acute respiratory failure ([facility])- (present on admission)  Assessment & Plan  Using oxygen 2 L nasal cannula, due to previous Covid infection.  Presenting with lower extremity edema, crackles, and reports of ALICIA.  Chest x-ray showed pulmonary edema  Elevated BNP  Due to acute extensive bilateral PE  -Continue oxygen supplementation  -Patient is on nocturnal oxygen home order, will need home O2 eval when patient is more appropriate.  Currently using 2 L continuous.    MIKAYLA (acute kidney injury) ([facility])- (present on admission)  Assessment & Plan  Came with cr 1.34 and 0.6  Due to acute process with VTE    History of COVID-19- (present on admission)  Assessment & Plan  COVID-19 (2/8/2021) on nocturnal oxygen at home  Will need home oxygen evaluation for continuous use    Obesity (BMI 35.0-39.9 without comorbidity) ([facility])- (present on admission)  Assessment & Plan  Recommending patient to follow up with their PCP  on weight management plan  Recommending polysomnography to PCP given elevated BMI  Counseled patient on weight control, diet management, following up with PCP    Essential hypertension- (present on admission)  Assessment & Plan  Close monitoring and adjust the dose if needed  Hydralazine IV as needed we will hold oral antimedication at this time due to possible decompensation    Coronary artery disease due to lipid rich plaque- (present on admission)  Assessment & Plan  History of coronary artery disease with stent more than 10 years ago  We will continue patient on rosuvastatin     VTE prophylaxis: therapeutic anticoagulation with heparin drip    I have performed a physical exam and reviewed and updated ROS and Plan today (7/11/2021). In review of yesterday's note (7/10/2021), there are no changes except as documented above.

## 2021-07-11 NOTE — CONSULTS
Critical Care Consultation    Date of consult: 7/10/2021    Referring Physician  Truman Matta M.D.    Reason for Consultation  EKOS    History of Presenting Illness  71 y.o. male who presented 7/9/2021 with chest pain and SOB. Patient has history of CAD s/p stent, HTN, obesity, and had COVID infection in February now requiring 2L of oxygen chronically at home. Patient states that he noticed some chest pain that was on and off, worsened with movement. He also noticed some swelling on his legs that was worsening in the past week and called his cardiologist who sent him to ED. In ED, patient had troponin elevation with elevated BNP along with EKG showing T wave inversions. Patient was also found to have acute DVT on the right side. He states that he had blood clots on his left leg previously and this was arterial clot. He used to be on blood thinners for this, however he stopped taking it after his refill was not renewed. Patient was initially admitted for ACS and CHF and started on heparin drip for his DVT.     During the admission. Patient had CTA done this AM which showed bilateral pulmonary embolism however he does have contrast going to the periphery and the bases. ECHOs done today does show RV dysfunction with paradoxical septal dysfunction. I was asked to evaluate this patient for transfer to ICU for EKOS procedure.    Patient seen at bedside states that his symptoms are better at this time. He states that he is a  and is usually sitting for prolonged hours. Denies any cancer workup in the past as he chooses not to. He denies any fever, chills, or weight loss. He states that he has been on chronic oxygen at 2L and is currently on 2L without any signs of respiratory distress. He states that his chest pain is better and reports that it has been better since he was placed on oxygen. He states that his oxygen concentrator seems to be malfunctioning as he felt improvement in his symptoms the moment  he went into his daughter in-law's car with oxygen tank on his way to the ED. His vitals are also reviewed and he does not show signs of shock at this time. His troponin level is also downtrending at this time. Patient also reports that since he is feeling better on heparin, he does not wish to have aggressive intervention such as EKOS if it is not immediately indicated. After evaluating this patient, his care can be managed on tele and this was communicated with primary team Dr. Matta who agrees with the plan.     Code Status  Full Code    Review of Systems  Review of Systems   Constitutional: Negative.    HENT: Negative.    Eyes: Negative.    Respiratory: Negative.    Cardiovascular: Negative.    Gastrointestinal: Negative.    Genitourinary: Negative.    Musculoskeletal: Negative.    Skin: Negative.    Neurological: Positive for tingling.        Chronic tingling of left lower extremity    Psychiatric/Behavioral: Negative.        Past Medical History   has a past medical history of ASTHMA, CAD (coronary artery disease) (8/26/2011), CATARACT, Colorblind, Dental disorder, Essential hypertension, Heart burn, High cholesterol, Hyperlipidemia (8/26/2011), Indigestion, Infectious disease, Myocardial infarct (HCC) (2005), Pain (11/14/11), Personal history of venous thrombosis and embolism (2006), Pneumonia (2009), Presence of stent in left circumflex coronary artery, Unspecified hemorrhagic conditions, Venous insufficiency of both lower extremities, White coat syndrome with diagnosis of hypertension, and White coat syndrome without hypertension.    Surgical History   has a past surgical history that includes tonsillectomy and adenoidectomy (1957); sinus trephine frontal (1971); cataract phaco with iol (11/2/2010); cataract phaco with iol (11/23/2010); ventral hernia repair (11/30/2011); sinusotomies (08/1963); other cardiac surgery (2005); inguinal hernia repair (1962); inguinal hernia repair (11/30/2011); and femoral  endarterectomy (Left, 9/20/2019).    Family History  family history includes Cancer in his mother; Dementia in his mother; Heart Attack in his mother; Heart Disease in his mother.    Social History   reports that he quit smoking about 9 years ago. His smoking use included cigars. He quit after 30.00 years of use. He has never used smokeless tobacco. He reports that he does not drink alcohol and does not use drugs.    Medications  Home Medications     Reviewed by Louann Hawkins (Pharmacy Tech) on 07/10/21 at 0234  Med List Status: Complete   Medication Last Dose Status   albuterol 108 (90 Base) MCG/ACT Aero Soln inhalation aerosol 7/8/2021 Active   Ascorbic Acid (VITAMIN C PO) 7/8/2021 Active   aspirin 81 MG tablet 7/8/2021 Active   Coenzyme Q10 (CO Q-10) 300 MG Cap 7/8/2021 Active   Multiple Vitamin (MULTIVITAMIN PO) 7/8/2021 Active   niacin (NIASPAN) 1000 MG CR tablet 7/8/2021 Active   Omega-3 Fatty Acids (FISH OIL) 1000 MG Cap capsule 7/8/2021 Active   rosuvastatin (CRESTOR) 20 MG Tab 7/8/2021 Active   VITAMIN D PO 7/8/2021 Active              Current Facility-Administered Medications   Medication Dose Route Frequency Provider Last Rate Last Admin   • albuterol inhaler 2 Puff  2 Puff Inhalation Q4HRS PRN Hannah Saavedra M.D.   2 Puff at 07/10/21 0734   • rosuvastatin (CRESTOR) tablet 20 mg  20 mg Oral Q EVENING Hannah Saavedra M.D.   20 mg at 07/10/21 1819   • senna-docusate (PERICOLACE or SENOKOT S) 8.6-50 MG per tablet 2 tablet  2 tablet Oral BID Hannah Saavedra M.D.        And   • polyethylene glycol/lytes (MIRALAX) PACKET 1 Packet  1 Packet Oral QDAY PRN Hannah Saavedra M.D.        And   • magnesium hydroxide (MILK OF MAGNESIA) suspension 30 mL  30 mL Oral QDAY PRN Hannah Saavedra M.D.        And   • bisacodyl (DULCOLAX) suppository 10 mg  10 mg Rectal QDAY PRN Hannah Saavedra M.D.       • acetaminophen (Tylenol) tablet 650 mg  650 mg Oral Q6HRS PRN Hannah Saavedra M.D.       •  "ondansetron (ZOFRAN) syringe/vial injection 4 mg  4 mg Intravenous Q4HRS PRN Hannah Saavedra M.D.       • ondansetron (ZOFRAN ODT) dispertab 4 mg  4 mg Oral Q4HRS PRN Hannah Saavedra M.D.       • heparin infusion 25,000 units in 500 mL 0.45% NACL  0-30 Units/kg/hr (Adjusted) Intravenous Continuous Hannah Saavedra M.D. 29.2 mL/hr at 07/10/21 1130 16 Units/kg/hr at 07/10/21 1130   • heparin injection 3,700 Units  40 Units/kg (Adjusted) Intravenous PRN Hannah Saavedra M.D.       • hydrALAZINE (APRESOLINE) injection 10 mg  10 mg Intravenous Q6HRS PRN Truman Matta M.D.           Allergies  Allergies   Allergen Reactions   • Mercury Swelling     And mercury based preservatives-Thimerasol  Swelling, \"strange discharge from eyes\"   • Other Environmental Anaphylaxis     Insect toxins/ bees and wasps- Anaphylaxis   • Sulfa Drugs Anaphylaxis and Swelling   • Pravastatin Diarrhea     Diarrhea        Vital Signs last 24 hours  Temp:  [36.1 °C (96.9 °F)-36.9 °C (98.5 °F)] 36.3 °C (97.4 °F)  Pulse:  [72-96] 75  Resp:  [16-20] 18  BP: ()/() 114/64  SpO2:  [91 %-98 %] 95 %    Physical Exam  Physical Exam  Vitals and nursing note reviewed.   Constitutional:       General: He is not in acute distress.     Appearance: Normal appearance. He is obese. He is not ill-appearing.   HENT:      Head: Normocephalic and atraumatic.      Mouth/Throat:      Mouth: Mucous membranes are moist.      Pharynx: Oropharynx is clear.   Eyes:      General: No scleral icterus.     Extraocular Movements: Extraocular movements intact.      Conjunctiva/sclera: Conjunctivae normal.      Pupils: Pupils are equal, round, and reactive to light.   Cardiovascular:      Rate and Rhythm: Normal rate and regular rhythm.      Pulses: Normal pulses.   Pulmonary:      Effort: Pulmonary effort is normal. No respiratory distress.      Breath sounds: Normal breath sounds. No wheezing or rhonchi.   Abdominal:      General: Bowel sounds are " normal. There is no distension.      Palpations: Abdomen is soft.      Tenderness: There is no abdominal tenderness. There is no guarding.   Musculoskeletal:         General: Swelling present. No tenderness. Normal range of motion.      Cervical back: Normal range of motion and neck supple. No rigidity or tenderness.   Skin:     General: Skin is warm and dry.      Comments: Chronic venous stasis changes of bilateral lower extremities    Neurological:      General: No focal deficit present.      Mental Status: He is alert and oriented to person, place, and time. Mental status is at baseline.      Cranial Nerves: No cranial nerve deficit.      Motor: No weakness.   Psychiatric:         Mood and Affect: Mood normal.         Behavior: Behavior normal.         Thought Content: Thought content normal.         Judgment: Judgment normal.         Fluids    Intake/Output Summary (Last 24 hours) at 7/10/2021 1913  Last data filed at 7/10/2021 0900  Gross per 24 hour   Intake --   Output 200 ml   Net -200 ml       Laboratory  Recent Results (from the past 48 hour(s))   CBC with Differential    Collection Time: 07/09/21  6:30 PM   Result Value Ref Range    WBC 9.8 4.8 - 10.8 K/uL    RBC 4.97 4.70 - 6.10 M/uL    Hemoglobin 15.3 14.0 - 18.0 g/dL    Hematocrit 46.6 42.0 - 52.0 %    MCV 93.8 81.4 - 97.8 fL    MCH 30.8 27.0 - 33.0 pg    MCHC 32.8 (L) 33.7 - 35.3 g/dL    RDW 47.1 35.9 - 50.0 fL    Platelet Count 127 (L) 164 - 446 K/uL    MPV 10.1 9.0 - 12.9 fL    Neutrophils-Polys 65.70 44.00 - 72.00 %    Lymphocytes 18.90 (L) 22.00 - 41.00 %    Monocytes 13.00 0.00 - 13.40 %    Eosinophils 1.20 0.00 - 6.90 %    Basophils 0.60 0.00 - 1.80 %    Immature Granulocytes 0.60 0.00 - 0.90 %    Nucleated RBC 0.00 /100 WBC    Neutrophils (Absolute) 6.44 1.82 - 7.42 K/uL    Lymphs (Absolute) 1.85 1.00 - 4.80 K/uL    Monos (Absolute) 1.28 (H) 0.00 - 0.85 K/uL    Eos (Absolute) 0.12 0.00 - 0.51 K/uL    Baso (Absolute) 0.06 0.00 - 0.12 K/uL     Immature Granulocytes (abs) 0.06 0.00 - 0.11 K/uL    NRBC (Absolute) 0.00 K/uL   Complete Metabolic Panel (CMP)    Collection Time: 07/09/21  6:30 PM   Result Value Ref Range    Sodium 141 135 - 145 mmol/L    Potassium 4.5 3.6 - 5.5 mmol/L    Chloride 107 96 - 112 mmol/L    Co2 20 20 - 33 mmol/L    Anion Gap 14.0 7.0 - 16.0    Glucose 112 (H) 65 - 99 mg/dL    Bun 22 8 - 22 mg/dL    Creatinine 1.34 0.50 - 1.40 mg/dL    Calcium 9.2 8.5 - 10.5 mg/dL    AST(SGOT) 34 12 - 45 U/L    ALT(SGPT) 21 2 - 50 U/L    Alkaline Phosphatase 93 30 - 99 U/L    Total Bilirubin 1.5 0.1 - 1.5 mg/dL    Albumin 4.0 3.2 - 4.9 g/dL    Total Protein 7.1 6.0 - 8.2 g/dL    Globulin 3.1 1.9 - 3.5 g/dL    A-G Ratio 1.3 g/dL   Troponin    Collection Time: 07/09/21  6:30 PM   Result Value Ref Range    Troponin T 91 (H) 6 - 19 ng/L   ESTIMATED GFR    Collection Time: 07/09/21  6:30 PM   Result Value Ref Range    GFR If African American >60 >60 mL/min/1.73 m 2    GFR If Non  53 (A) >60 mL/min/1.73 m 2   proBrain Natriuretic Peptide, NT    Collection Time: 07/09/21  6:30 PM   Result Value Ref Range    NT-proBNP 6807 (H) 0 - 125 pg/mL   D-DIMER    Collection Time: 07/10/21 12:35 AM   Result Value Ref Range    D-Dimer Screen >20.00 (H) 0.00 - 0.50 ug/mL (FEU)   Troponin - STAT Once    Collection Time: 07/10/21 12:35 AM   Result Value Ref Range    Troponin T 76 (H) 6 - 19 ng/L   HEMOGLOBIN A1C    Collection Time: 07/10/21 12:35 AM   Result Value Ref Range    Glycohemoglobin 5.7 (H) 4.0 - 5.6 %    Est Avg Glucose 117 mg/dL   aPTT    Collection Time: 07/10/21 12:35 AM   Result Value Ref Range    APTT 32.0 24.7 - 36.0 sec   Prothrombin Time    Collection Time: 07/10/21 12:35 AM   Result Value Ref Range    PT 15.0 (H) 12.0 - 14.6 sec    INR 1.22 (H) 0.87 - 1.13   Heparin Xa (Unfractionated)    Collection Time: 07/10/21 12:35 AM   Result Value Ref Range    Heparin Xa (UFH) <0.10 IU/mL   COV-2, FLU A/B, AND RSV BY PCR (2-4 HOURS CEPHEID):  Collect NP swab in VTM    Collection Time: 07/10/21 12:37 AM    Specimen: Nasopharyngeal; Respirate   Result Value Ref Range    Influenza virus A RNA Negative Negative    Influenza virus B, PCR Negative Negative    RSV, PCR Negative Negative    SARS-CoV-2 by PCR NotDetected     SARS-CoV-2 Source NP Swab    EKG    Collection Time: 07/10/21  1:24 AM   Result Value Ref Range    Report       [facility]    Test Date:  2021-07-10  Pt Name:    LYNN MORGAN             Department: ER  MRN:        [id number]                      Room:       [facility]  Gender:     Male                         Technician: [id number]  :        1950                   Requested By:MESSI ATKINS  Order #:    [id number]                    Reading MD:    Measurements  Intervals                                Axis  Rate:       81                           P:          43  LA:         168                          QRS:        -4  QRSD:       94                           T:          -35  QT:         420  QTc:        488    Interpretive Statements  SINUS RHYTHM  NONSPECIFIC T ABNORMALITIES, ANTERIOR LEADS  BORDERLINE PROLONGED QT INTERVAL  Compared to ECG 2021 16:45:59  Sinus tachycardia no longer present  Ventricular premature complex(es) no longer present  T-wave abnormality still present     Troponin in four (4) hours    Collection Time: 07/10/21  5:19 AM   Result Value Ref Range    Troponin T 61 (H) 6 - 19 ng/L   Heparin Xa (Unfractionated)    Collection Time: 07/10/21 10:00 AM   Result Value Ref Range    Heparin Xa (UFH) 0.76 IU/mL   Heparin Xa (Unfractionated)    Collection Time: 07/10/21  5:07 PM   Result Value Ref Range    Heparin Xa (UFH) 0.64 IU/mL       Imaging  EC-ECHOCARDIOGRAM COMPLETE W/ CONT   Final Result      CT-CTA CHEST PULMONARY ARTERY W/ RECONS   Final Result         1. Extensive acute bilateral pulmonary emboli.      2. Elevation of the RV/LV ratio.      3. Small wedge-shaped opacity in  the left lower lobe, likely infarct.      4. Moderate hiatal hernia.      CRITICAL RESULT READ BACK: Preliminary findings discussed with and critical read back performed by Dr. Giang via telephone on 7/10/2021 5:19 AM         US-EXTREMITY VENOUS LOWER BILAT   Final Result      DX-CHEST-PORTABLE (1 VIEW)   Final Result      Probable mild pulmonary interstitial edema and the cardiac silhouette is enlarged      IR-CONSULT AND TREAT    (Results Pending)       Assessment/Plan  PE (pulmonary thromboembolism) (HCC)- (present on admission)  Assessment & Plan  -Patient is improving on heparin drip at this time and does not show any signs of hemodynamic compromise, BP wnl, not tachycardic, and saturation is wnl on baseline home O2 of 2L  -We will continue to monitor this patient with the primary team and move him to ICU and consult IR accordingly if patient has any changes in his clinical status or vital signs  -Continue heparin drip protocol  -Fall precaution  -Trend troponin   -Oral anticoagulation prior to DC   -Will benefit from cancer screening as outpatient         Discussed patient condition and risk of morbidity and/or mortality with Hospitalist, RN and Patient.  Critical care time = 30 minutes in directly providing and coordinating critical care and extensive data review.  No time overlap and excludes procedures.

## 2021-07-11 NOTE — ASSESSMENT & PLAN NOTE
CT confirmed acute extensive bilateral pulmonary embolism  Spoke with ICU about patient, currently not a candidate for thrombectomy.   Patient is currently stable, blood pressure is maintaining and not hypotensive.  Patient is not experiencing pleuritic or other chest pains.    patient had COVID-19 and had short-term Xarelto for DVT prophylaxis, which may have prompted patient's original DVTs to worsen and develop PE.    -Continue heparin drip, as per cardiology recommending at least 5 days then transition to Xarelto.  -We will need to transition to oral anticoagulation, patient has had Xarelto in the past but did not fail medication.  Start transition to Xarelto 7/13.  -I have spoken directly with critical care, cardiologist, interventional radiologist.  At this time patient will be managed with heparin drip and supportive measures.

## 2021-07-11 NOTE — PROGRESS NOTES
Received bedside report from JOSE LUIS torres, pt care assumed, VS stable, pt assessment complete. Pt AAOx4, c/o chronic pain at this time. No signs of acute distress noted at this time. POC discussed with pt and verbalizes no questions. Pt denies any additional needs at this time. Bed in lowest position, bed alarm off. Pt educated on fall risk and verbalized understanding, call light within reach, hourly rounding initiated. in a sinus rhythm.

## 2021-07-11 NOTE — CARE PLAN
Problem: Pain - Standard  Goal: Alleviation of pain or a reduction in pain to the patient’s comfort goal  Outcome: Progressing     Problem: Knowledge Deficit - Standard  Goal: Patient and family/care givers will demonstrate understanding of plan of care, disease process/condition, diagnostic tests and medications  Outcome: Progressing     Problem: Communication  Goal: The ability to communicate needs accurately and effectively will improve  Outcome: Progressing     Problem: Hemodynamics  Goal: Patient's hemodynamics, fluid balance and neurologic status will be stable or improve  Outcome: Progressing     Problem: Respiratory  Goal: Patient will achieve/maintain optimum respiratory ventilation and gas exchange  Outcome: Progressing         The patient is Watcher - Medium risk of patient condition declining or worsening    Shift Goals  Clinical Goals: Maintain O2 sats above 90%, no chest pain/SOB  Patient Goals: rest  Family Goals: N/A

## 2021-07-12 ENCOUNTER — PATIENT OUTREACH (OUTPATIENT)
Dept: HEALTH INFORMATION MANAGEMENT | Facility: OTHER | Age: 71
End: 2021-07-12

## 2021-07-12 PROBLEM — I50.9 CHRONIC CHF (CONGESTIVE HEART FAILURE) (HCC): Chronic | Status: ACTIVE | Noted: 2021-07-12

## 2021-07-12 LAB
ALBUMIN SERPL BCP-MCNC: 3.8 G/DL (ref 3.2–4.9)
ALBUMIN/GLOB SERPL: 1.4 G/DL
ALP SERPL-CCNC: 83 U/L (ref 30–99)
ALT SERPL-CCNC: 17 U/L (ref 2–50)
ANION GAP SERPL CALC-SCNC: 10 MMOL/L (ref 7–16)
AST SERPL-CCNC: 22 U/L (ref 12–45)
BILIRUB SERPL-MCNC: 1.4 MG/DL (ref 0.1–1.5)
BUN SERPL-MCNC: 20 MG/DL (ref 8–22)
CALCIUM SERPL-MCNC: 8.9 MG/DL (ref 8.5–10.5)
CHLORIDE SERPL-SCNC: 101 MMOL/L (ref 96–112)
CO2 SERPL-SCNC: 23 MMOL/L (ref 20–33)
CREAT SERPL-MCNC: 1.05 MG/DL (ref 0.5–1.4)
EKG IMPRESSION: NORMAL
ERYTHROCYTE [DISTWIDTH] IN BLOOD BY AUTOMATED COUNT: 46.5 FL (ref 35.9–50)
GLOBULIN SER CALC-MCNC: 2.8 G/DL (ref 1.9–3.5)
GLUCOSE SERPL-MCNC: 109 MG/DL (ref 65–99)
HCT VFR BLD AUTO: 40.4 % (ref 42–52)
HGB BLD-MCNC: 13.5 G/DL (ref 14–18)
MAGNESIUM SERPL-MCNC: 2.1 MG/DL (ref 1.5–2.5)
MCH RBC QN AUTO: 30.7 PG (ref 27–33)
MCHC RBC AUTO-ENTMCNC: 33.4 G/DL (ref 33.7–35.3)
MCV RBC AUTO: 91.8 FL (ref 81.4–97.8)
PHOSPHATE SERPL-MCNC: 3.5 MG/DL (ref 2.5–4.5)
PLATELET # BLD AUTO: 134 K/UL (ref 164–446)
PMV BLD AUTO: 9.7 FL (ref 9–12.9)
POTASSIUM SERPL-SCNC: 4.2 MMOL/L (ref 3.6–5.5)
PROT SERPL-MCNC: 6.6 G/DL (ref 6–8.2)
RBC # BLD AUTO: 4.4 M/UL (ref 4.7–6.1)
SODIUM SERPL-SCNC: 134 MMOL/L (ref 135–145)
UFH PPP CHRO-ACNC: 0.48 IU/ML
WBC # BLD AUTO: 7.7 K/UL (ref 4.8–10.8)

## 2021-07-12 PROCEDURE — 84100 ASSAY OF PHOSPHORUS: CPT

## 2021-07-12 PROCEDURE — 99232 SBSQ HOSP IP/OBS MODERATE 35: CPT | Performed by: INTERNAL MEDICINE

## 2021-07-12 PROCEDURE — 700102 HCHG RX REV CODE 250 W/ 637 OVERRIDE(OP): Performed by: INTERNAL MEDICINE

## 2021-07-12 PROCEDURE — 36415 COLL VENOUS BLD VENIPUNCTURE: CPT

## 2021-07-12 PROCEDURE — 85520 HEPARIN ASSAY: CPT

## 2021-07-12 PROCEDURE — A9270 NON-COVERED ITEM OR SERVICE: HCPCS | Performed by: INTERNAL MEDICINE

## 2021-07-12 PROCEDURE — 80053 COMPREHEN METABOLIC PANEL: CPT

## 2021-07-12 PROCEDURE — 93010 ELECTROCARDIOGRAM REPORT: CPT | Performed by: INTERNAL MEDICINE

## 2021-07-12 PROCEDURE — 83735 ASSAY OF MAGNESIUM: CPT

## 2021-07-12 PROCEDURE — 85027 COMPLETE CBC AUTOMATED: CPT

## 2021-07-12 PROCEDURE — 770020 HCHG ROOM/CARE - TELE (206)

## 2021-07-12 PROCEDURE — 700111 HCHG RX REV CODE 636 W/ 250 OVERRIDE (IP): Performed by: INTERNAL MEDICINE

## 2021-07-12 RX ORDER — GUAIFENESIN 600 MG/1
600 TABLET, EXTENDED RELEASE ORAL EVERY 12 HOURS
Status: DISCONTINUED | OUTPATIENT
Start: 2021-07-12 | End: 2021-07-14 | Stop reason: HOSPADM

## 2021-07-12 RX ORDER — HYDRALAZINE HYDROCHLORIDE 25 MG/1
25 TABLET, FILM COATED ORAL EVERY 8 HOURS
Status: DISCONTINUED | OUTPATIENT
Start: 2021-07-12 | End: 2021-07-14 | Stop reason: HOSPADM

## 2021-07-12 RX ADMIN — ALBUTEROL SULFATE 2 PUFF: 90 AEROSOL, METERED RESPIRATORY (INHALATION) at 12:11

## 2021-07-12 RX ADMIN — HYDRALAZINE HYDROCHLORIDE 25 MG: 25 TABLET, FILM COATED ORAL at 14:07

## 2021-07-12 RX ADMIN — GUAIFENESIN 600 MG: 600 TABLET, EXTENDED RELEASE ORAL at 17:13

## 2021-07-12 RX ADMIN — ROSUVASTATIN CALCIUM 20 MG: 20 TABLET, FILM COATED ORAL at 17:13

## 2021-07-12 RX ADMIN — HEPARIN SODIUM 16 UNITS/KG/HR: 5000 INJECTION, SOLUTION INTRAVENOUS at 07:26

## 2021-07-12 RX ADMIN — HYDRALAZINE HYDROCHLORIDE 25 MG: 25 TABLET, FILM COATED ORAL at 21:06

## 2021-07-12 RX ADMIN — HYDRALAZINE HYDROCHLORIDE 25 MG: 25 TABLET, FILM COATED ORAL at 08:52

## 2021-07-12 SDOH — ECONOMIC STABILITY: FOOD INSECURITY: WITHIN THE PAST 12 MONTHS, THE FOOD YOU BOUGHT JUST DIDN'T LAST AND YOU DIDN'T HAVE MONEY TO GET MORE.: NEVER TRUE

## 2021-07-12 SDOH — ECONOMIC STABILITY: FOOD INSECURITY: WITHIN THE PAST 12 MONTHS, YOU WORRIED THAT YOUR FOOD WOULD RUN OUT BEFORE YOU GOT MONEY TO BUY MORE.: NEVER TRUE

## 2021-07-12 SDOH — ECONOMIC STABILITY: TRANSPORTATION INSECURITY
IN THE PAST 12 MONTHS, HAS THE LACK OF TRANSPORTATION KEPT YOU FROM MEDICAL APPOINTMENTS OR FROM GETTING MEDICATIONS?: NO

## 2021-07-12 SDOH — ECONOMIC STABILITY: TRANSPORTATION INSECURITY
IN THE PAST 12 MONTHS, HAS LACK OF TRANSPORTATION KEPT YOU FROM MEETINGS, WORK, OR FROM GETTING THINGS NEEDED FOR DAILY LIVING?: NO

## 2021-07-12 ASSESSMENT — ENCOUNTER SYMPTOMS
COUGH: 0
NAUSEA: 0
CONSTIPATION: 0
FEVER: 0
CHILLS: 0
VOMITING: 0
ABDOMINAL PAIN: 0
DIZZINESS: 0
HEADACHES: 0
DIARRHEA: 0
SHORTNESS OF BREATH: 0
PALPITATIONS: 0
BACK PAIN: 0

## 2021-07-12 ASSESSMENT — SOCIAL DETERMINANTS OF HEALTH (SDOH): HOW HARD IS IT FOR YOU TO PAY FOR THE VERY BASICS LIKE FOOD, HOUSING, MEDICAL CARE, AND HEATING?: NOT HARD AT ALL

## 2021-07-12 NOTE — PROGRESS NOTES
[facility] Medicine Daily Progress Note    Date of Service  7/12/2021    Chief Complaint  Polo Plye is a 71 y.o. male admitted 7/9/2021 with chest pain, shortness of breath, dyspnea on exertion, leg swelling    Hospital Course  71-year-old male with a past medical history of coronary artery disease, status post x5 stent last in 2005, COVID-19 (2/8/2021) on nocturnal oxygen at home, history of left leg endarterectomy and DVT was on Xarelto before, prior tobacco smoker, CHF, hypertension admitted 7/9/2021 for significant dyspnea on exertion, shortness of breath, leg swelling and chest pain.  Patient had symptoms since July 4, worsened in the past couple days.  Patient had seen his cardiologist today who sent the patient to the emergency room for chest pain.  Troponin 91, proBNP 6807.  ECG were showing T wave inversions.  Ultrasound of the lower extremity did show acute DVT on the right side, chronic on the left side.  CTA was positive for extensive PE.  Patient was started on heparin drip on admission.    Interval Problem Update  7/10 -- Patient was just assigned to my services at 10:25AM, spoke with previous Attending assigned this morning, Dr. Mitesh García with [facility], received sign-out.    Evaluated at 1030AM- patient stated he was not experiencing any chest pain or SOB while resting, but worsens when trying to ambulate.  He was using NC supplemental oxygen.    Patient is on heparin gtt since admission.   Spoke with several specialist including ICU Dr. Nicole 12:56PM. Given patient's improved NA score of 2, patient's stable vitals, at this time he did not recommend thrombectomy for the patient. Spoke with Dr. Meek (IR) and Dr. Hazel (cardiology) about patient case.    Echocardiogram was obtained  CONCLUSIONS (by Elvin Rabago M.D.)  Technically difficult due to body habitus and positioning but adequate   study for interpretation.   Mildly reduced left ventricular systolic function.  Left  ventricular ejection fraction is visually estimated to be 50%.  Paradoxical septal motion.  Mildly dilated right ventricle.  Reduced right ventricular systolic function.  Hypokinetic right ventricular free wall.  Right ventricular systolic pressure is estimated to be 28 mmHg.    5:00PM - spoke with patient's family members, updated them on the patient's case, discussions with specialist.   Spoke with Dr. Hazel again based on his updated note.  Called Dr. Nicole about patient's case, reiterated patient not requiring EKOS at this time.  No note written in chart.    7/11 --patient stated he was doing well today, denied any increase in work of breathing, chest pain.  Explained to patient given his clot burden he will likely need heparin drip for couple days at this time and then we can proceed with trialing Xarelto.  Patient was in agreement with this treatment plan.  Patient explained he was on Xarelto for COVID-19 DVT prophylaxis, he was only on it for about 1 week when the prescription fell off and was not reordered.  Patient did not fail outpatient Xarelto.  The fact patient had COVID-19 and had short-term Xarelto may have prompted patient's original DVTs to worsen.    7/12 - Patient stated he was doing well, able to ambulate without any chest pain. Did complain he had a productive sputum, no significant cough.  Asked for robotussin. Hydralazine PO started for BP control. Consider lisinopril.    I have personally seen and examined the patient at bedside. I discussed the plan of care with patient, bedside RN, charge RN, , pharmacy and cardiology, critical care and IR.    Consultants/Specialty  cardiology, critical care and IR    Code Status  DNAR/DNI    Disposition  Patient is not medically cleared.   Anticipate discharge to to home with organized home healthcare and close outpatient follow-up.  I have placed the appropriate orders for post-discharge needs.    Review of Systems  Review of Systems    Constitutional: Negative for chills, fever and malaise/fatigue.   Respiratory: Negative for cough and shortness of breath.    Cardiovascular: Negative for chest pain, palpitations and leg swelling.   Gastrointestinal: Negative for abdominal pain, constipation, diarrhea, nausea and vomiting.   Musculoskeletal: Negative for back pain and joint pain.        Leg pain   Neurological: Negative for dizziness and headaches.   All other systems reviewed and are negative.     Physical Exam  Temp:  [36.2 °C (97.2 °F)-37.2 °C (99 °F)] 36.4 °C (97.6 °F)  Pulse:  [69-76] 71  Resp:  [17-18] 18  BP: (124-166)/(76-99) 143/84  SpO2:  [94 %-98 %] 96 %    Physical Exam  Vitals and nursing note reviewed.   Constitutional:       Appearance: He is obese. He is not ill-appearing or diaphoretic.   Cardiovascular:      Rate and Rhythm: Normal rate and regular rhythm.      Pulses: Normal pulses.      Heart sounds: No murmur heard.     Pulmonary:      Effort: Pulmonary effort is normal. No respiratory distress.      Comments: Using supplemental oxygen  Abdominal:      General: Bowel sounds are normal. There is no distension.      Palpations: Abdomen is soft.   Musculoskeletal:         General: No swelling or tenderness. Normal range of motion.   Skin:     General: Skin is warm.      Capillary Refill: Capillary refill takes less than 2 seconds.      Coloration: Skin is not jaundiced or pale.      Comments: BLE evidence of Venous stasis   Neurological:      General: No focal deficit present.      Mental Status: He is alert and oriented to person, place, and time. Mental status is at baseline.   Psychiatric:         Mood and Affect: Mood normal.         Behavior: Behavior normal.         Thought Content: Thought content normal.         Judgment: Judgment normal.       Fluids    Intake/Output Summary (Last 24 hours) at 7/12/2021 1742  Last data filed at 7/12/2021 1458  Gross per 24 hour   Intake 840 ml   Output --   Net 840 ml        Laboratory  Recent Labs     07/09/21  1830 07/11/21  0150 07/12/21  0204   WBC 9.8 10.4 7.7   RBC 4.97 4.43* 4.40*   HEMOGLOBIN 15.3 13.7* 13.5*   HEMATOCRIT 46.6 41.1* 40.4*   MCV 93.8 92.8 91.8   MCH 30.8 30.9 30.7   MCHC 32.8* 33.3* 33.4*   RDW 47.1 47.0 46.5   PLATELETCT 127* 128* 134*   MPV 10.1 9.4 9.7     Recent Labs     07/10/21  2012 07/11/21  0150 07/12/21  0204   SODIUM 141 140 134*   POTASSIUM 3.8 4.3 4.2   CHLORIDE 104 106 101   CO2 24 24 23   GLUCOSE 171* 94 109*   BUN 20 20 20   CREATININE 1.07 1.03 1.05   CALCIUM 8.6 8.8 8.9     Recent Labs     07/10/21  0035   APTT 32.0   INR 1.22*         Recent Labs     07/11/21  0150   TRIGLYCERIDE 66   HDL 57   LDL 51       Imaging  EC-ECHOCARDIOGRAM COMPLETE W/ CONT   Final Result      CT-CTA CHEST PULMONARY ARTERY W/ RECONS   Final Result         1. Extensive acute bilateral pulmonary emboli.      2. Elevation of the RV/LV ratio.      3. Small wedge-shaped opacity in the left lower lobe, likely infarct.      4. Moderate hiatal hernia.      CRITICAL RESULT READ BACK: Preliminary findings discussed with and critical read back performed by Dr. Giang via telephone on 7/10/2021 5:19 AM         US-EXTREMITY VENOUS LOWER BILAT   Final Result      DX-CHEST-PORTABLE (1 VIEW)   Final Result      Probable mild pulmonary interstitial edema and the cardiac silhouette is enlarged           Assessment/Plan  * Other chest pain- (present on admission)  Assessment & Plan  PREETI score around 6  Typical chest pain on exertion with changes in the EKG; T inversion on anterior leads  Troponin elevation better explained due to PE.  Cardiology was consulted, patient is not a candidate for catheterization or stress test at this time due to his acute PE.  Chest pain due to acute extensive bilateral PE    Continue Heparin drip    PE (pulmonary thromboembolism) (HCC)- (present on admission)  Assessment & Plan  CT confirmed acute extensive bilateral pulmonary embolism  Spoke with ICU  about patient, currently not a candidate for thrombectomy.   Patient is currently stable, blood pressure is maintaining and not hypotensive.  Patient is not experiencing pleuritic or other chest pains.    patient had COVID-19 and had short-term Xarelto for DVT prophylaxis, which may have prompted patient's original DVTs to worsen and develop PE.    -Continue heparin drip, as per cardiology recommending at least 5 days then transition to Xarelto.  -We will need to transition to oral anticoagulation, patient has had Xarelto in the past but did not fail medication.  Start transition to Xarelto 7/13.  -I have spoken directly with critical care, cardiologist, interventional radiologist.  At this time patient will be managed with heparin drip and supportive measures.    Acute DVT (deep venous thrombosis) (HCC)- (present on admission)  Assessment & Plan  Patient has history of DVT on the left side years ago. Patient was on Xarelto before, he mentioned he was on a triangle shaped medication, pt is color blind and does not know if it was red colored pill.    D-dimer was very elevated, repeat ultrasound showed acute DVT on the right side  CTA CONFIRMED PE  Continue heparin drip and switch to oral later  Patient is not a candidate for thrombectomy as per critical care, Veronica score 2 after improvement    Acute respiratory failure (HCC)- (present on admission)  Assessment & Plan  Using oxygen 2 L nasal cannula, due to previous Covid infection.  Presenting with lower extremity edema, crackles, and reports of ALICIA.  Chest x-ray showed pulmonary edema  Elevated BNP  Due to acute extensive bilateral PE  -Continue oxygen supplementation  -Patient is on nocturnal oxygen home order, will need home O2 eval when patient is more appropriate.  Currently using 2 L continuous.    MIKAYLA (acute kidney injury) (HCC)- (present on admission)  Assessment & Plan  Came with cr 1.34 and 0.6  Due to acute process with VTE    Chronic CHF (congestive heart  failure) (HCC)- (present on admission)  Assessment & Plan  Patient has chronic CHF.  At this time, patient has acute CHF exacerbation but due to new onset massive pulmonary embolism as primary cause of dysfunction.    History of COVID-19- (present on admission)  Assessment & Plan  COVID-19 (2/8/2021) on nocturnal oxygen at home  Will need home oxygen evaluation for continuous use    Obesity (BMI 35.0-39.9 without comorbidity) (HCC)- (present on admission)  Assessment & Plan  Recommending patient to follow up with their PCP on weight management plan  Recommending polysomnography to PCP given elevated BMI  Counseled patient on weight control, diet management, following up with PCP    Essential hypertension- (present on admission)  Assessment & Plan  Close monitoring and adjust the dose if needed  Hydralazine IV as needed we will hold oral antimedication at this time due to possible decompensation    Coronary artery disease due to lipid rich plaque- (present on admission)  Assessment & Plan  History of coronary artery disease with stent more than 10 years ago  We will continue patient on rosuvastatin     VTE prophylaxis: therapeutic anticoagulation with heparin drip    I have performed a physical exam and reviewed and updated ROS and Plan today (7/12/2021). In review of yesterday's note (7/11/2021), there are no changes except as documented above.

## 2021-07-12 NOTE — CARE PLAN
The patient is Watcher - Medium risk of patient condition declining or worsening    Shift Goals  Clinical Goals: no chest pain or sob  Patient Goals: go home  Family Goals: n/a    Progress made toward(s) clinical / shift goals:  No continued chest pain or sob    Patient is not progressing towards the following goals: n/a      Problem: Pain - Standard  Goal: Alleviation of pain or a reduction in pain to the patient’s comfort goal  Outcome: Progressing     Problem: Knowledge Deficit - Standard  Goal: Patient and family/care givers will demonstrate understanding of plan of care, disease process/condition, diagnostic tests and medications  Outcome: Progressing     Problem: Communication  Goal: The ability to communicate needs accurately and effectively will improve  Outcome: Progressing     Problem: Hemodynamics  Goal: Patient's hemodynamics, fluid balance and neurologic status will be stable or improve  Outcome: Progressing     Problem: Respiratory  Goal: Patient will achieve/maintain optimum respiratory ventilation and gas exchange  Outcome: Progressing

## 2021-07-12 NOTE — PROGRESS NOTES
Community Health Worker Intake  • Social determinates of health intake Complete.   • Identified barriers to None.  • Contact information provided to Polo Pyle   • Declined Meds-To-Beds.   • Inpatient assessment completed.    CHW [place] met with pt bedside to introduce CCM and GSC programs. Pt declined GSC. Pt refused CHW to make PCP appointments. Pt identifies no barriers to resources and expressed no needs at this time.     Plan: CHW will follow up with pt after d/c

## 2021-07-12 NOTE — DOCUMENTATION QUERY
[facility]                                                                       Query Response Note      PATIENT:               LYNN MORGAN  ACCT #:                  [id number]  MRN:                     [id number]  :                      1950  ADMIT DATE:       2021 10:48 PM  DISCH DATE:          RESPONDING  PROVIDER #:        [id number]           QUERY TEXT:    Please clarify the clinical relevance for the diagnostic findings of lactic acid of 2.1.    NOTE:  If an appropriate response is not listed below, please respond with a new note.          The patient's clinical indicators include:  7/10 Lactic acid = 2.1    Treatment:  2L O2 NC, repeat BMP    Risk factors: Acute kidney injury, acute respiratory failure, acute heart failure, possible cardiogenic shock  Options provided:   -- Clinically relevant - Lactic acidosis ruled in   -- Findings on no clinical significance   -- Unable to determine      Query created by: Emy Ashby on 2021 12:50 PM    RESPONSE TEXT:    Unable to determine          Electronically signed by:  ABDON RUGGIERO MD 2021 12:55 PM

## 2021-07-12 NOTE — ASSESSMENT & PLAN NOTE
Patient has chronic CHF.  At this time, patient has acute CHF exacerbation but due to new onset massive pulmonary embolism as primary cause of dysfunction.

## 2021-07-12 NOTE — DOCUMENTATION QUERY
"                                                                         [facility]                                                                       Query Response Note      PATIENT:               LYNN MORGAN  ACCT #:                  [id number]  MRN:                     [id number]  :                      1950  ADMIT DATE:       2021 10:48 PM  DISCH DATE:          RESPONDING  PROVIDER #:        [id number]           QUERY TEXT:    \"Also in cardiogenic shock\" is documented in the 7/10 Cardiology progress note.  Please provide additional clinical indicators supportive of your documented diagnosis of cardiogenic shock.    Note: If an appropriate response is not listed below, please respond with a new note.        The patient's Clinical Indicators include:  Admit vitals: Pulse 110, 96, 83, /81, 137/82  Lowest documented BP- 7/10: 98/48 @ 1049, 107/57 @ 1050.  Pulse was not documented for either of these BP's.  Pulse 82 @ 0717; 77 @ 1308    7/10 PN - suggesting patient is in cardiogenic shock, needing urgent procedure  7/10 Cardiology PN - By TTE, measured LVOT diameter 2.0cm, LVOT VTI 12, cardiac index 1.1. Therefore, also in cardiogenic shock  7/10 CC Consult - does not show signs of shock at this time    Treatment:  CXR, Echo, Hydralazine ordered but not given 7/10 &     Risk factors: Acute kidney injury, acute respiratory failure, acute congestive heart failure, CAD, Hx MI  Options provided:   -- Condition of  cardiogenic shock exists, please document additional clinical indicators   -- Condition of  cardiogenic shock does not exist and amended documentation provided in the medical record   -- Unable to provide additional clarity regarding the diagnosis   -- Unable to determine      Query created by: Emy Ashby on 2021 12:56 PM    RESPONSE TEXT:    Unable to provide additional clarity regarding the diagnosis          Electronically signed by:  ABDON RUGGIERO MD 2021 1:14 " PM

## 2021-07-12 NOTE — PROGRESS NOTES
Cardiology Follow Up Progress Note    Date of Service  7/12/2021    Attending Physician  Truman Matta M.D.    Chief Complaint   Chest pain     HPI  Polo Pyle is a 71 y.o. male with significant past medical history of CAD S/P 5 stents in 2005, hypertension, hyperlipidemia, peripheral artery disease, AAA, and DVT admitted 7/9/2021 with submassive pulmonary embolism.     Interim Events  Patient resting comfortably at bedside this morning. Denies chest pain, SOB, lightheadedness or dizziness.     Review of Systems  Review of Systems    Vital signs in last 24 hours  Temp:  [36.2 °C (97.2 °F)-37.2 °C (99 °F)] 36.4 °C (97.5 °F)  Pulse:  [66-76] 73  Resp:  [17-18] 17  BP: (125-166)/(77-99) 151/99  SpO2:  [94 %-97 %] 95 %    Physical Exam  Gen: Alert, NAD  CV: RRR. S1S2.   Lungs: CTAB  Abdomen: Soft, NT/ND  Extremities: Bilateral 1+ LE edema with stasis dermatitis.   Skin: No cyanosis or jaundice    Lab Review  Lab Results   Component Value Date/Time    WBC 7.7 07/12/2021 02:04 AM    RBC 4.40 (L) 07/12/2021 02:04 AM    HEMOGLOBIN 13.5 (L) 07/12/2021 02:04 AM    HEMATOCRIT 40.4 (L) 07/12/2021 02:04 AM    MCV 91.8 07/12/2021 02:04 AM    MCH 30.7 07/12/2021 02:04 AM    MCHC 33.4 (L) 07/12/2021 02:04 AM    MPV 9.7 07/12/2021 02:04 AM      Lab Results   Component Value Date/Time    SODIUM 134 (L) 07/12/2021 02:04 AM    POTASSIUM 4.2 07/12/2021 02:04 AM    CHLORIDE 101 07/12/2021 02:04 AM    CO2 23 07/12/2021 02:04 AM    GLUCOSE 109 (H) 07/12/2021 02:04 AM    BUN 20 07/12/2021 02:04 AM    CREATININE 1.05 07/12/2021 02:04 AM    CREATININE 1.4 11/28/2006 03:42 AM      Lab Results   Component Value Date/Time    ASTSGOT 22 07/12/2021 02:04 AM    ALTSGPT 17 07/12/2021 02:04 AM     Lab Results   Component Value Date/Time    CHOLSTRLTOT 121 07/11/2021 01:50 AM    LDL 51 07/11/2021 01:50 AM    HDL 57 07/11/2021 01:50 AM    TRIGLYCERIDE 66 07/11/2021 01:50 AM    TROPONINT 53 (H) 07/11/2021 05:26 PM       Recent Labs      07/09/21  1830 07/10/21  2012   NTPROBNP 6807* 2479*       Assessment/Plan  Polo Pyle is a 71 y.o. male with significant past medical history of CAD S/P 5 stents in 2005, hypertension, hyperlipidemia, peripheral artery disease, AAA, and DVT admitted 7/9/2021 with submassive pulmonary embolism.     #Submassive pulmonary embolism  -CTA showed bilateral PE w/ elevated RV/LV ratio. Troponin, BNP and lactic acid elevated on admission.TTE showed EF ~50%, dilated RV and hypokinetic RV.   -NA score 4 points ((RV dysfunction, elevated troponin), class II intermdiate risk.   -SPESI High risk (history CAD)  -Continue heparin GTT  -Avoid negative inotrope until RV strain resolves  -CTM on telemetry   -Any signs of cardiopulmonary instability, low threshold for ICU transfer and IR consult for thrombolysis   -Plan for transition to DOAC at discharge    #Acute DVT  -Continue Heparin GTT  -Plan to transition to DOAC at discharge

## 2021-07-13 LAB
ALBUMIN SERPL BCP-MCNC: 3.5 G/DL (ref 3.2–4.9)
BUN SERPL-MCNC: 16 MG/DL (ref 8–22)
CALCIUM SERPL-MCNC: 9.1 MG/DL (ref 8.5–10.5)
CHLORIDE SERPL-SCNC: 104 MMOL/L (ref 96–112)
CO2 SERPL-SCNC: 22 MMOL/L (ref 20–33)
CREAT SERPL-MCNC: 0.91 MG/DL (ref 0.5–1.4)
ERYTHROCYTE [DISTWIDTH] IN BLOOD BY AUTOMATED COUNT: 46.9 FL (ref 35.9–50)
GLUCOSE SERPL-MCNC: 95 MG/DL (ref 65–99)
HCT VFR BLD AUTO: 40.7 % (ref 42–52)
HGB BLD-MCNC: 13.5 G/DL (ref 14–18)
MAGNESIUM SERPL-MCNC: 2.1 MG/DL (ref 1.5–2.5)
MCH RBC QN AUTO: 30.5 PG (ref 27–33)
MCHC RBC AUTO-ENTMCNC: 33.2 G/DL (ref 33.7–35.3)
MCV RBC AUTO: 91.9 FL (ref 81.4–97.8)
PHOSPHATE SERPL-MCNC: 3.2 MG/DL (ref 2.5–4.5)
PLATELET # BLD AUTO: 121 K/UL (ref 164–446)
PMV BLD AUTO: 10.4 FL (ref 9–12.9)
POTASSIUM SERPL-SCNC: 4.2 MMOL/L (ref 3.6–5.5)
RBC # BLD AUTO: 4.43 M/UL (ref 4.7–6.1)
SODIUM SERPL-SCNC: 135 MMOL/L (ref 135–145)
UFH PPP CHRO-ACNC: 0.24 IU/ML
UFH PPP CHRO-ACNC: 0.45 IU/ML
UFH PPP CHRO-ACNC: 0.68 IU/ML
WBC # BLD AUTO: 6.8 K/UL (ref 4.8–10.8)

## 2021-07-13 PROCEDURE — 99233 SBSQ HOSP IP/OBS HIGH 50: CPT | Performed by: STUDENT IN AN ORGANIZED HEALTH CARE EDUCATION/TRAINING PROGRAM

## 2021-07-13 PROCEDURE — 85520 HEPARIN ASSAY: CPT

## 2021-07-13 PROCEDURE — A9270 NON-COVERED ITEM OR SERVICE: HCPCS | Performed by: STUDENT IN AN ORGANIZED HEALTH CARE EDUCATION/TRAINING PROGRAM

## 2021-07-13 PROCEDURE — 80069 RENAL FUNCTION PANEL: CPT

## 2021-07-13 PROCEDURE — A9270 NON-COVERED ITEM OR SERVICE: HCPCS | Performed by: INTERNAL MEDICINE

## 2021-07-13 PROCEDURE — 83735 ASSAY OF MAGNESIUM: CPT

## 2021-07-13 PROCEDURE — 700111 HCHG RX REV CODE 636 W/ 250 OVERRIDE (IP): Performed by: INTERNAL MEDICINE

## 2021-07-13 PROCEDURE — 36415 COLL VENOUS BLD VENIPUNCTURE: CPT

## 2021-07-13 PROCEDURE — 85027 COMPLETE CBC AUTOMATED: CPT

## 2021-07-13 PROCEDURE — 700102 HCHG RX REV CODE 250 W/ 637 OVERRIDE(OP): Performed by: INTERNAL MEDICINE

## 2021-07-13 PROCEDURE — 700102 HCHG RX REV CODE 250 W/ 637 OVERRIDE(OP): Performed by: STUDENT IN AN ORGANIZED HEALTH CARE EDUCATION/TRAINING PROGRAM

## 2021-07-13 PROCEDURE — 770020 HCHG ROOM/CARE - TELE (206)

## 2021-07-13 RX ORDER — BENZONATATE 100 MG/1
100 CAPSULE ORAL 3 TIMES DAILY PRN
Status: DISCONTINUED | OUTPATIENT
Start: 2021-07-13 | End: 2021-07-14 | Stop reason: HOSPADM

## 2021-07-13 RX ADMIN — HYDRALAZINE HYDROCHLORIDE 25 MG: 25 TABLET, FILM COATED ORAL at 14:47

## 2021-07-13 RX ADMIN — HEPARIN SODIUM 16 UNITS/KG/HR: 5000 INJECTION, SOLUTION INTRAVENOUS at 03:00

## 2021-07-13 RX ADMIN — GUAIFENESIN 600 MG: 600 TABLET, EXTENDED RELEASE ORAL at 16:38

## 2021-07-13 RX ADMIN — HYDRALAZINE HYDROCHLORIDE 25 MG: 25 TABLET, FILM COATED ORAL at 04:44

## 2021-07-13 RX ADMIN — ALBUTEROL SULFATE 2 PUFF: 90 AEROSOL, METERED RESPIRATORY (INHALATION) at 14:48

## 2021-07-13 RX ADMIN — ROSUVASTATIN CALCIUM 20 MG: 20 TABLET, FILM COATED ORAL at 16:37

## 2021-07-13 RX ADMIN — GUAIFENESIN 600 MG: 600 TABLET, EXTENDED RELEASE ORAL at 04:44

## 2021-07-13 RX ADMIN — RIVAROXABAN 15 MG: 15 TABLET, FILM COATED ORAL at 17:39

## 2021-07-13 RX ADMIN — HYDRALAZINE HYDROCHLORIDE 25 MG: 25 TABLET, FILM COATED ORAL at 22:11

## 2021-07-13 RX ADMIN — BENZONATATE 100 MG: 100 CAPSULE ORAL at 23:43

## 2021-07-13 RX ADMIN — HEPARIN SODIUM 3700 UNITS: 1000 INJECTION, SOLUTION INTRAVENOUS; SUBCUTANEOUS at 05:08

## 2021-07-13 ASSESSMENT — ENCOUNTER SYMPTOMS
CONSTIPATION: 0
ABDOMINAL PAIN: 0
PALPITATIONS: 0
COUGH: 0
FEVER: 0
DIZZINESS: 0
CHILLS: 0
SHORTNESS OF BREATH: 0
BACK PAIN: 0
HEADACHES: 0
NAUSEA: 0
DIARRHEA: 0
VOMITING: 0

## 2021-07-13 ASSESSMENT — FIBROSIS 4 INDEX: FIB4 SCORE: 3.09

## 2021-07-13 NOTE — CARE PLAN
Problem: Pain - Standard  Goal: Alleviation of pain or a reduction in pain to the patient’s comfort goal  Outcome: Progressing     Problem: Knowledge Deficit - Standard  Goal: Patient and family/care givers will demonstrate understanding of plan of care, disease process/condition, diagnostic tests and medications  Outcome: Progressing     Problem: Communication  Goal: The ability to communicate needs accurately and effectively will improve  Outcome: Progressing     Problem: Hemodynamics  Goal: Patient's hemodynamics, fluid balance and neurologic status will be stable or improve  Outcome: Progressing     Problem: Respiratory  Goal: Patient will achieve/maintain optimum respiratory ventilation and gas exchange  Outcome: Progressing     The patient is Stable - Low risk of patient condition declining or worsening    Shift Goals  Clinical Goals: no chest pain or sob  Patient Goals: go home  Family Goals: n/a

## 2021-07-13 NOTE — PROGRESS NOTES
Hospital Medicine Daily Progress Note    Date of Service  7/13/2021    Chief Complaint  Polo Pyle is a 71 y.o. male admitted 7/9/2021 with chest pain, shortness of breath, dyspnea on exertion, leg swelling    Hospital Course    Respiratory status continued to improve, off oxygen, on room air  Patient appears well, reported some chronic cough as usual, denies chest pain, nausea vomiting.  Per cardiology recommendation, continue heparin drip for 5 days, transition to Xarelto today 7/13    I have personally seen and examined the patient at bedside. I discussed the plan of care with patient, bedside RN, charge RN, , pharmacy and cardiology, critical care and IR.    Consultants/Specialty  cardiology, critical care and IR    Code Status  DNAR/DNI    Disposition  Patient is not medically cleared.   Anticipate discharge to to home with organized home healthcare and close outpatient follow-up.  I have placed the appropriate orders for post-discharge needs.    Review of Systems  Review of Systems   Constitutional: Negative for chills, fever and malaise/fatigue.   Respiratory: Negative for cough and shortness of breath.    Cardiovascular: Negative for chest pain, palpitations and leg swelling.   Gastrointestinal: Negative for abdominal pain, constipation, diarrhea, nausea and vomiting.   Musculoskeletal: Negative for back pain and joint pain.        Leg pain   Neurological: Negative for dizziness and headaches.   All other systems reviewed and are negative.     Physical Exam  Temp:  [35.9 °C (96.6 °F)-36.6 °C (97.8 °F)] 36.4 °C (97.5 °F)  Pulse:  [62-74] 71  Resp:  [16-19] 17  BP: (117-146)/(73-87) 129/87  SpO2:  [93 %-97 %] 97 %    Physical Exam  Vitals and nursing note reviewed.   Constitutional:       Appearance: He is obese. He is not ill-appearing or diaphoretic.   Cardiovascular:      Rate and Rhythm: Normal rate and regular rhythm.      Pulses: Normal pulses.      Heart sounds: No murmur heard.      Pulmonary:      Effort: Pulmonary effort is normal. No respiratory distress.      Comments: Using supplemental oxygen  Abdominal:      General: Bowel sounds are normal. There is no distension.      Palpations: Abdomen is soft.   Musculoskeletal:         General: No swelling or tenderness. Normal range of motion.   Skin:     General: Skin is warm.      Capillary Refill: Capillary refill takes less than 2 seconds.      Coloration: Skin is not jaundiced or pale.      Comments: BLE evidence of Venous stasis   Neurological:      General: No focal deficit present.      Mental Status: He is alert and oriented to person, place, and time. Mental status is at baseline.   Psychiatric:         Mood and Affect: Mood normal.         Behavior: Behavior normal.         Thought Content: Thought content normal.         Judgment: Judgment normal.       Fluids    Intake/Output Summary (Last 24 hours) at 7/13/2021 1721  Last data filed at 7/12/2021 2000  Gross per 24 hour   Intake 120 ml   Output --   Net 120 ml       Laboratory  Recent Labs     07/11/21  0150 07/12/21  0204 07/13/21  0359   WBC 10.4 7.7 6.8   RBC 4.43* 4.40* 4.43*   HEMOGLOBIN 13.7* 13.5* 13.5*   HEMATOCRIT 41.1* 40.4* 40.7*   MCV 92.8 91.8 91.9   MCH 30.9 30.7 30.5   MCHC 33.3* 33.4* 33.2*   RDW 47.0 46.5 46.9   PLATELETCT 128* 134* 121*   MPV 9.4 9.7 10.4     Recent Labs     07/11/21  0150 07/12/21  0204 07/13/21  0359   SODIUM 140 134* 135   POTASSIUM 4.3 4.2 4.2   CHLORIDE 106 101 104   CO2 24 23 22   GLUCOSE 94 109* 95   BUN 20 20 16   CREATININE 1.03 1.05 0.91   CALCIUM 8.8 8.9 9.1             Recent Labs     07/11/21  0150   TRIGLYCERIDE 66   HDL 57   LDL 51       Imaging  EC-ECHOCARDIOGRAM COMPLETE W/ CONT   Final Result      CT-CTA CHEST PULMONARY ARTERY W/ RECONS   Final Result         1. Extensive acute bilateral pulmonary emboli.      2. Elevation of the RV/LV ratio.      3. Small wedge-shaped opacity in the left lower lobe, likely infarct.      4.  Moderate hiatal hernia.      CRITICAL RESULT READ BACK: Preliminary findings discussed with and critical read back performed by Dr. Giang via telephone on 7/10/2021 5:19 AM         US-EXTREMITY VENOUS LOWER BILAT   Final Result      DX-CHEST-PORTABLE (1 VIEW)   Final Result      Probable mild pulmonary interstitial edema and the cardiac silhouette is enlarged           Assessment/Plan  * PE (pulmonary thromboembolism) (HCC)- (present on admission)  Assessment & Plan  CT confirmed acute extensive bilateral pulmonary embolism  Spoke with ICU about patient, currently not a candidate for thrombectomy.   Patient is currently stable, blood pressure is maintaining and not hypotensive.  Patient is not experiencing pleuritic or other chest pains.    patient had COVID-19 and had short-term Xarelto for DVT prophylaxis, which may have prompted patient's original DVTs to worsen and develop PE.    -Continue heparin drip, as per cardiology recommending at least 5 days then transition to Xarelto.  -We will need to transition to oral anticoagulation, patient has had Xarelto in the past but did not fail medication.  Start transition to Xarelto 7/13.  -I have spoken directly with critical care, cardiologist, interventional radiologist.  At this time patient will be managed with heparin drip and supportive measures.    Chronic CHF (congestive heart failure) (HCC)- (present on admission)  Assessment & Plan  Patient has chronic CHF.  At this time, patient has acute CHF exacerbation but due to new onset massive pulmonary embolism as primary cause of dysfunction.    Acute DVT (deep venous thrombosis) (HCC)- (present on admission)  Assessment & Plan  Patient has history of DVT on the left side years ago. Patient was on Xarelto before, he mentioned he was on a triangle shaped medication, pt is color blind and does not know if it was red colored pill.    D-dimer was very elevated, repeat ultrasound showed acute DVT on the right side  CTA  CONFIRMED PE  Continue heparin drip and switch to oral later  Patient is not a candidate for thrombectomy as per critical care, Veronica score 2 after improvement    MIKAYLA (acute kidney injury) (HCC)- (present on admission)  Assessment & Plan  Came with cr 1.34 and 0.6  Due to acute process with VTE    Other chest pain- (present on admission)  Assessment & Plan  PREETI score around 6  Typical chest pain on exertion with changes in the EKG; T inversion on anterior leads  Troponin elevation better explained due to PE.  Cardiology was consulted, patient is not a candidate for catheterization or stress test at this time due to his acute PE.  Chest pain due to acute extensive bilateral PE    Continue Heparin drip    Acute respiratory failure (HCC)- (present on admission)  Assessment & Plan  Using oxygen 2 L nasal cannula, due to previous Covid infection.  Presenting with lower extremity edema, crackles, and reports of ALICIA.  Chest x-ray showed pulmonary edema  Elevated BNP  Due to acute extensive bilateral PE  -Continue oxygen supplementation  -Patient is on nocturnal oxygen home order, will need home O2 eval when patient is more appropriate.  Currently using 2 L continuous.    History of COVID-19- (present on admission)  Assessment & Plan  COVID-19 (2/8/2021) on nocturnal oxygen at home  Will need home oxygen evaluation for continuous use    Obesity (BMI 35.0-39.9 without comorbidity) ([facility])- (present on admission)  Assessment & Plan  Recommending patient to follow up with their PCP on weight management plan  Recommending polysomnography to PCP given elevated BMI  Counseled patient on weight control, diet management, following up with PCP    Essential hypertension- (present on admission)  Assessment & Plan  Close monitoring and adjust the dose if needed  Hydralazine IV as needed we will hold oral antimedication at this time due to possible decompensation    Coronary artery disease due to lipid rich plaque- (present on  admission)  Assessment & Plan  History of coronary artery disease with stent more than 10 years ago  We will continue patient on rosuvastatin     VTE prophylaxis: therapeutic anticoagulation with heparin drip    I have performed a physical exam and reviewed and updated ROS and Plan today (7/13/2021). In review of yesterday's note (7/12/2021), there are no changes except as documented above.

## 2021-07-13 NOTE — CARE PLAN
The patient is Stable - Low risk of patient condition declining or worsening    Shift Goals  Clinical Goals: no chest pain or sob, maintain therapeutic anti xa levels  Patient Goals: go home  Family Goals: n/a    Progress made toward(s) clinical / shift goals:  No SOB or chest pain    Patient is not progressing towards the following goals: below therapeutic anti xa levels      Problem: Pain - Standard  Goal: Alleviation of pain or a reduction in pain to the patient’s comfort goal  Outcome: Progressing     Problem: Knowledge Deficit - Standard  Goal: Patient and family/care givers will demonstrate understanding of plan of care, disease process/condition, diagnostic tests and medications  Outcome: Progressing     Problem: Communication  Goal: The ability to communicate needs accurately and effectively will improve  Outcome: Progressing     Problem: Hemodynamics  Goal: Patient's hemodynamics, fluid balance and neurologic status will be stable or improve  Outcome: Progressing     Problem: Respiratory  Goal: Patient will achieve/maintain optimum respiratory ventilation and gas exchange  Outcome: Progressing

## 2021-07-14 VITALS
SYSTOLIC BLOOD PRESSURE: 150 MMHG | WEIGHT: 259.48 LBS | HEART RATE: 69 BPM | HEIGHT: 70 IN | DIASTOLIC BLOOD PRESSURE: 95 MMHG | OXYGEN SATURATION: 97 % | RESPIRATION RATE: 17 BRPM | BODY MASS INDEX: 37.15 KG/M2 | TEMPERATURE: 97.3 F

## 2021-07-14 PROCEDURE — A9270 NON-COVERED ITEM OR SERVICE: HCPCS | Performed by: INTERNAL MEDICINE

## 2021-07-14 PROCEDURE — 700102 HCHG RX REV CODE 250 W/ 637 OVERRIDE(OP): Performed by: STUDENT IN AN ORGANIZED HEALTH CARE EDUCATION/TRAINING PROGRAM

## 2021-07-14 PROCEDURE — 700102 HCHG RX REV CODE 250 W/ 637 OVERRIDE(OP): Performed by: INTERNAL MEDICINE

## 2021-07-14 PROCEDURE — 99239 HOSP IP/OBS DSCHRG MGMT >30: CPT | Performed by: STUDENT IN AN ORGANIZED HEALTH CARE EDUCATION/TRAINING PROGRAM

## 2021-07-14 PROCEDURE — A9270 NON-COVERED ITEM OR SERVICE: HCPCS | Performed by: STUDENT IN AN ORGANIZED HEALTH CARE EDUCATION/TRAINING PROGRAM

## 2021-07-14 RX ADMIN — RIVAROXABAN 15 MG: 15 TABLET, FILM COATED ORAL at 07:48

## 2021-07-14 RX ADMIN — GUAIFENESIN 600 MG: 600 TABLET, EXTENDED RELEASE ORAL at 05:47

## 2021-07-14 RX ADMIN — HYDRALAZINE HYDROCHLORIDE 25 MG: 25 TABLET, FILM COATED ORAL at 05:47

## 2021-07-14 NOTE — CARE PLAN
Problem: Pain - Standard  Goal: Alleviation of pain or a reduction in pain to the patient’s comfort goal  Outcome: Progressing     Problem: Knowledge Deficit - Standard  Goal: Patient and family/care givers will demonstrate understanding of plan of care, disease process/condition, diagnostic tests and medications  Outcome: Progressing     Problem: Communication  Goal: The ability to communicate needs accurately and effectively will improve  Outcome: Progressing     Problem: Hemodynamics  Goal: Patient's hemodynamics, fluid balance and neurologic status will be stable or improve  Outcome: Progressing     The patient is Stable - Low risk of patient condition declining or worsening    Shift Goals  Clinical Goals: no chest pain or sob  Patient Goals: go home  Family Goals: n/a

## 2021-07-14 NOTE — DISCHARGE SUMMARY
Discharge Summary    CHIEF COMPLAINT ON ADMISSION  Chief Complaint   Patient presents with   • Chest Pain     Pt c/o symptoms for about 1 wk. sent by MD for further eval, new onset CHF. chest pressure, dull ache.   • Fatigue   • Shortness of Breath   • Leg Swelling       Reason for Admission  Chest Pain      Admission Date  7/9/2021    CODE STATUS  DNAR/DNI    HPI & HOSPITAL COURSE    71-year-old male with history of coronary artery disease s/p stent peripheral vascular disease hypertension and obesity presented 7/10 with chest pain, patient has history of coronary artery disease was seen by cardiologist today who sent him to the emergency for chest pain, patient has had pressure chest pain on exertion, releasing on rest, not radiated, associated with shortness of breath, patient also noticed swelling on his legs and using more pillows at night, no syncope or fainting no palpitation or urinary or bowel symptoms, on admission labs showed elevated troponin 91 and proBNP 6807, also he had crackles on exam with lower extremity edema, a chest x-ray showed mild pulmonary edema, EKG showed T inversion on anterior leads. Ultrasound lower extremity showed acute DVT on the right side and chronic on the left side. CT confirmed acute extensive bilateral pulmonary embolism. patient had COVID-19 and had short-term Xarelto for DVT prophylaxis. Spoke with ICU about patient, currently not a candidate for thrombectomy.  Finished heparin drip for 5 days per cardiology recommendation and transitioned to Xarelto at discharge.    Patient presented with acute respiratory failure at admission due to PE which requires 2 L oxygen.  He was on home oxygen at night after Covid infection 2/2021.  He was able to wean off daytime oxygen at discharge. His MIKAYLA resolved at discharge with supportive care.    His other medical conditions including MIKAYLA, CHF, hypertension, CAD, have also been monitored and/or treated      Therefore, he is discharged in  good and stable condition to home with close outpatient follow-up.    The patient met 2-midnight criteria for an inpatient stay at the time of discharge.    Discharge Date  7/14 2021    FOLLOW UP ITEMS POST DISCHARGE  You may need lifelong anticoagulation, Follow-up with oncologist/primary care physician for further management of pulmonary embolism  Monitor bleeding signs, please go to ER if you have concerns    DISCHARGE DIAGNOSES  Principal Problem:    PE (pulmonary thromboembolism) ([facility]) POA: Yes  Active Problems:    Coronary artery disease due to lipid rich plaque POA: Yes    Essential hypertension (Chronic) POA: Yes    Obesity (BMI 35.0-39.9 without comorbidity) (HCC) POA: Yes    History of COVID-19 POA: Yes    Acute respiratory failure (HCC) POA: Yes    Other chest pain POA: Yes    MIKAYLA (acute kidney injury) (HCC) POA: Yes    Acute DVT (deep venous thrombosis) (HCC) POA: Yes    Chronic CHF (congestive heart failure) ([facility]) (Chronic) POA: Yes  Resolved Problems:    * No resolved hospital problems. *      FOLLOW UP  Future Appointments   Date Time Provider Department Center   7/27/2021  1:15 PM [facility] EXAM 10 ECHO [facility]     LAMAR Lennon  [address] [address]  [phone]    Schedule an appointment as soon as possible for a visit in 1 week  Recent hospitalization for pulmonary embolism      MEDICATIONS ON DISCHARGE     Medication List      START taking these medications      Instructions   * rivaroxaban 15 MG Tabs tablet  Commonly known as: XARELTO   Take 1 tablet by mouth 2 times a day with meals for 20 days.  Dose: 15 mg     * rivaroxaban 20 MG Tabs tablet  Start taking on: August 3, 2021  Commonly known as: XARELTO   Take 1 tablet by mouth with dinner for 30 days.  Dose: 20 mg         * This list has 2 medication(s) that are the same as other medications prescribed for you. Read the directions carefully, and ask your doctor or other care provider to review them with  "you.            CONTINUE taking these medications      Instructions   albuterol 108 (90 Base) MCG/ACT Aers inhalation aerosol   Doctor's comments: Give albuterol that is patient or insurance preference please  Inhale 2 Puffs every four hours as needed for Shortness of Breath.  Dose: 2 Puff     aspirin 81 MG tablet   Take 81 mg by mouth every day.  Dose: 81 mg     Co Q-10 300 MG Caps   Take 1 Cap by mouth every day.  Dose: 1 capsule     fish oil 1000 MG Caps capsule   Take 1 capsule by mouth 2 times a day.  Dose: 1,000 mg     MULTIVITAMIN PO   Take 1 Tab by mouth every day.  Dose: 1 tablet     niacin 1000 MG CR tablet  Commonly known as: NIASPAN   TAKE TWO TABLETS BY MOUTH DAILY     rosuvastatin 20 MG Tabs  Commonly known as: CRESTOR   Take 1 Tab by mouth every evening.  Dose: 20 mg     VITAMIN C PO   Take 6,000 mg by mouth every day.  Dose: 6,000 mg     VITAMIN D PO   Take 1 capsule by mouth every day.  Dose: 1 capsule            Allergies  Allergies   Allergen Reactions   • Mercury Swelling     And mercury based preservatives-Thimerasol  Swelling, \"strange discharge from eyes\"   • Other Environmental Anaphylaxis     Insect toxins/ bees and wasps- Anaphylaxis   • Sulfa Drugs Anaphylaxis and Swelling   • Pravastatin Diarrhea     Diarrhea        DIET  Orders Placed This Encounter   Procedures   • Diet Order Diet: Cardiac     Standing Status:   Standing     Number of Occurrences:   1     Order Specific Question:   Diet:     Answer:   Cardiac [6]       ACTIVITY  As tolerated.  Weight bearing as tolerated    CONSULTATIONS  cardiology, critical care and IR    PROCEDURES      LABORATORY  Lab Results   Component Value Date    SODIUM 135 07/13/2021    POTASSIUM 4.2 07/13/2021    CHLORIDE 104 07/13/2021    CO2 22 07/13/2021    GLUCOSE 95 07/13/2021    BUN 16 07/13/2021    CREATININE 0.91 07/13/2021    CREATININE 1.4 11/28/2006        Lab Results   Component Value Date    WBC 6.8 07/13/2021    HEMOGLOBIN 13.5 (L) 07/13/2021    " HEMATOCRIT 40.7 (L) 07/13/2021    PLATELETCT 121 (L) 07/13/2021        Total time of the discharge process exceeds 32 minutes.

## 2021-07-14 NOTE — DISCHARGE INSTRUCTIONS
Discharge Instructions    Discharged to home by car with self. Discharged via wheelchair, hospital escort: Yes.  Special equipment needed: Not Applicable    Be sure to schedule a follow-up appointment with your primary care doctor or any specialists as instructed.     Discharge Plan:        I understand that a diet low in cholesterol, fat, and sodium is recommended for good health. Unless I have been given specific instructions below for another diet, I accept this instruction as my diet prescription.         · Is patient discharged on Warfarin / Coumadin?   No     Depression / Suicide Risk    As you are discharged from this [facility] facility, it is important to learn how to keep safe from harming yourself.    Recognize the warning signs:  · Abrupt changes in personality, positive or negative- including increase in energy   · Giving away possessions  · Change in eating patterns- significant weight changes-  positive or negative  · Change in sleeping patterns- unable to sleep or sleeping all the time   · Unwillingness or inability to communicate  · Depression  · Unusual sadness, discouragement and loneliness  · Talk of wanting to die  · Neglect of personal appearance   · Rebelliousness- reckless behavior  · Withdrawal from people/activities they love  · Confusion- inability to concentrate     If you or a loved one observes any of these behaviors or has concerns about self-harm, here's what you can do:  · Talk about it- your feelings and reasons for harming yourself  · Remove any means that you might use to hurt yourself (examples: pills, rope, extension cords, firearm)  · Get professional help from the community (Mental Health, Substance Abuse, psychological counseling)  · Do not be alone:Call your Safe Contact- someone whom you trust who will be there for you.  · Call your local CRISIS HOTLINE 244-5387 or 401-222-0818  · Call your local Children's Mobile Crisis Response Team [place] (951) 053-1438 or  www.[employer]  · Call the toll free National Suicide Prevention Hotlines   · National Suicide Prevention Lifeline 051-689-HXHQ (3021)  · National Hope Line Network 800-SUICIDE (524-1150)      Pulmonary Embolism    A pulmonary embolism (PE) is a sudden blockage or decrease of blood flow in one or both lungs. Most blockages come from a blood clot that forms in the vein of a lower leg, thigh, or arm (deep vein thrombosis, DVT) and travels to the lungs. A clot is blood that has thickened into a gel or solid. PE is a dangerous and life-threatening condition that needs to be treated right away.  What are the causes?  This condition is usually caused by a blood clot that forms in a vein and moves to the lungs. In rare cases, it may be caused by air, fat, part of a tumor, or other tissue that moves through the veins and into the lungs.  What increases the risk?  The following factors may make you more likely to develop this condition:  · Experiencing a traumatic injury, such as breaking a hip or leg.  · Having:  ? A spinal cord injury.  ? Orthopedic surgery, especially hip or knee replacement.  ? Any major surgery.  ? A stroke.  ? DVT.  ? Blood clots or blood clotting disease.  ? Long-term (chronic) lung or heart disease.  ? Cancer treated with chemotherapy.  ? A central venous catheter.  · Taking medicines that contain estrogen. These include birth control pills and hormone replacement therapy.  · Being:  ? Pregnant.  ? In the period of time after your baby is delivered (postpartum).  ? Older than age 60.  ? Overweight.  ? A smoker, especially if you have other risks.  What are the signs or symptoms?  Symptoms of this condition usually start suddenly and include:  · Shortness of breath during activity or at rest.  · Coughing, coughing up blood, or coughing up blood-tinged mucus.  · Chest pain that is often worse with deep breaths.  · Rapid or irregular heartbeat.  · Feeling light-headed or  dizzy.  · Fainting.  · Feeling anxious.  · Fever.  · Sweating.  · Pain and swelling in a leg. This is a symptom of DVT, which can lead to PE.  How is this diagnosed?  This condition may be diagnosed based on:  · Your medical history.  · A physical exam.  · Blood tests.  · CT pulmonary angiogram. This test checks blood flow in and around your lungs.  · Ventilation-perfusion scan, also called a lung VQ scan. This test measures air flow and blood flow to the lungs.  · An ultrasound of the legs.  How is this treated?  Treatment for this condition depends on many factors, such as the cause of your PE, your risk for bleeding or developing more clots, and other medical conditions you have. Treatment aims to remove, dissolve, or stop blood clots from forming or growing larger. Treatment may include:  · Medicines, such as:  ? Blood thinning medicines (anticoagulants) to stop clots from forming.  ? Medicines that dissolve clots (thrombolytics).  · Procedures, such as:  ? Using a flexible tube to remove a blood clot (embolectomy) or to deliver medicine to destroy it (catheter-directed thrombolysis).  ? Inserting a filter into a large vein that carries blood to the heart (inferior vena cava). This filter (vena cava filter) catches blood clots before they reach the lungs.  ? Surgery to remove the clot (surgical embolectomy). This is rare.  You may need a combination of immediate, long-term (up to 3 months after diagnosis), and extended (more than 3 months after diagnosis) treatments. Your treatment may continue for several months (maintenance therapy). You and your health care provider will work together to choose the treatment program that is best for you.  Follow these instructions at home:  Medicines  · Take over-the-counter and prescription medicines only as told by your health care provider.  · If you are taking an anticoagulant medicine:  ? Take the medicine every day at the same time each day.  ? Understand what foods and  drugs interact with your medicine.  ? Understand the side effects of this medicine, including excessive bruising or bleeding. Ask your health care provider or pharmacist about other side effects.  General instructions  · Wear a medical alert bracelet or carry a medical alert card that says you have had a PE and lists what medicines you take.  · Ask your health care provider when you may return to your normal activities. Avoid sitting or lying for a long time without moving.  · Maintain a healthy weight. Ask your health care provider what weight is healthy for you.  · Do not use any products that contain nicotine or tobacco, such as cigarettes, e-cigarettes, and chewing tobacco. If you need help quitting, ask your health care provider.  · Talk with your health care provider about any travel plans. It is important to make sure that you are still able to take your medicine while on trips.  · Keep all follow-up visits as told by your health care provider. This is important.  Contact a health care provider if:  · You missed a dose of your blood thinner medicine.  Get help right away if:  · You have:  ? New or increased pain, swelling, warmth, or redness in an arm or leg.  ? Numbness or tingling in an arm or leg.  ? Shortness of breath during activity or at rest.  ? A fever.  ? Chest pain.  ? A rapid or irregular heartbeat.  ? A severe headache.  ? Vision changes.  ? A serious fall or accident, or you hit your head.  ? Stomach (abdominal) pain.  ? Blood in your vomit, stool, or urine.  ? A cut that will not stop bleeding.  · You cough up blood.  · You feel light-headed or dizzy.  · You cannot move your arms or legs.  · You are confused or have memory loss.  These symptoms may represent a serious problem that is an emergency. Do not wait to see if the symptoms will go away. Get medical help right away. Call your local emergency services (911 in the [place] Do not drive yourself to the hospital.  Summary  · A pulmonary  embolism (PE) is a sudden blockage or decrease of blood flow in one or both lungs. PE is a dangerous and life-threatening condition that needs to be treated right away.  · Treatments for this condition usually include medicines to thin your blood (anticoagulants) or medicines to break apart blood clots (thrombolytics).  · If you are given blood thinners, it is important to take the medicine every day at the same time each day.  · Understand what foods and drugs interact with any medicines that you are taking.  · If you have signs of PE or DVT, call your local emergency services (911 in the [place]  This information is not intended to replace advice given to you by your health care provider. Make sure you discuss any questions you have with your health care provider.  Document Released: 12/15/2001 Document Revised: 09/25/2019 Document Reviewed: 09/25/2019  [employer] Patient Education © 2020 [employer].    Rivaroxaban oral tablets  What is this medicine?  RIVAROXABAN (ri va SHARON a ban) is an anticoagulant (blood thinner). It is used to treat blood clots in the lungs or in the veins. It is also used to prevent blood clots in the lungs or in the veins. It is also used to lower the chance of stroke in people with a medical condition called atrial fibrillation.  This medicine may be used for other purposes; ask your health care provider or pharmacist if you have questions.  COMMON BRAND NAME(S): Xarelto, Xarelto Starter Pack  What should I tell my health care provider before I take this medicine?  They need to know if you have any of these conditions:  · antiphospholipid antibody syndrome  · artificial heart valve  · bleeding disorders  · bleeding in the brain  · blood in your stools (black or tarry stools) or if you have blood in your vomit  · history of blood clots  · history of stomach bleeding  · kidney disease  · liver disease  · low blood counts, like low white cell, platelet, or red cell counts  · recent or planned  spinal or epidural procedure  · take medicines that treat or prevent blood clots  · an unusual or allergic reaction to rivaroxaban, other medicines, foods, dyes, or preservatives  · pregnant or trying to get pregnant  · breast-feeding  How should I use this medicine?  Take this medicine by mouth with a glass of water. Follow the directions on the prescription label. Take your medicine at regular intervals. Do not take it more often than directed. Do not stop taking except on your doctor's advice. Stopping this medicine may increase your risk of a blood clot. Be sure to refill your prescription before you run out of medicine.  If you are taking this medicine after hip or knee replacement surgery, take it with or without food. If you are taking this medicine for atrial fibrillation, take it with your evening meal. If you are taking this medicine to treat blood clots, take it with food at the same time each day. If you are unable to swallow your tablet, you may crush the tablet and mix it in applesauce. Then, immediately eat the applesauce. You should eat more food right after you eat the applesauce containing the crushed tablet.  Talk to your pediatrician regarding the use of this medicine in children. Special care may be needed.  Overdosage: If you think you have taken too much of this medicine contact a poison control center or emergency room at once.  NOTE: This medicine is only for you. Do not share this medicine with others.  What if I miss a dose?  If you take your medicine once a day and miss a dose, take the missed dose as soon as you remember. If it is almost time for your next dose, take only that dose. Do not take double or extra doses.  If you take your medicine twice a day and miss a dose, take the missed dose immediately. In this instance, 2 tablets may be taken at the same time. The next day you should take 1 tablet twice a day as directed.  What may interact with this medicine?  Do not take this  medicine with any of the following medications:  · defibrotide  This medicine may also interact with the following medications:  · aspirin and aspirin-like medicines  · certain antibiotics like erythromycin, azithromycin, and clarithromycin  · certain medicines for fungal infections like ketoconazole and itraconazole  · certain medicines for irregular heart beat like amiodarone, quinidine, dronedarone  · certain medicines for seizures like carbamazepine, phenytoin  · certain medicines that treat or prevent blood clots like warfarin, enoxaparin, and dalteparin  · conivaptan  · felodipine  · indinavir  · lopinavir; ritonavir  · NSAIDS, medicines for pain and inflammation, like ibuprofen or naproxen  · ranolazine  · rifampin  · ritonavir  · SNRIs, medicines for depression, like desvenlafaxine, duloxetine, levomilnacipran, venlafaxine  · SSRIs, medicines for depression, like citalopram, escitalopram, fluoxetine, fluvoxamine, paroxetine, sertraline  · Asher's wort  · verapamil  This list may not describe all possible interactions. Give your health care provider a list of all the medicines, herbs, non-prescription drugs, or dietary supplements you use. Also tell them if you smoke, drink alcohol, or use illegal drugs. Some items may interact with your medicine.  What should I watch for while using this medicine?  Visit your healthcare professional for regular checks on your progress. You may need blood work done while you are taking this medicine. Your condition will be monitored carefully while you are receiving this medicine. It is important not to miss any appointments.  Avoid sports and activities that might cause injury while you are using this medicine. Severe falls or injuries can cause unseen bleeding. Be careful when using sharp tools or knives. Consider using an electric razor. Take special care brushing or flossing your teeth. Report any injuries, bruising, or red spots on the skin to your healthcare  professional.  If you are going to need surgery or other procedure, tell your healthcare professional that you are taking this medicine.  Wear a medical ID bracelet or chain. Carry a card that describes your disease and details of your medicine and dosage times.  What side effects may I notice from receiving this medicine?  Side effects that you should report to your doctor or health care professional as soon as possible:  · allergic reactions like skin rash, itching or hives, swelling of the face, lips, or tongue  · back pain  · redness, blistering, peeling or loosening of the skin, including inside the mouth  · signs and symptoms of bleeding such as bloody or black, tarry stools; red or dark-brown urine; spitting up blood or brown material that looks like coffee grounds; red spots on the skin; unusual bruising or bleeding from the eye, gums, or nose  · signs and symptoms of a blood clot such as chest pain; shortness of breath; pain, swelling, or warmth in the leg  · signs and symptoms of a stroke such as changes in vision; confusion; trouble speaking or understanding; severe headaches; sudden numbness or weakness of the face, arm or leg; trouble walking; dizziness; loss of coordination  Side effects that usually do not require medical attention (report to your doctor or health care professional if they continue or are bothersome):  · dizziness  · muscle pain  This list may not describe all possible side effects. Call your doctor for medical advice about side effects. You may report side effects to FDA at 4-423-FDA-9584.  Where should I keep my medicine?  Keep out of the reach of children.  Store at room temperature between 15 and 30 degrees C (59 and 86 degrees F). Throw away any unused medicine after the expiration date.  NOTE: This sheet is a summary. It may not cover all possible information. If you have questions about this medicine, talk to your doctor, pharmacist, or health care provider.  © 2020  Elsevier/Gold Standard (2020-03-16 09:45:59)    You may need lifelong anticoagulation, Follow-up with oncologist/primary care physician for further management of pulmonary embolism  Monitor bleeding signs, please go to ER if you have concerns CPR okay okay

## 2021-07-14 NOTE — DISCHARGE PLANNING
Anticipated Discharge Disposition: Home    Action: RN CM met with the patient at bedside to deliver the 2nd IMM.  Patient signed 2nd IMM at 1000.  Patient stated that he can get a ride home from family today.  No anticipated discharge needs at this time.    Barriers to Discharge: None     Plan: Case coordination available to discuss any further discharge barriers.

## 2021-07-14 NOTE — PROGRESS NOTES
Received bedside report from RN, pt care assumed, VSS, pt assessment complete. Pt AAOx4 , with 3/10  Pain in upper legs at this time. No signs of acute distress noted at this time. Plan of care discussed with pt and verbalizes no questions. Pt denies any additional needs at this time. Bed locked/in lowest position, pt educated on fall risk and verbalized understanding, call light within reach, hourly rounding initiated.

## 2021-07-15 ENCOUNTER — PATIENT OUTREACH (OUTPATIENT)
Dept: MEDICAL GROUP | Facility: MEDICAL CENTER | Age: 71
End: 2021-07-15

## 2021-07-16 ENCOUNTER — APPOINTMENT (OUTPATIENT)
Dept: RADIOLOGY | Facility: MEDICAL CENTER | Age: 71
End: 2021-07-16
Attending: PHYSICIAN ASSISTANT
Payer: MEDICARE

## 2021-07-19 ENCOUNTER — PATIENT MESSAGE (OUTPATIENT)
Dept: HEALTH INFORMATION MANAGEMENT | Facility: OTHER | Age: 71
End: 2021-07-19

## 2021-07-19 ENCOUNTER — OFFICE VISIT (OUTPATIENT)
Dept: MEDICAL GROUP | Facility: MEDICAL CENTER | Age: 71
End: 2021-07-19
Payer: MEDICARE

## 2021-07-19 VITALS
OXYGEN SATURATION: 97 % | SYSTOLIC BLOOD PRESSURE: 108 MMHG | HEART RATE: 68 BPM | WEIGHT: 256 LBS | BODY MASS INDEX: 36.73 KG/M2 | DIASTOLIC BLOOD PRESSURE: 68 MMHG | TEMPERATURE: 97.3 F

## 2021-07-19 DIAGNOSIS — I82.419 ACUTE DEEP VEIN THROMBOSIS (DVT) OF FEMORAL VEIN, UNSPECIFIED LATERALITY (HCC): ICD-10-CM

## 2021-07-19 DIAGNOSIS — I26.99 PE (PULMONARY THROMBOEMBOLISM) (HCC): ICD-10-CM

## 2021-07-19 PROCEDURE — 99496 TRANSJ CARE MGMT HIGH F2F 7D: CPT | Performed by: STUDENT IN AN ORGANIZED HEALTH CARE EDUCATION/TRAINING PROGRAM

## 2021-07-19 RX ORDER — COVID-19 MOLECULAR TEST ASSAY
KIT MISCELLANEOUS
COMMUNITY
Start: 2021-07-09 | End: 2021-07-19

## 2021-07-19 ASSESSMENT — FIBROSIS 4 INDEX: FIB4 SCORE: 3.09

## 2021-07-19 NOTE — PROGRESS NOTES
Subjective:     Polo Pyle is a 71 y.o. male who presents for Hospital Follow-up.    POST DISCHARGE CALL:  Discharge Date:7/14/2021   Date of Outreach Call: 7/15/2021  9:29 AM  Now that you're home, how are you doing? Good  Comment:Pt states feeling better. Denies any chest  pains. Reports ongoing problem with swollen legs. States  this is not new. Reports left left is not as swollen.  Do you have questions about your medications? No  Did you fill your medications? Yes  Do you have a follow-up appointment scheduled?Yes  Comment:PCP 7/19/21  Discharging Department: Telemetry 7  Number of Attempts: 1  Current or previous attempts completed within two business days of discharge? Yes  Provided education regarding treatment plan, medication, self-management, ADLs? Yes  Has patient completed Advance Directive? If yes, advise them to bring to appointment. Yes  Care Manager phone number provided? *  Is there anything else I can help you with? No    HPI:   Recently hospitalized for     PE  Patient presented to the ED 7/9/2021 due to chest pain on exertion, pressure at rest and associated shortness of breath.  On admission labs showed elevated troponin at 91 and proBNP 6807, on physical exam patient had crackles with lower extremity edema.  Chest x-ray showed mild pulmonary edema, EKG showed T inversion on anterior leads.  Ultrasound of lower extremity showed acute DVT on the right side and chronic on the left side.  CT confirmed acute extensive bilateral pulmonary embolism.  Patient's history is significant for COVID-19 time Xarelto for DVT prophylaxis.  Patient was started on heparin drip for 5 days per cardiology recommendation and transition to Xarelto at discharge.  Patient continues on Xarelto.  Patient continues on 2 L oxygen at nightly but is off of daily oxygen.  Patient discharged 7/14.    Patient presents today for work note allowing him to return to work.  Patient states that he is feeling significantly  better.  Patient denies chest pain or shortness of breath today.  Patient denies any leg pain or leg swelling.  Patient scheduled for echo 2021.  Patient denies any unusual bleeding.  Recommended patient follow-up with cardiologist.    Current medicines (including reconciliation performed today)  Current Outpatient Medications   Medication Sig Dispense Refill   • rivaroxaban (XARELTO) 15 MG Tab tablet Take 1 tablet by mouth 2 times a day with meals for 20 days. 40 tablet 0   • [START ON 8/3/2021] rivaroxaban (XARELTO) 20 MG Tab tablet Take 1 tablet by mouth with dinner for 30 days. 30 tablet 0   • albuterol 108 (90 Base) MCG/ACT Aero Soln inhalation aerosol Inhale 2 Puffs every four hours as needed for Shortness of Breath. 1 Each 1   • Omega-3 Fatty Acids (FISH OIL) 1000 MG Cap capsule Take 1 capsule by mouth 2 times a day.     • VITAMIN D PO Take 1 capsule by mouth every day.     • niacin (NIASPAN) 1000 MG CR tablet TAKE TWO TABLETS BY MOUTH DAILY 180 Tab 1   • rosuvastatin (CRESTOR) 20 MG Tab Take 1 Tab by mouth every evening. 90 Tab 3   • Coenzyme Q10 (CO Q-10) 300 MG Cap Take 1 Cap by mouth every day.     • Ascorbic Acid (VITAMIN C PO) Take 6,000 mg by mouth every day.     • Multiple Vitamin (MULTIVITAMIN PO) Take 1 Tab by mouth every day.     • aspirin 81 MG tablet Take 81 mg by mouth every day.       No current facility-administered medications for this visit.       Allergies:   Mercury, Other environmental, Sulfa drugs, and Pravastatin    Social History     Tobacco Use   • Smoking status: Former Smoker     Years: 30.00     Types: Cigars     Quit date: 2011     Years since quittin.7   • Smokeless tobacco: Never Used   • Tobacco comment:    Vaping Use   • Vaping Use: Never used   Substance Use Topics   • Alcohol use: No   • Drug use: No       ROS:  Gen: no fevers/chills  ENT: no sore throat, no bloody nose  Pulm: no sob, no cough  CV: no chest pain, no palpitations  GI: no nausea/vomiting, no  diarrhea  MSk: no myalgias  Skin: no rash  Neuro: no headaches  Heme/Lymph:+ easy bruising          Objective:     Vitals:    07/19/21 1556   BP: 108/68   BP Location: Right arm   Patient Position: Sitting   BP Cuff Size: Adult   Pulse: 68   Temp: 36.3 °C (97.3 °F)   SpO2: 97%   Weight: 116 kg (256 lb)     Body mass index is 36.73 kg/m².    Physical Exam:  Gen: Alert and oriented, No apparent distress.  Neck: Neck is supple without lymphadenopathy.  Lungs: Normal effort, CTA bilaterally, no wheezes, rhonchi, or rales  CV: Regular rate and rhythm. No murmurs, rubs, or gallops.  Ext: No clubbing, cyanosis, edema.      Assessment and Plan:   1. PE (pulmonary thromboembolism) (HCC)  2. Acute deep vein thrombosis (DVT) of femoral vein, unspecified laterality (HCC)  Acute, stable.  Patient seen in hospital 7/10-7/14.  Patient continues on Xarelto.  Patient denies symptoms today including shortness of breath, chest pain, orthopnea.  Patient asking to return to work as a .  Patient understands that he needs to take frequent breaks for walking.  Patient continues to work with [employer] and vascular due to previous and recent DVT.  Patient scheduled for echo 7/27.  Patient recommended to follow-up with cardiologist.  Patient has no new concerns today.    - Chart and discharge summary were reviewed.   - Hospitalization and results reviewed with patient.   - Medications reviewed including instructions regarding high risk medications, dosing and side effects.  - Recommended Services: No services needed at this time  - Advance directive/POLST on file?  No     Follow-up:Return if symptoms worsen or fail to improve.

## 2021-07-19 NOTE — LETTER
July 19, 2021    To Whom It May Concern:         This is confirmation that Polo Pyle attended his scheduled appointment with Ruth Quach P.A.-C. on 7/19/21. Please excuse Polo from work from 7/9/21 to 7/27/21 due to hospitalization and medical condition. Patient is cleared to retuern to work at this time. Patient will need to attend scheduled follow up 7/27/21 AT 1:15 pm.         If you have any questions please do not hesitate to call me at the phone number listed below.    Sincerely,          Ruth Quach P.A.-C.  [phone]

## 2021-07-20 NOTE — DOCUMENTATION QUERY
[facility]                                                                       Query Response Note      PATIENT:               LYNN MORGAN  ACCT #:                  [id number]  MRN:                     [id number]  :                      1950  ADMIT DATE:       2021 10:48 PM  DISCH DATE:          RESPONDING  PROVIDER #:        [id number]           QUERY TEXT:    High risk submassive PE causing RV failure is documented in progress notes by cardiology. Can the diagnosis be further clarified to support the clinical picture? (including probable or suspected)?        NOTE:  If an appropriate response is not listed below, please respond with a new note.        The patient's clinical indicators include:  Admit Vitals: Pulse 100, RR 20, /81    Labs: BNP 6807, Troponin 53, Lactic acid 2.1    7/10 Echo - Mildly reduced LV systolic function LVEF 50%. Mildly dilated RV.  Reduced RV systolic function. Hypokinetic right ventricular free wall. RV systolic pressure 28mmHg    7/10 Cardiology consult - Chief Complaint = Chest pain, shortness of breath, leg swelling.  Assessment/Plan  -  Likely high risk PE with RV  strain.  Neck: No bruits No JVD.    Cardiology Note - estimated pulmonary pressure is low due to RV failure.  This measure is not an accurate representation of severity of pulmonary artery obstruction.  Appreciate pulmonary/crit consult. Agree to disagree with controversial expert consensus of treatment for submassive PE.    Treatments: Lasix, IV heparin infusion, avoid negative inotrope, telemetry monitoring    Risk factors: Pulmonary embolism, acute and chronic  DVT, acute respiratory failure    Thanks  Emy Ashby RN  Clinical   Connect via [employer]  Options provided:   -- Acute PE with acute cor pumonale   -- Acute PE without cor pumonale   -- Unable to determine      Query created by: Isma  Emy on 7/19/2021 9:43 AM    RESPONSE TEXT:    Acute PE with acute cor pumonale          Electronically signed by:  SHONDA COTA MD 7/19/2021 7:39 PM

## 2021-07-27 ENCOUNTER — HOSPITAL ENCOUNTER (OUTPATIENT)
Dept: CARDIOLOGY | Facility: MEDICAL CENTER | Age: 71
End: 2021-07-27
Attending: PHYSICIAN ASSISTANT
Payer: MEDICARE

## 2021-07-27 DIAGNOSIS — I25.83 CORONARY ARTERY DISEASE DUE TO LIPID RICH PLAQUE: ICD-10-CM

## 2021-07-27 DIAGNOSIS — Z95.5 PRESENCE OF STENT IN LEFT CIRCUMFLEX CORONARY ARTERY: ICD-10-CM

## 2021-07-27 DIAGNOSIS — I25.10 CORONARY ARTERY DISEASE DUE TO LIPID RICH PLAQUE: ICD-10-CM

## 2021-07-27 DIAGNOSIS — R07.89 OTHER CHEST PAIN: ICD-10-CM

## 2021-07-27 LAB
LV EJECT FRACT  99904: 55
LV EJECT FRACT MOD 2C 99903: 56.46
LV EJECT FRACT MOD 4C 99902: 41
LV EJECT FRACT MOD BP 99901: 44.2

## 2021-07-27 PROCEDURE — 93306 TTE W/DOPPLER COMPLETE: CPT

## 2021-07-27 PROCEDURE — 93306 TTE W/DOPPLER COMPLETE: CPT | Mod: 26 | Performed by: INTERNAL MEDICINE

## 2021-08-06 ENCOUNTER — PATIENT MESSAGE (OUTPATIENT)
Dept: CARDIOLOGY | Facility: MEDICAL CENTER | Age: 71
End: 2021-08-06

## 2021-08-06 NOTE — TELEPHONE ENCOUNTER
----- Message from Buffy Hairston P.A.-C. sent at 7/27/2021  5:24 PM PDT -----  His echo appears improved.   He probably needs an inperson follow up with us.    Evonne can you help let pt know his echo is improved and get him scheudled for an appt.    Thanks  buffy

## 2021-08-09 NOTE — PATIENT COMMUNICATION
Called pt to review PAC recommendations.  Pt verbalizes understanding and states he may need a refill of statin medication, but will follow up via MyChart if refill is needed as he is waiting on vascular to reply.  FV scheduled with PAC 8/27/2021.  He no other concerns or questions at this time and is appreciative of information given.

## 2021-08-12 DIAGNOSIS — E78.5 DYSLIPIDEMIA: ICD-10-CM

## 2021-08-17 RX ORDER — ROSUVASTATIN CALCIUM 20 MG/1
20 TABLET, COATED ORAL EVERY EVENING
Qty: 100 TABLET | Refills: 3 | Status: SHIPPED | OUTPATIENT
Start: 2021-08-17 | End: 2022-09-15 | Stop reason: SDUPTHER

## 2021-08-27 ENCOUNTER — OFFICE VISIT (OUTPATIENT)
Dept: CARDIOLOGY | Facility: MEDICAL CENTER | Age: 71
End: 2021-08-27
Payer: MEDICARE

## 2021-08-27 VITALS
OXYGEN SATURATION: 95 % | HEIGHT: 70 IN | HEART RATE: 78 BPM | WEIGHT: 255.8 LBS | DIASTOLIC BLOOD PRESSURE: 98 MMHG | SYSTOLIC BLOOD PRESSURE: 128 MMHG | RESPIRATION RATE: 14 BRPM | BODY MASS INDEX: 36.62 KG/M2

## 2021-08-27 DIAGNOSIS — I25.10 CORONARY ARTERY DISEASE DUE TO LIPID RICH PLAQUE: ICD-10-CM

## 2021-08-27 DIAGNOSIS — I26.99 PE (PULMONARY THROMBOEMBOLISM) (HCC): ICD-10-CM

## 2021-08-27 DIAGNOSIS — I25.83 CORONARY ARTERY DISEASE DUE TO LIPID RICH PLAQUE: ICD-10-CM

## 2021-08-27 DIAGNOSIS — E78.5 DYSLIPIDEMIA: ICD-10-CM

## 2021-08-27 PROCEDURE — 99213 OFFICE O/P EST LOW 20 MIN: CPT | Performed by: PHYSICIAN ASSISTANT

## 2021-08-27 ASSESSMENT — ENCOUNTER SYMPTOMS
VOMITING: 0
PALPITATIONS: 0
ORTHOPNEA: 0
DIAPHORESIS: 0
DECREASED APPETITE: 0
BRUISES/BLEEDS EASILY: 1
WEAKNESS: 0
MYALGIAS: 0
DIZZINESS: 0
NAUSEA: 0
SNORING: 0
SHORTNESS OF BREATH: 0
BLOATING: 0
ABDOMINAL PAIN: 0
SYNCOPE: 0
NEAR-SYNCOPE: 0
BLURRED VISION: 0
DYSPNEA ON EXERTION: 1
FEVER: 0

## 2021-08-27 ASSESSMENT — FIBROSIS 4 INDEX: FIB4 SCORE: 3.09

## 2021-08-27 NOTE — PROGRESS NOTES
Cardiology Clinic Follow-up Note    Date of note:    8/27/2021  Primary Care Provider: LAMAR Lennon    Name:             Polo Pyle  YOB: 1950  MRN:               [id number]    CC: htn, CAD, AAA, PE    Primary Cardiologist: Dr. Flores     Patient HPI:   Polo Pyle is a 71 y.o. male with PMH including CAD with hx of stent to the LCX, PAD, Dyslipidemia, recent admission in 7/2021 for submassive PE with s/sx R heart failure.    Interim History:  Mr. Pyle is here for follow up.  He is doing much better,   Less LE swelling, using compression stockings.   Breathing improved.     Review of Systems   Constitutional: Positive for malaise/fatigue. Negative for decreased appetite, diaphoresis and fever.   Eyes: Negative for blurred vision.   Cardiovascular: Positive for dyspnea on exertion. Negative for chest pain, leg swelling, near-syncope, orthopnea, palpitations and syncope.   Respiratory: Negative for shortness of breath and snoring.    Hematologic/Lymphatic: Negative for bleeding problem. Bruises/bleeds easily.   Skin: Negative for rash.   Musculoskeletal: Negative for joint pain and myalgias.   Gastrointestinal: Negative for bloating, abdominal pain, nausea and vomiting.   Genitourinary: Negative for frequency.   Neurological: Negative for dizziness and weakness.        Past medical history, social history and family history reviewed.  No changes other than what is outlined in HPI.    Current Outpatient Medications   Medication Sig Dispense Refill   • Iron-Vitamin C (IRON 100/C PO) Take  by mouth.     • rosuvastatin (CRESTOR) 20 MG Tab Take 1 Tablet by mouth every evening. 100 Tablet 3   • rivaroxaban (XARELTO) 20 MG Tab tablet Take 1 tablet by mouth with dinner for 30 days. 30 tablet 0   • Omega-3 Fatty Acids (FISH OIL) 1000 MG Cap capsule Take 1 capsule by mouth 2 times a day.     • VITAMIN D PO Take 1 capsule by mouth every day.     • niacin (NIASPAN) 1000 MG CR  "tablet TAKE TWO TABLETS BY MOUTH DAILY 180 Tab 1   • Coenzyme Q10 (CO Q-10) 300 MG Cap Take 1 Cap by mouth every day.     • Ascorbic Acid (VITAMIN C PO) Take 6,000 mg by mouth every day.     • Multiple Vitamin (MULTIVITAMIN PO) Take 1 Tab by mouth every day.     • aspirin 81 MG tablet Take 81 mg by mouth every day.       No current facility-administered medications for this visit.       Allergies   Allergen Reactions   • Mercury Swelling     And mercury based preservatives-Thimerasol  Swelling, \"strange discharge from eyes\"   • Other Environmental Anaphylaxis     Insect toxins/ bees and wasps- Anaphylaxis   • Sulfa Drugs Anaphylaxis and Swelling   • Pravastatin Diarrhea     Diarrhea          Physical Exam:  Ambulatory Vitals  /98 (BP Location: Left arm, Patient Position: Sitting, BP Cuff Size: Adult)   Pulse 78   Resp 14   Ht 1.778 m (5' 10\")   Wt 116 kg (255 lb 12.8 oz)   SpO2 95%    BP Readings from Last 4 Encounters:   08/27/21 128/98   07/19/21 108/68   07/14/21 150/95   07/06/21 138/88       Weight/BMI: Body mass index is 36.7 kg/m².  Wt Readings from Last 4 Encounters:   08/27/21 116 kg (255 lb 12.8 oz)   07/19/21 116 kg (256 lb)   07/13/21 118 kg (259 lb 7.7 oz)   07/06/21 119 kg (262 lb)       General: no apparent distress  Lungs: normal effort, without crackles, no wheezing or rhonchi.  Heart: normal rate, regular rhythm, no murmur, no rub  Ext:  BLE wwollen without pitting edema.  Neurological: No focal deficits, no facial asymmetry.  Normal speech.  Psychiatric: Appropriate affect, alert and oriented x 3.   Skin: Warm extremities, no rash.      Lab Data Review:  Lab Results   Component Value Date/Time    CHOLSTRLTOT 121 07/11/2021 01:50 AM    LDL 51 07/11/2021 01:50 AM    HDL 57 07/11/2021 01:50 AM    TRIGLYCERIDE 66 07/11/2021 01:50 AM       Lab Results   Component Value Date/Time    SODIUM 135 07/13/2021 03:59 AM    POTASSIUM 4.2 07/13/2021 03:59 AM    CHLORIDE 104 07/13/2021 03:59 AM    CO2 " 22 2021 03:59 AM    GLUCOSE 95 2021 03:59 AM    BUN 16 2021 03:59 AM    CREATININE 0.91 2021 03:59 AM    CREATININE 1.4 2006 03:42 AM     Lab Results   Component Value Date/Time    ALKPHOSPHAT 83 2021 02:04 AM    ASTSGOT 22 2021 02:04 AM    ALTSGPT 17 2021 02:04 AM    TBILIRUBIN 1.4 2021 02:04 AM      Lab Results   Component Value Date/Time    WBC 6.8 2021 03:59 AM         Cardiac Imaging and Procedures Review:      Echo 7/10/2021:  CONCLUSIONS  Technically difficult due to body habitus and positioning but adequate   study for interpretation.   Mildly reduced left ventricular systolic function.  Left ventricular ejection fraction is visually estimated to be 50%.  Paradoxical septal motion.  Mildly dilated right ventricle.  Reduced right ventricular systolic function.  Hypokinetic right ventricular free wall.  Right ventricular systolic pressure is estimated to be 28 mmHg.    Echo 21::Normal left ventricular size and systolic function. Mild concentric   left ventricular hypertrophy.  Normal right ventricular size and systolic function.  No significant valvular pathology.  Normal pericardium without effusion.  Ascending aorta diameter is dilated to 4.0 cm    LE venous US 7/10/2021:  CONCLUSIONS   Evidence of acute occlusive deep vein thrombosis in the right superficial    femoral vein extending to popliteal veins.    Evidence of acute partial deep vein thrombosis in one of the paired right    posterior tibial vein and peroneal veins.    Evidence of subacute/chronic, non-occlusive deep vein thrombosis in the    left proximal-mid superficial femoral vein.    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      Assessment and Clinical Decision Makin   Submassive bilateral PE with R heart strain  -    R heart appears improved on repeat echo  -    Continue Xarelto 20 mg daily  -    With hx of recurrent DVT, would recommend  OAC for life.    2   CAD  -    May d/c aspirin while on OAC to improve bleeding risk  -    Continue high intensity statin    3   Dyslipidemia   -    LDL 51 on Crestor 20    May follow up with Dr. Flores every 1-2 years as previously arranged by Dr. Flores.       DASHA WickC     [facility]

## 2021-10-11 ENCOUNTER — OFFICE VISIT (OUTPATIENT)
Dept: MEDICAL GROUP | Facility: MEDICAL CENTER | Age: 71
End: 2021-10-11
Payer: MEDICARE

## 2021-10-11 VITALS
DIASTOLIC BLOOD PRESSURE: 66 MMHG | TEMPERATURE: 97.8 F | HEIGHT: 70 IN | HEART RATE: 71 BPM | BODY MASS INDEX: 37.8 KG/M2 | WEIGHT: 264 LBS | OXYGEN SATURATION: 96 % | SYSTOLIC BLOOD PRESSURE: 114 MMHG

## 2021-10-11 DIAGNOSIS — I77.810 THORACIC AORTIC ECTASIA (HCC): ICD-10-CM

## 2021-10-11 DIAGNOSIS — I26.99 PE (PULMONARY THROMBOEMBOLISM) (HCC): ICD-10-CM

## 2021-10-11 DIAGNOSIS — I73.9 PERIPHERAL ARTERY DISEASE (HCC): Chronic | ICD-10-CM

## 2021-10-11 DIAGNOSIS — M20.41 HAMMER TOE OF RIGHT FOOT: ICD-10-CM

## 2021-10-11 DIAGNOSIS — D68.69 SECONDARY HYPERCOAGULABLE STATE (HCC): ICD-10-CM

## 2021-10-11 DIAGNOSIS — I71.40 ABDOMINAL AORTIC ANEURYSM WITHOUT RUPTURE (HCC): ICD-10-CM

## 2021-10-11 DIAGNOSIS — L60.0 INGROWING NAIL, RIGHT GREAT TOE: ICD-10-CM

## 2021-10-11 DIAGNOSIS — L98.9 SKIN LESION: ICD-10-CM

## 2021-10-11 DIAGNOSIS — Z95.5 PRESENCE OF STENT IN LEFT CIRCUMFLEX CORONARY ARTERY: Chronic | ICD-10-CM

## 2021-10-11 DIAGNOSIS — E78.5 DYSLIPIDEMIA: ICD-10-CM

## 2021-10-11 PROCEDURE — 99214 OFFICE O/P EST MOD 30 MIN: CPT | Performed by: FAMILY MEDICINE

## 2021-10-11 ASSESSMENT — PAIN SCALES - GENERAL: PAINLEVEL: 3=SLIGHT PAIN

## 2021-10-11 ASSESSMENT — FIBROSIS 4 INDEX: FIB4 SCORE: 3.09

## 2021-10-11 NOTE — PROGRESS NOTES
CC: New patient: CAD, peripheral artery disease, dyslipidemia, pulmonary embolism/secondary hypercoagulable state, hammertoe, ingrowing nail, skin lesions    HPI:  Polo presents today to establish new PCP.      Patient has been active independent with all ADLs.  Has the following chronic medical issues:    Presence of stent in left circumflex coronary artery  Currently denies any chest pain or shortness of breath.  Patient has history of placement of 5 stents in 2005.  Has been on rosuvastatin 20 mg daily, has been following with cardiology.    Peripheral artery disease (HCC)  History of femoral endarterectomy in 2019.  Currently denies any leg pain with walking or at rest.  Has been following up with his vascular surgeon..  Patient has been on    Dyslipidemia  He has been tolerating the statin. Denies muscle pain LFTs has been normalrosuvastatin 20 mg daily.    PE (pulmonary thromboembolism) ([facility])/ Secondary hypercoagulable state ([facility])  History of 2 pulmonary embolisms, currently stable and asymptomatic.  He has been on Xarelto 20 mg daily.    6Hammer toe of right foot. Ingrowing nail, right great toe  Patient has right hammertoe and recurrent ingrowing nail.  Has been having intermittent pain on his distal interphalangeal joint of the right big toe.  Patient states the pain is mainly because of the compression socks that has been tight.    Skin lesion  He has multiple suspicious skin lesions on his face.    Abdominal aortic aneurysm without rupture ([facility])  Last Abdominal CT showed infrarenal aortic aneurysm about 3.7 cm.  Patient has been asymptomatic    Thoracic aortic ectasia ([facility])  Echocardiogram showed dilated thoracic aortia, 4 cm.  Patient has been asymptomatic      We will discuss referral to colonoscopy next visit    Patient Active Problem List    Diagnosis Date Noted   • Chronic CHF (congestive heart failure) ([facility]) 07/12/2021   • Other chest pain 07/10/2021   • MIKAYLA (acute kidney injury) ([facility]) 07/10/2021    • Acute DVT (deep venous thrombosis) ([facility]) 07/10/2021   • PE (pulmonary thromboembolism) ([facility]) 07/10/2021   • White coat syndrome without hypertension    • 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 intermit claudication ([facility]) 03/22/2021   • Abdominal aortic aneurysm without rupture ([facility]) 03/22/2021   • Thrombus 02/12/2021   • Acute respiratory failure ([facility]) 02/11/2021   • Benign prostatic hyperplasia with urinary hesitancy 02/11/2021   • Advanced care planning/counseling discussion 02/11/2021   • Elevated d-dimer 02/11/2021   • History of COVID-19 02/10/2021   • Acute cystitis 02/10/2021   • Iron deficiency anemia due to chronic blood loss 11/25/2019   • Obesity (BMI 35.0-39.9 without comorbidity) ([facility]) 08/13/2019   • Peripheral artery disease ([facility]) 08/01/2019   • Essential hypertension    • Presence of stent in LAD coronary artery    • Presence of stent in left circumflex coronary artery    • Dyslipidemia 08/26/2011   • Coronary artery disease due to lipid rich plaque 08/26/2011       Current Outpatient Medications   Medication Sig Dispense Refill   • Rivaroxaban (XARELTO PO) Take 20 mg by mouth.     • Iron-Vitamin C (IRON 100/C PO) Take  by mouth.     • rosuvastatin (CRESTOR) 20 MG Tab Take 1 Tablet by mouth every evening. 100 Tablet 3   • Omega-3 Fatty Acids (FISH OIL) 1000 MG Cap capsule Take 1 capsule by mouth 2 times a day.     • VITAMIN D PO Take 1 capsule by mouth every day.     • Coenzyme Q10 (CO Q-10) 300 MG Cap Take 1 Cap by mouth every day.     • Ascorbic Acid (VITAMIN C PO) Take 6,000 mg by mouth every day.     • Multiple Vitamin (MULTIVITAMIN PO) Take 1 Tab by mouth every day.       No current facility-administered medications for this visit.         Allergies as of 10/11/2021 - Reviewed 10/11/2021   Allergen Reaction Noted   • Mercury Swelling 11/01/2010   • Other environmental Anaphylaxis 11/23/2010   • Sulfa drugs Anaphylaxis and Swelling 11/01/2010   • Pravastatin Diarrhea 04/03/2020         Social History     Socioeconomic History   • Marital status:      Spouse name: Not on file   • Number of children: Not on file   • Years of education: Not on file   • Highest education level: Not on file   Occupational History   • Not on file   Tobacco Use   • Smoking status: Former Smoker     Years: 30.00     Types: Cigars, Cigarettes     Quit date: 2011     Years since quittin.9   • Smokeless tobacco: Never Used   • Tobacco comment:    Vaping Use   • Vaping Use: Never used   Substance and Sexual Activity   • Alcohol use: No   • Drug use: No   • Sexual activity: Not Currently     Partners: Female   Other Topics Concern   • Not on file   Social History Narrative   • Not on file     Social Determinants of Health     Financial Resource Strain: Low Risk    • Difficulty of Paying Living Expenses: Not hard at all   Food Insecurity: No Food Insecurity   • Worried About Running Out of Food in the Last Year: Never true   • Ran Out of Food in the Last Year: Never true   Transportation Needs: No Transportation Needs   • Lack of Transportation (Medical): No   • Lack of Transportation (Non-Medical): No   Physical Activity:    • Days of Exercise per Week:    • Minutes of Exercise per Session:    Stress:    • Feeling of Stress :    Social Connections:    • Frequency of Communication with Friends and Family:    • Frequency of Social Gatherings with Friends and Family:    • Attends Sikhism Services:    • Active Member of Clubs or Organizations:    • Attends Club or Organization Meetings:    • Marital Status:    Intimate Partner Violence:    • Fear of Current or Ex-Partner:    • Emotionally Abused:    • Physically Abused:    • Sexually Abused:        Family History   Problem Relation Age of Onset   • Heart Attack Mother    • Heart Disease Mother    • Cancer Mother         LYKIMA    • Dementia Mother        Past Surgical History:   Procedure Laterality Date   • FEMORAL ENDARTERECTOMY Left 2019     "Procedure: ENDARTERECTOMY, FEMORAL;  Surgeon: Candelaria Crawford M.D.;  Location: SURGERY [facility];  Service: General   • VENTRAL HERNIA REPAIR  11/30/2011    Performed by TROY GONZALEZ at SURGERY [facility]   • INGUINAL HERNIA REPAIR  11/30/2011    Performed by TROY GONZALEZ at SURGERY [facility]   • CATARACT PHACO WITH IOL  11/23/2010    Performed by RM WILLIS at SURGERY SAME DAY [facility]   • CATARACT PHACO WITH IOL  11/2/2010    Performed by RM WILLIS at SURGERY SAME DAY [facility]   • OTHER CARDIAC SURGERY  2005    STENTS x 4   • SINUS TREPHINE FRONTAL  1971   • SINUSOTOMIES  08/1963   • INGUINAL HERNIA REPAIR  1962    WITH UNDESCENDED TESTICLE   • TONSILLECTOMY AND ADENOIDECTOMY  1957       ROS:  Denies any Headache, Blurred Vision, Confusion Chest pain,  Shortness of breath,  Abdominal pain, Changes of bowel or bladder, Lower ext edema, Fevers, Nights sweats, Weight Changes, Focal weakness or numbness.  All other systems are negative.    /66 (BP Location: Left arm, Patient Position: Sitting, BP Cuff Size: Adult long)   Pulse 71   Temp 36.6 °C (97.8 °F) (Temporal)   Ht 1.778 m (5' 10\")   Wt 120 kg (264 lb)   SpO2 96%   BMI 37.88 kg/m²     Physical Exam:  Gen:         Alert and oriented, No apparent distress.  HEENT:   Perrla, TM clear,  Oralpharynx no erythema or exudates.  Neck:       No Jugular venous distension, Lymphadenopathy, Thyromegaly, Bruits.  Lungs:     Clear to auscultation bilaterally  CV:          Regular rate and rhythm. No murmurs, rubs or gallops.  Abd:         Soft non tender, non distended. Normal active bowel sounds. No                                        Hepatosplenomegaly, No pulsatile masses.  Ext:          No clubbing, cyanosis, edema.      Assessment and Plan.   71 y.o. male     1. Presence of stent in left circumflex coronary artery  Patient has history of placement of 5 stents.  However currently stable.  Continue on statin.  Continue " follow-up with cardiology.    2. Peripheral artery disease (HCC)  History of femoral endarterectomy in 2019.  Has been asymptomatic.  Continue on statin.    3. Dyslipidemia  He has been tolerating the statin. Denies muscle pain LFTs has been normal  Continue rosuvastatin 20 mg daily.    4. PE (pulmonary thromboembolism) ([facility])  History of 2 pulmonary embolisms, currently stable and asymptomatic.  Continue on Xarelto 20 mg daily.      5. Secondary hypercoagulable state ([facility])  History of pulmonary embolism.  Continue Xarelto 20 mg daily.    6. Hammer toe of right foot. Ingrowing nail, right great toe  Patient has right hammertoe and recurrent ingrowing nail.  Has been having distal interphalangeal joint.  Refer patient to podiatrist     - REFERRAL TO PODIATRY    8. Skin lesion  Has multiple suspicious skin lesions on his face.    - REFERRAL TO DERMATOLOGY    9. Abdominal aortic aneurysm without rupture ([facility])  Stable.  Asymptomatic.  Last Abdominal CT showed infrarenal aortic aneurysm about 3.7 cm  Continue monitor.    10. Thoracic aortic ectasia ([facility])  Echocardiogram showed dilated thoracic aortia, 4 cm.  Patient has been asymptomatic  Continue monitor.

## 2022-01-07 ENCOUNTER — OFFICE VISIT (OUTPATIENT)
Dept: DERMATOLOGY | Facility: IMAGING CENTER | Age: 72
End: 2022-01-07
Payer: MEDICARE

## 2022-01-07 DIAGNOSIS — L57.0 ACTINIC KERATOSIS: ICD-10-CM

## 2022-01-07 PROCEDURE — 17003 DESTRUCT PREMALG LES 2-14: CPT | Performed by: DERMATOLOGY

## 2022-01-07 PROCEDURE — 17000 DESTRUCT PREMALG LESION: CPT | Performed by: DERMATOLOGY

## 2022-01-07 RX ORDER — RIVAROXABAN 20 MG/1
TABLET, FILM COATED ORAL
COMMUNITY
Start: 2022-01-02 | End: 2022-09-06

## 2022-01-07 NOTE — PROGRESS NOTES
CC: skin lesion     Subjective: new patient here for spots on face.     HPI/location: lt temple forehead and upper cheeks lesions   Time present: 3-4 years   Painful lesion: No  Itching lesion: Yes  Enlarging lesion: No  Anything make it better or worse?    History of skin cancer: No  History of precancers/actinic keratoses: Yes, Details: possibly on forehead  History of biopsies:No  History of blistering/severe sunburns:Yes, Details: [place] a [place]   Family history of skin cancer:No  Family history of atypical moles:No    ROS: no fevers/chills. No itch.  No cough  DermPMH: no skin cancer/melanoma  No problem-specific Assessment & Plan notes found for this encounter.    Relevant PMH: many med comorbidities  Social: former smoker    PE: Gen:WDWN male in NAD. Skin: focal exam.  Declined additional exam today.  Scattered thin/moderate HK papules on left temple, right cheek, left helix and forehead    A/P: AKs: face/head  -counseled on diagnosis and treatment, including risks/benefits/alternatives of cyrotherapy  -LN2 25 sec X 2 cycles X 9 lesions  -f/u 4 weeks.  Consider bx of sites not improved with LN2    -reviewed risks of AK progression and reviewed use of topical efudex, as good candidate for possible field trx-future        I have reviewed medications relevant to my specialty.

## 2022-02-16 ENCOUNTER — PATIENT MESSAGE (OUTPATIENT)
Dept: HEALTH INFORMATION MANAGEMENT | Facility: OTHER | Age: 72
End: 2022-02-16
Payer: MEDICARE

## 2022-02-18 ENCOUNTER — OFFICE VISIT (OUTPATIENT)
Dept: DERMATOLOGY | Facility: IMAGING CENTER | Age: 72
End: 2022-02-18
Payer: MEDICARE

## 2022-02-18 DIAGNOSIS — D49.2 SKIN NEOPLASM: ICD-10-CM

## 2022-02-18 PROCEDURE — 11102 TANGNTL BX SKIN SINGLE LES: CPT | Performed by: DERMATOLOGY

## 2022-02-18 PROCEDURE — 99213 OFFICE O/P EST LOW 20 MIN: CPT | Mod: 25 | Performed by: DERMATOLOGY

## 2022-02-18 RX ORDER — FLUOROURACIL 50 MG/G
CREAM TOPICAL
Qty: 40 G | Refills: 0 | Status: SHIPPED
Start: 2022-02-18 | End: 2022-05-20

## 2022-02-18 NOTE — PROGRESS NOTES
CC: FV AK      Subjective: established patient here for AKs previously tx'd with ln2, improvement noticed, lt temple stil present and occ hurts with pressure/touch    HPI/location: lt temple forehead and upper cheeks lesions - red and crusting noted.     History of skin cancer: No  History of precancers/actinic keratoses: Yes, Details: possibly on forehead  History of biopsies:No  History of blistering/severe sunburns:Yes, Details: [place] a [place]   Family history of skin cancer:No  Family history of atypical moles:No    ROS: no fevers/chills. No itch.  No cough  Relevant PMH: many med comorbidities  Social: former smoker    PE: Gen:WDWN male in NAD. Skin: focal exam.  Declined additional exam today.  Scattered thin/moderate gritty papules on forehead and lateral face.  Thin-moderate HK left temple, approx 0.7cm.     A/P: AKs: face/head, chronic persistent  -efudex cream BID X 2-4 weeks, se reviewed    Neoplasm NOS: left temple. HAK vs SCC  -consent for bx, including R/B/A. Cleaned with EtOH, anesthesia with lidocaine 1% + epinephrine, shave bx, AlCl3 for hemostasis  -vaseline/bandage and wound care reviewed    F/u 3mon, encouraged TORITO    I have reviewed medications relevant to my specialty.

## 2022-02-24 DIAGNOSIS — C44.320 SCC (SQUAMOUS CELL CARCINOMA), FACE: ICD-10-CM

## 2022-04-13 ENCOUNTER — PATIENT MESSAGE (OUTPATIENT)
Dept: MEDICAL GROUP | Facility: MEDICAL CENTER | Age: 72
End: 2022-04-13
Payer: MEDICARE

## 2022-04-14 NOTE — TELEPHONE ENCOUNTER
From: Polo Pyle  To: Physician Lucy Martinez  Sent: 4/13/2022 9:33 PM PDT  Subject: Cortisone shot for left knee    Dr. Martinez-I would like to get a cortisone shot for my left knee as soon as I can arrange it…do you need to contact [facility] for that or should I just walk in?

## 2022-04-21 NOTE — ED NOTES
Attempted to place wesley catheter.  Catheter unable to advance after approx 2/3 of expected insertion length due to resistance.  Pt having pain w/ attempted advancement.  Small amount of blood on tip of catheter after removal.  ERP aware.   Department of Anesthesiology  Postprocedure Note    Patient: Vida Espitia  MRN: [id number]  YOB: 1947  Date of evaluation: 4/21/2022  Time:  11:08 AM     Procedure Summary     Date: 04/21/22 Room / Location: [place] [id number] / [facility] - INPATIENT    Anesthesia Start: 1025 Anesthesia Stop: 1103    Procedures:       COLONOSCOPY WITH BIOPSY (N/A )      EGD BIOPSY (N/A ) Diagnosis: (DX FATTY LIVER  SPLENMEGALY  CHRONIC DIARRHEA)    Surgeons: Ary Gaming MD Responsible Provider: Azul Vaca MD    Anesthesia Type: MAC ASA Status: 2          Anesthesia Type: MAC    Laney Phase I:      Laney Phase II: Laney Score: 10    Last vitals: Reviewed and per EMR flowsheets.        Anesthesia Post Evaluation    Patient location during evaluation: PACU  Patient participation: complete - patient participated  Level of consciousness: awake  Airway patency: patent  Nausea & Vomiting: no vomiting and no nausea  Complications: no  Cardiovascular status: hemodynamically stable  Respiratory status: acceptable  Hydration status: stable

## 2022-04-22 ENCOUNTER — APPOINTMENT (RX ONLY)
Dept: URBAN - METROPOLITAN AREA CLINIC 22 | Facility: CLINIC | Age: 72
Setting detail: DERMATOLOGY
End: 2022-04-22

## 2022-04-22 PROBLEM — C44.329 SQUAMOUS CELL CARCINOMA OF SKIN OF OTHER PARTS OF FACE: Status: ACTIVE | Noted: 2022-04-22

## 2022-04-22 PROCEDURE — 17311 MOHS 1 STAGE H/N/HF/G: CPT

## 2022-04-22 PROCEDURE — 14040 TIS TRNFR F/C/C/M/N/A/G/H/F: CPT

## 2022-04-22 PROCEDURE — ? REFERRAL CORRESPONDENCE

## 2022-04-22 PROCEDURE — ? MOHS SURGERY

## 2022-04-22 NOTE — PROCEDURE: MOHS SURGERY
Body Location Override (Optional - Billing Will Still Be Based On Selected Body Map Location If Applicable): left temple
Mohs Case Number: [id number]
Previous Accession (Optional): [id number]
Biopsy Photograph Reviewed: Yes
Referring Physician (Optional): Clary Hyatt MD
Consent Type: Consent 1 (Standard)
Eye Shield Used: No
Surgeon Performing Repair (Optional): Yuriy Rivera M.D.
Initial Size Of Lesion: 0.5
X Size Of Lesion In Cm (Optional): 0.4
Number Of Stages: 1
Primary Defect Length In Cm (Final Defect Size - Required For Flaps/Grafts): 1.6
Primary Defect Width In Cm (Final Defect Size - Required For Flaps/Grafts): 1.4
Repair Type: Flap
Oculoplastic Surgeon Procedure Text (A): After obtaining clear surgical margins the patient was sent to oculoplastics for surgical repair.  The patient understands they will receive post-surgical care and follow-up from the referring physician's office.
Otolaryngologist Procedure Text (A): After obtaining clear surgical margins the patient was sent to otolaryngology for surgical repair.  The patient understands they will receive post-surgical care and follow-up from the referring physician's office.
Plastic Surgeon Procedure Text (A): After obtaining clear surgical margins the patient was sent to plastics for surgical repair.  The patient understands they will receive post-surgical care and follow-up from the referring physician's office.
Mid-Level Procedure Text (A): After obtaining clear surgical margins the patient was sent to a mid-level provider for surgical repair.  The patient understands they will receive post-surgical care and follow-up from the mid-level provider.
Provider Procedure Text (A): After obtaining clear surgical margins the defect was repaired by another provider.
Asc Procedure Text (A): After obtaining clear surgical margins the patient was sent to an ASC for surgical repair.  The patient understands they will receive post-surgical care and follow-up from the ASC physician.
Simple / Intermediate / Complex Repair - Final Wound Length In Cm: 0
Suturegard Retention Suture: 2-0 Nylon
Retention Suture Bite Size: 3 mm
Length To Time In Minutes Device Was In Place: 10
Undermining Type: Entire Wound
Debridement Text: The wound edges were debrided prior to proceeding with the closure to facilitate wound healing.
Helical Rim Text: The closure involved the helical rim.
Vermilion Border Text: The closure involved the vermilion border.
Nostril Rim Text: The closure involved the nostril rim.
Retention Suture Text: Retention sutures were placed to support the closure and prevent dehiscence.
Flap Type: Rhombic Flap
Secondary Defect Length In Cm (Required For Flaps): 3
Secondary Defect Width In Cm (Required For Flaps): 1.2
Area H Indication Text: Tumors in this location are included in Area H (eyelids, eyebrows, nose, lips, chin, ear, pre-auricular, post-auricular, temple, genitalia, hands, feet, ankles and areola).  Tissue conservation is critical in these anatomic locations.
Area M Indication Text: Tumors in this location are included in Area M (cheek, forehead, scalp, neck, jawline and pretibial skin).  Mohs surgery is indicated for tumors in these anatomic locations.
Area L Indication Text: Tumors in this location are included in Area L (trunk and extremities).  Mohs surgery is indicated for larger tumors, or tumors with aggressive histologic features, in these anatomic locations.
Tumor Debulked?: curette
Depth Of Tumor Invasion (For Histology): tumor not visualized (deep and peripheral margins are clear of tumor)
Special Stains Stage 1 - Results: Base On Clearance Noted Above
Stage 2: Additional Anesthesia Type: 1% lidocaine with epinephrine and a 1:10 solution of 8.4% sodium bicarbonate
Stage 4: Additional Anesthesia Type: 1% lidocaine with epinephrine
Include Tumor Staging In Mohs Note?: Please Select the Appropriate Response
Staging Info: By selecting yes to the question above you will include information on AJCC 8 tumor staging in your Mohs note. Information on tumor staging will be automatically added for SCCs on the head and neck. AJCC 8 includes tumor size, tumor depth, perineural involvement and bone invasion.
Tumor Depth: Less than 6mm from granular layer and no invasion beyond the subcutaneous fat
Medical Necessity Statement: Based on my medical judgement, Mohs surgery is the most appropriate treatment for this cancer compared to other treatments.
Alternatives Discussed Intro (Do Not Add Period): I discussed alternative treatments to Mohs surgery and specifically discussed the risks and benefits of
Consent 1/Introductory Paragraph: The rationale for Mohs was explained to the patient and consent was obtained. The risks, benefits and alternatives to therapy were discussed in detail. Specifically, the risks of infection, scarring, bleeding, prolonged wound healing, incomplete removal, allergy to anesthesia, nerve injury and recurrence were addressed. Prior to the procedure, the treatment site was clearly identified and confirmed by the patient. All components of Universal Protocol/PAUSE Rule completed.
Consent 2/Introductory Paragraph: Mohs surgery was explained to the patient and consent was obtained. The risks, benefits and alternatives to therapy were discussed in detail. Specifically, the risks of infection, scarring, bleeding, prolonged wound healing, incomplete removal, allergy to anesthesia, nerve injury and recurrence were addressed. Prior to the procedure, the treatment site was clearly identified and confirmed by the patient. All components of Universal Protocol/PAUSE Rule completed.
Consent 3/Introductory Paragraph: I gave the patient a chance to ask questions they had about the procedure.  Following this I explained the Mohs procedure and consent was obtained. The risks, benefits and alternatives to therapy were discussed in detail. Specifically, the risks of infection, scarring, bleeding, prolonged wound healing, incomplete removal, allergy to anesthesia, nerve injury and recurrence were addressed. Prior to the procedure, the treatment site was clearly identified and confirmed by the patient. All components of Universal Protocol/PAUSE Rule completed.
Consent (Temporal Branch)/Introductory Paragraph: The rationale for Mohs was explained to the patient and consent was obtained. The risks, benefits and alternatives to therapy were discussed in detail. Specifically, the risks of damage to the temporal branch of the facial nerve, infection, scarring, bleeding, prolonged wound healing, incomplete removal, allergy to anesthesia, and recurrence were addressed. Prior to the procedure, the treatment site was clearly identified and confirmed by the patient. All components of Universal Protocol/PAUSE Rule completed.
Consent (Marginal Mandibular)/Introductory Paragraph: The rationale for Mohs was explained to the patient and consent was obtained. The risks, benefits and alternatives to therapy were discussed in detail. Specifically, the risks of damage to the marginal mandibular branch of the facial nerve, infection, scarring, bleeding, prolonged wound healing, incomplete removal, allergy to anesthesia, and recurrence were addressed. Prior to the procedure, the treatment site was clearly identified and confirmed by the patient. All components of Universal Protocol/PAUSE Rule completed.
Consent (Spinal Accessory)/Introductory Paragraph: The rationale for Mohs was explained to the patient and consent was obtained. The risks, benefits and alternatives to therapy were discussed in detail. Specifically, the risks of damage to the spinal accessory nerve, infection, scarring, bleeding, prolonged wound healing, incomplete removal, allergy to anesthesia, and recurrence were addressed. Prior to the procedure, the treatment site was clearly identified and confirmed by the patient. All components of Universal Protocol/PAUSE Rule completed.
Consent (Near Eyelid Margin)/Introductory Paragraph: The rationale for Mohs was explained to the patient and consent was obtained. The risks, benefits and alternatives to therapy were discussed in detail. Specifically, the risks of ectropion or eyelid deformity, infection, scarring, bleeding, prolonged wound healing, incomplete removal, allergy to anesthesia, nerve injury and recurrence were addressed. Prior to the procedure, the treatment site was clearly identified and confirmed by the patient. All components of Universal Protocol/PAUSE Rule completed.
Consent (Ear)/Introductory Paragraph: The rationale for Mohs was explained to the patient and consent was obtained. The risks, benefits and alternatives to therapy were discussed in detail. Specifically, the risks of ear deformity, infection, scarring, bleeding, prolonged wound healing, incomplete removal, allergy to anesthesia, nerve injury and recurrence were addressed. Prior to the procedure, the treatment site was clearly identified and confirmed by the patient. All components of Universal Protocol/PAUSE Rule completed.
Consent (Nose)/Introductory Paragraph: The rationale for Mohs was explained to the patient and consent was obtained. The risks, benefits and alternatives to therapy were discussed in detail. Specifically, the risks of nasal deformity, changes in the flow of air through the nose, infection, scarring, bleeding, prolonged wound healing, incomplete removal, allergy to anesthesia, nerve injury and recurrence were addressed. Prior to the procedure, the treatment site was clearly identified and confirmed by the patient. All components of Universal Protocol/PAUSE Rule completed.
Consent (Lip)/Introductory Paragraph: The rationale for Mohs was explained to the patient and consent was obtained. The risks, benefits and alternatives to therapy were discussed in detail. Specifically, the risks of lip deformity, changes in the oral aperture, infection, scarring, bleeding, prolonged wound healing, incomplete removal, allergy to anesthesia, nerve injury and recurrence were addressed. Prior to the procedure, the treatment site was clearly identified and confirmed by the patient. All components of Universal Protocol/PAUSE Rule completed.
Consent (Scalp)/Introductory Paragraph: The rationale for Mohs was explained to the patient and consent was obtained. The risks, benefits and alternatives to therapy were discussed in detail. Specifically, the risks of changes in hair growth pattern secondary to repair, infection, scarring, bleeding, prolonged wound healing, incomplete removal, allergy to anesthesia, nerve injury and recurrence were addressed. Prior to the procedure, the treatment site was clearly identified and confirmed by the patient. All components of Universal Protocol/PAUSE Rule completed.
Detail Level: Detailed
Postop Diagnosis: same
Anesthesia Volume In Cc: 12
Additional Anesthesia Volume In Cc: 6
Hemostasis: Electrodesiccation
Estimated Blood Loss (Cc): minimal
Repair Anesthesia Method: local infiltration
Anesthesia Volume In Cc: 9
Undermining Location (Optional): in the deep fat
Brow Lift Text: A midfrontal incision was made medially to the defect to allow access to the tissues just superior to the left eyebrow. Following careful dissection inferiorly in a supraperiosteal plane to the level of the left eyebrow, several 3-0 monocryl sutures were used to resuspend the eyebrow orbicularis oculi muscular unit to the superior frontal bone periosteum. This resulted in an appropriate reapproximation of static eyebrow symmetry and correction of the left brow ptosis.
Deep Sutures: 4-0 Polysorb
Epidermal Sutures: 6-0 Surgipro
Epidermal Closure: running
Suturegard Intro: Intraoperative tissue expansion was performed, utilizing the SUTUREGARD device, in order to reduce wound tension.
Suturegard Body: The suture ends were repeatedly re-tightened and re-clamped to achieve the desired tissue expansion.
Hemigard Intro: Due to skin fragility and wound tension, it was decided to use HEMIGARD adhesive retention suture devices to permit a linear closure. The skin was cleaned and dried for a 6cm distance away from the wound. Excessive hair, if present, was removed to allow for adhesion.
Hemigard Postcare Instructions: The HEMIGARD strips are to remain completely dry for at least 5-7 days.
Donor Site Anesthesia Type: same as repair anesthesia
Epidermal Closure Graft Donor Site (Optional): simple interrupted
Graft Donor Site Bandage (Optional-Leave Blank If You Don't Want In Note): Steri-strips and a pressure bandage were applied to the donor site.
Closure 2 Information: This tab is for additional flaps and grafts, including complex repair and grafts and complex repair and flaps. You can also specify a different location for the additional defect, if the location is the same you do not need to select a new one. We will insert the automated text for the repair you select below just as we do for solitary flaps and grafts. Please note that at this time if you select a location with a different insurance zone you will need to override the ICD10 and CPT if appropriate.
Closure 3 Information: This tab is for additional flaps and grafts above and beyond our usual structured repairs.  Please note if you enter information here it will not currently bill and you will need to add the billing information manually.
Wound Care: Petrolatum
Dressing: gelfoam and pressure dressing with Telfa
Dressing (No Sutures): dry sterile dressing
Suture Removal: 7 days
Unna Boot Text: An Unna boot was placed to help immobilize the limb and facilitate more rapid healing.
Home Suture Removal Text: Patient was provided instructions on removing sutures and will remove their sutures at home.  If they have any questions or difficulties they will call the office.
Post-Care Instructions: I reviewed with the patient in detail post-care instructions. Patient is not to engage in any heavy lifting, exercise, or swimming for the next 14 days. Should the patient develop any fevers, chills, bleeding, severe pain patient will contact the office immediately.
Pain Refusal Text: I offered to prescribe pain medication but the patient refused to take this medication.
Mauc Instructions: By selecting yes to the question below the MAUC number will be added into the note.  This will be calculated automatically based on the diagnosis chosen, the size entered, the body zone selected (H,M,L) and the specific indications you chose. You will also have the option to override the Mohs AUC if you disagree with the automatically calculated number and this option is found in the Case Summary tab.
Where Do You Want The Question To Include Opioid Counseling Located?: Case Summary Tab
Eye Protection Verbiage: Before proceeding with the stage, a plastic scleral shield was inserted. The globe was anesthetized with a few drops of 1% lidocaine with 1:100,000 epinephrine. Then, an appropriate sized scleral shield was chosen and coated with lacrilube ointment. The shield was gently inserted and left in place for the duration of each stage. After the stage was completed, the shield was gently removed.
Mohs Method Verbiage: An incision at a 45 degree angle following the standard Mohs approach was done and the specimen was harvested as a microscopic controlled layer.
Surgeon/Pathologist Verbiage (Will Incorporate Name Of Surgeon From Intro If Not Blank): operated in two distinct and integrated capacities as the surgeon and pathologist.
Mohs Histo Method Verbiage: Each section was then chromacoded and processed in the Mohs lab using the Mohs protocol and submitted for frozen section.
Subsequent Stages Histo Method Verbiage: Using a similar technique to that described above, a thin layer of tissue was removed from all areas where tumor was visible on the previous stage.  The tissue was again oriented, mapped, dyed, and processed as above.
Mohs Rapid Report Verbiage: The area of clinically evident tumor was marked with skin marking ink and appropriately hatched.  The initial incision was made following the Mohs approach through the skin.  The specimen was taken to the lab, divided into the necessary number of pieces, chromacoded and processed according to the Mohs protocol.  This was repeated in successive stages until a tumor free defect was achieved.
Complex Repair Preamble Text (Leave Blank If You Do Not Want): Extensive wide undermining was performed.
Intermediate Repair Preamble Text (Leave Blank If You Do Not Want): Undermining was performed with blunt dissection.
Non-Graft Cartilage Fenestration Text: The cartilage was fenestrated with a 2mm punch biopsy to help facilitate healing.
Graft Cartilage Fenestration Text: The cartilage was fenestrated with a 2mm punch biopsy to help facilitate graft survival and healing.
Secondary Intention Text (Leave Blank If You Do Not Want): The defect will heal with secondary intention.
No Repair - Repaired With Adjacent Surgical Defect Text (Leave Blank If You Do Not Want): After obtaining clear surgical margins the defect was repaired concurrently with another surgical defect which was in close approximation.
Adjacent Tissue Transfer Text: The defect edges were debeveled with a #15 scalpel blade.  Given the location of the defect and the proximity to free margins an adjacent tissue transfer was deemed most appropriate.  Using a sterile surgical marker, an appropriate flap was drawn incorporating the defect and placing the expected incisions within the relaxed skin tension lines where possible.    The area thus outlined was incised deep to adipose tissue with a #15 scalpel blade.  The skin margins were undermined to an appropriate distance in all directions utilizing iris scissors.
Advancement Flap (Single) Text: The defect edges were debeveled with a #15 scalpel blade.  Given the location of the defect and the proximity to free margins a single advancement flap was deemed most appropriate.  Using a sterile surgical marker, an appropriate advancement flap was drawn incorporating the defect and placing the expected incisions within the relaxed skin tension lines where possible.    The area thus outlined was incised deep to adipose tissue with a #15 scalpel blade.  The skin margins were undermined to an appropriate distance in all directions utilizing iris scissors.
Advancement Flap (Double) Text: The defect edges were debeveled with a #15 scalpel blade.  Given the location of the defect and the proximity to free margins a double advancement flap was deemed most appropriate.  Using a sterile surgical marker, the appropriate advancement flaps were drawn incorporating the defect and placing the expected incisions within the relaxed skin tension lines where possible.    The area thus outlined was incised deep to adipose tissue with a #15 scalpel blade.  The skin margins were undermined to an appropriate distance in all directions utilizing iris scissors.
Burow's Advancement Flap Text: The defect edges were debeveled with a #15 scalpel blade.  Given the location of the defect and the proximity to free margins a Burow's advancement flap was deemed most appropriate.  Using a sterile surgical marker, the appropriate advancement flap was drawn incorporating the defect and placing the expected incisions within the relaxed skin tension lines where possible.    The area thus outlined was incised deep to adipose tissue with a #15 scalpel blade.  The skin margins were undermined to an appropriate distance in all directions utilizing iris scissors.
Chonodrocutaneous Helical Advancement Flap Text: The defect edges were debeveled with a #15 scalpel blade.  Given the location of the defect and the proximity to free margins a chondrocutaneous helical advancement flap was deemed most appropriate.  Using a sterile surgical marker, the appropriate advancement flap was drawn incorporating the defect and placing the expected incisions within the relaxed skin tension lines where possible.    The area thus outlined was incised deep to adipose tissue with a #15 scalpel blade.  The skin margins were undermined to an appropriate distance in all directions utilizing iris scissors.
Crescentic Advancement Flap Text: The defect edges were debeveled with a #15 scalpel blade.  Given the location of the defect and the proximity to free margins a crescentic advancement flap was deemed most appropriate.  Using a sterile surgical marker, the appropriate advancement flap was drawn incorporating the defect and placing the expected incisions within the relaxed skin tension lines where possible.    The area thus outlined was incised deep to adipose tissue with a #15 scalpel blade.  The skin margins were undermined to an appropriate distance in all directions utilizing iris scissors.
A-T Advancement Flap Text: The defect edges were debeveled with a #15 scalpel blade.  Given the location of the defect, shape of the defect and the proximity to free margins an A-T advancement flap was deemed most appropriate.  Using a sterile surgical marker, an appropriate advancement flap was drawn incorporating the defect and placing the expected incisions within the relaxed skin tension lines where possible.    The area thus outlined was incised deep to adipose tissue with a #15 scalpel blade.  The skin margins were undermined to an appropriate distance in all directions utilizing iris scissors.
O-T Advancement Flap Text: The defect edges were debeveled with a #15 scalpel blade.  Given the location of the defect, shape of the defect and the proximity to free margins an O-T advancement flap was deemed most appropriate.  Using a sterile surgical marker, an appropriate advancement flap was drawn incorporating the defect and placing the expected incisions within the relaxed skin tension lines where possible.    The area thus outlined was incised deep to adipose tissue with a #15 scalpel blade.  The skin margins were undermined to an appropriate distance in all directions utilizing iris scissors.
O-L Flap Text: The defect edges were debeveled with a #15 scalpel blade.  Given the location of the defect, shape of the defect and the proximity to free margins an O-L flap was deemed most appropriate.  Using a sterile surgical marker, an appropriate advancement flap was drawn incorporating the defect and placing the expected incisions within the relaxed skin tension lines where possible.    The area thus outlined was incised deep to adipose tissue with a #15 scalpel blade.  The skin margins were undermined to an appropriate distance in all directions utilizing iris scissors.
O-Z Flap Text: The defect edges were debeveled with a #15 scalpel blade.  Given the location of the defect, shape of the defect and the proximity to free margins an O-Z flap was deemed most appropriate.  Using a sterile surgical marker, an appropriate transposition flap was drawn incorporating the defect and placing the expected incisions within the relaxed skin tension lines where possible. The area thus outlined was incised deep to adipose tissue with a #15 scalpel blade.  The skin margins were undermined to an appropriate distance in all directions utilizing iris scissors.
Double O-Z Flap Text: The defect edges were debeveled with a #15 scalpel blade.  Given the location of the defect, shape of the defect and the proximity to free margins a Double O-Z flap was deemed most appropriate.  Using a sterile surgical marker, an appropriate transposition flap was drawn incorporating the defect and placing the expected incisions within the relaxed skin tension lines where possible. The area thus outlined was incised deep to adipose tissue with a #15 scalpel blade.  The skin margins were undermined to an appropriate distance in all directions utilizing iris scissors.
V-Y Flap Text: The defect edges were debeveled with a #15 scalpel blade.  Given the location of the defect, shape of the defect and the proximity to free margins a V-Y flap was deemed most appropriate.  Using a sterile surgical marker, an appropriate advancement flap was drawn incorporating the defect and placing the expected incisions within the relaxed skin tension lines where possible.    The area thus outlined was incised deep to adipose tissue with a #15 scalpel blade.  The skin margins were undermined to an appropriate distance in all directions utilizing iris scissors.
Advancement-Rotation Flap Text: The defect edges were debeveled with a #15 scalpel blade.  Given the location of the defect, shape of the defect and the proximity to free margins an advancement-rotation flap was deemed most appropriate.  Using a sterile surgical marker, an appropriate flap was drawn incorporating the defect and placing the expected incisions within the relaxed skin tension lines where possible. The area thus outlined was incised deep to adipose tissue with a #15 scalpel blade.  The skin margins were undermined to an appropriate distance in all directions utilizing iris scissors.
Mercedes Flap Text: The defect edges were debeveled with a #15 scalpel blade.  Given the location of the defect, shape of the defect and the proximity to free margins a Mercedes flap was deemed most appropriate.  Using a sterile surgical marker, an appropriate advancement flap was drawn incorporating the defect and placing the expected incisions within the relaxed skin tension lines where possible. The area thus outlined was incised deep to adipose tissue with a #15 scalpel blade.  The skin margins were undermined to an appropriate distance in all directions utilizing iris scissors.
Modified Advancement Flap Text: The defect edges were debeveled with a #15 scalpel blade.  Given the location of the defect, shape of the defect and the proximity to free margins a modified advancement flap was deemed most appropriate.  Using a sterile surgical marker, an appropriate advancement flap was drawn incorporating the defect and placing the expected incisions within the relaxed skin tension lines where possible.    The area thus outlined was incised deep to adipose tissue with a #15 scalpel blade.  The skin margins were undermined to an appropriate distance in all directions utilizing iris scissors.
Mucosal Advancement Flap Text: Given the location of the defect, shape of the defect and the proximity to free margins a mucosal advancement flap was deemed most appropriate. Incisions were made with a 15 blade scalpel in the appropriate fashion along the cutaneous vermilion border and the mucosal lip. The remaining actinically damaged mucosal tissue was excised.  The mucosal advancement flap was then elevated to the gingival sulcus with care taken to preserve the neurovascular structures and advanced into the primary defect. Care was taken to ensure that precise realignment of the vermilion border was achieved.
Peng Advancement Flap Text: The defect edges were debeveled with a #15 scalpel blade.  Given the location of the defect, shape of the defect and the proximity to free margins a Peng advancement flap was deemed most appropriate.  Using a sterile surgical marker, an appropriate advancement flap was drawn incorporating the defect and placing the expected incisions within the relaxed skin tension lines where possible. The area thus outlined was incised deep to adipose tissue with a #15 scalpel blade.  The skin margins were undermined to an appropriate distance in all directions utilizing iris scissors.
Hatchet Flap Text: The defect edges were debeveled with a #15 scalpel blade.  Given the location of the defect, shape of the defect and the proximity to free margins a hatchet flap was deemed most appropriate.  Using a sterile surgical marker, an appropriate hatchet flap was drawn incorporating the defect and placing the expected incisions within the relaxed skin tension lines where possible.    The area thus outlined was incised deep to adipose tissue with a #15 scalpel blade.  The skin margins were undermined to an appropriate distance in all directions utilizing iris scissors.
Rotation Flap Text: The defect edges were debeveled with a #15 scalpel blade.  Given the location of the defect, shape of the defect and the proximity to free margins a rotation flap was deemed most appropriate.  Using a sterile surgical marker, an appropriate rotation flap was drawn incorporating the defect and placing the expected incisions within the relaxed skin tension lines where possible.    The area thus outlined was incised deep to adipose tissue with a #15 scalpel blade.  The skin margins were undermined to an appropriate distance in all directions utilizing iris scissors.
Spiral Flap Text: The defect edges were debeveled with a #15 scalpel blade.  Given the location of the defect, shape of the defect and the proximity to free margins a spiral flap was deemed most appropriate.  Using a sterile surgical marker, an appropriate rotation flap was drawn incorporating the defect and placing the expected incisions within the relaxed skin tension lines where possible. The area thus outlined was incised deep to adipose tissue with a #15 scalpel blade.  The skin margins were undermined to an appropriate distance in all directions utilizing iris scissors.
Staged Advancement Flap Text: The defect edges were debeveled with a #15 scalpel blade.  Given the location of the defect, shape of the defect and the proximity to free margins a staged advancement flap was deemed most appropriate.  Using a sterile surgical marker, an appropriate advancement flap was drawn incorporating the defect and placing the expected incisions within the relaxed skin tension lines where possible. The area thus outlined was incised deep to adipose tissue with a #15 scalpel blade.  The skin margins were undermined to an appropriate distance in all directions utilizing iris scissors.
Star Wedge Flap Text: The defect edges were debeveled with a #15 scalpel blade.  Given the location of the defect, shape of the defect and the proximity to free margins a star wedge flap was deemed most appropriate.  Using a sterile surgical marker, an appropriate rotation flap was drawn incorporating the defect and placing the expected incisions within the relaxed skin tension lines where possible. The area thus outlined was incised deep to adipose tissue with a #15 scalpel blade.  The skin margins were undermined to an appropriate distance in all directions utilizing iris scissors.
Transposition Flap Text: The defect edges were debeveled with a #15 scalpel blade.  Given the location of the defect and the proximity to free margins a transposition flap was deemed most appropriate.  Using a sterile surgical marker, an appropriate transposition flap was drawn incorporating the defect.    The area thus outlined was incised deep to adipose tissue with a #15 scalpel blade.  The skin margins were undermined to an appropriate distance in all directions utilizing iris scissors.
Muscle Hinge Flap Text: The defect edges were debeveled with a #15 scalpel blade.  Given the size, depth and location of the defect and the proximity to free margins a muscle hinge flap was deemed most appropriate.  Using a sterile surgical marker, an appropriate hinge flap was drawn incorporating the defect. The area thus outlined was incised with a #15 scalpel blade.  The skin margins were undermined to an appropriate distance in all directions utilizing iris scissors.
Mustarde Flap Text: The defect edges were debeveled with a #15 scalpel blade.  Given the size, depth and location of the defect and the proximity to free margins a Mustarde flap was deemed most appropriate.  Using a sterile surgical marker, an appropriate flap was drawn incorporating the defect. The area thus outlined was incised with a #15 scalpel blade.  The skin margins were undermined to an appropriate distance in all directions utilizing iris scissors.
Nasal Turnover Hinge Flap Text: The defect edges were debeveled with a #15 scalpel blade.  Given the size, depth, location of the defect and the defect being full thickness a nasal turnover hinge flap was deemed most appropriate.  Using a sterile surgical marker, an appropriate hinge flap was drawn incorporating the defect. The area thus outlined was incised with a #15 scalpel blade. The flap was designed to recreate the nasal mucosal lining and the alar rim. The skin margins were undermined to an appropriate distance in all directions utilizing iris scissors.
Nasalis-Muscle-Based Myocutaneous Island Pedicle Flap Text: Using a #15 blade, an incision was made around the donor flap to the level of the nasalis muscle. Wide lateral undermining was then performed in both the subcutaneous plane above the nasalis muscle, and in a submuscular plane just above periosteum. This allowed the formation of a free nasalis muscle axial pedicle (based on the angular artery) which was still attached to the actual cutaneous flap, increasing its mobility and vascular viability. Hemostasis was obtained with pinpoint electrocoagulation. The flap was mobilized into position and the pivotal anchor points positioned and stabilized with buried interrupted sutures. Subcutaneous and dermal tissues were closed in a multilayered fashion with sutures. Tissue redundancies were excised, and the epidermal edges were apposed without significant tension and sutured with sutures.
Orbicularis Oris Muscle Flap Text: The defect edges were debeveled with a #15 scalpel blade.  Given that the defect affected the competency of the oral sphincter an orbicularis oris muscle flap was deemed most appropriate to restore this competency and normal muscle function.  Using a sterile surgical marker, an appropriate flap was drawn incorporating the defect. The area thus outlined was incised with a #15 scalpel blade.
Melolabial Transposition Flap Text: The defect edges were debeveled with a #15 scalpel blade.  Given the location of the defect and the proximity to free margins a melolabial flap was deemed most appropriate.  Using a sterile surgical marker, an appropriate melolabial transposition flap was drawn incorporating the defect.    The area thus outlined was incised deep to adipose tissue with a #15 scalpel blade.  The skin margins were undermined to an appropriate distance in all directions utilizing iris scissors.
Rhombic Flap Text: The defect edges were debeveled with a #15 scalpel blade.  Given the location of the defect and the proximity to free margins a rhombic flap was deemed most appropriate.  Using a sterile surgical marker, an appropriate rhombic flap was drawn incorporating the defect.    The area thus outlined was incised deep to adipose tissue with a #15 scalpel blade.  The skin margins were undermined to an appropriate distance in all directions utilizing iris scissors.
Rhomboid Transposition Flap Text: The defect edges were debeveled with a #15 scalpel blade.  Given the location of the defect and the proximity to free margins a rhomboid transposition flap was deemed most appropriate.  Using a sterile surgical marker, an appropriate rhomboid flap was drawn incorporating the defect.    The area thus outlined was incised deep to adipose tissue with a #15 scalpel blade.  The skin margins were undermined to an appropriate distance in all directions utilizing iris scissors.
Bi-Rhombic Flap Text: The defect edges were debeveled with a #15 scalpel blade.  Given the location of the defect and the proximity to free margins a bi-rhombic flap was deemed most appropriate.  Using a sterile surgical marker, an appropriate rhombic flap was drawn incorporating the defect. The area thus outlined was incised deep to adipose tissue with a #15 scalpel blade.  The skin margins were undermined to an appropriate distance in all directions utilizing iris scissors.
Helical Rim Advancement Flap Text: The defect edges were debeveled with a #15 blade scalpel.  Given the location of the defect and the proximity to free margins (helical rim) a double helical rim advancement flap was deemed most appropriate.  Using a sterile surgical marker, the appropriate advancement flaps were drawn incorporating the defect and placing the expected incisions between the helical rim and antihelix where possible.  The area thus outlined was incised through and through with a #15 scalpel blade.  With a skin hook and iris scissors, the flaps were gently and sharply undermined and freed up.
Bilateral Helical Rim Advancement Flap Text: The defect edges were debeveled with a #15 blade scalpel.  Given the location of the defect and the proximity to free margins (helical rim) a bilateral helical rim advancement flap was deemed most appropriate.  Using a sterile surgical marker, the appropriate advancement flaps were drawn incorporating the defect and placing the expected incisions between the helical rim and antihelix where possible.  The area thus outlined was incised through and through with a #15 scalpel blade.  With a skin hook and iris scissors, the flaps were gently and sharply undermined and freed up.
Ear Star Wedge Flap Text: The defect edges were debeveled with a #15 blade scalpel.  Given the location of the defect and the proximity to free margins (helical rim) an ear star wedge flap was deemed most appropriate.  Using a sterile surgical marker, the appropriate flap was drawn incorporating the defect and placing the expected incisions between the helical rim and antihelix where possible.  The area thus outlined was incised through and through with a #15 scalpel blade.
Banner Transposition Flap Text: The defect edges were debeveled with a #15 scalpel blade.  Given the location of the defect and the proximity to free margins a Banner transposition flap was deemed most appropriate.  Using a sterile surgical marker, an appropriate flap drawn around the defect. The area thus outlined was incised deep to adipose tissue with a #15 scalpel blade.  The skin margins were undermined to an appropriate distance in all directions utilizing iris scissors.
Bilobed Flap Text: The defect edges were debeveled with a #15 scalpel blade.  Given the location of the defect and the proximity to free margins a bilobe flap was deemed most appropriate.  Using a sterile surgical marker, an appropriate bilobe flap drawn around the defect.    The area thus outlined was incised deep to adipose tissue with a #15 scalpel blade.  The skin margins were undermined to an appropriate distance in all directions utilizing iris scissors.
Bilobed Transposition Flap Text: The defect edges were debeveled with a #15 scalpel blade.  Given the location of the defect and the proximity to free margins a bilobed transposition flap was deemed most appropriate.  Using a sterile surgical marker, an appropriate bilobe flap drawn around the defect.    The area thus outlined was incised deep to adipose tissue with a #15 scalpel blade.  The skin margins were undermined to an appropriate distance in all directions utilizing iris scissors.
Trilobed Flap Text: The defect edges were debeveled with a #15 scalpel blade.  Given the location of the defect and the proximity to free margins a trilobed flap was deemed most appropriate.  Using a sterile surgical marker, an appropriate trilobed flap drawn around the defect.    The area thus outlined was incised deep to adipose tissue with a #15 scalpel blade.  The skin margins were undermined to an appropriate distance in all directions utilizing iris scissors.
Dorsal Nasal Flap Text: The defect edges were debeveled with a #15 scalpel blade.  Given the location of the defect and the proximity to free margins a dorsal nasal flap was deemed most appropriate.  Using a sterile surgical marker, an appropriate dorsal nasal flap was drawn around the defect.    The area thus outlined was incised deep to adipose tissue with a #15 scalpel blade.  The skin margins were undermined to an appropriate distance in all directions utilizing iris scissors.
Island Pedicle Flap Text: The defect edges were debeveled with a #15 scalpel blade.  Given the location of the defect, shape of the defect and the proximity to free margins an island pedicle advancement flap was deemed most appropriate.  Using a sterile surgical marker, an appropriate advancement flap was drawn incorporating the defect, outlining the appropriate donor tissue and placing the expected incisions within the relaxed skin tension lines where possible.    The area thus outlined was incised deep to adipose tissue with a #15 scalpel blade.  The skin margins were undermined to an appropriate distance in all directions around the primary defect and laterally outward around the island pedicle utilizing iris scissors.  There was minimal undermining beneath the pedicle flap.
Island Pedicle Flap With Canthal Suspension Text: The defect edges were debeveled with a #15 scalpel blade.  Given the location of the defect, shape of the defect and the proximity to free margins an island pedicle advancement flap was deemed most appropriate.  Using a sterile surgical marker, an appropriate advancement flap was drawn incorporating the defect, outlining the appropriate donor tissue and placing the expected incisions within the relaxed skin tension lines where possible. The area thus outlined was incised deep to adipose tissue with a #15 scalpel blade.  The skin margins were undermined to an appropriate distance in all directions around the primary defect and laterally outward around the island pedicle utilizing iris scissors.  There was minimal undermining beneath the pedicle flap. A suspension suture was placed in the canthal tendon to prevent tension and prevent ectropion.
Alar Island Pedicle Flap Text: The defect edges were debeveled with a #15 scalpel blade.  Given the location of the defect, shape of the defect and the proximity to the alar rim an island pedicle advancement flap was deemed most appropriate.  Using a sterile surgical marker, an appropriate advancement flap was drawn incorporating the defect, outlining the appropriate donor tissue and placing the expected incisions within the nasal ala running parallel to the alar rim. The area thus outlined was incised with a #15 scalpel blade.  The skin margins were undermined minimally to an appropriate distance in all directions around the primary defect and laterally outward around the island pedicle utilizing iris scissors.  There was minimal undermining beneath the pedicle flap.
Double Island Pedicle Flap Text: The defect edges were debeveled with a #15 scalpel blade.  Given the location of the defect, shape of the defect and the proximity to free margins a double island pedicle advancement flap was deemed most appropriate.  Using a sterile surgical marker, an appropriate advancement flap was drawn incorporating the defect, outlining the appropriate donor tissue and placing the expected incisions within the relaxed skin tension lines where possible.    The area thus outlined was incised deep to adipose tissue with a #15 scalpel blade.  The skin margins were undermined to an appropriate distance in all directions around the primary defect and laterally outward around the island pedicle utilizing iris scissors.  There was minimal undermining beneath the pedicle flap.
Island Pedicle Flap-Requiring Vessel Identification Text: The defect edges were debeveled with a #15 scalpel blade.  Given the location of the defect, shape of the defect and the proximity to free margins an island pedicle advancement flap was deemed most appropriate.  Using a sterile surgical marker, an appropriate advancement flap was drawn, based on the axial vessel mentioned above, incorporating the defect, outlining the appropriate donor tissue and placing the expected incisions within the relaxed skin tension lines where possible.    The area thus outlined was incised deep to adipose tissue with a #15 scalpel blade.  The skin margins were undermined to an appropriate distance in all directions around the primary defect and laterally outward around the island pedicle utilizing iris scissors.  There was minimal undermining beneath the pedicle flap.
Keystone Flap Text: The defect edges were debeveled with a #15 scalpel blade.  Given the location of the defect, shape of the defect a keystone flap was deemed most appropriate.  Using a sterile surgical marker, an appropriate keystone flap was drawn incorporating the defect, outlining the appropriate donor tissue and placing the expected incisions within the relaxed skin tension lines where possible. The area thus outlined was incised deep to adipose tissue with a #15 scalpel blade.  The skin margins were undermined to an appropriate distance in all directions around the primary defect and laterally outward around the flap utilizing iris scissors.
O-T Plasty Text: The defect edges were debeveled with a #15 scalpel blade.  Given the location of the defect, shape of the defect and the proximity to free margins an O-T plasty was deemed most appropriate.  Using a sterile surgical marker, an appropriate O-T plasty was drawn incorporating the defect and placing the expected incisions within the relaxed skin tension lines where possible.    The area thus outlined was incised deep to adipose tissue with a #15 scalpel blade.  The skin margins were undermined to an appropriate distance in all directions utilizing iris scissors.
O-Z Plasty Text: The defect edges were debeveled with a #15 scalpel blade.  Given the location of the defect, shape of the defect and the proximity to free margins an O-Z plasty (double transposition flap) was deemed most appropriate.  Using a sterile surgical marker, the appropriate transposition flaps were drawn incorporating the defect and placing the expected incisions within the relaxed skin tension lines where possible.    The area thus outlined was incised deep to adipose tissue with a #15 scalpel blade.  The skin margins were undermined to an appropriate distance in all directions utilizing iris scissors.  Hemostasis was achieved with electrocautery.  The flaps were then transposed into place, one clockwise and the other counterclockwise, and anchored with interrupted buried subcutaneous sutures.
Double O-Z Plasty Text: The defect edges were debeveled with a #15 scalpel blade.  Given the location of the defect, shape of the defect and the proximity to free margins a Double O-Z plasty (double transposition flap) was deemed most appropriate.  Using a sterile surgical marker, the appropriate transposition flaps were drawn incorporating the defect and placing the expected incisions within the relaxed skin tension lines where possible. The area thus outlined was incised deep to adipose tissue with a #15 scalpel blade.  The skin margins were undermined to an appropriate distance in all directions utilizing iris scissors.  Hemostasis was achieved with electrocautery.  The flaps were then transposed into place, one clockwise and the other counterclockwise, and anchored with interrupted buried subcutaneous sutures.
V-Y Plasty Text: The defect edges were debeveled with a #15 scalpel blade.  Given the location of the defect, shape of the defect and the proximity to free margins an V-Y advancement flap was deemed most appropriate.  Using a sterile surgical marker, an appropriate advancement flap was drawn incorporating the defect and placing the expected incisions within the relaxed skin tension lines where possible.    The area thus outlined was incised deep to adipose tissue with a #15 scalpel blade.  The skin margins were undermined to an appropriate distance in all directions utilizing iris scissors.
H Plasty Text: Given the location of the defect, shape of the defect and the proximity to free margins a H-plasty was deemed most appropriate for repair.  Using a sterile surgical marker, the appropriate advancement arms of the H-plasty were drawn incorporating the defect and placing the expected incisions within the relaxed skin tension lines where possible. The area thus outlined was incised deep to adipose tissue with a #15 scalpel blade. The skin margins were undermined to an appropriate distance in all directions utilizing iris scissors.  The opposing advancement arms were then advanced into place in opposite direction and anchored with interrupted buried subcutaneous sutures.
W Plasty Text: The lesion was extirpated to the level of the fat with a #15 scalpel blade.  Given the location of the defect, shape of the defect and the proximity to free margins a W-plasty was deemed most appropriate for repair.  Using a sterile surgical marker, the appropriate transposition arms of the W-plasty were drawn incorporating the defect and placing the expected incisions within the relaxed skin tension lines where possible.    The area thus outlined was incised deep to adipose tissue with a #15 scalpel blade.  The skin margins were undermined to an appropriate distance in all directions utilizing iris scissors.  The opposing transposition arms were then transposed into place in opposite direction and anchored with interrupted buried subcutaneous sutures.
Z Plasty Text: The lesion was extirpated to the level of the fat with a #15 scalpel blade.  Given the location of the defect, shape of the defect and the proximity to free margins a Z-plasty was deemed most appropriate for repair.  Using a sterile surgical marker, the appropriate transposition arms of the Z-plasty were drawn incorporating the defect and placing the expected incisions within the relaxed skin tension lines where possible.    The area thus outlined was incised deep to adipose tissue with a #15 scalpel blade.  The skin margins were undermined to an appropriate distance in all directions utilizing iris scissors.  The opposing transposition arms were then transposed into place in opposite direction and anchored with interrupted buried subcutaneous sutures.
Zygomaticofacial Flap Text: Given the location of the defect, shape of the defect and the proximity to free margins a zygomaticofacial flap was deemed most appropriate for repair.  Using a sterile surgical marker, the appropriate flap was drawn incorporating the defect and placing the expected incisions within the relaxed skin tension lines where possible. The area thus outlined was incised deep to adipose tissue with a #15 scalpel blade with preservation of a vascular pedicle.  The skin margins were undermined to an appropriate distance in all directions utilizing iris scissors.  The flap was then placed into the defect and anchored with interrupted buried subcutaneous sutures.
Cheek Interpolation Flap Text: A decision was made to reconstruct the defect utilizing an interpolation axial flap and a staged reconstruction.  A telfa template was made of the defect.  This telfa template was then used to outline the Cheek Interpolation flap.  The donor area for the pedicle flap was then injected with anesthesia.  The flap was excised through the skin and subcutaneous tissue down to the layer of the underlying musculature.  The interpolation flap was carefully excised within this deep plane to maintain its blood supply.  The edges of the donor site were undermined.   The donor site was closed in a primary fashion.  The pedicle was then rotated into position and sutured.  Once the tube was sutured into place, adequate blood supply was confirmed with blanching and refill.  The pedicle was then wrapped with xeroform gauze and dressed appropriately with a telfa and gauze bandage to ensure continued blood supply and protect the attached pedicle.
Cheek-To-Nose Interpolation Flap Text: A decision was made to reconstruct the defect utilizing an interpolation axial flap and a staged reconstruction.  A telfa template was made of the defect.  This telfa template was then used to outline the Cheek-To-Nose Interpolation flap.  The donor area for the pedicle flap was then injected with anesthesia.  The flap was excised through the skin and subcutaneous tissue down to the layer of the underlying musculature.  The interpolation flap was carefully excised within this deep plane to maintain its blood supply.  The edges of the donor site were undermined.   The donor site was closed in a primary fashion.  The pedicle was then rotated into position and sutured.  Once the tube was sutured into place, adequate blood supply was confirmed with blanching and refill.  The pedicle was then wrapped with xeroform gauze and dressed appropriately with a telfa and gauze bandage to ensure continued blood supply and protect the attached pedicle.
Interpolation Flap Text: A decision was made to reconstruct the defect utilizing an interpolation axial flap and a staged reconstruction.  A telfa template was made of the defect.  This telfa template was then used to outline the interpolation flap.  The donor area for the pedicle flap was then injected with anesthesia.  The flap was excised through the skin and subcutaneous tissue down to the layer of the underlying musculature.  The interpolation flap was carefully excised within this deep plane to maintain its blood supply.  The edges of the donor site were undermined.   The donor site was closed in a primary fashion.  The pedicle was then rotated into position and sutured.  Once the tube was sutured into place, adequate blood supply was confirmed with blanching and refill.  The pedicle was then wrapped with xeroform gauze and dressed appropriately with a telfa and gauze bandage to ensure continued blood supply and protect the attached pedicle.
Melolabial Interpolation Flap Text: A decision was made to reconstruct the defect utilizing an interpolation axial flap and a staged reconstruction.  A telfa template was made of the defect.  This telfa template was then used to outline the melolabial interpolation flap.  The donor area for the pedicle flap was then injected with anesthesia.  The flap was excised through the skin and subcutaneous tissue down to the layer of the underlying musculature.  The pedicle flap was carefully excised within this deep plane to maintain its blood supply.  The edges of the donor site were undermined.   The donor site was closed in a primary fashion.  The pedicle was then rotated into position and sutured.  Once the tube was sutured into place, adequate blood supply was confirmed with blanching and refill.  The pedicle was then wrapped with xeroform gauze and dressed appropriately with a telfa and gauze bandage to ensure continued blood supply and protect the attached pedicle.
Mastoid Interpolation Flap Text: A decision was made to reconstruct the defect utilizing an interpolation axial flap and a staged reconstruction.  A telfa template was made of the defect.  This telfa template was then used to outline the mastoid interpolation flap.  The donor area for the pedicle flap was then injected with anesthesia.  The flap was excised through the skin and subcutaneous tissue down to the layer of the underlying musculature.  The pedicle flap was carefully excised within this deep plane to maintain its blood supply.  The edges of the donor site were undermined.   The donor site was closed in a primary fashion.  The pedicle was then rotated into position and sutured.  Once the tube was sutured into place, adequate blood supply was confirmed with blanching and refill.  The pedicle was then wrapped with xeroform gauze and dressed appropriately with a telfa and gauze bandage to ensure continued blood supply and protect the attached pedicle.
Posterior Auricular Interpolation Flap Text: A decision was made to reconstruct the defect utilizing an interpolation axial flap and a staged reconstruction.  A telfa template was made of the defect.  This telfa template was then used to outline the posterior auricular interpolation flap.  The donor area for the pedicle flap was then injected with anesthesia.  The flap was excised through the skin and subcutaneous tissue down to the layer of the underlying musculature.  The pedicle flap was carefully excised within this deep plane to maintain its blood supply.  The edges of the donor site were undermined.   The donor site was closed in a primary fashion.  The pedicle was then rotated into position and sutured.  Once the tube was sutured into place, adequate blood supply was confirmed with blanching and refill.  The pedicle was then wrapped with xeroform gauze and dressed appropriately with a telfa and gauze bandage to ensure continued blood supply and protect the attached pedicle.
Paramedian Forehead Flap Text: A decision was made to reconstruct the defect utilizing an interpolation axial flap and a staged reconstruction.  A telfa template was made of the defect.  This telfa template was then used to outline the paramedian forehead pedicle flap.  The donor area for the pedicle flap was then injected with anesthesia.  The flap was excised through the skin and subcutaneous tissue down to the layer of the underlying musculature.  The pedicle flap was carefully excised within this deep plane to maintain its blood supply.  The edges of the donor site were undermined.   The donor site was closed in a primary fashion.  The pedicle was then rotated into position and sutured.  Once the tube was sutured into place, adequate blood supply was confirmed with blanching and refill.  The pedicle was then wrapped with xeroform gauze and dressed appropriately with a telfa and gauze bandage to ensure continued blood supply and protect the attached pedicle.
Cheiloplasty (Less Than 50%) Text: A decision was made to reconstruct the defect with a  cheiloplasty.  The defect was undermined extensively.  Additional obicularis oris muscle was excised with a 15 blade scalpel.  The defect was converted into a full thickness wedge, of less than 50% of the vertical height of the lip, to facilite a better cosmetic result.  Small vessels were then tied off with 5-0 monocyrl. The obicularis oris, superficial fascia, adipose and dermis were then reapproximated.  After the deeper layers were approximated the epidermis was reapproximated with particular care given to realign the vermilion border.
Cheiloplasty (Complex) Text: A decision was made to reconstruct the defect with a  cheiloplasty.  The defect was undermined extensively.  Additional obicularis oris muscle was excised with a 15 blade scalpel.  The defect was converted into a full thickness wedge to facilite a better cosmetic result.  Small vessels were then tied off with 5-0 monocyrl. The obicularis oris, superficial fascia, adipose and dermis were then reapproximated.  After the deeper layers were approximated the epidermis was reapproximated with particular care given to realign the vermilion border.
Ear Wedge Repair Text: A wedge excision was completed by carrying down an excision through the full thickness of the ear and cartilage with an inward facing Burow's triangle. The wound was then closed in a layered fashion.
Full Thickness Lip Wedge Repair (Flap) Text: Given the location of the defect and the proximity to free margins a full thickness wedge repair was deemed most appropriate.  Using a sterile surgical marker, the appropriate repair was drawn incorporating the defect and placing the expected incisions perpendicular to the vermilion border.  The vermilion border was also meticulously outlined to ensure appropriate reapproximation during the repair.  The area thus outlined was incised through and through with a #15 scalpel blade.  The muscularis and dermis were reaproximated with deep sutures following hemostasis. Care was taken to realign the vermilion border before proceeding with the superficial closure.  Once the vermilion was realigned the superfical and mucosal closure was finished.
Ftsg Text: The defect edges were debeveled with a #15 scalpel blade.  Given the location of the defect, shape of the defect and the proximity to free margins a full thickness skin graft was deemed most appropriate.  Using a sterile surgical marker, the primary defect shape was transferred to the donor site. The area thus outlined was incised deep to adipose tissue with a #15 scalpel blade.  The harvested graft was then trimmed of adipose tissue until only dermis and epidermis was left.  The skin margins of the secondary defect were undermined to an appropriate distance in all directions utilizing iris scissors.  The secondary defect was closed with interrupted buried subcutaneous sutures.  The skin edges were then re-apposed with running  sutures.  The skin graft was then placed in the primary defect and oriented appropriately.
Split-Thickness Skin Graft Text: The defect edges were debeveled with a #15 scalpel blade.  Given the location of the defect, shape of the defect and the proximity to free margins a split thickness skin graft was deemed most appropriate.  Using a sterile surgical marker, the primary defect shape was transferred to the donor site. The split thickness graft was then harvested.  The skin graft was then placed in the primary defect and oriented appropriately.
Burow's Graft Text: The defect edges were debeveled with a #15 scalpel blade.  Given the location of the defect, shape of the defect, the proximity to free margins and the presence of a standing cone deformity a Burow's skin graft was deemed most appropriate. The standing cone was removed and this tissue was then trimmed to the shape of the primary defect. The adipose tissue was also removed until only dermis and epidermis were left.  The skin margins of the secondary defect were undermined to an appropriate distance in all directions utilizing iris scissors.  The secondary defect was closed with interrupted buried subcutaneous sutures.  The skin edges were then re-apposed with running  sutures.  The skin graft was then placed in the primary defect and oriented appropriately.
Cartilage Graft Text: The defect edges were debeveled with a #15 scalpel blade.  Given the location of the defect, shape of the defect, the fact the defect involved a full thickness cartilage defect a cartilage graft was deemed most appropriate.  An appropriate donor site was identified, cleansed, and anesthetized. The cartilage graft was then harvested and transferred to the recipient site, oriented appropriately and then sutured into place.  The secondary defect was then repaired using a primary closure.
Composite Graft Text: The defect edges were debeveled with a #15 scalpel blade.  Given the location of the defect, shape of the defect, the proximity to free margins and the fact the defect was full thickness a composite graft was deemed most appropriate.  The defect was outline and then transferred to the donor site.  A full thickness graft was then excised from the donor site. The graft was then placed in the primary defect, oriented appropriately and then sutured into place.  The secondary defect was then repaired using a primary closure.
Epidermal Autograft Text: The defect edges were debeveled with a #15 scalpel blade.  Given the location of the defect, shape of the defect and the proximity to free margins an epidermal autograft was deemed most appropriate.  Using a sterile surgical marker, the primary defect shape was transferred to the donor site. The epidermal graft was then harvested.  The skin graft was then placed in the primary defect and oriented appropriately.
Dermal Autograft Text: The defect edges were debeveled with a #15 scalpel blade.  Given the location of the defect, shape of the defect and the proximity to free margins a dermal autograft was deemed most appropriate.  Using a sterile surgical marker, the primary defect shape was transferred to the donor site. The area thus outlined was incised deep to adipose tissue with a #15 scalpel blade.  The harvested graft was then trimmed of adipose and epidermal tissue until only dermis was left.  The skin graft was then placed in the primary defect and oriented appropriately.
Skin Substitute Text: The defect edges were debeveled with a #15 scalpel blade.  Given the location of the defect, shape of the defect and the proximity to free margins a skin substitute graft was deemed most appropriate.  The graft material was trimmed to fit the size of the defect. The graft was then placed in the primary defect and oriented appropriately.
Tissue Cultured Epidermal Autograft Text: The defect edges were debeveled with a #15 scalpel blade.  Given the location of the defect, shape of the defect and the proximity to free margins a tissue cultured epidermal autograft was deemed most appropriate.  The graft was then trimmed to fit the size of the defect.  The graft was then placed in the primary defect and oriented appropriately.
Xenograft Text: The defect edges were debeveled with a #15 scalpel blade.  Given the location of the defect, shape of the defect and the proximity to free margins a xenograft was deemed most appropriate.  The graft was then trimmed to fit the size of the defect.  The graft was then placed in the primary defect and oriented appropriately.
Purse String (Simple) Text: Given the location of the defect and the characteristics of the surrounding skin a purse string closure was deemed most appropriate.  Undermining was performed circumfirentially around the surgical defect.  A purse string suture was then placed and tightened.
Purse String (Intermediate) Text: Given the location of the defect and the characteristics of the surrounding skin a purse string intermediate closure was deemed most appropriate.  Undermining was performed circumfirentially around the surgical defect.  A purse string suture was then placed and tightened.
Partial Purse String (Simple) Text: Given the location of the defect and the characteristics of the surrounding skin a simple purse string closure was deemed most appropriate.  Undermining was performed circumfirentially around the surgical defect.  A purse string suture was then placed and tightened. Wound tension only allowed a partial closure of the circular defect.
Partial Purse String (Intermediate) Text: Given the location of the defect and the characteristics of the surrounding skin an intermediate purse string closure was deemed most appropriate.  Undermining was performed circumfirentially around the surgical defect.  A purse string suture was then placed and tightened. Wound tension only allowed a partial closure of the circular defect.
Localized Dermabrasion With Wire Brush Text: The patient was draped in routine manner.  Localized dermabrasion using 3 x 17 mm wire brush was performed in routine manner to papillary dermis. This spot dermabrasion is being performed to complete skin cancer reconstruction. It also will eliminate the other sun damaged precancerous cells that are known to be part of the regional effect of a lifetime's worth of sun exposure. This localized dermabrasion is therapeutic and should not be considered cosmetic in any regard.
Tarsorrhaphy Text: A tarsorrhaphy was performed using Frost sutures.
Complex Repair And Flap Additional Text (Will Appearing After The Standard Complex Repair Text): The complex repair was not sufficient to completely close the primary defect. The remaining additional defect was repaired with the flap mentioned below.
Complex Repair And Graft Additional Text (Will Appearing After The Standard Complex Repair Text): The complex repair was not sufficient to completely close the primary defect. The remaining additional defect was repaired with the graft mentioned below.
Manual Repair Warning Statement: We plan on removing the manually selected variable below in favor of our much easier automatic structured text blocks found in the previous tab. We decided to do this to help make the flow better and give you the full power of structured data. Manual selection is never going to be ideal in our platform and I would encourage you to avoid using manual selection from this point on, especially since I will be sunsetting this feature. It is important that you do one of two things with the customized text below. First, you can save all of the text in a word file so you can have it for future reference. Second, transfer the text to the appropriate area in the Library tab. Lastly, if there is a flap or graft type which we do not have you need to let us know right away so I can add it in before the variable is hidden. No need to panic, we plan to give you roughly 6 months to make the change.
Same Histology In Subsequent Stages Text: The pattern and morphology of the tumor is as described in the first stage.
No Residual Tumor Seen Histology Text: There were no malignant cells seen in the sections examined.
Inflammation Suggestive Of Cancer Camouflage Histology Text: There was a dense lymphocytic infiltrate which prevented adequate histologic evaluation of adjacent structures.
Bcc Histology Text: There were numerous aggregates of basaloid cells.
Bcc Infiltrative Histology Text: There were numerous aggregates of basaloid cells demonstrating an infiltrative pattern.
Mart-1 - Positive Histology Text: MART-1 staining demonstrates areas of higher density and clustering of melanocytes with Pagetoid spread upwards within the epidermis. The surgical margins are positive for tumor cells.
Mart-1 - Negative Histology Text: MART-1 staining demonstrates a normal density and pattern of melanocytes along the dermal-epidermal junction. The surgical margins are negative for tumor cells.
Information: Selecting Yes will display possible errors in your note based on the variables you have selected. This validation is only offered as a suggestion for you. PLEASE NOTE THAT THE VALIDATION TEXT WILL BE REMOVED WHEN YOU FINALIZE YOUR NOTE. IF YOU WANT TO FAX A PRELIMINARY NOTE YOU WILL NEED TO TOGGLE THIS TO 'NO' IF YOU DO NOT WANT IT IN YOUR FAXED NOTE.

## 2022-04-22 NOTE — PROCEDURE: MIPS QUALITY
Detail Level: Detailed
Quality 130: Documentation Of Current Medications In The Medical Record: Current Medications Documented
Quality 111:Pneumonia Vaccination Status For Older Adults: Pneumococcal Vaccination not Administered or Previously Received, Reason not Otherwise Specified
Quality 226: Preventive Care And Screening: Tobacco Use: Screening And Cessation Intervention: Patient screened for tobacco use and is an ex/non-smoker

## 2022-04-29 ENCOUNTER — APPOINTMENT (RX ONLY)
Dept: URBAN - METROPOLITAN AREA CLINIC 22 | Facility: CLINIC | Age: 72
Setting detail: DERMATOLOGY
End: 2022-04-29

## 2022-04-29 DIAGNOSIS — Z48.817 ENCOUNTER FOR SURGICAL AFTERCARE FOLLOWING SURGERY ON THE SKIN AND SUBCUTANEOUS TISSUE: ICD-10-CM

## 2022-04-29 PROBLEM — E66.01 MORBID OBESITY (HCC): Status: ACTIVE | Noted: 2022-04-29

## 2022-04-29 PROBLEM — I72.4 FEMORAL ARTERY ANEURYSM, LEFT (HCC): Status: ACTIVE | Noted: 2022-04-29

## 2022-04-29 PROBLEM — Z79.01 LONG TERM (CURRENT) USE OF ANTICOAGULANTS: Status: ACTIVE | Noted: 2022-04-29

## 2022-04-29 PROBLEM — D69.6 THROMBOCYTOPENIA (HCC): Status: ACTIVE | Noted: 2022-04-29

## 2022-04-29 PROBLEM — I70.0 AORTIC ATHEROSCLEROSIS (HCC): Status: ACTIVE | Noted: 2022-04-29

## 2022-04-29 PROBLEM — I25.2 OLD MI (MYOCARDIAL INFARCTION): Status: ACTIVE | Noted: 2022-04-29

## 2022-04-29 PROCEDURE — ? POST-OP WOUND EVALUATION

## 2022-04-29 PROCEDURE — 99024 POSTOP FOLLOW-UP VISIT: CPT

## 2022-04-29 ASSESSMENT — LOCATION DETAILED DESCRIPTION DERM: LOCATION DETAILED: LEFT CENTRAL TEMPLE

## 2022-04-29 ASSESSMENT — LOCATION ZONE DERM: LOCATION ZONE: FACE

## 2022-04-29 ASSESSMENT — LOCATION SIMPLE DESCRIPTION DERM: LOCATION SIMPLE: LEFT TEMPLE

## 2022-04-29 NOTE — PROCEDURE: POST-OP WOUND EVALUATION
Body Location Override (Optional): left temple
Detail Level: Detailed
Add 08001 Cpt? (Important Note: In 2017 The Use Of 34021 Is Being Tracked By Cms To Determine Future Global Period Reimbursement For Global Periods): yes
Wound Evaluated By (Optional): Yuriy Rivera MD
Wound Diameter In Cm(Optional): 0
Wound Crusting?: clean
Wound Discharge?: minimal discharge
Sutures?: intact
Wound Edema?: mild
Wound Drainage?: serous drainage
Wound Color?: red

## 2022-05-20 ENCOUNTER — OFFICE VISIT (OUTPATIENT)
Dept: DERMATOLOGY | Facility: IMAGING CENTER | Age: 72
End: 2022-05-20
Payer: MEDICARE

## 2022-05-20 DIAGNOSIS — Z85.89 HISTORY OF SQUAMOUS CELL CARCINOMA: ICD-10-CM

## 2022-05-20 DIAGNOSIS — D69.2 SENILE PURPURA (HCC): ICD-10-CM

## 2022-05-20 PROCEDURE — 99213 OFFICE O/P EST LOW 20 MIN: CPT | Performed by: DERMATOLOGY

## 2022-06-24 ENCOUNTER — OFFICE VISIT (OUTPATIENT)
Dept: MEDICAL GROUP | Facility: MEDICAL CENTER | Age: 72
End: 2022-06-24
Payer: MEDICARE

## 2022-06-24 VITALS
SYSTOLIC BLOOD PRESSURE: 124 MMHG | BODY MASS INDEX: 41.98 KG/M2 | WEIGHT: 277 LBS | DIASTOLIC BLOOD PRESSURE: 74 MMHG | TEMPERATURE: 97.6 F | RESPIRATION RATE: 16 BRPM | HEART RATE: 64 BPM | OXYGEN SATURATION: 96 % | HEIGHT: 68 IN

## 2022-06-24 DIAGNOSIS — D53.9 ANEMIA, DEFICIENCY: ICD-10-CM

## 2022-06-24 DIAGNOSIS — I73.9 PERIPHERAL ARTERY DISEASE (HCC): ICD-10-CM

## 2022-06-24 DIAGNOSIS — Z95.5 HISTORY OF CORONARY ARTERY STENT PLACEMENT: ICD-10-CM

## 2022-06-24 DIAGNOSIS — I71.40 ABDOMINAL AORTIC ANEURYSM WITHOUT RUPTURE (HCC): ICD-10-CM

## 2022-06-24 DIAGNOSIS — I27.82 CHRONIC PULMONARY EMBOLISM WITHOUT ACUTE COR PULMONALE, UNSPECIFIED PULMONARY EMBOLISM TYPE (HCC): ICD-10-CM

## 2022-06-24 DIAGNOSIS — E78.5 DYSLIPIDEMIA: ICD-10-CM

## 2022-06-24 PROCEDURE — 99214 OFFICE O/P EST MOD 30 MIN: CPT | Performed by: FAMILY MEDICINE

## 2022-06-24 ASSESSMENT — FIBROSIS 4 INDEX: FIB4 SCORE: 3.13

## 2022-06-24 NOTE — PROGRESS NOTES
CC: Peripheral vascular disease, CAD/stent placement, dyslipidemia, chronic pulmonary embolism, abdominal aortic aneurysm  HPI:   Polo presents today to discuss the following:    Peripheral artery disease (HCC)  Patient with history of femoral endarterectomy in 2019.    Patient currently denies any leg pain  at rest.  However he has been having some pain in the right leg with walking.  Patient has been following up with vascular surgeon, had recent ultrasound showed occlusion of the right lower artery.  As per patient he has an appointment with his vascular surgeon    Presence of stent in left circumflex coronary artery  Patient is asymptomatic currently.  Denies any chest pain or shortness of breath.  Patient has history of placement of 5 stents in 2005.    Normal left ventricular size and systolic function. Normal regional   wall motion. Mild concentric left ventricular hypertrophy. Normal   diastolic function. Left ventricular ejection fraction is visually   estimated to be 55%.    Has been on rosuvastatin 20 mg daily, has been following with cardiology.     Dyslipidemia  He has been tolerating the statin. Denies muscle pain LFTs has been normal, has been on rosuvastatin 20 mg daily.     Chronic pulmonary embolism without acute cor pulmonale, unspecified pulmonary embolism type (HCC)  History of two pulmonary embolisms, currently stable and asymptomatic.  He has been on Xarelto 20 mg daily.  No side effects    Abdominal aortic aneurysm without rupture (HCC)  Patient has been asymptomatic.  Denies any abdominal pain or back pain.  Most recent ultrasound showed that patient's infrarenal aneurysm has increased in size now its almost 5 x 5 cm.  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   • Aortic atherosclerosis ([facility]) 04/29/2022   • Thrombocytopenia ([facility]) 04/29/2022   • Chronic CHF (congestive heart failure) ([facility]) 07/12/2021   • Other chest pain 07/10/2021   • MIKAYLA (acute kidney injury) ([facility]) 07/10/2021   • Acute DVT (deep venous thrombosis) ([facility]) 07/10/2021   • PE (pulmonary thromboembolism) ([facility]) 07/10/2021   • White coat syndrome without hypertension    • 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 intermit claudication ([facility]) 03/22/2021   • Abdominal aortic aneurysm without rupture ([facility]) 03/22/2021   • Thrombus 02/12/2021   • Acute respiratory failure ([facility]) 02/11/2021   • Benign prostatic hyperplasia with urinary hesitancy 02/11/2021   • Advanced care planning/counseling discussion 02/11/2021   • Elevated d-dimer 02/11/2021   • History of COVID-19 02/10/2021   • Acute cystitis 02/10/2021   • Iron deficiency anemia due to chronic blood loss 11/25/2019   • Obesity (BMI 35.0-39.9 without comorbidity) ([facility]) 08/13/2019   • Peripheral artery disease ([facility]) 08/01/2019   • Essential hypertension    • Presence of stent in LAD coronary artery    • Presence of stent in left circumflex coronary artery    • Dyslipidemia 08/26/2011   • Coronary artery disease due to lipid rich plaque 08/26/2011       Current Outpatient Medications   Medication Sig Dispense Refill   • XARELTO 20 MG Tab tablet      • Iron-Vitamin C (IRON 100/C PO) Take  by mouth.     • rosuvastatin (CRESTOR) 20 MG Tab Take 1 Tablet by mouth every evening. 100 Tablet 3   • Omega-3 Fatty Acids (FISH OIL) 1000 MG Cap capsule Take 1 capsule by mouth 2 times a day.     • VITAMIN D PO Take 1 capsule by mouth every day.     • Coenzyme Q10 (CO Q-10) 300 MG Cap Take 1 Cap by mouth every day.     • Ascorbic Acid (VITAMIN C PO) Take 6,000 mg by mouth every day.     • Multiple Vitamin (MULTIVITAMIN PO) Take  "1 Tab by mouth every day.       No current facility-administered medications for this visit.         Allergies as of 06/24/2022 - Reviewed 06/24/2022   Allergen Reaction Noted   • Mercury Swelling 11/01/2010   • Other environmental Anaphylaxis 11/23/2010   • Sulfa drugs Anaphylaxis and Swelling 11/01/2010   • Pravastatin Diarrhea 04/03/2020        ROS: Denies any chest pain, Shortness of breath, Changes bowel or bladder, Lower extremity edema.    Physical Exam:  /74   Pulse 64   Temp 36.4 °C (97.6 °F)   Resp 16   Ht 1.727 m (5' 8\")   Wt (!) 126 kg (277 lb)   SpO2 96%   BMI 42.12 kg/m²   Gen.: Well-developed, well-nourished, no apparent distress,pleasant and cooperative with the examination  Skin:  Warm and dry with good turgor. No rashes or suspicious lesions in visible areas  HEENT:Sinuses nontender with palpation, TMs clear, nares patent with pink mucosa and clear rhinorrhea,no septal deviation ,polyps or lesions. lips without lesions, oropharynx clear.  Neck: Trachea midline,no masses or adenopathy. No JVD.  Cor: Regular rate and rhythm without murmur, gallop or rub.  Lungs: Respirations unlabored.Clear to auscultation with equal breath sounds bilaterally. No wheezes, rhonchi.  Extremities: No cyanosis, clubbing or edema.      Assessment and Plan.   72 y.o. male     1. Peripheral artery disease (HCC)  Status post femoral endarterectomy in 2019, currently stable.  However has been having right leg pain with walking.  No pain at rest.  Most recent ultrasound ordered by vascular surgeon showed right femoral artery occlusion.  Continue on statin.  Currently on Xarelto  Continue follow-up with vascular surgery    - CBC WITH DIFFERENTIAL; Future  - Comp Metabolic Panel; Future  - Lipid Profile; Future    2. History of coronary artery stent placement  Stable.  Asymptomatic  Continue on statin  Continue problems cardiology    - CBC WITH DIFFERENTIAL; Future  - Comp Metabolic Panel; Future  - Lipid Profile; " Future    3. Chronic pulmonary embolism without acute cor pulmonale, unspecified pulmonary embolism type (HCC)  Stable.  Asymptomatic  Continue on Xarelto    4. Dyslipidemia  He has been tolerating the statin. Denies muscle pain LFTs has been normal  Continue on rosuvastatin 20 mg daily    - Lipid Profile; Future  - TSH; Future    5. Abdominal aortic aneurysm without rupture (HCC)  Most recent ultrasound showed that patient's infrarenal aneurysm has increased in size now its almost 5 x 5 cm.   Continue follow-up with vascular surgeon

## 2022-09-04 DIAGNOSIS — I82.419 ACUTE DEEP VEIN THROMBOSIS (DVT) OF FEMORAL VEIN, UNSPECIFIED LATERALITY (HCC): ICD-10-CM

## 2022-09-04 DIAGNOSIS — I27.82 CHRONIC PULMONARY EMBOLISM WITHOUT ACUTE COR PULMONALE, UNSPECIFIED PULMONARY EMBOLISM TYPE (HCC): ICD-10-CM

## 2022-09-04 DIAGNOSIS — I26.99 PE (PULMONARY THROMBOEMBOLISM) (HCC): ICD-10-CM

## 2022-09-06 RX ORDER — RIVAROXABAN 20 MG/1
TABLET, FILM COATED ORAL
Qty: 100 TABLET | Refills: 0 | Status: SHIPPED | OUTPATIENT
Start: 2022-09-06 | End: 2022-12-14

## 2022-09-06 NOTE — TELEPHONE ENCOUNTER
Is the patient due for a refill? Yes  ( Not previously filled by our office)     Was the patient seen the past year? No    Date of last office visit: 8/27/21    Does the patient have an upcoming appointment?  No    Provider to refill: LEVY (ADD)    Does the patients insurance require a 100 day supply? Yes   4

## 2022-09-12 ENCOUNTER — DOCUMENTATION (OUTPATIENT)
Dept: HEALTH INFORMATION MANAGEMENT | Facility: OTHER | Age: 72
End: 2022-09-12
Payer: MEDICARE

## 2022-09-15 ENCOUNTER — TELEPHONE (OUTPATIENT)
Dept: DERMATOLOGY | Facility: IMAGING CENTER | Age: 72
End: 2022-09-15

## 2022-09-15 ENCOUNTER — TELEPHONE (OUTPATIENT)
Dept: CARDIOLOGY | Facility: MEDICAL CENTER | Age: 72
End: 2022-09-15
Payer: MEDICARE

## 2022-09-15 DIAGNOSIS — E78.5 DYSLIPIDEMIA: ICD-10-CM

## 2022-09-15 RX ORDER — ROSUVASTATIN CALCIUM 20 MG/1
20 TABLET, COATED ORAL EVERY EVENING
Qty: 100 TABLET | Refills: 0 | Status: SHIPPED | OUTPATIENT
Start: 2022-09-15 | End: 2022-12-27

## 2022-09-15 RX ORDER — FLUOROURACIL 50 MG/G
CREAM TOPICAL
Qty: 40 G | Refills: 0 | Status: SHIPPED | OUTPATIENT
Start: 2022-09-15 | End: 2023-09-18

## 2022-09-15 NOTE — TELEPHONE ENCOUNTER
D[place]    Caller: Shayne Cuellar    Medication Name and Dosage:    rosuvastatin (CRESTOR) 20 MG Tab [[id number]]    Did patient contact pharmacy (If no, please call pharmacy first): yes    Medication amount left: 5    [facility]     Other questions (Topic): N/A    Callback Number (Will only call for issues): [phone]    Thank you,   María MORA

## 2022-10-01 ENCOUNTER — HOSPITAL ENCOUNTER (OUTPATIENT)
Dept: LAB | Facility: MEDICAL CENTER | Age: 72
End: 2022-10-01
Attending: FAMILY MEDICINE
Payer: MEDICARE

## 2022-10-01 DIAGNOSIS — E78.5 DYSLIPIDEMIA: ICD-10-CM

## 2022-10-01 DIAGNOSIS — D53.9 ANEMIA, DEFICIENCY: ICD-10-CM

## 2022-10-01 DIAGNOSIS — Z95.5 HISTORY OF CORONARY ARTERY STENT PLACEMENT: ICD-10-CM

## 2022-10-01 DIAGNOSIS — I73.9 PERIPHERAL ARTERY DISEASE (HCC): ICD-10-CM

## 2022-10-01 LAB
ALBUMIN SERPL BCP-MCNC: 4 G/DL (ref 3.2–4.9)
ALBUMIN/GLOB SERPL: 1.3 G/DL
ALP SERPL-CCNC: 73 U/L (ref 30–99)
ALT SERPL-CCNC: 15 U/L (ref 2–50)
ANION GAP SERPL CALC-SCNC: 9 MMOL/L (ref 7–16)
AST SERPL-CCNC: 21 U/L (ref 12–45)
BASOPHILS # BLD AUTO: 0.8 % (ref 0–1.8)
BASOPHILS # BLD: 0.05 K/UL (ref 0–0.12)
BILIRUB SERPL-MCNC: 1.3 MG/DL (ref 0.1–1.5)
BUN SERPL-MCNC: 18 MG/DL (ref 8–22)
CALCIUM SERPL-MCNC: 9.7 MG/DL (ref 8.5–10.5)
CHLORIDE SERPL-SCNC: 104 MMOL/L (ref 96–112)
CHOLEST SERPL-MCNC: 134 MG/DL (ref 100–199)
CO2 SERPL-SCNC: 26 MMOL/L (ref 20–33)
CREAT SERPL-MCNC: 1.1 MG/DL (ref 0.5–1.4)
EOSINOPHIL # BLD AUTO: 0.18 K/UL (ref 0–0.51)
EOSINOPHIL NFR BLD: 3 % (ref 0–6.9)
ERYTHROCYTE [DISTWIDTH] IN BLOOD BY AUTOMATED COUNT: 48.5 FL (ref 35.9–50)
FASTING STATUS PATIENT QL REPORTED: NORMAL
GFR SERPLBLD CREATININE-BSD FMLA CKD-EPI: 72 ML/MIN/1.73 M 2
GLOBULIN SER CALC-MCNC: 3 G/DL (ref 1.9–3.5)
GLUCOSE SERPL-MCNC: 99 MG/DL (ref 65–99)
HCT VFR BLD AUTO: 44.1 % (ref 42–52)
HDLC SERPL-MCNC: 47 MG/DL
HGB BLD-MCNC: 14.3 G/DL (ref 14–18)
IMM GRANULOCYTES # BLD AUTO: 0.04 K/UL (ref 0–0.11)
IMM GRANULOCYTES NFR BLD AUTO: 0.7 % (ref 0–0.9)
LDLC SERPL CALC-MCNC: 61 MG/DL
LYMPHOCYTES # BLD AUTO: 1.26 K/UL (ref 1–4.8)
LYMPHOCYTES NFR BLD: 21.1 % (ref 22–41)
MCH RBC QN AUTO: 30.6 PG (ref 27–33)
MCHC RBC AUTO-ENTMCNC: 32.4 G/DL (ref 33.7–35.3)
MCV RBC AUTO: 94.2 FL (ref 81.4–97.8)
MONOCYTES # BLD AUTO: 0.81 K/UL (ref 0–0.85)
MONOCYTES NFR BLD AUTO: 13.5 % (ref 0–13.4)
NEUTROPHILS # BLD AUTO: 3.64 K/UL (ref 1.82–7.42)
NEUTROPHILS NFR BLD: 60.9 % (ref 44–72)
NRBC # BLD AUTO: 0 K/UL
NRBC BLD-RTO: 0 /100 WBC
PLATELET # BLD AUTO: 241 K/UL (ref 164–446)
PMV BLD AUTO: 10.8 FL (ref 9–12.9)
POTASSIUM SERPL-SCNC: 4.9 MMOL/L (ref 3.6–5.5)
PROT SERPL-MCNC: 7 G/DL (ref 6–8.2)
RBC # BLD AUTO: 4.68 M/UL (ref 4.7–6.1)
SODIUM SERPL-SCNC: 139 MMOL/L (ref 135–145)
TRIGL SERPL-MCNC: 130 MG/DL (ref 0–149)
TSH SERPL DL<=0.005 MIU/L-ACNC: 2.95 UIU/ML (ref 0.38–5.33)
WBC # BLD AUTO: 6 K/UL (ref 4.8–10.8)

## 2022-10-01 PROCEDURE — 80061 LIPID PANEL: CPT

## 2022-10-01 PROCEDURE — 85025 COMPLETE CBC W/AUTO DIFF WBC: CPT

## 2022-10-01 PROCEDURE — 80053 COMPREHEN METABOLIC PANEL: CPT

## 2022-10-01 PROCEDURE — 36415 COLL VENOUS BLD VENIPUNCTURE: CPT

## 2022-10-01 PROCEDURE — 84443 ASSAY THYROID STIM HORMONE: CPT

## 2022-10-10 ENCOUNTER — OFFICE VISIT (OUTPATIENT)
Dept: CARDIOLOGY | Facility: MEDICAL CENTER | Age: 72
End: 2022-10-10
Payer: MEDICARE

## 2022-10-10 VITALS
WEIGHT: 278 LBS | SYSTOLIC BLOOD PRESSURE: 148 MMHG | DIASTOLIC BLOOD PRESSURE: 96 MMHG | OXYGEN SATURATION: 96 % | HEART RATE: 68 BPM | RESPIRATION RATE: 16 BRPM | HEIGHT: 68 IN | BODY MASS INDEX: 42.13 KG/M2

## 2022-10-10 DIAGNOSIS — D68.69 SECONDARY HYPERCOAGULABLE STATE (HCC): ICD-10-CM

## 2022-10-10 DIAGNOSIS — E78.5 DYSLIPIDEMIA: ICD-10-CM

## 2022-10-10 DIAGNOSIS — I25.83 CORONARY ARTERY DISEASE DUE TO LIPID RICH PLAQUE: ICD-10-CM

## 2022-10-10 DIAGNOSIS — I10 ESSENTIAL HYPERTENSION: Chronic | ICD-10-CM

## 2022-10-10 DIAGNOSIS — Z95.5 PRESENCE OF STENT IN LEFT CIRCUMFLEX CORONARY ARTERY: Chronic | ICD-10-CM

## 2022-10-10 DIAGNOSIS — I25.10 CORONARY ARTERY DISEASE DUE TO LIPID RICH PLAQUE: ICD-10-CM

## 2022-10-10 PROCEDURE — 99214 OFFICE O/P EST MOD 30 MIN: CPT | Performed by: NURSE PRACTITIONER

## 2022-10-10 RX ORDER — ACETAMINOPHEN 500 MG
500-1000 TABLET ORAL EVERY 6 HOURS PRN
COMMUNITY

## 2022-10-10 ASSESSMENT — FIBROSIS 4 INDEX: FIB4 SCORE: 1.6

## 2022-10-10 NOTE — PATIENT INSTRUCTIONS
Checking Blood Pressure:  -Blood pressure cuff, spend in the $40-65, with good return policy  -It should be automatic, upper arm, measure your arm to get the correct size, probably adult Large but your arm should be under 16.5 cm. If you need an XL cuff, you will have to have it special ordered from a pharmacy or durable medical equipment company.  -Put the cuff in place, rest arm on table near height of your heart, sit quietly for 5 min, legs uncrossed, with back support, then take your blood pressure, write it down, keep a log  -Check no more than 1 time day, maybe 4-5 times per week, try different times of day.  -Can bring your cuff to at least one appointment where it can be calibrated to a manual cuff if you are concerned.  -Goal blood pressure is at least under 130/80, ideally under 120/80.  If you think your BP is overall too high, let us know in the office, we can adjust medications, can use [employer] or call the [employer] office: [phone].    Salt=sodium=sea salt, guidelines say stay under 2,500 mg daily but I ask for under 4,000 mg daily.  Get salt smart, start looking at labels, count it up.  Salt is hidden in everything, salad dressing, sauces, cheese, most canned food, any processed meat.

## 2022-10-10 NOTE — PROGRESS NOTES
"      Cardiology Clinic Follow-up Note    Date of note:    10/10/2022  Primary Care Provider: Lucy Martinez M.D.    Name:             Polo Pyle  YOB: 1950  MRN:               [id number]    CC: yearly F/U for CAD, dyslipidemia    Primary Cardiologist: Dr. Flores    Patient HPI:   Polo Pyle is a 72 y.o. male with current medical problems including dyslipidemia, PAD, CAD with hx of cardiac stents x4 in 2005, and Hx of recurrent DVT and submassive PE.    Interim History:  Mr. Pyle was last seen in this cardiology office by Elidia SIERRA on 8/27/21. At that time he was stable from cardiology standpoint and recommended to see Dr. Flores on a yearly basis.    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    Noticed chest \"heaviness\" with recent smoke, resolves after inside. Not so much chest tightness with activity.      He denies palpitations, shortness of breath, dyspnea on exertion, dizziness or syncopal episodes, orthopnea, PND, and recent weight gain.  Suffers from chronic back and neck pain.    Patient endorses medication compliance.    Has been told he does suffer from whitecoat syndrome, per patient BP's are usually well controlled < 130/80.    Review of systems:  All others systems reviewed and negative except for what is outlined in the above HPI    Past Medical History:   Diagnosis Date    ASTHMA     as a youth    CAD (coronary artery disease) 8/26/2011    CATARACT     bilateral IOL    Colorblind     problems with red/green    Dental disorder     upper and partial lower    Essential hypertension     Heart burn     High cholesterol     Hyperlipidemia 8/26/2011    Indigestion     Infectious disease     Hx of staph infections- last infection 2018-     Myocardial infarct (HCC) 2005    STENTS    Pain 11/14/11    NECK 7.5/10; C5-C6    Personal history of venous thrombosis and " embolism 2006    DVT 01/2019    Pneumonia 2009    Presence of stent in left circumflex coronary artery     Unspecified hemorrhagic conditions     TAKES ASA . PROLONGED BLEEDING, nosebleeds    Venous insufficiency of both lower extremities     White coat syndrome with diagnosis of hypertension     White coat syndrome without hypertension      Past Surgical History:   Procedure Laterality Date    FEMORAL ENDARTERECTOMY Left 9/20/2019    Procedure: ENDARTERECTOMY, FEMORAL;  Surgeon: Candelaria Crawford M.D.;  Location: SURGERY [facility];  Service: General    VENTRAL HERNIA REPAIR  11/30/2011    Performed by TROY GONZALEZ at SURGERY [facility]    INGUINAL HERNIA REPAIR  11/30/2011    Performed by TROY GONZALEZ at SURGERY [facility]S    CATARACT PHACO WITH IOL  11/23/2010    Performed by RM WILLIS at SURGERY SAME DAY [facility] ORS    CATARACT PHACO WITH IOL  11/2/2010    Performed by RM WILLIS at SURGERY SAME DAY [facility] ORS    OTHER CARDIAC SURGERY  2005    STENTS x 4    SINUS TREPHINE FRONTAL  1971    SINUSOTOMIES  08/1963    INGUINAL HERNIA REPAIR  1962    WITH UNDESCENDED TESTICLE    TONSILLECTOMY AND ADENOIDECTOMY  1957     Family History   Problem Relation Age of Onset    Heart Attack Mother     Heart Disease Mother     Cancer Mother         LYKIMA     Dementia Mother      Social History     Socioeconomic History    Marital status:      Spouse name: Not on file    Number of children: Not on file    Years of education: Not on file    Highest education level: Not on file   Occupational History    Not on file   Tobacco Use    Smoking status: Former     Years: 30.00     Types: Cigars, Cigarettes    Smokeless tobacco: Never    Tobacco comments:     quit 2011 cigars, 2001 cigarettes   Vaping Use    Vaping Use: Never used   Substance and Sexual Activity    Alcohol use: No    Drug use: No    Sexual activity: Not Currently     Partners: Female   Other Topics Concern    Not on file   Social  "History Narrative    Not on file     Social Determinants of Health     Financial Resource Strain: Not on file   Food Insecurity: Not on file   Transportation Needs: Not on file   Physical Activity: Not on file   Stress: Not on file   Social Connections: Not on file   Intimate Partner Violence: Not on file   Housing Stability: Not on file     Allergies   Allergen Reactions    Mercury Swelling     And mercury based preservatives-Thimerasol  Swelling, \"strange discharge from eyes\"    Other Environmental Anaphylaxis     Insect toxins/ bees and wasps- Anaphylaxis    Sulfa Drugs Anaphylaxis and Swelling    Pravastatin Diarrhea     Diarrhea      Current Outpatient Medications   Medication Sig Dispense Refill    acetaminophen (TYLENOL) 500 MG Tab Take 500-1,000 mg by mouth every 6 hours as needed.      rosuvastatin (CRESTOR) 20 MG Tab Take 1 Tablet by mouth every evening. 100 Tablet 0    fluorouracil (EFUDEX) 5 % cream AAA face/AKs BID X 2-4 weeks as directed 40 g 0    XARELTO 20 MG Tab tablet TAKE 1 TABLET BY MOUTH EVERY DAY WITH DINNER 100 Tablet 0    Iron-Vitamin C (IRON 100/C PO) Take  by mouth.      Omega-3 Fatty Acids (FISH OIL) 1000 MG Cap capsule Take 1 capsule by mouth 2 times a day.      VITAMIN D PO Take 1 capsule by mouth every day.      Coenzyme Q10 (CO Q-10) 300 MG Cap Take 1 Cap by mouth every day.      Ascorbic Acid (VITAMIN C PO) Take 6,000 mg by mouth every day.      Multiple Vitamin (MULTIVITAMIN PO) Take 1 Tab by mouth every day.       No current facility-administered medications for this visit.       Physical Exam:  Ambulatory Vitals  BP (!) 148/96 (BP Location: Left arm, Patient Position: Sitting)   Pulse 68   Resp 16   Ht 1.727 m (5' 8\")   Wt (!) 126 kg (278 lb)   SpO2 96%    BP Readings from Last 4 Encounters:   10/10/22 (!) 148/96   06/24/22 124/74   04/29/22 138/84   10/11/21 114/66       Weight/BMI: Body mass index is 42.27 kg/m².  Wt Readings from Last 4 Encounters:   10/10/22 (!) 126 kg " (278 lb)   06/24/22 (!) 126 kg (277 lb)   05/02/22 123 kg (272 lb)   04/29/22 124 kg (272 lb 9.6 oz)     General: No apparent distress. Well nourished. BMI 42  Neck: No JVD. No caroid bruits, trachea midline  Lungs: CTAB. Normal effort, without crackles/rhonchi, no wheezing  Heart: RRR. Normal S1/S2, no murmur, no rub. + lower extremity edema, Left more than right 2+ radial pulses, 2+ DT pulses  Ext: No clubbing or cyanosis.  Abdomen: soft, non tender, non distended, no neal hepatomegaly.  Neurological: No focal deficits, no facial asymmetry.  Normal speech.  Psychiatric: Appropriate affect, alert and oriented x 4.   Skin: Warm and dry, no rash.    Lab Data Review:  Lab Results   Component Value Date/Time    CHOLSTRLTOT 134 10/01/2022 10:44 AM    LDL 61 10/01/2022 10:44 AM    HDL 47 10/01/2022 10:44 AM    TRIGLYCERIDE 130 10/01/2022 10:44 AM       Lab Results   Component Value Date/Time    SODIUM 139 10/01/2022 10:44 AM    POTASSIUM 4.9 10/01/2022 10:44 AM    CHLORIDE 104 10/01/2022 10:44 AM    CO2 26 10/01/2022 10:44 AM    GLUCOSE 99 10/01/2022 10:44 AM    BUN 18 10/01/2022 10:44 AM    CREATININE 1.10 10/01/2022 10:44 AM    CREATININE 1.4 11/28/2006 03:42 AM     Lab Results   Component Value Date/Time    ALKPHOSPHAT 73 10/01/2022 10:44 AM    ASTSGOT 21 10/01/2022 10:44 AM    ALTSGPT 15 10/01/2022 10:44 AM    TBILIRUBIN 1.3 10/01/2022 10:44 AM      Lab Results   Component Value Date/Time    WBC 6.0 10/01/2022 10:44 AM     Cardiac Imaging and Procedures Review:      EKG 7/11/21: My Personal interpretation reveals SR 69    Echo 7/10/2021:  CONCLUSIONS  Technically difficult due to body habitus and positioning but adequate   study for interpretation.   Mildly reduced left ventricular systolic function.  Left ventricular ejection fraction is visually estimated to be 50%.  Paradoxical septal motion.  Mildly dilated right ventricle.  Reduced right ventricular systolic function.  Hypokinetic right ventricular free  wall.  Right ventricular systolic pressure is estimated to be 28 mmHg.     Echo 21:      Normal left ventricular size and systolic function. Mild concentric   left ventricular hypertrophy.  Normal right ventricular size and systolic function.  No significant valvular pathology.  Normal pericardium without effusion.  Ascending aorta diameter is dilated to 4.0 cm      Assessment and Clinical Decision Makin. Essential hypertension        2. Presence of stent in left circumflex coronary artery  Lipid Profile    Comp Metabolic Panel      3. Dyslipidemia  Lipid Profile    Comp Metabolic Panel      4. Coronary artery disease due to lipid rich plaque  Lipid Profile    Comp Metabolic Panel      5. BMI 40.0-44.9, adult (HCC)        6. Secondary hypercoagulable state (HCC)          The following treatment plan was discussed    HTN  -Slightly elevated today in office, per patient he suffers from whitecoat syndrome, consider 24-hour BPM  -Per patient BPs usually well controlled near 120/80  -Currently not on any BP medication  -Discussed checking home blood pressures, with a goal of < 130/80    CAD  Presents of cardiac stents  Dyslipidemia  -Patient not on daily aspirin given long-term Xarelto use for decreased risk of bleeding  -Continue high intensity rosuvastatin 20 mg nightly, may discontinue coenzyme Q 10 ,however discussed that if myalgias return he may want to restart this  -Recent LDL at goal of   -Check yearly lipid/CMP panels    BMI 42  -Difficult for patient to exercise given chronic left leg problems  -Discussed importance of heart healthy diet, decrease carbohydrate intake    Secondary hypercoagulable state  -Recent labs in normal ranges  -Continue Xarelto lifelong as tolerated given recurrent DVTs with PEs  -Check CBC and BMP yearly    Plan reviewed in detail with the patient, verbalizes understanding and is in agreement.  Pt is to follow up with myself or Dr. Flores in 1 year  Encouraged Pt to follow up  with us over the phone or electronically using my MyChart as cardiac issues/concerns arise.      PLEASE NOTE: This dictation was created using voice recognition software. I have made every reasonable attempt to correct obvious errors, but I expect that there are errors of grammar and possibly content that I did not discover before finalizing the note.       AMAYA Church   [facility]  [phone]    Collaborating Physician: Dr. Quezada

## 2022-10-24 ENCOUNTER — OFFICE VISIT (OUTPATIENT)
Dept: MEDICAL GROUP | Facility: MEDICAL CENTER | Age: 72
End: 2022-10-24
Payer: MEDICARE

## 2022-10-24 VITALS
WEIGHT: 273 LBS | TEMPERATURE: 98.3 F | HEART RATE: 103 BPM | HEIGHT: 68 IN | OXYGEN SATURATION: 94 % | DIASTOLIC BLOOD PRESSURE: 60 MMHG | RESPIRATION RATE: 16 BRPM | BODY MASS INDEX: 41.37 KG/M2 | SYSTOLIC BLOOD PRESSURE: 122 MMHG

## 2022-10-24 DIAGNOSIS — E78.5 DYSLIPIDEMIA: ICD-10-CM

## 2022-10-24 DIAGNOSIS — I10 ESSENTIAL HYPERTENSION: Chronic | ICD-10-CM

## 2022-10-24 DIAGNOSIS — E66.01 MORBID OBESITY (HCC): ICD-10-CM

## 2022-10-24 DIAGNOSIS — Z95.5 PRESENCE OF STENT IN LAD CORONARY ARTERY: ICD-10-CM

## 2022-10-24 PROCEDURE — 99214 OFFICE O/P EST MOD 30 MIN: CPT | Performed by: FAMILY MEDICINE

## 2022-10-24 ASSESSMENT — FIBROSIS 4 INDEX: FIB4 SCORE: 1.6

## 2022-10-24 NOTE — PROGRESS NOTES
CC: Hypertension, hyperlipidemia, CAD/stent placement, history of pulmonary embolism, morbid obesity    HPI:   Polo presents today to discuss the following:    Essential hypertension  Blood pressure has been adequately controlled on low-salt diet    Dyslipidemia  He has been tolerating the statin. Denies muscle pain LFTs has been normal, currently on rosuvastatin 20 mg daily.  No side effects    Presence of stent in LAD coronary artery/Chronic pulmonary embolism without acute cor pulmonale(HCC)  Patient denies any chest pain, shortness of breath, or any swelling.  Has been on statin, no side effects.  He also has been on Xarelto due to history of pulmonary embolism.  No side effects.    Morbid obesity ([facility])  BMI is 41.The medical rationale for weight loss in obese individuals is that obesity is associated with a significant increase in mortality, and many health risks including type 2 diabetes mellitus, hypertension, dyslipidemia, and coronary heart disease. The benefits of weight loss include a reduction in the rate of progression from impaired glucose tolerance to diabetes, blood pressure in hypertensive patients, and lipid levels in higher risk patients. Other noncardiac benefits of weight loss include reductions in urinary incontinence, sleep apnea, and depression, and improvements in quality of life, physical functioning, and mobility.Recommend lifestyle modification: exercise 30 minutes per day 5 days per week. Recommend also portion control.    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    Aortic atherosclerosis ([facility]) 04/29/2022    Thrombocytopenia  ([facility]) 04/29/2022    Other chest pain 07/10/2021    MIKAYLA (acute kidney injury) ([facility]) 07/10/2021    Acute DVT (deep venous thrombosis) ([facility]) 07/10/2021    PE (pulmonary thromboembolism) ([facility]) 07/10/2021    White coat syndrome without hypertension     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 intermit claudication ([facility]) 03/22/2021    Abdominal aortic aneurysm without rupture 03/22/2021    Thrombus 02/12/2021    Acute respiratory failure ([facility]) 02/11/2021    Benign prostatic hyperplasia with urinary hesitancy 02/11/2021    Advanced care planning/counseling discussion 02/11/2021    Elevated d-dimer 02/11/2021    History of COVID-19 02/10/2021    Acute cystitis 02/10/2021    Iron deficiency anemia due to chronic blood loss 11/25/2019    Obesity (BMI 35.0-39.9 without comorbidity) ([facility]) 08/13/2019    Peripheral artery disease ([facility]) 08/01/2019    Essential hypertension     Presence of stent in LAD coronary artery     Presence of stent in left circumflex coronary artery     Dyslipidemia 08/26/2011    Coronary artery disease due to lipid rich plaque 08/26/2011       Current Outpatient Medications   Medication Sig Dispense Refill    acetaminophen (TYLENOL) 500 MG Tab Take 500-1,000 mg by mouth every 6 hours as needed.      rosuvastatin (CRESTOR) 20 MG Tab Take 1 Tablet by mouth every evening. 100 Tablet 0    fluorouracil (EFUDEX) 5 % cream AAA face/AKs BID X 2-4 weeks as directed 40 g 0    XARELTO 20 MG Tab tablet TAKE 1 TABLET BY MOUTH EVERY DAY WITH DINNER 100 Tablet 0    Iron-Vitamin C (IRON 100/C PO) Take  by mouth.      Omega-3 Fatty Acids (FISH OIL) 1000 MG Cap capsule Take 1 capsule by mouth 2 times a day.      VITAMIN D PO Take 1 capsule by mouth every day.      Coenzyme Q10 (CO Q-10) 300 MG Cap Take 1 Cap by mouth every day.      Ascorbic Acid (VITAMIN C PO) Take 6,000 mg by mouth every day.      Multiple Vitamin (MULTIVITAMIN PO) Take 1 Tab by mouth every day.       No current facility-administered medications for  "this visit.         Allergies as of 10/24/2022 - Reviewed 10/24/2022   Allergen Reaction Noted    Mercury Swelling 11/01/2010    Other environmental Anaphylaxis 11/23/2010    Sulfa drugs Anaphylaxis and Swelling 11/01/2010    Bee Anaphylaxis, Hives, Itching, Palpitations, Rash, Runny Nose, Shortness of Breath, and Swelling 10/10/2022    Pravastatin Diarrhea 04/03/2020        ROS: Denies any chest pain, Shortness of breath, Changes bowel or bladder, Lower extremity edema.    Physical Exam:  /60 (BP Location: Right arm, Patient Position: Sitting, BP Cuff Size: Adult)   Pulse (!) 103   Temp 36.8 °C (98.3 °F) (Temporal)   Resp 16   Ht 1.727 m (5' 8\")   Wt 124 kg (273 lb)   SpO2 94%   BMI 41.51 kg/m²   Gen.: Morbidly obese, no apparent distress,pleasant and cooperative with the examination  Skin:  Warm and dry with good turgor. No rashes or suspicious lesions in visible areas  HEENT:Sinuses nontender with palpation, TMs clear, nares patent with pink mucosa and clear rhinorrhea,no septal deviation ,polyps or lesions. lips without lesions, oropharynx clear.  Neck: Trachea midline,no masses or adenopathy. No JVD.  Cor: Regular rate and rhythm without murmur, gallop or rub.  Lungs: Respirations unlabored.Clear to auscultation with equal breath sounds bilaterally. No wheezes, rhonchi.  Extremities: No cyanosis, clubbing or edema.          Assessment and Plan.   72 y.o. male     1. Essential hypertension  Controlled on low-salt diet    2. Dyslipidemia  He has been tolerating the statin. Denies muscle pain LFTs has been normal  Continue on rosuvastatin 20 mg daily    3. Presence of stent in LAD coronary artery/  4.Chronic pulmonary embolism without acute cor pulmonale(HCC)  Stable.  Asymptomatic  Continue on statin, has been on Xarelto due to history of pulmonary embolism.    5. Morbid obesity (HCC)  BMI is 41.  Patient is counseled on lifestyle modification  - Patient identified as having weight management issue.  " Appropriate orders and counseling given.

## 2022-12-02 ENCOUNTER — DOCUMENTATION (OUTPATIENT)
Dept: HEALTH INFORMATION MANAGEMENT | Facility: OTHER | Age: 72
End: 2022-12-02
Payer: MEDICARE

## 2022-12-08 DIAGNOSIS — I26.99 PE (PULMONARY THROMBOEMBOLISM) (HCC): ICD-10-CM

## 2022-12-08 DIAGNOSIS — I82.419 ACUTE DEEP VEIN THROMBOSIS (DVT) OF FEMORAL VEIN, UNSPECIFIED LATERALITY (HCC): ICD-10-CM

## 2022-12-08 DIAGNOSIS — I27.82 CHRONIC PULMONARY EMBOLISM WITHOUT ACUTE COR PULMONALE, UNSPECIFIED PULMONARY EMBOLISM TYPE (HCC): ICD-10-CM

## 2022-12-12 DIAGNOSIS — I26.99 PE (PULMONARY THROMBOEMBOLISM) (HCC): ICD-10-CM

## 2022-12-12 DIAGNOSIS — I27.82 CHRONIC PULMONARY EMBOLISM WITHOUT ACUTE COR PULMONALE, UNSPECIFIED PULMONARY EMBOLISM TYPE (HCC): ICD-10-CM

## 2022-12-12 DIAGNOSIS — I82.419 ACUTE DEEP VEIN THROMBOSIS (DVT) OF FEMORAL VEIN, UNSPECIFIED LATERALITY (HCC): ICD-10-CM

## 2022-12-12 NOTE — TELEPHONE ENCOUNTER
Is the patient due for a refill? Yes    Was the patient seen the past year? Yes    Date of last office visit: 10/10/2022    Does the patient have an upcoming appointment?  No    Provider to refill:MR    Does the patients insurance require a 100 day supply?  Yes

## 2022-12-14 NOTE — TELEPHONE ENCOUNTER
Is the patient due for a refill? Yes    Was the patient seen the past year? Yes    Date of last office visit: 10/10/22    Does the patient have an upcoming appointment?  No   If yes, When?     Provider to refill:MR    Does the patients insurance require a 100 day supply?  Yes

## 2022-12-19 ENCOUNTER — HOSPITAL ENCOUNTER (OUTPATIENT)
Dept: LAB | Facility: MEDICAL CENTER | Age: 72
End: 2022-12-19
Attending: SURGERY
Payer: MEDICARE

## 2022-12-19 LAB
BUN SERPL-MCNC: 22 MG/DL (ref 8–22)
CREAT SERPL-MCNC: 1.11 MG/DL (ref 0.5–1.4)
GFR SERPLBLD CREATININE-BSD FMLA CKD-EPI: 71 ML/MIN/1.73 M 2

## 2022-12-19 PROCEDURE — 84520 ASSAY OF UREA NITROGEN: CPT

## 2022-12-19 PROCEDURE — 82565 ASSAY OF CREATININE: CPT

## 2022-12-19 PROCEDURE — 36415 COLL VENOUS BLD VENIPUNCTURE: CPT

## 2022-12-23 ENCOUNTER — HOSPITAL ENCOUNTER (OUTPATIENT)
Dept: RADIOLOGY | Facility: MEDICAL CENTER | Age: 72
End: 2022-12-23
Attending: PHYSICIAN ASSISTANT
Payer: MEDICARE

## 2022-12-23 DIAGNOSIS — I70.213 ATHEROSCLEROSIS OF NATIVE ARTERY OF BOTH LOWER EXTREMITIES WITH INTERMITTENT CLAUDICATION (HCC): ICD-10-CM

## 2022-12-23 DIAGNOSIS — I71.40 ABDOMINAL AORTIC ANEURYSM (AAA) WITHOUT RUPTURE, UNSPECIFIED PART (HCC): ICD-10-CM

## 2022-12-23 PROCEDURE — 700117 HCHG RX CONTRAST REV CODE 255: Performed by: PHYSICIAN ASSISTANT

## 2022-12-23 PROCEDURE — 74174 CTA ABD&PLVS W/CONTRAST: CPT

## 2022-12-23 RX ADMIN — IOHEXOL 100 ML: 350 INJECTION, SOLUTION INTRAVENOUS at 11:30

## 2022-12-24 DIAGNOSIS — E78.5 DYSLIPIDEMIA: ICD-10-CM

## 2022-12-27 RX ORDER — ROSUVASTATIN CALCIUM 20 MG/1
20 TABLET, COATED ORAL EVERY EVENING
Qty: 100 TABLET | Refills: 3 | Status: SHIPPED | OUTPATIENT
Start: 2022-12-27 | End: 2023-09-18 | Stop reason: SDUPTHER

## 2022-12-27 NOTE — TELEPHONE ENCOUNTER
Is the patient due for a refill? Yes    Was the patient seen the past year? Yes    Date of last office visit: 10/10/2022    Does the patient have an upcoming appointment?  No   If yes, When?     Provider to refill:MR    Does the patients insurance require a 100 day supply?  Yes

## 2023-02-13 ENCOUNTER — HOSPITAL ENCOUNTER (OUTPATIENT)
Dept: RADIOLOGY | Facility: MEDICAL CENTER | Age: 73
End: 2023-02-13
Attending: PHYSICIAN ASSISTANT
Payer: MEDICARE

## 2023-02-13 DIAGNOSIS — I70.213 ATHEROSCLEROSIS OF NATIVE ARTERY OF BOTH LOWER EXTREMITIES WITH INTERMITTENT CLAUDICATION (HCC): ICD-10-CM

## 2023-02-13 DIAGNOSIS — I71.40 ABDOMINAL AORTIC ANEURYSM (AAA) WITHOUT RUPTURE, UNSPECIFIED PART (HCC): ICD-10-CM

## 2023-02-13 PROCEDURE — 93925 LOWER EXTREMITY STUDY: CPT

## 2023-02-13 PROCEDURE — 93922 UPR/L XTREMITY ART 2 LEVELS: CPT

## 2023-02-14 PROCEDURE — 93922 UPR/L XTREMITY ART 2 LEVELS: CPT | Mod: 26 | Performed by: INTERNAL MEDICINE

## 2023-02-14 PROCEDURE — 93925 LOWER EXTREMITY STUDY: CPT | Mod: 26 | Performed by: INTERNAL MEDICINE

## 2023-03-11 ENCOUNTER — APPOINTMENT (OUTPATIENT)
Dept: URGENT CARE | Facility: PHYSICIAN GROUP | Age: 73
End: 2023-03-11
Payer: MEDICARE

## 2023-04-28 ENCOUNTER — OFFICE VISIT (OUTPATIENT)
Dept: MEDICAL GROUP | Facility: MEDICAL CENTER | Age: 73
End: 2023-04-28
Payer: MEDICARE

## 2023-04-28 VITALS
BODY MASS INDEX: 41.03 KG/M2 | HEIGHT: 69 IN | TEMPERATURE: 97.9 F | HEART RATE: 66 BPM | RESPIRATION RATE: 16 BRPM | WEIGHT: 277 LBS | OXYGEN SATURATION: 98 % | DIASTOLIC BLOOD PRESSURE: 90 MMHG | SYSTOLIC BLOOD PRESSURE: 140 MMHG

## 2023-04-28 DIAGNOSIS — I27.82 CHRONIC PULMONARY EMBOLISM WITHOUT ACUTE COR PULMONALE, UNSPECIFIED PULMONARY EMBOLISM TYPE (HCC): ICD-10-CM

## 2023-04-28 DIAGNOSIS — I70.0 ATHEROSCLEROSIS OF AORTA (HCC): ICD-10-CM

## 2023-04-28 DIAGNOSIS — E66.01 MORBID OBESITY (HCC): ICD-10-CM

## 2023-04-28 DIAGNOSIS — I72.4 FEMORAL ARTERY ANEURYSM, LEFT (HCC): ICD-10-CM

## 2023-04-28 DIAGNOSIS — Z95.5 PRESENCE OF STENT IN LEFT CIRCUMFLEX CORONARY ARTERY: Chronic | ICD-10-CM

## 2023-04-28 DIAGNOSIS — I73.9 PERIPHERAL ARTERY DISEASE (HCC): ICD-10-CM

## 2023-04-28 DIAGNOSIS — R04.0 BLEEDING NOSE: ICD-10-CM

## 2023-04-28 DIAGNOSIS — I71.43 INFRARENAL ABDOMINAL AORTIC ANEURYSM (AAA) WITHOUT RUPTURE (HCC): ICD-10-CM

## 2023-04-28 DIAGNOSIS — D68.69 SECONDARY HYPERCOAGULABLE STATE (HCC): ICD-10-CM

## 2023-04-28 DIAGNOSIS — I10 ESSENTIAL HYPERTENSION: Chronic | ICD-10-CM

## 2023-04-28 PROCEDURE — 99214 OFFICE O/P EST MOD 30 MIN: CPT | Performed by: FAMILY MEDICINE

## 2023-04-28 ASSESSMENT — PATIENT HEALTH QUESTIONNAIRE - PHQ9: CLINICAL INTERPRETATION OF PHQ2 SCORE: 0

## 2023-04-28 ASSESSMENT — FIBROSIS 4 INDEX: FIB4 SCORE: 1.62

## 2023-04-28 NOTE — PROGRESS NOTES
CC: Multiple medical problems    HPI:   Polo presents today to discuss the following medical issues:\\    Bleeding nose  Patient stated that he had it 3-4 times in the past 6 months last time was 2 days ago.  Usually takes few minutes to stop, however the last one took longer time to stop.  However currently he is asymptomatic.  Denies any tiredness, shortness of breath, or dizziness.    Essential hypertension  Blood pressure has been fluctuating but mostly controlled.  Asymptomatic, denies any chest pain, or shortness of breath.  Patient has been on low-salt diet    Presence of stent in left circumflex coronary artery  He is asymptomatic currently.  Denies any chest pain or shortness of breath.  Patient has history of placement of 5 stents in 2005.     Normal left ventricular size and systolic function. Normal regional   wall motion. Mild concentric left ventricular hypertrophy. Normal   diastolic function. Left ventricular ejection fraction is visually   estimated to be 55%.      Patient has been on rosuvastatin 20 mg daily, he has been following with cardiology regular basis.    Chronic pulmonary embolism without acute cor pulmonale, unspecified pulmonary embolism type ([facility])/Secondary hypercoagulable state ([facility])  Was diagnosed with pulmonary embolism/unprovoked.  Currently denies any chest pain or shortness of breath.  Patient has been on Xarelto 20 mg daily    Peripheral artery disease ([facility])  Patient with history of femoral endarterectomy in 2019.    Patient currently denies any leg pain  at rest.    Patient has been following up with vascular surgeon,      Morbid obesity ([facility])  BMI is 40.The medical rationale for weight loss in obese individuals is that obesity is associated with a significant increase in mortality, and many health risks including type 2 diabetes mellitus, hypertension, dyslipidemia, and coronary heart disease. The benefits of weight loss include a reduction in the rate of progression from  impaired glucose tolerance to diabetes, blood pressure in hypertensive patients, and lipid levels in higher risk patients. Other noncardiac benefits of weight loss include reductions in urinary incontinence, sleep apnea, and depression, and improvements in quality of life, physical functioning, and mobility.Recommend lifestyle modification: exercise 30 minutes per day 5 days per week. Recommend also portion control.    Infrarenal abdominal aortic aneurysm (AAA) without rupture ([facility])/femoral artery aneurysm, left([facility])  Patient has been asymptomatic.  Most recent abdominal CT CT showed:  1.  Slight interval increase in size in infrarenal abdominal aortic aneurysm currently measuring 4.6 x 4.7 cm.  2.  Apparent postsurgical change involving the left common femoral artery with revascularization with aneurysmal dilatation.     Atherosclerosis of aorta ([facility])  Patient has been asymptomatic.  Currently on rosuvastatin 20 mg, has history of CAD.         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    Aortic atherosclerosis ([facility]) 04/29/2022    Thrombocytopenia ([facility]) 04/29/2022    Other chest pain 07/10/2021    MIKAYLA (acute kidney injury) ([facility]) 07/10/2021    Acute DVT (deep venous thrombosis) ([facility]) 07/10/2021    PE (pulmonary thromboembolism) ([facility]) 07/10/2021    White coat syndrome without hypertension     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 intermit claudication ([facility]) 03/22/2021    Abdominal aortic aneurysm without rupture ([facility]) 03/22/2021    Thrombus 02/12/2021    Acute respiratory failure ([facility]) 02/11/2021    Benign prostatic hyperplasia with urinary hesitancy  02/11/2021    Advanced care planning/counseling discussion 02/11/2021    Elevated d-dimer 02/11/2021    History of COVID-19 02/10/2021    Acute cystitis 02/10/2021    Iron deficiency anemia due to chronic blood loss 11/25/2019    Obesity (BMI 35.0-39.9 without comorbidity) ([facility]) 08/13/2019    Peripheral artery disease ([facility]) 08/01/2019    Essential hypertension     Presence of stent in LAD coronary artery     Presence of stent in left circumflex coronary artery     Dyslipidemia 08/26/2011    Coronary artery disease due to lipid rich plaque 08/26/2011       Current Outpatient Medications   Medication Sig Dispense Refill    rosuvastatin (CRESTOR) 20 MG Tab TAKE 1 TABLET BY MOUTH EVERY EVENING 100 Tablet 3    rivaroxaban (XARELTO) 20 MG Tab tablet Take 1 Tablet by mouth with dinner. 90 Tablet 2    acetaminophen (TYLENOL) 500 MG Tab Take 500-1,000 mg by mouth every 6 hours as needed.      fluorouracil (EFUDEX) 5 % cream AAA face/AKs BID X 2-4 weeks as directed 40 g 0    Iron-Vitamin C (IRON 100/C PO) Take  by mouth.      Omega-3 Fatty Acids (FISH OIL) 1000 MG Cap capsule Take 1 capsule by mouth 2 times a day.      VITAMIN D PO Take 1 capsule by mouth every day.      Coenzyme Q10 (CO Q-10) 300 MG Cap Take 1 Cap by mouth every day.      Ascorbic Acid (VITAMIN C PO) Take 6,000 mg by mouth every day.      Multiple Vitamin (MULTIVITAMIN PO) Take 1 Tab by mouth every day.       No current facility-administered medications for this visit.         Allergies as of 04/28/2023 - Reviewed 04/28/2023   Allergen Reaction Noted    Mercury Swelling 11/01/2010    Other environmental Anaphylaxis 11/23/2010    Sulfa drugs Anaphylaxis and Swelling 11/01/2010    Bee Anaphylaxis, Hives, Itching, Palpitations, Rash, Runny Nose, Shortness of Breath, and Swelling 10/10/2022    Pravastatin Diarrhea 04/03/2020        ROS: Denies any chest pain, Shortness of breath, Changes bowel or bladder, Lower extremity edema.    Physical Exam:  BP (!) 140/90  "(BP Location: Right arm, Patient Position: Sitting, BP Cuff Size: Adult)   Pulse 66   Temp 36.6 °C (97.9 °F) (Temporal)   Resp 16   Ht 1.753 m (5' 9\")   Wt (!) 126 kg (277 lb)   SpO2 98%   BMI 40.91 kg/m²   Gen.: Well-developed, well-nourished, no apparent distress,pleasant and cooperative with the examination  Skin:  Warm and dry with good turgor. No rashes or suspicious lesions in visible areas  HEENT:Sinuses nontender with palpation, TMs clear, nares patent with pink mucosa and clear rhinorrhea,no septal deviation ,polyps or lesions. lips without lesions, oropharynx clear.  Neck: Trachea midline,no masses or adenopathy. No JVD.  Cor: Regular rate and rhythm without murmur, gallop or rub.  Lungs: Respirations unlabored.Clear to auscultation with equal breath sounds bilaterally. No wheezes, rhonchi.  Extremities: No cyanosis, clubbing or edema.      Assessment and Plan.   73 y.o. male     1. Essential hypertension  Blood pressure has been fluctuating but mostly controlled.  Asymptomatic  For now continue on low-salt diet    2. Presence of stent in left circumflex coronary artery  Stable.  Asymptomatic  Continue on statin  Continue follow-up with cardiology    3. Chronic pulmonary embolism without acute cor pulmonale, unspecified pulmonary embolism type (HCC)  4. Secondary hypercoagulable state (HCC)  Stable.  Asymptomatic.  Continue on Xarelto 20 mg daily    5. Bleeding nose  Probably dry nose and the Xarelto makes it worse.  However currently asymptomatic.  Patient stated that he had it 3-4 times in the past 6 months last time was 2 days ago.  Will check CBC  Patient advised to go to ER if he develops severe refractory bleeding.    - CBC WITH DIFFERENTIAL; Future    6. Peripheral artery disease (HCC)  Currently asymptomatic.  Continue rosuvastatin 20 mg, and Xarelto 20 mg daily  Continue follow-up with vascular surgeon    7. Morbid obesity (HCC)  BMI is 40.  Patient is counseled on lifestyle " modification    8. Infrarenal abdominal aortic aneurysm (AAA) without rupture (HCC)  Asymptomatic.  Most recent abdominal CT CT showed:  1.  Slight interval increase in size in infrarenal abdominal aortic aneurysm currently measuring 4.6 x 4.7 cm.  2.  Apparent postsurgical change involving the left common femoral artery with revascularization with aneurysmal dilatation.     We will continue monitor.    9. Atherosclerosis of aorta (HCC)  Recommend risk reduction.  Continue on statin

## 2023-05-01 ENCOUNTER — HOSPITAL ENCOUNTER (OUTPATIENT)
Dept: LAB | Facility: MEDICAL CENTER | Age: 73
End: 2023-05-01
Attending: FAMILY MEDICINE
Payer: MEDICARE

## 2023-05-01 DIAGNOSIS — R04.0 BLEEDING NOSE: ICD-10-CM

## 2023-05-01 LAB
BASOPHILS # BLD AUTO: 0.8 % (ref 0–1.8)
BASOPHILS # BLD: 0.07 K/UL (ref 0–0.12)
EOSINOPHIL # BLD AUTO: 0.21 K/UL (ref 0–0.51)
EOSINOPHIL NFR BLD: 2.5 % (ref 0–6.9)
ERYTHROCYTE [DISTWIDTH] IN BLOOD BY AUTOMATED COUNT: 49.1 FL (ref 35.9–50)
HCT VFR BLD AUTO: 46.3 % (ref 42–52)
HGB BLD-MCNC: 14.7 G/DL (ref 14–18)
IMM GRANULOCYTES # BLD AUTO: 0.04 K/UL (ref 0–0.11)
IMM GRANULOCYTES NFR BLD AUTO: 0.5 % (ref 0–0.9)
LYMPHOCYTES # BLD AUTO: 1.79 K/UL (ref 1–4.8)
LYMPHOCYTES NFR BLD: 21.3 % (ref 22–41)
MCH RBC QN AUTO: 30.5 PG (ref 27–33)
MCHC RBC AUTO-ENTMCNC: 31.7 G/DL (ref 33.7–35.3)
MCV RBC AUTO: 96.1 FL (ref 81.4–97.8)
MONOCYTES # BLD AUTO: 1.04 K/UL (ref 0–0.85)
MONOCYTES NFR BLD AUTO: 12.4 % (ref 0–13.4)
NEUTROPHILS # BLD AUTO: 5.26 K/UL (ref 1.82–7.42)
NEUTROPHILS NFR BLD: 62.5 % (ref 44–72)
NRBC # BLD AUTO: 0 K/UL
NRBC BLD-RTO: 0 /100 WBC
PLATELET # BLD AUTO: 206 K/UL (ref 164–446)
PMV BLD AUTO: 10.9 FL (ref 9–12.9)
RBC # BLD AUTO: 4.82 M/UL (ref 4.7–6.1)
WBC # BLD AUTO: 8.4 K/UL (ref 4.8–10.8)

## 2023-05-01 PROCEDURE — 85025 COMPLETE CBC W/AUTO DIFF WBC: CPT

## 2023-05-01 PROCEDURE — 36415 COLL VENOUS BLD VENIPUNCTURE: CPT

## 2023-06-05 ENCOUNTER — DOCUMENTATION (OUTPATIENT)
Dept: HEALTH INFORMATION MANAGEMENT | Facility: OTHER | Age: 73
End: 2023-06-05
Payer: MEDICARE

## 2023-06-05 NOTE — PROGRESS NOTES
Pt was outreached 2 times for ELKE this yr, by [facility], the [facility] on 2/6/2023 says that pt will call back.     Colorectal Cancer Screening: Next due 2030

## 2023-07-19 ENCOUNTER — TELEPHONE (OUTPATIENT)
Dept: HEALTH INFORMATION MANAGEMENT | Facility: OTHER | Age: 73
End: 2023-07-19
Payer: MEDICARE

## 2023-09-08 DIAGNOSIS — I82.419 ACUTE DEEP VEIN THROMBOSIS (DVT) OF FEMORAL VEIN, UNSPECIFIED LATERALITY (HCC): ICD-10-CM

## 2023-09-08 DIAGNOSIS — I26.99 PE (PULMONARY THROMBOEMBOLISM) (HCC): ICD-10-CM

## 2023-09-08 DIAGNOSIS — I27.82 CHRONIC PULMONARY EMBOLISM WITHOUT ACUTE COR PULMONALE, UNSPECIFIED PULMONARY EMBOLISM TYPE (HCC): ICD-10-CM

## 2023-09-08 NOTE — LETTER
2023        Polo Pyle  [facility] [place] [id number]        Dear Polo:    We received a request for a refill and our records show you are due for a follow up visit and lab work with our office. Raquel Torres is no longer with our office, but advised you to see  for a follow up visit this year. Please call [phone] to schedule your follow up visit to ensure further refills.  In the meantime, you have been given a courtesy refill.  Please complete the required lab work prior to your follow up visit with our office.      Your Medication: Xarelto    Required lab work : Lipid Profile, CMP    Follow up needing to be scheduled: Yes, with       Please let us know if you have any further questions or concerns.    Thank you!    JOSE LUIS Cunningham  [facility]  Phone: [phone]  [phone]

## 2023-09-11 NOTE — TELEPHONE ENCOUNTER
Last OV w/ MR 10/2022  CBC 05/2023, Creatinine 12/2022    Pending labs from MR to be completed, will fill under [facility]. Pt advised by MR to schedule with [facility] for [facility]     Pt notified via [employer] Letter by Mail

## 2023-09-16 ENCOUNTER — HOSPITAL ENCOUNTER (OUTPATIENT)
Dept: LAB | Facility: MEDICAL CENTER | Age: 73
End: 2023-09-16
Attending: NURSE PRACTITIONER
Payer: MEDICARE

## 2023-09-16 DIAGNOSIS — Z95.5 PRESENCE OF STENT IN LEFT CIRCUMFLEX CORONARY ARTERY: Chronic | ICD-10-CM

## 2023-09-16 DIAGNOSIS — E78.5 DYSLIPIDEMIA: ICD-10-CM

## 2023-09-16 DIAGNOSIS — I25.83 CORONARY ARTERY DISEASE DUE TO LIPID RICH PLAQUE: ICD-10-CM

## 2023-09-16 DIAGNOSIS — I25.10 CORONARY ARTERY DISEASE DUE TO LIPID RICH PLAQUE: ICD-10-CM

## 2023-09-16 LAB
ALBUMIN SERPL BCP-MCNC: 4.2 G/DL (ref 3.2–4.9)
ALBUMIN/GLOB SERPL: 1.4 G/DL
ALP SERPL-CCNC: 82 U/L (ref 30–99)
ALT SERPL-CCNC: 13 U/L (ref 2–50)
ANION GAP SERPL CALC-SCNC: 12 MMOL/L (ref 7–16)
AST SERPL-CCNC: 17 U/L (ref 12–45)
BILIRUB SERPL-MCNC: 1.9 MG/DL (ref 0.1–1.5)
BUN SERPL-MCNC: 19 MG/DL (ref 8–22)
CALCIUM ALBUM COR SERPL-MCNC: 9.8 MG/DL (ref 8.5–10.5)
CALCIUM SERPL-MCNC: 10 MG/DL (ref 8.5–10.5)
CHLORIDE SERPL-SCNC: 105 MMOL/L (ref 96–112)
CHOLEST SERPL-MCNC: 132 MG/DL (ref 100–199)
CO2 SERPL-SCNC: 26 MMOL/L (ref 20–33)
CREAT SERPL-MCNC: 1.14 MG/DL (ref 0.5–1.4)
FASTING STATUS PATIENT QL REPORTED: NORMAL
GFR SERPLBLD CREATININE-BSD FMLA CKD-EPI: 68 ML/MIN/1.73 M 2
GLOBULIN SER CALC-MCNC: 3.1 G/DL (ref 1.9–3.5)
GLUCOSE SERPL-MCNC: 99 MG/DL (ref 65–99)
HDLC SERPL-MCNC: 49 MG/DL
LDLC SERPL CALC-MCNC: 56 MG/DL
POTASSIUM SERPL-SCNC: 5 MMOL/L (ref 3.6–5.5)
PROT SERPL-MCNC: 7.3 G/DL (ref 6–8.2)
SODIUM SERPL-SCNC: 143 MMOL/L (ref 135–145)
TRIGL SERPL-MCNC: 135 MG/DL (ref 0–149)

## 2023-09-16 PROCEDURE — 80053 COMPREHEN METABOLIC PANEL: CPT

## 2023-09-16 PROCEDURE — 36415 COLL VENOUS BLD VENIPUNCTURE: CPT

## 2023-09-16 PROCEDURE — 80061 LIPID PANEL: CPT

## 2023-09-18 ENCOUNTER — OFFICE VISIT (OUTPATIENT)
Dept: CARDIOLOGY | Facility: MEDICAL CENTER | Age: 73
End: 2023-09-18
Payer: MEDICARE

## 2023-09-18 VITALS
BODY MASS INDEX: 39.99 KG/M2 | HEIGHT: 69 IN | OXYGEN SATURATION: 96 % | HEART RATE: 75 BPM | WEIGHT: 270 LBS | DIASTOLIC BLOOD PRESSURE: 70 MMHG | SYSTOLIC BLOOD PRESSURE: 118 MMHG | RESPIRATION RATE: 16 BRPM

## 2023-09-18 DIAGNOSIS — I26.99 PE (PULMONARY THROMBOEMBOLISM) (HCC): ICD-10-CM

## 2023-09-18 DIAGNOSIS — I70.0 AORTIC ATHEROSCLEROSIS (HCC): ICD-10-CM

## 2023-09-18 DIAGNOSIS — D68.69 SECONDARY HYPERCOAGULABLE STATE (HCC): ICD-10-CM

## 2023-09-18 DIAGNOSIS — E78.5 DYSLIPIDEMIA: ICD-10-CM

## 2023-09-18 DIAGNOSIS — R03.0 WHITE COAT SYNDROME WITHOUT HYPERTENSION: Chronic | ICD-10-CM

## 2023-09-18 DIAGNOSIS — I10 ESSENTIAL HYPERTENSION: Chronic | ICD-10-CM

## 2023-09-18 DIAGNOSIS — Z95.5 PRESENCE OF STENT IN LEFT CIRCUMFLEX CORONARY ARTERY: Chronic | ICD-10-CM

## 2023-09-18 DIAGNOSIS — Z79.01 LONG TERM (CURRENT) USE OF ANTICOAGULANTS: ICD-10-CM

## 2023-09-18 DIAGNOSIS — I25.83 CORONARY ARTERY DISEASE DUE TO LIPID RICH PLAQUE: ICD-10-CM

## 2023-09-18 DIAGNOSIS — I25.10 CORONARY ARTERY DISEASE DUE TO LIPID RICH PLAQUE: ICD-10-CM

## 2023-09-18 DIAGNOSIS — I82.419 ACUTE DEEP VEIN THROMBOSIS (DVT) OF FEMORAL VEIN, UNSPECIFIED LATERALITY (HCC): ICD-10-CM

## 2023-09-18 DIAGNOSIS — Z95.5 PRESENCE OF STENT IN LAD CORONARY ARTERY: ICD-10-CM

## 2023-09-18 DIAGNOSIS — I27.82 CHRONIC PULMONARY EMBOLISM WITHOUT ACUTE COR PULMONALE, UNSPECIFIED PULMONARY EMBOLISM TYPE (HCC): ICD-10-CM

## 2023-09-18 PROBLEM — E66.01 MORBID OBESITY (HCC): Status: RESOLVED | Noted: 2022-04-29 | Resolved: 2023-09-18

## 2023-09-18 PROCEDURE — 3078F DIAST BP <80 MM HG: CPT

## 2023-09-18 PROCEDURE — 99214 OFFICE O/P EST MOD 30 MIN: CPT

## 2023-09-18 PROCEDURE — 99212 OFFICE O/P EST SF 10 MIN: CPT

## 2023-09-18 PROCEDURE — 3074F SYST BP LT 130 MM HG: CPT

## 2023-09-18 RX ORDER — ROSUVASTATIN CALCIUM 20 MG/1
20 TABLET, COATED ORAL EVERY EVENING
Qty: 100 TABLET | Refills: 3 | Status: SHIPPED | OUTPATIENT
Start: 2023-09-18

## 2023-09-18 ASSESSMENT — ENCOUNTER SYMPTOMS
ORTHOPNEA: 0
PND: 0
GASTROINTESTINAL NEGATIVE: 1
DEPRESSION: 0
CONSTITUTIONAL NEGATIVE: 1
NEUROLOGICAL NEGATIVE: 1
BACK PAIN: 1
SHORTNESS OF BREATH: 0
EYES NEGATIVE: 1
NERVOUS/ANXIOUS: 0
PALPITATIONS: 0

## 2023-09-18 ASSESSMENT — FIBROSIS 4 INDEX: FIB4 SCORE: 1.65

## 2023-09-18 NOTE — PROGRESS NOTES
Chief Complaint   Patient presents with    Hypertension    Dyslipidemia    Coronary Artery Disease     F/v dx : Coronary artery disease due to lipid rich plaque       Subjective     Polo Pyle is a 73 y.o. male who presents today for routine follow up. They have a history of whitecoat syndrome, dyslipidemia, PAD, CAD with hx of cardiac stents x4 in 2005, and Hx of recurrent DVT and submassive PE.    Last seen by SALUD Ann on 10/10/2020    Today 9/18/2023 he has been doing well besides back and joint pain    No symptoms of chest pain, palpitations, shortness of breath, exercise intolerance, or lower extremity edema.      Past Medical History:   Diagnosis Date    ASTHMA     as a youth    CAD (coronary artery disease) 8/26/2011    CATARACT     bilateral IOL    Colorblind     problems with red/green    Dental disorder     upper and partial lower    Essential hypertension     Heart burn     High cholesterol     Hyperlipidemia 8/26/2011    Indigestion     Infectious disease     Hx of staph infections- last infection 2018-     Myocardial infarct (HCC) 2005    STENTS    Pain 11/14/11    NECK 7.5/10; C5-C6    Personal history of venous thrombosis and embolism 2006    DVT 01/2019    Pneumonia 2009    Presence of stent in left circumflex coronary artery     Unspecified hemorrhagic conditions     TAKES ASA . PROLONGED BLEEDING, nosebleeds    Venous insufficiency of both lower extremities     White coat syndrome with diagnosis of hypertension     White coat syndrome without hypertension      Past Surgical History:   Procedure Laterality Date    FEMORAL ENDARTERECTOMY Left 9/20/2019    Procedure: ENDARTERECTOMY, FEMORAL;  Surgeon: Candelaria Crawford M.D.;  Location: [facility];  Service: General    VENTRAL HERNIA REPAIR  11/30/2011    Performed by TROY GONZALEZ at SURGERY [facility]    INGUINAL HERNIA REPAIR  11/30/2011    Performed by TROY GONZALEZ at SURGERY [facility]    CATARACT  PHACO WITH IOL  11/23/2010    Performed by RM WILLIS at SURGERY [facility]    CATARACT PHACO WITH IOL  11/2/2010    Performed by RM WILLIS at SUR[facility]ERY [facility]    OTHER CARDIAC SURGERY  2005    STENTS x 4    SINUS TREPHINE FRONTAL  1971    SINUSOTOMIES  08/1963    INGUINAL HERNIA REPAIR  1962    WITH UNDESCENDED TESTICLE    TONSILLECTOMY AND ADENOIDECTOMY  1957     Family History   Problem Relation Age of Onset    Heart Attack Mother     Heart Disease Mother     Cancer Mother         LYKIMA     Dementia Mother      Social History     Socioeconomic History    Marital status:      Spouse name: Not on file    Number of children: Not on file    Years of education: Not on file    Highest education level: Not on file   Occupational History    Not on file   Tobacco Use    Smoking status: Former     Types: Cigars, Cigarettes     Passive exposure: Never    Smokeless tobacco: Never    Tobacco comments:     quit 2011 cigars, 2001 cigarettes   Vaping Use    Vaping Use: Never used   Substance and Sexual Activity    Alcohol use: No    Drug use: No    Sexual activity: Not Currently     Partners: Female   Other Topics Concern    Not on file   Social History Narrative    Not on file     Social Determinants of Health     Financial Resource Strain: Low Risk  (7/12/2021)    Overall Financial Resource Strain (CARDIA)     Difficulty of Paying Living Expenses: Not hard at all   Food Insecurity: No Food Insecurity (7/12/2021)    Hunger Vital Sign     Worried About Running Out of Food in the Last Year: Never true     Ran Out of Food in the Last Year: Never true   Transportation Needs: No Transportation Needs (7/12/2021)    PRAPARE - Transportation     Lack of Transportation (Medical): No     Lack of Transportation (Non-Medical): No   Physical Activity: Not on file   Stress: Not on file   Social Connections: Not on file   Intimate Partner Violence: Not on file   Housing Stability: Not on file  "    Allergies   Allergen Reactions    Mercury Swelling     And mercury based preservatives-Thimerasol  Swelling, \"strange discharge from eyes\"    Other Environmental Anaphylaxis     Insect toxins/ bees and wasps- Anaphylaxis    Sulfa Drugs Anaphylaxis and Swelling    Bee Anaphylaxis, Hives, Itching, Palpitations, Rash, Runny Nose, Shortness of Breath and Swelling    Pravastatin Diarrhea     Diarrhea      Outpatient Encounter Medications as of 9/18/2023   Medication Sig Dispense Refill    rivaroxaban (XARELTO) 20 MG Tab tablet Take 1 Tablet by mouth with dinner. PLEASE CALL [phone] TO SCHEDULE A FOLLOW UP VISIT WITH YOUR CARDIOLOGY PROVIDER TO ENSURE FURTHER REFILLS. 90 Tablet 0    rosuvastatin (CRESTOR) 20 MG Tab TAKE 1 TABLET BY MOUTH EVERY EVENING 100 Tablet 3    acetaminophen (TYLENOL) 500 MG Tab Take 500-1,000 mg by mouth every 6 hours as needed.      Iron-Vitamin C (IRON 100/C PO) Take  by mouth.      Omega-3 Fatty Acids (FISH OIL) 1000 MG Cap capsule Take 1 capsule by mouth 2 times a day.      VITAMIN D PO Take 1 capsule by mouth every day.      Coenzyme Q10 (CO Q-10) 300 MG Cap Take 1 Cap by mouth every day.      Ascorbic Acid (VITAMIN C PO) Take 6,000 mg by mouth every day.      Multiple Vitamin (MULTIVITAMIN PO) Take 1 Tab by mouth every day.      fluorouracil (EFUDEX) 5 % cream AAA face/AKs BID X 2-4 weeks as directed 40 g 0     No facility-administered encounter medications on file as of 9/18/2023.     Review of Systems   Constitutional: Negative.    HENT: Negative.     Eyes: Negative.    Respiratory:  Negative for shortness of breath.    Cardiovascular:  Negative for chest pain, palpitations, orthopnea, leg swelling and PND.   Gastrointestinal: Negative.    Genitourinary: Negative.    Musculoskeletal:  Positive for back pain and joint pain.   Skin: Negative.    Neurological: Negative.    Endo/Heme/Allergies: Negative.    Psychiatric/Behavioral:  Negative for depression. The patient is not " "nervous/anxious.               Objective     /70 (BP Location: Left arm, Patient Position: Sitting, BP Cuff Size: Adult)   Pulse 75   Resp 16   Ht 1.753 m (5' 9\")   Wt 122 kg (270 lb)   SpO2 96%   BMI 39.87 kg/m²     Physical Exam  Constitutional:       Appearance: Normal appearance. He is obese.   HENT:      Head: Normocephalic.   Neck:      Vascular: No JVD.   Cardiovascular:      Rate and Rhythm: Normal rate and regular rhythm.      Pulses: Normal pulses.      Heart sounds: Normal heart sounds. No murmur heard.     No friction rub.   Pulmonary:      Effort: Pulmonary effort is normal.      Breath sounds: Normal breath sounds.   Abdominal:      Palpations: Abdomen is soft.   Musculoskeletal:         General: Normal range of motion.      Right lower leg: No edema.      Left lower leg: No edema.   Skin:     General: Skin is warm and dry.   Neurological:      General: No focal deficit present.      Mental Status: He is alert and oriented to person, place, and time.   Psychiatric:         Mood and Affect: Mood normal.         Behavior: Behavior normal.            Lab Results   Component Value Date/Time    CHOLSTRLTOT 132 09/16/2023 11:17 AM    LDL 56 09/16/2023 11:17 AM    HDL 49 09/16/2023 11:17 AM    TRIGLYCERIDE 135 09/16/2023 11:17 AM       Lab Results   Component Value Date/Time    SODIUM 143 09/16/2023 11:17 AM    POTASSIUM 5.0 09/16/2023 11:17 AM    CHLORIDE 105 09/16/2023 11:17 AM    CO2 26 09/16/2023 11:17 AM    GLUCOSE 99 09/16/2023 11:17 AM    BUN 19 09/16/2023 11:17 AM    CREATININE 1.14 09/16/2023 11:17 AM    CREATININE 1.4 11/28/2006 03:42 AM     Lab Results   Component Value Date/Time    ALKPHOSPHAT 82 09/16/2023 11:17 AM    ASTSGOT 17 09/16/2023 11:17 AM    ALTSGPT 13 09/16/2023 11:17 AM    TBILIRUBIN 1.9 (H) 09/16/2023 11:17 AM      Echo 7/10/2021:  CONCLUSIONS  Technically difficult due to body habitus and positioning but adequate   study for interpretation.   Mildly reduced left " ventricular systolic function.  Left ventricular ejection fraction is visually estimated to be 50%.  Paradoxical septal motion.  Mildly dilated right ventricle.  Reduced right ventricular systolic function.  Hypokinetic right ventricular free wall.  Right ventricular systolic pressure is estimated to be 28 mmHg.     Echo 7/27/21:      Normal left ventricular size and systolic function. Mild concentric   left ventricular hypertrophy.  Normal right ventricular size and systolic function.  No significant valvular pathology.  Normal pericardium without effusion.  Ascending aorta diameter is dilated to 4.0 cm    Assessment & Plan     1. Presence of stent in left circumflex coronary artery        2. Presence of stent in LAD coronary artery        3. Essential hypertension        4. White coat syndrome without hypertension        5. Dyslipidemia        6. Coronary artery disease due to lipid rich plaque        7. Long term (current) use of anticoagulants        8. Aortic atherosclerosis (HCC)        9. Secondary hypercoagulable state (HCC)        10. Acute deep vein thrombosis (DVT) of femoral vein, unspecified laterality ([facility])        11. Chronic pulmonary embolism without acute cor pulmonale, unspecified pulmonary embolism type ([facility])        12. PE (pulmonary thromboembolism) ([facility])            Medical Decision Making: Today's Assessment/Status/Plan:        Coronary Artery Disease  Status post stent x4 in 2005  - continue Xarelto 20 mg daily, rosuvastatin 20 mg daily  We addressed the management of atherosclerotic artery disease.  He is on proper antiplatelet, cholesterol management and beta-blockers as appropriate.  We addressed the potential side effects and laboratory follow-up for these medications.    Hypertension  - good control  - goal < 130/80    Hyperlipidemia with aortic atherosclerosis  -Most recent LDL 56  -Continue simvastatin 20 mg daily  -Goal of less than 70  -Check lipid panel annually    History of DVT and  submassive PE  -On long-term anticoagulation with Xarelto    Obesity   Discussed in extensive detail regarding lifestyle modifications, focusing on dietary changes.     Discussed following recommendations with the patient to improve dietary choices:  1.  Increase amount of whole foods and grains (fruits/vegetables from the produce section without processing).  2.  Pay special attention to the nutrition facts with goal of decreasing saturated fat, foods with high cholesterol and high overall fat content.  3.  Reducing portion size with healthy snack options  4.  Avoid processed/packaged/frozen meals with more than 5 ingredients on the label, or multiple ingredients you don't recognize.  5.  Avoiding red meat and replacing with fresh fruits, vegetables and lean meat.  6.  Minimize sugar.  This means any product which has added sugar in the label (soda, candy, and many processed foods).     Discussed ACC/AHA guidelines of cardio focused exercise for a minimum of 150 minutes/week to maintain healthy weight.      Follow-up with Alla BRANNON in 1 year    This note was dictated using Dragon speech recognition software.

## 2024-03-07 ENCOUNTER — DOCUMENTATION (OUTPATIENT)
Dept: HEALTH INFORMATION MANAGEMENT | Facility: OTHER | Age: 74
End: 2024-03-07
Payer: MEDICARE

## 2024-04-08 ENCOUNTER — HOSPITAL ENCOUNTER (OUTPATIENT)
Facility: MEDICAL CENTER | Age: 74
End: 2024-04-08
Attending: FAMILY MEDICINE
Payer: MEDICARE

## 2024-04-08 ENCOUNTER — OFFICE VISIT (OUTPATIENT)
Dept: MEDICAL GROUP | Facility: MEDICAL CENTER | Age: 74
End: 2024-04-08
Payer: MEDICARE

## 2024-04-08 VITALS
OXYGEN SATURATION: 95 % | BODY MASS INDEX: 41.43 KG/M2 | RESPIRATION RATE: 16 BRPM | HEART RATE: 72 BPM | HEIGHT: 68 IN | TEMPERATURE: 98 F | SYSTOLIC BLOOD PRESSURE: 116 MMHG | DIASTOLIC BLOOD PRESSURE: 80 MMHG | WEIGHT: 273.4 LBS

## 2024-04-08 DIAGNOSIS — D68.69 SECONDARY HYPERCOAGULABLE STATE (HCC): ICD-10-CM

## 2024-04-08 DIAGNOSIS — I27.82 CHRONIC PULMONARY EMBOLISM WITHOUT ACUTE COR PULMONALE, UNSPECIFIED PULMONARY EMBOLISM TYPE (HCC): ICD-10-CM

## 2024-04-08 DIAGNOSIS — Z95.5 PRESENCE OF STENT IN LEFT CIRCUMFLEX CORONARY ARTERY: ICD-10-CM

## 2024-04-08 DIAGNOSIS — R39.9 UTI SYMPTOMS: ICD-10-CM

## 2024-04-08 DIAGNOSIS — I10 ESSENTIAL HYPERTENSION: ICD-10-CM

## 2024-04-08 DIAGNOSIS — I70.0 AORTIC ATHEROSCLEROSIS (HCC): ICD-10-CM

## 2024-04-08 DIAGNOSIS — I71.43 INFRARENAL ABDOMINAL AORTIC ANEURYSM (AAA) WITHOUT RUPTURE (HCC): ICD-10-CM

## 2024-04-08 DIAGNOSIS — R31.0 GROSS HEMATURIA: ICD-10-CM

## 2024-04-08 DIAGNOSIS — E66.01 MORBID (SEVERE) OBESITY DUE TO EXCESS CALORIES (HCC): ICD-10-CM

## 2024-04-08 DIAGNOSIS — I70.0 ATHEROSCLEROSIS OF AORTA (HCC): ICD-10-CM

## 2024-04-08 DIAGNOSIS — I73.9 PERIPHERAL ARTERY DISEASE (HCC): ICD-10-CM

## 2024-04-08 DIAGNOSIS — E66.01 MORBID OBESITY (HCC): ICD-10-CM

## 2024-04-08 LAB
APPEARANCE UR: ABNORMAL
APPEARANCE UR: NORMAL
BACTERIA #/AREA URNS HPF: ABNORMAL /HPF
BILIRUB UR QL STRIP.AUTO: NEGATIVE
BILIRUB UR STRIP-MCNC: NORMAL MG/DL
COLOR UR AUTO: NORMAL
COLOR UR: ABNORMAL
EPI CELLS #/AREA URNS HPF: ABNORMAL /HPF
GLUCOSE UR STRIP.AUTO-MCNC: NEGATIVE MG/DL
GLUCOSE UR STRIP.AUTO-MCNC: NEGATIVE MG/DL
HYALINE CASTS #/AREA URNS LPF: ABNORMAL /LPF
KETONES UR STRIP.AUTO-MCNC: NEGATIVE MG/DL
KETONES UR STRIP.AUTO-MCNC: NORMAL MG/DL
LEUKOCYTE ESTERASE UR QL STRIP.AUTO: NEGATIVE
LEUKOCYTE ESTERASE UR QL STRIP.AUTO: NORMAL
MICRO URNS: ABNORMAL
NITRITE UR QL STRIP.AUTO: NEGATIVE
NITRITE UR QL STRIP.AUTO: POSITIVE
PH UR STRIP.AUTO: 6 [PH] (ref 5–8)
PH UR STRIP.AUTO: 6 [PH] (ref 5–8)
PROT UR QL STRIP: 30 MG/DL
PROT UR QL STRIP: >=300 MG/DL
RBC # URNS HPF: >150 /HPF
RBC UR QL AUTO: ABNORMAL
RBC UR QL AUTO: NORMAL
SP GR UR STRIP.AUTO: 1.02
SP GR UR STRIP.AUTO: 1.02
UROBILINOGEN UR STRIP-MCNC: 2 MG/DL
UROBILINOGEN UR STRIP.AUTO-MCNC: 1 MG/DL
WBC #/AREA URNS HPF: ABNORMAL /HPF

## 2024-04-08 PROCEDURE — 3074F SYST BP LT 130 MM HG: CPT | Performed by: FAMILY MEDICINE

## 2024-04-08 PROCEDURE — 99214 OFFICE O/P EST MOD 30 MIN: CPT | Performed by: FAMILY MEDICINE

## 2024-04-08 PROCEDURE — 81001 URINALYSIS AUTO W/SCOPE: CPT

## 2024-04-08 PROCEDURE — 3079F DIAST BP 80-89 MM HG: CPT | Performed by: FAMILY MEDICINE

## 2024-04-08 PROCEDURE — 81002 URINALYSIS NONAUTO W/O SCOPE: CPT | Performed by: FAMILY MEDICINE

## 2024-04-08 RX ORDER — NITROFURANTOIN 25; 75 MG/1; MG/1
100 CAPSULE ORAL 2 TIMES DAILY
Qty: 14 CAPSULE | Refills: 0 | Status: SHIPPED | OUTPATIENT
Start: 2024-04-08

## 2024-04-08 ASSESSMENT — FIBROSIS 4 INDEX: FIB4 SCORE: 1.67

## 2024-04-08 ASSESSMENT — PATIENT HEALTH QUESTIONNAIRE - PHQ9: CLINICAL INTERPRETATION OF PHQ2 SCORE: 0

## 2024-04-08 NOTE — PROGRESS NOTES
CC: UTI/hematuria, hypertension, left circumflex stent, pulmonary embolism/secondary hypercoagulable state, peripheral vascular disease, morbid obesity, atherosclerosis of aorta, abdominal aortic aneurysm    HPI:   Polo presents today to discuss the following:      UTI symptoms/ Gross hematuria(blood in the urine)  Patient reported some dysuria, and change in the color of the urine.  Patient stated that his urine is dark red.  Has been having increased urinary frequency as well.  Denies any fever, chills, nausea, vomiting.  Denies any flank/abdominal pain.    Essential hypertension  Patient's blood pressure has been adequately controlled on low-salt diet.    Presence of stent in left circumflex coronary artery  Patient with history of coronary artery disease status post stent placement of left circumflex coronary artery.  Currently asymptomatic.  Has been on rosuvastatin 20 mg daily.    Chronic pulmonary embolism without acute cor pulmonale, unspecified pulmonary embolism type (HCC)/ Secondary hypercoagulable state (HCC)  Patient with history of pulmonary embolism, currently.  No side effects on Xarelto 20 mg daily    Peripheral artery disease (HCC)  History of left femoral endarterectomy in 2019.  Most recent arterial ultrasound showed:   50-75% stenosis in the RIGHT common femoral artery.    50-75% stenosis in the RIGHT proximal superficial artery.    Segmental occlusion of the RIGHT proximal posterior tibial artery,    reconstitution of flow demonstrated distally.   Segmental occlusion of the RIGHT proximal peroneal artery, reconstituation    of flow demonstrated distally.    Single vessel runoff to the ankle via the RIGHT anterior tibial artery with    monophasic flow.    Aneurysm noted at the LEFT common femoral artery extending into the    superficial femoral artery measuring 2.34 x 2.48 cm. There is a non-   occlusive thrombus noted within the aneurysm.    > 75% stenosis in the LEFT mid superficial femoral  artery.    The LEFT peroneal artery is occluded throughout its length.    Two vessel runoff to the LEFT ankle with monophasic flow.     Patient has not been following up by his vascular surgeon because significant close does not cover him.  Will send patient to a new vascular surgeon that is covered by [facility]      Morbid obesity ([facility])/Morbid (severe) obesity due to excess calories ([facility])/Body mass index (BMI) 40.0-44.9, adult ([facility])  BMI is 41.5.  Patient refused referral to bariatric surgery or medicine.  Stating that he does not care about his weight at this time of his life    Atherosclerosis of aorta ([facility])  Accidental finding o imaging.  Patient with history of CAD and peripheral vascular disease.  Continue on rosuvastatin and Xarelto.    Infrarenal abdominal aortic aneurysm (AAA) without rupture ([facility])  Patient's last abdominal CT showed: Slight interval increase in size in infrarenal abdominal aortic aneurysm currently measuring 4.6 x 4.7 cm.  Denies any abdominal pain.  He is not following up with his vascular surgeon because they show acid normal him.  A new referral placed        Patient Active Problem List    Diagnosis Date Noted    Secondary hypercoagulable state ([facility]) 10/10/2022    Chronic pulmonary embolism without acute cor pulmonale ([facility]) 06/24/2022    Long term (current) use of anticoagulants 04/29/2022    Old MI (myocardial infarction) 04/29/2022    Aortic atherosclerosis ([facility]) 04/29/2022    Other chest pain 07/10/2021    MIKAYLA (acute kidney injury) ([facility]) 07/10/2021    Acute DVT (deep venous thrombosis) ([facility]) 07/10/2021    White coat syndrome without hypertension     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 intermit claudication ([facility]) 03/22/2021    Abdominal aortic aneurysm without rupture ([facility]) 03/22/2021    Thrombus 02/12/2021    Acute respiratory failure ([facility]) 02/11/2021    Benign prostatic hyperplasia with urinary hesitancy 02/11/2021    Advanced care planning/counseling discussion  02/11/2021    Elevated d-dimer 02/11/2021    History of COVID-19 02/10/2021    Acute cystitis 02/10/2021    Iron deficiency anemia due to chronic blood loss 11/25/2019    Obesity (BMI 35.0-39.9 without comorbidity) ([facility]) 08/13/2019    Peripheral artery disease ([facility]) 08/01/2019    Essential hypertension     Presence of stent in LAD coronary artery     Presence of stent in left circumflex coronary artery     Dyslipidemia 08/26/2011    Coronary artery disease due to lipid rich plaque 08/26/2011       Current Outpatient Medications   Medication Sig Dispense Refill    rivaroxaban (XARELTO) 20 MG Tab tablet Take 1 Tablet by mouth with dinner. PLEASE CALL [phone] TO SCHEDULE A FOLLOW UP VISIT WITH YOUR CARDIOLOGY PROVIDER TO ENSURE FURTHER REFILLS. 30 Tablet 12    rosuvastatin (CRESTOR) 20 MG Tab Take 1 Tablet by mouth every evening. 100 Tablet 3    acetaminophen (TYLENOL) 500 MG Tab Take 500-1,000 mg by mouth every 6 hours as needed.      Iron-Vitamin C (IRON 100/C PO) Take  by mouth.      VITAMIN D PO Take 1 capsule by mouth every day.      Coenzyme Q10 (CO Q-10) 300 MG Cap Take 1 Cap by mouth every day.      Ascorbic Acid (VITAMIN C PO) Take 6,000 mg by mouth every day.      Multiple Vitamin (MULTIVITAMIN PO) Take 1 Tab by mouth every day.       No current facility-administered medications for this visit.         Allergies as of 04/08/2024 - Reviewed 04/08/2024   Allergen Reaction Noted    Mercury Swelling 11/01/2010    Other environmental Anaphylaxis 11/23/2010    Sulfa drugs Anaphylaxis and Swelling 11/01/2010    Bee Anaphylaxis, Hives, Itching, Palpitations, Rash, Runny Nose, Shortness of Breath, and Swelling 10/10/2022    Pravastatin Diarrhea 04/03/2020        ROS: Denies any chest pain, Shortness of breath, Changes bowel or bladder, Lower extremity edema.    Physical Exam:  /80 (BP Location: Right arm, Patient Position: Sitting, BP Cuff Size: Adult)   Pulse 72   Temp 36.7 °C (98 °F) (Temporal)   Resp  "16   Ht 1.727 m (5' 8\")   Wt 124 kg (273 lb 6.4 oz)   SpO2 95%   BMI 41.57 kg/m²   Gen.: Well-developed, well-nourished, no apparent distress,pleasant and cooperative with the examination  Skin:  Warm and dry with good turgor. No rashes or suspicious lesions in visible areas  HEENT:Sinuses nontender with palpation, TMs clear, nares patent with pink mucosa and clear rhinorrhea,no septal deviation ,polyps or lesions. lips without lesions, oropharynx clear.  Neck: Trachea midline,no masses or adenopathy. No JVD.  Cor: Regular rate and rhythm without murmur, gallop or rub.  Lungs: Respirations unlabored.Clear to auscultation with equal breath sounds bilaterally. No wheezes, rhonchi.  Extremities: No cyanosis, clubbing or edema.          Assessment and Plan.   74 y.o. male     1. UTI symptoms  Urinalysis showed nitrite positive, small amount of leukocytes, large amount blood.  Start patient on Macrobid twice a for a week.  Will send urine for C/S    - POCT Urinalysis  - nitrofurantoin (MACROBID) 100 MG Cap; Take 1 Capsule by mouth 2 times a day.  Dispense: 14 Capsule; Refill: 0  - URINALYSIS; Future    2. Essential hypertension  Controlled on low-salt diet.    3. Presence of stent in left circumflex coronary artery  Stable.  Asymptomatic  Continue on rosuvastatin  Continue FU with cardiology    4. Chronic pulmonary embolism without acute cor pulmonale, unspecified pulmonary embolism type (HCC)  5. Secondary hypercoagulable state (HCC)  Stable.  Asymptomatic.  Continue on Xarelto 20 mg daily    6. Peripheral artery disease (HCC)  History of left femoral endarterectomy in 2019.  Most recent arterial ultrasound showed:   50-75% stenosis in the RIGHT common femoral artery.    50-75% stenosis in the RIGHT proximal superficial artery.    Segmental occlusion of the RIGHT proximal posterior tibial artery,    reconstitution of flow demonstrated distally.   Segmental occlusion of the RIGHT proximal peroneal artery, " reconstituation    of flow demonstrated distally.    Single vessel runoff to the ankle via the RIGHT anterior tibial artery with    monophasic flow.    Aneurysm noted at the LEFT common femoral artery extending into the    superficial femoral artery measuring 2.34 x 2.48 cm. There is a non-   occlusive thrombus noted within the aneurysm.    > 75% stenosis in the LEFT mid superficial femoral artery.    The LEFT peroneal artery is occluded throughout its length.    Two vessel runoff to the LEFT ankle with monophasic flow.     Patient has not been following up by his vascular surgeon because significant close does not cover him.  Will send patient to a new vascular surgeon that is covered by [facility]    - Referral to Vascular Surgery    7. Morbid obesity (HCC)  8. Morbid (severe) obesity due to excess calories (HCC)  9. Body mass index (BMI) 40.0-44.9, adult (HCC)  BMI is 41.5.  Patient refused referral to bariatric surgery or medicine.  Stating that he does not care about his weight at this time of his life  Patient is counseled on lifestyle modification.    10. Atherosclerosis of aorta (HCC)  Accidental finding o imaging.  Patient with history of CAD and peripheral vascular disease.  Continue on rosuvastatin and Xarelto.    11. Infrarenal abdominal aortic aneurysm (AAA) without rupture (HCC)  Stable.  Asymptomatic.  Last abdominal CT showed: Slight interval increase in size in infrarenal abdominal aortic aneurysm currently measuring 4.6 x 4.7 cm.  Continue follow-up with vascular surgery     - Referral to Vascular Surgery      12. Gross hematuria  Patient has been having gross hematuria for a while.  Urinalysis today showed large amount of blood in the urine probably associated with UTI, however will send patient to be evaluated by urology    - Referral to Urology    13. Aortic atherosclerosis (HCC)  Accidental finding on imaging continue on statin

## 2024-04-19 ENCOUNTER — OFFICE VISIT (OUTPATIENT)
Dept: UROLOGY | Facility: MEDICAL CENTER | Age: 74
End: 2024-04-19
Payer: MEDICARE

## 2024-04-19 VITALS
SYSTOLIC BLOOD PRESSURE: 144 MMHG | OXYGEN SATURATION: 97 % | TEMPERATURE: 96.8 F | HEART RATE: 85 BPM | BODY MASS INDEX: 40.92 KG/M2 | WEIGHT: 270 LBS | DIASTOLIC BLOOD PRESSURE: 79 MMHG | HEIGHT: 68 IN

## 2024-04-19 DIAGNOSIS — R31.0 GROSS HEMATURIA: ICD-10-CM

## 2024-04-19 PROCEDURE — 3078F DIAST BP <80 MM HG: CPT | Performed by: STUDENT IN AN ORGANIZED HEALTH CARE EDUCATION/TRAINING PROGRAM

## 2024-04-19 PROCEDURE — 99204 OFFICE O/P NEW MOD 45 MIN: CPT | Performed by: STUDENT IN AN ORGANIZED HEALTH CARE EDUCATION/TRAINING PROGRAM

## 2024-04-19 PROCEDURE — 3077F SYST BP >= 140 MM HG: CPT | Performed by: STUDENT IN AN ORGANIZED HEALTH CARE EDUCATION/TRAINING PROGRAM

## 2024-04-19 ASSESSMENT — FIBROSIS 4 INDEX: FIB4 SCORE: 1.67

## 2024-04-19 NOTE — PROGRESS NOTES
Subjective  Polo Pyle is a 74 y.o. male who presents today for evaluation of gross hematuria.    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 He noted some dysuria while the color was abnormal. Several urinalyses have microscopic hematuria from February and April. Patient states he was a smoker for quite some time, but quit in 2000. Now only occasionally smokes.     Family History   Problem Relation Age of Onset    Heart Attack Mother     Heart Disease Mother     Cancer Mother         LYKIMA     Dementia Mother        Social History     Socioeconomic History    Marital status:      Spouse name: Not on file    Number of children: Not on file    Years of education: Not on file    Highest education level: Not on file   Occupational History    Not on file   Tobacco Use    Smoking status: Former     Types: Cigars, Cigarettes     Passive exposure: Never    Smokeless tobacco: Never    Tobacco comments:     quit 2011 cigars, 2001 cigarettes   Vaping Use    Vaping Use: Never used   Substance and Sexual Activity    Alcohol use: No    Drug use: No    Sexual activity: Not Currently     Partners: Female   Other Topics Concern    Not on file   Social History Narrative    Not on file     Social Determinants of Health     Financial Resource Strain: Low Risk  (7/12/2021)    Overall Financial Resource Strain (CARDIA)     Difficulty of Paying Living Expenses: Not hard at all   Food Insecurity: No Food Insecurity (7/12/2021)    Hunger Vital Sign     Worried About Running Out of Food in the Last Year: Never true     Ran Out of Food in the Last Year: Never true   Transportation Needs: No Transportation Needs (7/12/2021)    PRAPARE - Transportation     Lack of Transportation (Medical): No     Lack of Transportation (Non-Medical): No   Physical Activity: Not on file   Stress: Not on file   Social Connections: Not on file   Intimate Partner Violence: Not on file   Housing  Stability: Not on file       Past Surgical History:   Procedure Laterality Date    FEMORAL ENDARTERECTOMY Left 9/20/2019    Procedure: ENDARTERECTOMY, FEMORAL;  Surgeon: Candelaria Crawford M.D.;  Location: SURGERY [facility];  Service: General    VENTRAL HERNIA REPAIR  11/30/2011    Performed by TROY GONZALEZ at SURGERY [facility]    INGUINAL HERNIA REPAIR  11/30/2011    Performed by TROY GONZALEZ at SURGERY [facility]    CATARACT PHACO WITH IOL  11/23/2010    Performed by RM WILLIS at SURGERY [facility] DAY [facility]    CATARACT PHACO WITH IOL  11/2/2010    Performed by RM WILLIS at SURGERY [facility] DAY [facility]    OTHER CARDIAC SURGERY  2005    STENTS x 4    SINUS TREPHINE FRONTAL  1971    SINUSOTOMIES  08/1963    INGUINAL HERNIA REPAIR  1962    WITH UNDESCENDED TESTICLE    TONSILLECTOMY AND ADENOIDECTOMY  1957       Past Medical History:   Diagnosis Date    ASTHMA     as a youth    CAD (coronary artery disease) 8/26/2011    CATARACT     bilateral IOL    Colorblind     problems with red/green    Dental disorder     upper and partial lower    Essential hypertension     Heart burn     High cholesterol     Hyperlipidemia 8/26/2011    Indigestion     Infectious disease     Hx of staph infections- last infection 2018-     Myocardial infarct (HCC) 2005    STENTS    Pain 11/14/11    NECK 7.5/10; C5-C6    Personal history of venous thrombosis and embolism 2006    DVT 01/2019    Pneumonia 2009    Presence of stent in left circumflex coronary artery     Unspecified hemorrhagic conditions     TAKES ASA . PROLONGED BLEEDING, nosebleeds    Venous insufficiency of both lower extremities     White coat syndrome with diagnosis of hypertension     White coat syndrome without hypertension        Current Outpatient Medications   Medication Sig Dispense Refill    rivaroxaban (XARELTO) 20 MG Tab tablet Take 1 Tablet by mouth with dinner. PLEASE CALL [phone] TO SCHEDULE A FOLLOW UP VISIT WITH YOUR  "CARDIOLOGY PROVIDER TO ENSURE FURTHER REFILLS. 30 Tablet 12    rosuvastatin (CRESTOR) 20 MG Tab Take 1 Tablet by mouth every evening. 100 Tablet 3    acetaminophen (TYLENOL) 500 MG Tab Take 500-1,000 mg by mouth every 6 hours as needed.      Iron-Vitamin C (IRON 100/C PO) Take  by mouth.      VITAMIN D PO Take 1 capsule by mouth every day.      Coenzyme Q10 (CO Q-10) 300 MG Cap Take 1 Cap by mouth every day.      Ascorbic Acid (VITAMIN C PO) Take 6,000 mg by mouth every day.      Multiple Vitamin (MULTIVITAMIN PO) Take 1 Tab by mouth every day.      nitrofurantoin (MACROBID) 100 MG Cap Take 1 Capsule by mouth 2 times a day. (Patient not taking: Reported on 4/19/2024) 14 Capsule 0     No current facility-administered medications for this visit.       Allergies   Allergen Reactions    Mercury Swelling     And mercury based preservatives-Thimerasol  Swelling, \"strange discharge from eyes\"    Other Environmental Anaphylaxis     Insect toxins/ bees and wasps- Anaphylaxis    Sulfa Drugs Anaphylaxis and Swelling    Bee Anaphylaxis, Hives, Itching, Palpitations, Rash, Runny Nose, Shortness of Breath and Swelling    Pravastatin Diarrhea     Diarrhea        Objective  BP (!) 144/79 (BP Location: Left arm, Patient Position: Sitting, BP Cuff Size: Large adult)   Pulse 85   Temp 36 °C (96.8 °F) (Temporal)   Ht 1.727 m (5' 8\")   Wt 122 kg (270 lb)   SpO2 97%   Physical Exam  Constitutional:       Appearance: Normal appearance.   HENT:      Head: Normocephalic and atraumatic.   Eyes:      Extraocular Movements: Extraocular movements intact.      Conjunctiva/sclera: Conjunctivae normal.   Pulmonary:      Effort: No respiratory distress.   Musculoskeletal:      Cervical back: Normal range of motion.   Skin:     General: Skin is warm and dry.   Neurological:      General: No focal deficit present.      Mental Status: He is alert.   Psychiatric:         Mood and Affect: Mood normal.         Labs:   CBC   Lab Results   Component " Value Date/Time    WBC 8.4 05/01/2023 1322    RBC 4.82 05/01/2023 1322    HEMOGLOBIN 14.7 05/01/2023 1322    HEMATOCRIT 46.3 05/01/2023 1322    MCV 96.1 05/01/2023 1322    MCH 30.5 05/01/2023 1322    MCHC 31.7 (L) 05/01/2023 1322    RDW 49.1 05/01/2023 1322    MPV 10.9 05/01/2023 1322    LYMPHOCYTES 21.30 (L) 05/01/2023 1322    LYMPHS 1.79 05/01/2023 1322    MONOCYTES 12.40 05/01/2023 1322    MONOS 1.04 (H) 05/01/2023 1322    EOSINOPHILS 2.50 05/01/2023 1322    EOS 0.21 05/01/2023 1322    BASOPHILS 0.80 05/01/2023 1322    BASO 0.07 05/01/2023 1322    NRBC 0.00 05/01/2023 1322       BMP   Lab Results   Component Value Date/Time    SODIUM 143 09/16/2023 1117    POTASSIUM 5.0 09/16/2023 1117    CHLORIDE 105 09/16/2023 1117    CO2 26 09/16/2023 1117    GLUCOSE 99 09/16/2023 1117    BUN 19 09/16/2023 1117    CREATININE 1.14 09/16/2023 1117    CALCIUM 10.0 09/16/2023 1117     PSA   Lab Results   Component Value Date/Time    PSATOTAL 2.34 03/14/2020 1002         Imaging:     none    Assessment    Gross Hematuria  -Will get CT Urogram, explained to patient the purpose behind this study. He is in agreement to proceed  -I explained to the patient that cystoscopy is a standard portion of the workup for gross and microscopic hematuria especially given his age and risk factors.  While there can be benign causes of hematuria such as an enlarged prostate gland, it is also important to rule out bladder cancer.  If there were to be a mass in his bladder that is too small to be seen on CT scan he would be an ideal candidate for minimally invasive therapies to treat and possibly cure any bladder cancer.  If we were to delay cystoscopy for several months he could have progression of any possible bladder cancer which would ultimately lead to very extensive procedures to treat, control or cure.  The patient is hesitant to proceed with cystoscopy, he has a better understanding of the risks and benefits of proceeding or not proceeding with  the procedure.  He would like to see with the results of the CT scan first, he will discuss cystoscopy with his family members and let us know if he is in agreement to proceed    Plan    Problem List Items Addressed This Visit    None  Visit Diagnoses       Gross hematuria        Relevant Orders    POCT Urinalysis    CT-ABDOMEN & PELVIS UROGRAM    Comp Metabolic Panel          Will call with CT results, patient will call to schedule cystoscopy if he decides to proceed

## 2024-04-25 ENCOUNTER — OFFICE VISIT (OUTPATIENT)
Dept: VASCULAR SURGERY | Facility: MEDICAL CENTER | Age: 74
End: 2024-04-25
Payer: MEDICARE

## 2024-04-25 VITALS
BODY MASS INDEX: 40.92 KG/M2 | DIASTOLIC BLOOD PRESSURE: 72 MMHG | WEIGHT: 270 LBS | HEIGHT: 68 IN | HEART RATE: 67 BPM | SYSTOLIC BLOOD PRESSURE: 116 MMHG | TEMPERATURE: 98.1 F | OXYGEN SATURATION: 97 %

## 2024-04-25 DIAGNOSIS — I71.43 INFRARENAL ABDOMINAL AORTIC ANEURYSM (AAA) WITHOUT RUPTURE (HCC): ICD-10-CM

## 2024-04-25 DIAGNOSIS — I77.9 PAOD (PERIPHERAL ARTERIAL OCCLUSIVE DISEASE) (HCC): ICD-10-CM

## 2024-04-25 ASSESSMENT — FIBROSIS 4 INDEX: FIB4 SCORE: 1.67

## 2024-04-25 NOTE — H&P
Vascular Surgery            New Patient Consultation    Patient:Polo Pyle  MRN:[id number]  Primary care physician:Lucy Martinez M.D.  Referring Provider: Lucy Martinez M.D.    Vascular Consultant: Mohinder Crandall MD    Date: 4/25/2024  _____________________________________________________    Chief Complaint:     Chief Complaint   Patient presents with    New Patient     Peripheral artery disease         History of Present Illness:   Polo Pyle  is a 74 y.o. year old male who has a history of peripheral arterial disease as well as an abdominal aortic aneurysm.  The patient was a patient of Dr. Crawford.  The patient has come to become established with [facility].  The patient has on last check a 4.8 to 4.9 cm abdominal aortic aneurysm which has been in surveillance.  Patient also has undergone a left femoral endarterectomy for peripheral arterial disease.  The patient denies symptoms of claudication but does report that he has a degenerated arthritis of the left knee which limits his mobility.  The patient is also overweight.  The patient does not complain of any arterial insufficiency related symptoms of his lower extremities.  Patient reports that he is going to have a CAT scan of his bladder and kidneys on Monday.    Past Medical History:     Past Medical History:   Diagnosis Date    ASTHMA     as a youth    CAD (coronary artery disease) 8/26/2011    CATARACT     bilateral IOL    Colorblind     problems with red/green    Dental disorder     upper and partial lower    Essential hypertension     Heart burn     High cholesterol     Hyperlipidemia 8/26/2011    Indigestion     Infectious disease     Hx of staph infections- last infection 2018-     Myocardial infarct (HCC) 2005    STENTS    Pain 11/14/11    NECK 7.5/10; C5-C6    Personal history of venous thrombosis and embolism 2006    DVT 01/2019    Pneumonia 2009    Presence of stent in left circumflex coronary  "artery     Unspecified hemorrhagic conditions     TAKES ASA . PROLONGED BLEEDING, nosebleeds    Venous insufficiency of both lower extremities     White coat syndrome with diagnosis of hypertension     White coat syndrome without hypertension      Past Surgical History:     Past Surgical History:   Procedure Laterality Date    FEMORAL ENDARTERECTOMY Left 9/20/2019    Procedure: ENDARTERECTOMY, FEMORAL;  Surgeon: Candelaria Crawford M.D.;  Location: SURGERY [facility];  Service: General    VENTRAL HERNIA REPAIR  11/30/2011    Performed by TROY GONZALEZ at SURGERY [facility]    INGUINAL HERNIA REPAIR  11/30/2011    Performed by TROY GONZALEZ at SURGERY [facility]    CATARACT PHACO WITH IOL  11/23/2010    Performed by RM WILLIS at SURGERY SAME DAY [facility]    CATARACT PHACO WITH IOL  11/2/2010    Performed by RM WILLIS at SURGERY SAME DAY [facility]    OTHER CARDIAC SURGERY  2005    STENTS x 4    SINUS TREPHINE FRONTAL  1971    SINUSOTOMIES  08/1963    INGUINAL HERNIA REPAIR  1962    WITH UNDESCENDED TESTICLE    TONSILLECTOMY AND ADENOIDECTOMY  1957     Allergies:     Allergies   Allergen Reactions    Mercury Swelling     And mercury based preservatives-Thimerasol  Swelling, \"strange discharge from eyes\"    Other Environmental Anaphylaxis     Insect toxins/ bees and wasps- Anaphylaxis    Sulfa Drugs Anaphylaxis and Swelling    Bee Anaphylaxis, Hives, Itching, Palpitations, Rash, Runny Nose, Shortness of Breath and Swelling    Pravastatin Diarrhea     Diarrhea      Medications:     Outpatient Encounter Medications as of 4/25/2024   Medication Sig Dispense Refill    rivaroxaban (XARELTO) 20 MG Tab tablet Take 1 Tablet by mouth with dinner. PLEASE CALL [phone] TO SCHEDULE A FOLLOW UP VISIT WITH YOUR CARDIOLOGY PROVIDER TO ENSURE FURTHER REFILLS. 30 Tablet 12    rosuvastatin (CRESTOR) 20 MG Tab Take 1 Tablet by mouth every evening. 100 Tablet 3    acetaminophen (TYLENOL) 500 MG Tab " Take 500-1,000 mg by mouth every 6 hours as needed.      Iron-Vitamin C (IRON 100/C PO) Take  by mouth.      VITAMIN D PO Take 1 capsule by mouth every day.      Coenzyme Q10 (CO Q-10) 300 MG Cap Take 1 Cap by mouth every day.      Ascorbic Acid (VITAMIN C PO) Take 6,000 mg by mouth every day.      Multiple Vitamin (MULTIVITAMIN PO) Take 1 Tab by mouth every day.      nitrofurantoin (MACROBID) 100 MG Cap Take 1 Capsule by mouth 2 times a day. (Patient not taking: Reported on 4/19/2024) 14 Capsule 0     No facility-administered encounter medications on file as of 4/25/2024.     Social History:     Social History     Socioeconomic History    Marital status:      Spouse name: Not on file    Number of children: Not on file    Years of education: Not on file    Highest education level: Not on file   Occupational History    Not on file   Tobacco Use    Smoking status: Former     Types: Cigars, Cigarettes     Passive exposure: Never    Smokeless tobacco: Never    Tobacco comments:     quit 2011 cigars, 2001 cigarettes   Vaping Use    Vaping Use: Never used   Substance and Sexual Activity    Alcohol use: No    Drug use: No    Sexual activity: Not Currently     Partners: Female   Other Topics Concern    Not on file   Social History Narrative    Not on file     Social Determinants of Health     Financial Resource Strain: Low Risk  (7/12/2021)    Overall Financial Resource Strain (CARDIA)     Difficulty of Paying Living Expenses: Not hard at all   Food Insecurity: No Food Insecurity (7/12/2021)    Hunger Vital Sign     Worried About Running Out of Food in the Last Year: Never true     Ran Out of Food in the Last Year: Never true   Transportation Needs: No Transportation Needs (7/12/2021)    PRAPARE - Transportation     Lack of Transportation (Medical): No     Lack of Transportation (Non-Medical): No   Physical Activity: Not on file   Stress: Not on file   Social Connections: Not on file   Intimate Partner Violence: Not  "on file   Housing Stability: Not on file      Social History     Tobacco Use   Smoking Status Former    Types: Cigars, Cigarettes    Passive exposure: Never   Smokeless Tobacco Never   Tobacco Comments    quit 2011 cigars, 2001 cigarettes     Social History     Substance and Sexual Activity   Alcohol Use No     Social History     Substance and Sexual Activity   Drug Use No      Family History:     Family History   Problem Relation Age of Onset    Heart Attack Mother     Heart Disease Mother     Cancer Mother         LYKIMA     Dementia Mother        Review of Systems:   Constitutional: Negative for fever or chills  HENT:    Negative for hearing loss or tinnitus    Eyes:    Negative for blurred vision or loss of vision  Respiratory:  Negative for cough or hemoptysis  Cardiac:  Negative for chest pain or palpitations  Vascular:  Negative for claudication, Negative for rest pain  Gastrointestinal: Negative for vomiting or abdominal pain     Negative for hematochezia or melena   Genitourinary: Negative for dysuria or hematuria   Musculoskeletal: Pain left knee   skin:   Negative for itching or rash  Neurological:  Negative for dizziness or headaches     Negative for speech disturbance     Negative for extremity weakness or paresthesias  Endo/Heme:  Negative for easy bruising or bleeding       Exam:   /72 (BP Location: Left arm, Patient Position: Sitting, BP Cuff Size: Adult)   Pulse 67   Temp 36.7 °C (98.1 °F) (Temporal)   Ht 1.727 m (5' 8\")   Wt 122 kg (270 lb)   SpO2 97%   BMI 41.05 kg/m²     Constitutional: Alert, oriented, no acute distress  HEENT:  Normocephalic and atraumatic, EOMI  Neck:   Supple, no JVD,   Cardiovascular: Regular rate and rhythm,   Pulmonary:  Good air entry bilaterally,    Abdominal:  Soft, non-tender, non-distended  Musculoskeletal: No tenderness, no deformity  Neurological:  CN II-XII grossly intact, no focal deficits  Skin:   Skin is warm and dry. No rash noted.  Vascular " exam: Diminished    Imaging:         Assessment and Plan:   -Abdominal aortic aneurysm this patient's aneurysm is certainly approaching 5 cm if not surpassing it.  He is due for a CAT scan on Monday and I will review those images and communicate with the patient in approximately a week and a half after I have had time to review the CT scan.  Will make further recommendations with respect to the aneurysm after I see his CT scan on Monday.    With respect to his peripheral arterial disease he certainly has some.  He is asymptomatic from his PAD standpoint due primarily to his degenerative arthritis.  We will continue to monitor him from a symptom standpoint.    Further recommendations to follow the results of the CT scan on Monday.             _____________________________________________________  Mohinder Crandall MD  [facility]  [phone]  [address], [place] [address]

## 2024-04-26 ENCOUNTER — HOSPITAL ENCOUNTER (OUTPATIENT)
Dept: LAB | Facility: MEDICAL CENTER | Age: 74
End: 2024-04-26
Attending: STUDENT IN AN ORGANIZED HEALTH CARE EDUCATION/TRAINING PROGRAM
Payer: MEDICARE

## 2024-04-26 DIAGNOSIS — R31.0 GROSS HEMATURIA: ICD-10-CM

## 2024-04-26 LAB
ALBUMIN SERPL BCP-MCNC: 4.2 G/DL (ref 3.2–4.9)
ALBUMIN/GLOB SERPL: 1.4 G/DL
ALP SERPL-CCNC: 80 U/L (ref 30–99)
ALT SERPL-CCNC: 10 U/L (ref 2–50)
ANION GAP SERPL CALC-SCNC: 10 MMOL/L (ref 7–16)
AST SERPL-CCNC: 11 U/L (ref 12–45)
BILIRUB SERPL-MCNC: 1.7 MG/DL (ref 0.1–1.5)
BUN SERPL-MCNC: 29 MG/DL (ref 8–22)
CALCIUM ALBUM COR SERPL-MCNC: 9.7 MG/DL (ref 8.5–10.5)
CALCIUM SERPL-MCNC: 9.9 MG/DL (ref 8.5–10.5)
CHLORIDE SERPL-SCNC: 108 MMOL/L (ref 96–112)
CO2 SERPL-SCNC: 26 MMOL/L (ref 20–33)
CREAT SERPL-MCNC: 1.2 MG/DL (ref 0.5–1.4)
GFR SERPLBLD CREATININE-BSD FMLA CKD-EPI: 64 ML/MIN/1.73 M 2
GLOBULIN SER CALC-MCNC: 3 G/DL (ref 1.9–3.5)
GLUCOSE SERPL-MCNC: 101 MG/DL (ref 65–99)
POTASSIUM SERPL-SCNC: 5.2 MMOL/L (ref 3.6–5.5)
PROT SERPL-MCNC: 7.2 G/DL (ref 6–8.2)
SODIUM SERPL-SCNC: 144 MMOL/L (ref 135–145)

## 2024-04-26 PROCEDURE — 36415 COLL VENOUS BLD VENIPUNCTURE: CPT

## 2024-04-26 PROCEDURE — 80053 COMPREHEN METABOLIC PANEL: CPT

## 2024-04-29 ENCOUNTER — HOSPITAL ENCOUNTER (OUTPATIENT)
Dept: RADIOLOGY | Facility: MEDICAL CENTER | Age: 74
End: 2024-04-29
Attending: STUDENT IN AN ORGANIZED HEALTH CARE EDUCATION/TRAINING PROGRAM
Payer: MEDICARE

## 2024-04-29 DIAGNOSIS — R31.0 GROSS HEMATURIA: ICD-10-CM

## 2024-04-29 PROCEDURE — 74178 CT ABD&PLV WO CNTR FLWD CNTR: CPT

## 2024-04-29 PROCEDURE — 700117 HCHG RX CONTRAST REV CODE 255: Performed by: STUDENT IN AN ORGANIZED HEALTH CARE EDUCATION/TRAINING PROGRAM

## 2024-04-29 RX ADMIN — IOHEXOL 100 ML: 350 INJECTION, SOLUTION INTRAVENOUS at 15:45

## 2024-05-03 ENCOUNTER — TELEPHONE (OUTPATIENT)
Dept: UROLOGY | Facility: MEDICAL CENTER | Age: 74
End: 2024-05-03
Payer: MEDICARE

## 2024-08-21 ENCOUNTER — TELEPHONE (OUTPATIENT)
Dept: HEALTH INFORMATION MANAGEMENT | Facility: OTHER | Age: 74
End: 2024-08-21

## 2024-08-21 ENCOUNTER — DOCUMENTATION (OUTPATIENT)
Dept: HEALTH INFORMATION MANAGEMENT | Facility: OTHER | Age: 74
End: 2024-08-21
Payer: MEDICARE

## 2024-08-25 NOTE — PROGRESS NOTES
[facility] Medicine Daily Progress Note    Date of Service  7/10/2021    Chief Complaint  Polo Pyle is a 71 y.o. male admitted 7/9/2021 with chest pain, shortness of breath, dyspnea on exertion, leg swelling    Hospital Course  71-year-old male with a past medical history of coronary artery disease, status post x5 stent last in 2005, COVID-19 (2/8/2021) on nocturnal oxygen at home, history of left leg endarterectomy and DVT was on Xarelto before, prior tobacco smoker, CHF, hypertension admitted 7/9/2021 for significant dyspnea on exertion, shortness of breath, leg swelling and chest pain.  Patient had symptoms since July 4, worsened in the past couple days.  Patient had seen his cardiologist today who sent the patient to the emergency room for chest pain.  Troponin 91, proBNP 6807.  ECG were showing T wave inversions.  Ultrasound of the lower extremity did show acute DVT on the right side, chronic on the left side.  CTA was positive for extensive PE.  Patient was started on heparin drip on admission.    Interval Problem Update  7/10 -- Patient was just assigned to my services at 10:25AM, spoke with previous Attending assigned this morning, Dr. Mitesh García with [facility], received sign-out.    Evaluated at 1030AM- patient stated he was not experiencing any chest pain or SOB while resting, but worsens when trying to ambulate.  He was using NC supplemental oxygen.    Patient is on heparin gtt since admission.   Spoke with several specialist including ICU Dr. Nicole 12:56PM. Given patient's improved NA score of 2, patient's stable vitals, at this time he did not recommend thrombectomy for the patient. Spoke with Dr. Meek (IR) and Dr. Hazel (cardiology) about patient case.    Echocardiogram was obtained  CONCLUSIONS (by Elvin Rabago M.D.)  Technically difficult due to body habitus and positioning but adequate   study for interpretation.   Mildly reduced left ventricular systolic function.  Left  [FreeTextEntry1] : occ dizzy spells bp considerably better given cmy would change to toprol xl 25 ventricular ejection fraction is visually estimated to be 50%.  Paradoxical septal motion.  Mildly dilated right ventricle.  Reduced right ventricular systolic function.  Hypokinetic right ventricular free wall.  Right ventricular systolic pressure is estimated to be 28 mmHg.    5:00PM - spoke with patient's family members, updated them on the patient's case, discussions with specialist.   Spoke with Dr. Hazel again based on his updated note.  Called Dr. Nicole about patient's case, reiterated patient not requiring EKOS at this time.  No note written in chart.    I have personally seen and examined the patient at bedside. I discussed the plan of care with patient, bedside RN, charge RN, , pharmacy and cardiology, critical care and IR.    Consultants/Specialty  cardiology, critical care and IR    Code Status  Full Code    Disposition  Patient is not medically cleared.   Anticipate discharge to Unclear at this time.  I have placed the appropriate orders for post-discharge needs.    Review of Systems  Review of Systems   Constitutional: Negative for chills, fever and malaise/fatigue.   Respiratory: Positive for shortness of breath. Negative for cough.    Cardiovascular: Positive for leg swelling. Negative for chest pain and palpitations.   Gastrointestinal: Negative for abdominal pain, constipation, diarrhea, nausea and vomiting.   Musculoskeletal: Negative for back pain and joint pain.        Leg pain   Neurological: Negative for dizziness and headaches.   All other systems reviewed and are negative.       Physical Exam  Temp:  [36.1 °C (96.9 °F)-36.9 °C (98.5 °F)] 36.1 °C (96.9 °F)  Pulse:  [72-96] 72  Resp:  [16-20] 18  BP: ()/() 165/104  SpO2:  [91 %-98 %] 98 %    Physical Exam  Vitals and nursing note reviewed.   Constitutional:       Appearance: He is obese. He is ill-appearing. He is not diaphoretic.   Cardiovascular:      Rate and Rhythm: Normal rate and regular rhythm.       Pulses: Normal pulses.      Heart sounds: No murmur heard.     Pulmonary:      Effort: Respiratory distress present.      Comments: Tachypnea  Using supplemental oxygen  Abdominal:      General: Bowel sounds are normal. There is no distension.      Palpations: Abdomen is soft.   Musculoskeletal:         General: No swelling or tenderness. Normal range of motion.   Skin:     General: Skin is warm.      Capillary Refill: Capillary refill takes less than 2 seconds.      Coloration: Skin is not jaundiced or pale.      Comments: BLE evidence of Venous stasis   Neurological:      General: No focal deficit present.      Mental Status: He is alert and oriented to person, place, and time. Mental status is at baseline.   Psychiatric:         Mood and Affect: Mood normal.         Behavior: Behavior normal.         Thought Content: Thought content normal.         Judgment: Judgment normal.         Fluids    Intake/Output Summary (Last 24 hours) at 7/10/2021 1805  Last data filed at 7/10/2021 0900  Gross per 24 hour   Intake --   Output 200 ml   Net -200 ml       Laboratory  Recent Labs     07/09/21  1830   WBC 9.8   RBC 4.97   HEMOGLOBIN 15.3   HEMATOCRIT 46.6   MCV 93.8   MCH 30.8   MCHC 32.8*   RDW 47.1   PLATELETCT 127*   MPV 10.1     Recent Labs     07/09/21  1830   SODIUM 141   POTASSIUM 4.5   CHLORIDE 107   CO2 20   GLUCOSE 112*   BUN 22   CREATININE 1.34   CALCIUM 9.2     Recent Labs     07/10/21  0035   APTT 32.0   INR 1.22*               Imaging  EC-ECHOCARDIOGRAM COMPLETE W/ CONT   Final Result      CT-CTA CHEST PULMONARY ARTERY W/ RECONS   Final Result         1. Extensive acute bilateral pulmonary emboli.      2. Elevation of the RV/LV ratio.      3. Small wedge-shaped opacity in the left lower lobe, likely infarct.      4. Moderate hiatal hernia.      CRITICAL RESULT READ BACK: Preliminary findings discussed with and critical read back performed by Dr. Giang via telephone on 7/10/2021 5:19 AM         -EXTREMITY  VENOUS LOWER BILAT   Final Result      DX-CHEST-PORTABLE (1 VIEW)   Final Result      Probable mild pulmonary interstitial edema and the cardiac silhouette is enlarged      IR-CONSULT AND TREAT    (Results Pending)        Assessment/Plan  * Other chest pain- (present on admission)  Assessment & Plan  PREETI score around 6  Typical chest pain on exertion with changes in the EKG; T inversion on anterior leads  Troponin elevation better explained due to PE.  Cardiology was consulted, patient is not a candidate for catheterization or stress test at this time due to his acute PE.  Chest pain due to acute extensive bilateral PE    Continue Heparin drip    PE (pulmonary thromboembolism) (HCC)- (present on admission)  Assessment & Plan  CT confirmed acute extensive bilateral pulmonary embolism  Spoke with ICU about patient, currently not a candidate for thrombectomy.   Patient is currently stable, blood pressure is maintaining and not hypotensive.  Patient is not experiencing pleuritic or other chest pains.      -Continue heparin drip  -We will need to transition to oral anticoagulation, patient has had Xarelto in the past  -I have spoken directly with critical care, cardiologist, interventional radiologist.  At this time patient will be managed with heparin drip and supportive measures.    Additional time spent on patient's case.  More than 50% of the time was spent face to face with patient, speaking with patient and family on updates, treatment goals, risks and benefits of treatment, potential discharge planning.  - spoke with patient's family members, updated them on the patient's case, discussions with specialist.   Spoke with Dr. Hazel again based on his updated note, suggesting patient is in cardiogenic shock, needing urgent procedure.  Called Dr. Nicole about patient's case, he reiterated patient not requiring EKOS at this time.  At this time patient to remain on telemetry based on NA score 4, stage II, 18% PE  related complications in 30 days, 6.8% PE related mortality in 30 days.  After speaking with both parties, I have urged the specialists to speak to each other to help come to a conclusion.  Start time 5:00PM End time 5:54PM     Acute DVT (deep venous thrombosis) ([facility])- (present on admission)  Assessment & Plan  Patient has history of DVT on the left side years ago. Patient was on Xarelto before, he mentioned he was on a triangle shaped medication, pt is color blind and does not know if it was red colored pill.    D-dimer was very elevated, repeat ultrasound showed acute DVT on the right side  CTA CONFIRMED PE  Continue heparin drip and switch to oral later  Patient is not a candidate for thrombectomy, Veronica score 2 after improvement    Acute respiratory failure ([facility])- (present on admission)  Assessment & Plan  Using oxygen 2 L nasal cannula, due to previous Covid infection.  Presenting with lower extremity edema, crackles, and reports of ALICIA.  Chest x-ray showed pulmonary edema  Elevated BNP  Due to acute extensive bilateral PE  -Continue oxygen supplementation    MIKAYLA (acute kidney injury) ([facility])- (present on admission)  Assessment & Plan  Came with cr 1.34 and 0.6  Due to acute process with VTE    History of COVID-19- (present on admission)  Assessment & Plan  COVID-19 (2/8/2021) on nocturnal oxygen at home  Will need home oxygen evaluation      Obesity (BMI 35.0-39.9 without comorbidity) ([facility])- (present on admission)  Assessment & Plan  Recommending patient to follow up with their PCP on weight management plan  Recommending polysomnography to PCP given elevated BMI  Counseled patient on weight control, diet management, following up with PCP    Essential hypertension- (present on admission)  Assessment & Plan  Continue metoprolol and lisinopril due to possible heart failure  Close monitoring and adjust the dose if needed  Consider amlodipine    Coronary artery disease due to lipid rich plaque- (present on  admission)  Assessment & Plan  History of coronary artery disease with stent more than 10 years ago  We will continue patient on rosuvastatin, aspirin       VTE prophylaxis: therapeutic anticoagulation with heparin drip    I have performed a physical exam and reviewed and updated ROS and Plan today (7/10/2021). In review of yesterday's note (7/9/2021), there are no changes except as documented above.

## 2024-09-19 DIAGNOSIS — E78.5 DYSLIPIDEMIA: ICD-10-CM

## 2024-09-19 RX ORDER — ROSUVASTATIN CALCIUM 20 MG/1
20 TABLET, COATED ORAL EVERY EVENING
Qty: 100 TABLET | Refills: 0 | Status: SHIPPED | OUTPATIENT
Start: 2024-09-19

## 2024-09-19 NOTE — TELEPHONE ENCOUNTER
Is the patient due for a refill? Yes    Was the patient seen the past year? No    Date of last office visit: 9/18/23    Does the patient have an upcoming appointment?  No    Provider to refill:LH    Does the patient have [facility] and need 100-day supply? (This applies to ALL medications) Yes, quantity updated to 100 days

## 2024-09-23 ENCOUNTER — OFFICE VISIT (OUTPATIENT)
Dept: MEDICAL GROUP | Facility: MEDICAL CENTER | Age: 74
End: 2024-09-23
Payer: MEDICARE

## 2024-09-23 VITALS
HEART RATE: 80 BPM | HEIGHT: 68 IN | BODY MASS INDEX: 40.16 KG/M2 | RESPIRATION RATE: 15 BRPM | DIASTOLIC BLOOD PRESSURE: 68 MMHG | OXYGEN SATURATION: 98 % | WEIGHT: 265 LBS | SYSTOLIC BLOOD PRESSURE: 130 MMHG | TEMPERATURE: 98 F

## 2024-09-23 DIAGNOSIS — G89.29 CHRONIC PAIN OF LEFT KNEE: ICD-10-CM

## 2024-09-23 DIAGNOSIS — M25.562 CHRONIC PAIN OF LEFT KNEE: ICD-10-CM

## 2024-09-23 DIAGNOSIS — R31.9 HEMATURIA, UNSPECIFIED TYPE: ICD-10-CM

## 2024-09-23 DIAGNOSIS — R39.9 UTI SYMPTOMS: ICD-10-CM

## 2024-09-23 LAB
APPEARANCE UR: NORMAL
BILIRUB UR STRIP-MCNC: NORMAL MG/DL
COLOR UR AUTO: NORMAL
GLUCOSE UR STRIP.AUTO-MCNC: NEGATIVE MG/DL
KETONES UR STRIP.AUTO-MCNC: NORMAL MG/DL
LEUKOCYTE ESTERASE UR QL STRIP.AUTO: NORMAL
NITRITE UR QL STRIP.AUTO: POSITIVE
PH UR STRIP.AUTO: 5.5 [PH] (ref 5–8)
PROT UR QL STRIP: 100 MG/DL
RBC UR QL AUTO: NORMAL
SP GR UR STRIP.AUTO: 1.03
UROBILINOGEN UR STRIP-MCNC: 2 MG/DL

## 2024-09-23 PROCEDURE — 3078F DIAST BP <80 MM HG: CPT | Performed by: FAMILY MEDICINE

## 2024-09-23 PROCEDURE — 3075F SYST BP GE 130 - 139MM HG: CPT | Performed by: FAMILY MEDICINE

## 2024-09-23 PROCEDURE — 99214 OFFICE O/P EST MOD 30 MIN: CPT | Performed by: FAMILY MEDICINE

## 2024-09-23 PROCEDURE — 81002 URINALYSIS NONAUTO W/O SCOPE: CPT | Performed by: FAMILY MEDICINE

## 2024-09-23 RX ORDER — CEPHALEXIN 500 MG/1
500 CAPSULE ORAL 2 TIMES DAILY
Qty: 14 CAPSULE | Refills: 0 | Status: SHIPPED | OUTPATIENT
Start: 2024-09-23

## 2024-09-23 ASSESSMENT — FIBROSIS 4 INDEX: FIB4 SCORE: 1.23

## 2024-09-23 NOTE — PROGRESS NOTES
CC: Increased urinary frequency/burning urination/blood in the urine    HPI:   Polo presents today because he has been having intermittent chills and fever.  Patient had it 2 weeks ago, it went away, had another episode was a week ago then it went away.  Currently is asymptomatic but she has been having blood in the urine for the past 7 days.  Has mild burning urination and increased frequency.  No blood in the urine is not continuous, it has been on and off.      Patient was seen by BLAISE, advised knee replacement.  Patient wants a second opinion, wants to be referred to [facility] orthopedics    Patient Active Problem List    Diagnosis Date Noted    Secondary hypercoagulable state ([facility]) 10/10/2022    Chronic pulmonary embolism without acute cor pulmonale ([facility]) 06/24/2022    Body mass index (BMI) 40.0-44.9, adult ([facility]) 04/29/2022    Morbid (severe) obesity due to excess calories ([facility]) 04/29/2022    Long term (current) use of anticoagulants 04/29/2022    Old MI (myocardial infarction) 04/29/2022    Aortic atherosclerosis ([facility]) 04/29/2022    Other chest pain 07/10/2021    MIKAYLA (acute kidney injury) ([facility]) 07/10/2021    Acute DVT (deep venous thrombosis) ([facility]) 07/10/2021    White coat syndrome without hypertension     Abdominal aortic aneurysm without rupture ([facility]) 03/22/2021    Thrombus 02/12/2021    Acute respiratory failure ([facility]) 02/11/2021    Benign prostatic hyperplasia with urinary hesitancy 02/11/2021    Advanced care planning/counseling discussion 02/11/2021    Elevated d-dimer 02/11/2021    History of COVID-19 02/10/2021    Acute cystitis 02/10/2021    Iron deficiency anemia due to chronic blood loss 11/25/2019    Obesity (BMI 35.0-39.9 without comorbidity) ([facility]) 08/13/2019    Peripheral artery disease ([facility]) 08/01/2019    Essential hypertension     Presence of stent in LAD coronary artery     Presence of stent in left circumflex coronary artery     Dyslipidemia 08/26/2011    Coronary artery disease due to lipid  "rich plaque 08/26/2011       Current Outpatient Medications   Medication Sig Dispense Refill    cephALEXin (KEFLEX) 500 MG Cap Take 1 Capsule by mouth 2 times a day. 14 Capsule 0    rosuvastatin (CRESTOR) 20 MG Tab Take 1 Tablet by mouth every evening. 100 Tablet 0    rivaroxaban (XARELTO) 20 MG Tab tablet Take 1 Tablet by mouth with dinner. PLEASE CALL [phone] TO SCHEDULE A FOLLOW UP VISIT WITH YOUR CARDIOLOGY PROVIDER TO ENSURE FURTHER REFILLS. 30 Tablet 12    acetaminophen (TYLENOL) 500 MG Tab Take 500-1,000 mg by mouth every 6 hours as needed.      Iron-Vitamin C (IRON 100/C PO) Take  by mouth.      VITAMIN D PO Take 1 capsule by mouth every day.      Coenzyme Q10 (CO Q-10) 300 MG Cap Take 1 Cap by mouth every day.      Ascorbic Acid (VITAMIN C PO) Take 6,000 mg by mouth every day.      Multiple Vitamin (MULTIVITAMIN PO) Take 1 Tab by mouth every day.      nitrofurantoin (MACROBID) 100 MG Cap Take 1 Capsule by mouth 2 times a day. (Patient not taking: Reported on 4/19/2024) 14 Capsule 0     No current facility-administered medications for this visit.         Allergies as of 09/23/2024 - Reviewed 09/23/2024   Allergen Reaction Noted    Mercury Swelling 11/01/2010    Other environmental Anaphylaxis 11/23/2010    Sulfa drugs Anaphylaxis and Swelling 11/01/2010    Bee Anaphylaxis, Hives, Itching, Palpitations, Rash, Runny Nose, Shortness of Breath, and Swelling 10/10/2022    Pravastatin Diarrhea 04/03/2020        ROS: Denies any chest pain, Shortness of breath, Changes bowel or bladder, Lower extremity edema.    Physical Exam:  /68   Pulse 80   Temp 36.7 °C (98 °F)   Resp 15   Ht 1.727 m (5' 8\")   Wt 120 kg (265 lb)   SpO2 98%   BMI 40.29 kg/m²   Gen.: Well-developed, well-nourished, no apparent distress,pleasant and cooperative with the examination  Skin:  Warm and dry with good turgor. No rashes or suspicious lesions in visible areas  HEENT:Sinuses nontender with palpation, TMs clear, nares " patent with pink mucosa and clear rhinorrhea,no septal deviation ,polyps or lesions. lips without lesions, oropharynx clear.  Neck: Trachea midline,no masses or adenopathy. No JVD.  Cor: Regular rate and rhythm without murmur, gallop or rub.  Lungs: Respirations unlabored.Clear to auscultation with equal breath sounds bilaterally. No wheezes, rhonchi.  Extremities: No cyanosis, clubbing or edema.  Left knee: Decreased range of motion, mild swelling and tenderness.        Assessment and Plan.   74 y.o. male     1. UTI symptoms  Urinalysis showed mild sign of infection(trace leukocyte, positive nitrite)  I will patient Twice a day for a week    - POCT Urinalysis  - cephALEXin (KEFLEX) 500 MG Cap; Take 1 Capsule by mouth 2 times a day.  Dispense: 14 Capsule; Refill: 0    2. Hematuria, unspecified type  Blood in the urine for a week, I will send patient to urology for possible cystoscopy    - Referral to Urology    3. Chronic pain of left knee  Was seen by BLAISE, advised knee replacement.  Patient wants a second opinion, wants to be referred to [facility] orthopedics    - Referral to Orthopedics

## 2024-10-10 DIAGNOSIS — I26.99 PE (PULMONARY THROMBOEMBOLISM) (HCC): ICD-10-CM

## 2024-10-10 DIAGNOSIS — I27.82 CHRONIC PULMONARY EMBOLISM WITHOUT ACUTE COR PULMONALE, UNSPECIFIED PULMONARY EMBOLISM TYPE (HCC): ICD-10-CM

## 2024-10-10 DIAGNOSIS — I82.419 ACUTE DEEP VEIN THROMBOSIS (DVT) OF FEMORAL VEIN, UNSPECIFIED LATERALITY (HCC): ICD-10-CM

## 2024-10-12 DIAGNOSIS — I27.82 CHRONIC PULMONARY EMBOLISM WITHOUT ACUTE COR PULMONALE, UNSPECIFIED PULMONARY EMBOLISM TYPE (HCC): ICD-10-CM

## 2024-10-12 DIAGNOSIS — I82.419 ACUTE DEEP VEIN THROMBOSIS (DVT) OF FEMORAL VEIN, UNSPECIFIED LATERALITY (HCC): ICD-10-CM

## 2024-10-12 DIAGNOSIS — I26.99 PE (PULMONARY THROMBOEMBOLISM) (HCC): ICD-10-CM

## 2024-10-14 RX ORDER — RIVAROXABAN 20 MG/1
20 TABLET, FILM COATED ORAL
Qty: 60 TABLET | Refills: 0 | OUTPATIENT
Start: 2024-10-14

## 2024-10-17 ENCOUNTER — TELEPHONE (OUTPATIENT)
Dept: CARDIOLOGY | Facility: MEDICAL CENTER | Age: 74
End: 2024-10-17
Payer: MEDICARE

## 2024-10-23 ENCOUNTER — TELEPHONE (OUTPATIENT)
Dept: CARDIOLOGY | Facility: MEDICAL CENTER | Age: 74
End: 2024-10-23
Payer: MEDICARE

## 2024-10-28 ENCOUNTER — HOSPITAL ENCOUNTER (OUTPATIENT)
Facility: MEDICAL CENTER | Age: 74
End: 2024-10-28
Attending: PHYSICIAN ASSISTANT
Payer: MEDICARE

## 2024-10-28 ENCOUNTER — OFFICE VISIT (OUTPATIENT)
Dept: UROLOGY | Facility: MEDICAL CENTER | Age: 74
End: 2024-10-28
Payer: MEDICARE

## 2024-10-28 DIAGNOSIS — R31.0 GROSS HEMATURIA: Primary | ICD-10-CM

## 2024-10-28 DIAGNOSIS — R31.0 GROSS HEMATURIA: ICD-10-CM

## 2024-10-28 DIAGNOSIS — N39.0 FREQUENT UTI: ICD-10-CM

## 2024-10-28 DIAGNOSIS — N40.1 BPH WITH LOWER URINARY TRACT SYMPTOMS WITHOUT URINARY OBSTRUCTION: ICD-10-CM

## 2024-10-28 LAB
APPEARANCE UR: NORMAL
BILIRUB UR STRIP-MCNC: NORMAL MG/DL
COLOR UR AUTO: NORMAL
CYTOLOGY REG CYTOL: NORMAL
GLUCOSE UR STRIP.AUTO-MCNC: NORMAL MG/DL
KETONES UR STRIP.AUTO-MCNC: NORMAL MG/DL
LEUKOCYTE ESTERASE UR QL STRIP.AUTO: NORMAL
NITRITE UR QL STRIP.AUTO: POSITIVE
PH UR STRIP.AUTO: 5.5 [PH] (ref 5–8)
POC POST-VOID: 43 ML
POC PRE-VOID: NORMAL
PROT UR QL STRIP: NORMAL MG/DL
RBC UR QL AUTO: NORMAL
SP GR UR STRIP.AUTO: >=1.03
UROBILINOGEN UR STRIP-MCNC: 1 MG/DL

## 2024-10-28 PROCEDURE — 81015 MICROSCOPIC EXAM OF URINE: CPT

## 2024-10-28 PROCEDURE — 87186 SC STD MICRODIL/AGAR DIL: CPT

## 2024-10-28 PROCEDURE — 81003 URINALYSIS AUTO W/O SCOPE: CPT

## 2024-10-28 PROCEDURE — 87086 URINE CULTURE/COLONY COUNT: CPT

## 2024-10-28 PROCEDURE — 87077 CULTURE AEROBIC IDENTIFY: CPT

## 2024-10-28 RX ORDER — ALFUZOSIN HYDROCHLORIDE 10 MG/1
10 TABLET, EXTENDED RELEASE ORAL DAILY
Qty: 30 TABLET | Refills: 2 | Status: SHIPPED | OUTPATIENT
Start: 2024-10-28 | End: 2024-10-28

## 2024-10-28 RX ORDER — ALFUZOSIN HYDROCHLORIDE 10 MG/1
10 TABLET, EXTENDED RELEASE ORAL DAILY
Qty: 90 TABLET | Refills: 3 | Status: SHIPPED | OUTPATIENT
Start: 2024-10-28

## 2024-10-29 LAB
APPEARANCE UR: ABNORMAL
BACTERIA #/AREA URNS HPF: ABNORMAL /HPF
BILIRUB UR QL STRIP.AUTO: NEGATIVE
CASTS URNS QL MICRO: ABNORMAL /LPF (ref 0–2)
COLOR UR: ABNORMAL
EPITHELIAL CELLS 1715: ABNORMAL /HPF (ref 0–5)
GLUCOSE UR STRIP.AUTO-MCNC: NEGATIVE MG/DL
KETONES UR STRIP.AUTO-MCNC: NEGATIVE MG/DL
LEUKOCYTE ESTERASE UR QL STRIP.AUTO: ABNORMAL
MICRO URNS: ABNORMAL
NITRITE UR QL STRIP.AUTO: POSITIVE
PH UR STRIP.AUTO: 5.5 [PH] (ref 5–8)
PROT UR QL STRIP: NEGATIVE MG/DL
RBC # URNS HPF: ABNORMAL /HPF (ref 0–2)
RBC UR QL AUTO: NEGATIVE
SP GR UR STRIP.AUTO: 1.03
UROBILINOGEN UR STRIP.AUTO-MCNC: 1 EU/DL
WBC #/AREA URNS HPF: ABNORMAL /HPF

## 2024-11-04 ENCOUNTER — TELEPHONE (OUTPATIENT)
Dept: UROLOGY | Facility: MEDICAL CENTER | Age: 74
End: 2024-11-04
Payer: MEDICARE

## 2024-11-04 DIAGNOSIS — N39.0 ACUTE UTI: ICD-10-CM

## 2024-11-04 DIAGNOSIS — R39.9 UTI SYMPTOMS: ICD-10-CM

## 2024-11-04 RX ORDER — CEPHALEXIN 500 MG/1
500 CAPSULE ORAL 2 TIMES DAILY
Qty: 14 CAPSULE | Refills: 0 | Status: SHIPPED | OUTPATIENT
Start: 2024-11-04

## 2024-11-04 NOTE — TELEPHONE ENCOUNTER
Called patient to discuss urine culture results. Would like to confirm that he is still not symptomatic.     Jo Ann Lacey PA-C

## 2024-11-05 NOTE — TELEPHONE ENCOUNTER
Spoke to patient regarding results of urine culture. He grew >100 K Klebsiella that is pansensitive. He denies any dysuria, gross hematuria (although he is color blind), foul smelling urine and general feelings of being unwell. He does have ongoing frequency, but has complained of this for months.    He started tamsulosin and did have dizziness the morning after his first dose. Since then, he has been tolerating it well.    Will treat UTI with Keflex 500 mg BID x7 days.     Jo Ann Lacey PA-C

## 2024-11-07 ENCOUNTER — PATIENT MESSAGE (OUTPATIENT)
Dept: UROLOGY | Facility: MEDICAL CENTER | Age: 74
End: 2024-11-07
Payer: MEDICARE

## 2024-12-09 ENCOUNTER — APPOINTMENT (OUTPATIENT)
Dept: MEDICAL GROUP | Facility: MEDICAL CENTER | Age: 74
End: 2024-12-09
Payer: MEDICARE

## 2025-01-14 DIAGNOSIS — E78.5 DYSLIPIDEMIA: ICD-10-CM

## 2025-01-14 RX ORDER — ROSUVASTATIN CALCIUM 20 MG/1
20 TABLET, COATED ORAL EVERY EVENING
Qty: 100 TABLET | Refills: 0 | OUTPATIENT
Start: 2025-01-14

## 2025-01-14 NOTE — TELEPHONE ENCOUNTER
Refill request refused. Pt needs follow up visit, already received courtesy refill. Routed to scheduling to reach out to patient to schedule office visit and [email or web address] message sent to patient.

## 2025-01-23 SDOH — ECONOMIC STABILITY: FOOD INSECURITY: WITHIN THE PAST 12 MONTHS, THE FOOD YOU BOUGHT JUST DIDN'T LAST AND YOU DIDN'T HAVE MONEY TO GET MORE.: NEVER TRUE

## 2025-01-23 SDOH — HEALTH STABILITY: PHYSICAL HEALTH: ON AVERAGE, HOW MANY MINUTES DO YOU ENGAGE IN EXERCISE AT THIS LEVEL?: 30 MIN

## 2025-01-23 SDOH — ECONOMIC STABILITY: INCOME INSECURITY: IN THE LAST 12 MONTHS, WAS THERE A TIME WHEN YOU WERE NOT ABLE TO PAY THE MORTGAGE OR RENT ON TIME?: NO

## 2025-01-23 SDOH — HEALTH STABILITY: MENTAL HEALTH
STRESS IS WHEN SOMEONE FEELS TENSE, NERVOUS, ANXIOUS, OR CAN'T SLEEP AT NIGHT BECAUSE THEIR MIND IS TROUBLED. HOW STRESSED ARE YOU?: NOT AT ALL

## 2025-01-23 SDOH — HEALTH STABILITY: PHYSICAL HEALTH: ON AVERAGE, HOW MANY DAYS PER WEEK DO YOU ENGAGE IN MODERATE TO STRENUOUS EXERCISE (LIKE A BRISK WALK)?: 5 DAYS

## 2025-01-23 SDOH — ECONOMIC STABILITY: FOOD INSECURITY: WITHIN THE PAST 12 MONTHS, YOU WORRIED THAT YOUR FOOD WOULD RUN OUT BEFORE YOU GOT MONEY TO BUY MORE.: NEVER TRUE

## 2025-01-23 SDOH — ECONOMIC STABILITY: HOUSING INSECURITY
IN THE LAST 12 MONTHS, WAS THERE A TIME WHEN YOU DID NOT HAVE A STEADY PLACE TO SLEEP OR SLEPT IN A SHELTER (INCLUDING NOW)?: NO

## 2025-01-23 SDOH — ECONOMIC STABILITY: INCOME INSECURITY: HOW HARD IS IT FOR YOU TO PAY FOR THE VERY BASICS LIKE FOOD, HOUSING, MEDICAL CARE, AND HEATING?: NOT VERY HARD

## 2025-01-23 SDOH — ECONOMIC STABILITY: TRANSPORTATION INSECURITY
IN THE PAST 12 MONTHS, HAS LACK OF RELIABLE TRANSPORTATION KEPT YOU FROM MEDICAL APPOINTMENTS, MEETINGS, WORK OR FROM GETTING THINGS NEEDED FOR DAILY LIVING?: NO

## 2025-01-23 ASSESSMENT — SOCIAL DETERMINANTS OF HEALTH (SDOH)
HOW OFTEN DO YOU GET TOGETHER WITH FRIENDS OR RELATIVES?: PATIENT DECLINED
HOW MANY DRINKS CONTAINING ALCOHOL DO YOU HAVE ON A TYPICAL DAY WHEN YOU ARE DRINKING: PATIENT DECLINED
HOW OFTEN DO YOU ATTEND CHURCH OR RELIGIOUS SERVICES?: NEVER
IN A TYPICAL WEEK, HOW MANY TIMES DO YOU TALK ON THE PHONE WITH FAMILY, FRIENDS, OR NEIGHBORS?: MORE THAN THREE TIMES A WEEK
WITHIN THE PAST 12 MONTHS, YOU WORRIED THAT YOUR FOOD WOULD RUN OUT BEFORE YOU GOT THE MONEY TO BUY MORE: NEVER TRUE
IN A TYPICAL WEEK, HOW MANY TIMES DO YOU TALK ON THE PHONE WITH FAMILY, FRIENDS, OR NEIGHBORS?: MORE THAN THREE TIMES A WEEK
DO YOU BELONG TO ANY CLUBS OR ORGANIZATIONS SUCH AS CHURCH GROUPS UNIONS, FRATERNAL OR ATHLETIC GROUPS, OR SCHOOL GROUPS?: YES
HOW OFTEN DO YOU HAVE SIX OR MORE DRINKS ON ONE OCCASION: NEVER
HOW HARD IS IT FOR YOU TO PAY FOR THE VERY BASICS LIKE FOOD, HOUSING, MEDICAL CARE, AND HEATING?: NOT VERY HARD
HOW OFTEN DO YOU GET TOGETHER WITH FRIENDS OR RELATIVES?: PATIENT DECLINED
HOW OFTEN DO YOU ATTENT MEETINGS OF THE CLUB OR ORGANIZATION YOU BELONG TO?: PATIENT DECLINED
HOW OFTEN DO YOU ATTENT MEETINGS OF THE CLUB OR ORGANIZATION YOU BELONG TO?: PATIENT DECLINED
DO YOU BELONG TO ANY CLUBS OR ORGANIZATIONS SUCH AS CHURCH GROUPS UNIONS, FRATERNAL OR ATHLETIC GROUPS, OR SCHOOL GROUPS?: YES
HOW OFTEN DO YOU ATTEND CHURCH OR RELIGIOUS SERVICES?: NEVER
IN THE PAST 12 MONTHS, HAS THE ELECTRIC, GAS, OIL, OR WATER COMPANY THREATENED TO SHUT OFF SERVICE IN YOUR HOME?: NO
HOW OFTEN DO YOU HAVE A DRINK CONTAINING ALCOHOL: MONTHLY OR LESS

## 2025-01-23 ASSESSMENT — LIFESTYLE VARIABLES
AUDIT-C TOTAL SCORE: -1
HOW OFTEN DO YOU HAVE SIX OR MORE DRINKS ON ONE OCCASION: NEVER
HOW OFTEN DO YOU HAVE A DRINK CONTAINING ALCOHOL: MONTHLY OR LESS
HOW MANY STANDARD DRINKS CONTAINING ALCOHOL DO YOU HAVE ON A TYPICAL DAY: PATIENT DECLINED
SKIP TO QUESTIONS 9-10: 0

## 2025-01-24 ENCOUNTER — OFFICE VISIT (OUTPATIENT)
Dept: MEDICAL GROUP | Facility: MEDICAL CENTER | Age: 75
End: 2025-01-24
Payer: MEDICARE

## 2025-01-24 VITALS
TEMPERATURE: 97.5 F | HEIGHT: 68 IN | WEIGHT: 273.2 LBS | DIASTOLIC BLOOD PRESSURE: 74 MMHG | HEART RATE: 90 BPM | OXYGEN SATURATION: 98 % | SYSTOLIC BLOOD PRESSURE: 128 MMHG | BODY MASS INDEX: 41.4 KG/M2

## 2025-01-24 DIAGNOSIS — Z86.718 HISTORY OF DVT (DEEP VEIN THROMBOSIS): ICD-10-CM

## 2025-01-24 DIAGNOSIS — I26.99 PE (PULMONARY THROMBOEMBOLISM) (HCC): ICD-10-CM

## 2025-01-24 DIAGNOSIS — G89.29 CHRONIC PAIN OF LEFT KNEE: ICD-10-CM

## 2025-01-24 DIAGNOSIS — Z76.89 ENCOUNTER TO ESTABLISH CARE: ICD-10-CM

## 2025-01-24 DIAGNOSIS — E66.01 SEVERE OBESITY (HCC): ICD-10-CM

## 2025-01-24 DIAGNOSIS — E66.01 MORBID OBESITY WITH BMI OF 40.0-44.9, ADULT (HCC): ICD-10-CM

## 2025-01-24 DIAGNOSIS — Z79.01 LONG TERM (CURRENT) USE OF ANTICOAGULANTS: ICD-10-CM

## 2025-01-24 DIAGNOSIS — I27.82 CHRONIC PULMONARY EMBOLISM WITHOUT ACUTE COR PULMONALE, UNSPECIFIED PULMONARY EMBOLISM TYPE (HCC): ICD-10-CM

## 2025-01-24 DIAGNOSIS — M25.562 CHRONIC PAIN OF LEFT KNEE: ICD-10-CM

## 2025-01-24 DIAGNOSIS — I10 ESSENTIAL HYPERTENSION: ICD-10-CM

## 2025-01-24 DIAGNOSIS — D68.69 SECONDARY HYPERCOAGULABLE STATE (HCC): ICD-10-CM

## 2025-01-24 DIAGNOSIS — E78.5 DYSLIPIDEMIA: ICD-10-CM

## 2025-01-24 PROBLEM — Z86.16 HISTORY OF COVID-19: Status: RESOLVED | Noted: 2021-02-10 | Resolved: 2025-01-24

## 2025-01-24 PROBLEM — N30.00 ACUTE CYSTITIS: Status: RESOLVED | Noted: 2021-02-10 | Resolved: 2025-01-24

## 2025-01-24 PROBLEM — N17.9 AKI (ACUTE KIDNEY INJURY) (HCC): Status: RESOLVED | Noted: 2021-07-10 | Resolved: 2025-01-24

## 2025-01-24 PROBLEM — R07.89 OTHER CHEST PAIN: Status: RESOLVED | Noted: 2021-07-10 | Resolved: 2025-01-24

## 2025-01-24 PROBLEM — I82.409 ACUTE DVT (DEEP VENOUS THROMBOSIS) (HCC): Status: RESOLVED | Noted: 2021-07-10 | Resolved: 2025-01-24

## 2025-01-24 PROBLEM — I82.90 THROMBUS: Status: RESOLVED | Noted: 2021-02-12 | Resolved: 2025-01-24

## 2025-01-24 PROBLEM — J96.00 ACUTE RESPIRATORY FAILURE (HCC): Status: RESOLVED | Noted: 2021-02-11 | Resolved: 2025-01-24

## 2025-01-24 PROBLEM — E66.9 OBESITY (BMI 35.0-39.9 WITHOUT COMORBIDITY): Status: RESOLVED | Noted: 2019-08-13 | Resolved: 2025-01-24

## 2025-01-24 PROBLEM — R79.89 ELEVATED D-DIMER: Status: RESOLVED | Noted: 2021-02-11 | Resolved: 2025-01-24

## 2025-01-24 PROCEDURE — 99214 OFFICE O/P EST MOD 30 MIN: CPT

## 2025-01-24 PROCEDURE — 3074F SYST BP LT 130 MM HG: CPT

## 2025-01-24 PROCEDURE — 3078F DIAST BP <80 MM HG: CPT

## 2025-01-24 ASSESSMENT — ENCOUNTER SYMPTOMS
SHORTNESS OF BREATH: 0
CHILLS: 0
COUGH: 0
FEVER: 0
PALPITATIONS: 0
ORTHOPNEA: 0

## 2025-01-24 ASSESSMENT — PATIENT HEALTH QUESTIONNAIRE - PHQ9: CLINICAL INTERPRETATION OF PHQ2 SCORE: 0

## 2025-01-24 ASSESSMENT — FIBROSIS 4 INDEX: FIB4 SCORE: 1.23

## 2025-01-24 NOTE — PROGRESS NOTES
Subjective:     Verbal consent was acquired by the patient to use [employer] ambient listening note generation during this visit Yes    CC: Establish Care      HPI:   Polo is a 74 y.o. male who presents today for:    History of Present Illness  The patient presents to [facility].    He is seeking a referral to an orthopedic specialist due to persistent left knee pain, which has been ongoing since 2017 or 2018. He was previously under the care of Dr. Martinez, who had referred him to [facility] in 09/2024. However, he did not receive any follow-up from them. He reports a lack of cartilage in the knee, as confirmed by an x-ray taken in 2017 or 2018. He also experiences occasional buckling of the knee.  He uses a cane for ambulation.  He has been receiving steroid injections every 4 to 6 months, but the duration of relief provided by these injections has been decreasing over time. He is now seeking a second opinion to explore alternative treatment options beyond cortisone injections.  He has been following up at [facility] currently.  He has a temporary handicap placard for his vehicle due to his knee condition, but he has not yet visited the motor vehicle department to finalize the process. He is requesting a form for a hanging placard, as he anticipates needing it only temporarily. He is considering using a wrap around his leg to prevent the knee from buckling.    He has a history of cardiac issues, including a minor myocardial infarction in 2006, but has not experienced any further cardiac events since then. Approximately 5 months post-COVID-19 infection, he was suspected to have heart failure, but it was later determined that he had pulmonary emboli. This led to the initiation of Xarelto therapy, which he continues to this day. He ran out of Xarelto yesterday and is scheduled to see a new cardiologist on Valentine's Day. He expresses a desire to discontinue Xarelto, as he does not wish to be on it  "indefinitely. 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  He has been advised to continue Xarelto, but he is seeking a more definitive answer from his new cardiologist, and if he needs to continue Xarelto lifelong.            Allergies: Mercury, Other environmental, Sulfa drugs, Bee, and Pravastatin     Medications:   Current Outpatient Medications:     rivaroxaban (XARELTO) 20 MG Tab tablet, Take 1 Tablet by mouth with dinner. PLEASE CALL [phone] TO SCHEDULE A FOLLOW UP VISIT WITH YOUR CARDIOLOGY PROVIDER TO ENSURE FURTHER REFILLS., Disp: 100 Tablet, Rfl: 0    alfuzosin (UROXATRAL) 10 MG SR tablet, TAKE 1 TABLET BY MOUTH EVERY DAY, Disp: 90 Tablet, Rfl: 3    rosuvastatin (CRESTOR) 20 MG Tab, Take 1 Tablet by mouth every evening., Disp: 100 Tablet, Rfl: 0    acetaminophen (TYLENOL) 500 MG Tab, Take 500-1,000 mg by mouth every 6 hours as needed., Disp: , Rfl:     Iron-Vitamin C (IRON 100/C PO), Take  by mouth., Disp: , Rfl:     VITAMIN D PO, Take 1 capsule by mouth every day., Disp: , Rfl:     Coenzyme Q10 (CO Q-10) 300 MG Cap, Take 1 Cap by mouth every day., Disp: , Rfl:     Ascorbic Acid (VITAMIN C PO), Take 6,000 mg by mouth every day., Disp: , Rfl:     Multiple Vitamin (MULTIVITAMIN PO), Take 1 Tab by mouth every day., Disp: , Rfl:       ROS:  Review of Systems   Constitutional:  Negative for chills and fever.   Respiratory:  Negative for cough and shortness of breath.    Cardiovascular:  Negative for chest pain, palpitations, orthopnea and leg swelling.   Musculoskeletal:  Positive for joint pain.       Objective:     Exam:  /74   Pulse 90   Temp 36.4 °C (97.5 °F) (Temporal)   Ht 1.727 m (5' 8\")   Wt 124 kg (273 lb 3.2 oz)   SpO2 98%   BMI 41.54 kg/m²  Body mass index is 41.54 kg/m².    Physical Exam  Constitutional:       Appearance: He is normal " weight.   Eyes:      Pupils: Pupils are equal, round, and reactive to light.   Cardiovascular:      Rate and Rhythm: Normal rate and regular rhythm.      Pulses: Normal pulses.      Heart sounds: Normal heart sounds.   Pulmonary:      Effort: Pulmonary effort is normal.      Breath sounds: Normal breath sounds.   Musculoskeletal:      Left knee: Bony tenderness present.   Neurological:      Mental Status: He is alert and oriented to person, place, and time.   Psychiatric:         Mood and Affect: Mood normal.         Behavior: Behavior normal.           Assessment & Plan:     Polo rodriguez 74 y.o. male with the following -     Assessment & Plan       1. Encounter to establish care    2. Chronic pain of left knee  Chronic, stable   A referral to [facility] will be initiated for a second opinion and further evaluation. A form for a temporary handicap placard has been provided, valid until April 2026.  - Referral to Orthopedics    3. Chronic pulmonary embolism without acute cor pulmonale, unspecified pulmonary embolism type (HCC)  4. Secondary hypercoagulable state (HCC)  5. PE (pulmonary thromboembolism) (HCC)  6. History of DVT (deep vein thrombosis)  7. Long term (current) use of anticoagulants  Chronic, stable  He experienced pulmonary emboli approximately 5 months after a COVID-19 infection in 2021 and has been on Xarelto since then. A refill for Xarelto will be provided until his cardiology appointment. If the cardiologist decides to continue Xarelto, a referral to vascular medicine can be made to determine if the patient needs lifelong anticoagulation.  - rivaroxaban (XARELTO) 20 MG Tab tablet; Take 1 Tablet by mouth with dinner. PLEASE CALL [phone] TO SCHEDULE A FOLLOW UP VISIT WITH YOUR CARDIOLOGY PROVIDER TO ENSURE FURTHER REFILLS.  Dispense: 100 Tablet; Refill: 0    8. Morbid obesity with BMI of 40.0-44.9, adult (HCC)  9. Severe obesity (HCC)  10. Body mass index (BMI) of 40.0 to 44.9 in adult  (HCC)  Chronic, stable  - Patient identified as having weight management issue.  Appropriate orders and counseling given.  - TSH WITH REFLEX TO FT4; Future  - Comp Metabolic Panel; Future  - Lipid Profile; Future  - CBC WITHOUT DIFFERENTIAL; Future    11. Essential hypertension  Chronic, stable  Blood pressure well-controlled today, 128/74.  - Comp Metabolic Panel; Future  - CBC WITHOUT DIFFERENTIAL; Future    12. Dyslipidemia  Chronic, stable  Recommend continue rosuvastatin 20 mg nightly.  Recommend following up with cardiology as directed.  Continue to work on diet and exercise to help with cholesterol management.  Due to recheck labs.  - Lipid Profile; Future        Anticipatory guidance included the following: Patient counseled about skin care, diet, supplements, smoking, drugs/alcohol use, safe sex and exercise.     Return in about 6 months (around 7/24/2025).    Please note that this dictation was created using voice recognition software. I have made every reasonable attempt to correct obvious errors, but I expect that there are errors of grammar and possibly content that I did not discover before finalizing the note.

## 2025-01-26 DIAGNOSIS — I27.82 CHRONIC PULMONARY EMBOLISM WITHOUT ACUTE COR PULMONALE, UNSPECIFIED PULMONARY EMBOLISM TYPE (HCC): ICD-10-CM

## 2025-01-26 DIAGNOSIS — I26.99 PE (PULMONARY THROMBOEMBOLISM) (HCC): ICD-10-CM

## 2025-01-26 DIAGNOSIS — Z86.718 HISTORY OF DVT (DEEP VEIN THROMBOSIS): ICD-10-CM

## 2025-01-27 RX ORDER — RIVAROXABAN 20 MG/1
20 TABLET, FILM COATED ORAL
Qty: 100 TABLET | Refills: 0 | OUTPATIENT
Start: 2025-01-27

## 2025-02-14 ENCOUNTER — OFFICE VISIT (OUTPATIENT)
Dept: CARDIOLOGY | Facility: MEDICAL CENTER | Age: 75
End: 2025-02-14
Payer: MEDICARE

## 2025-02-14 VITALS
DIASTOLIC BLOOD PRESSURE: 64 MMHG | OXYGEN SATURATION: 96 % | SYSTOLIC BLOOD PRESSURE: 138 MMHG | HEIGHT: 68 IN | WEIGHT: 268 LBS | HEART RATE: 73 BPM | BODY MASS INDEX: 40.62 KG/M2

## 2025-02-14 DIAGNOSIS — E66.01 MORBID OBESITY WITH BMI OF 40.0-44.9, ADULT (HCC): ICD-10-CM

## 2025-02-14 DIAGNOSIS — I25.10 CORONARY ARTERY DISEASE DUE TO LIPID RICH PLAQUE: ICD-10-CM

## 2025-02-14 DIAGNOSIS — R03.0 WHITE COAT SYNDROME WITHOUT HYPERTENSION: Chronic | ICD-10-CM

## 2025-02-14 DIAGNOSIS — Z79.01 LONG TERM (CURRENT) USE OF ANTICOAGULANTS: ICD-10-CM

## 2025-02-14 DIAGNOSIS — I70.0 AORTIC ATHEROSCLEROSIS (HCC): ICD-10-CM

## 2025-02-14 DIAGNOSIS — I71.43 INFRARENAL ABDOMINAL AORTIC ANEURYSM (AAA) WITHOUT RUPTURE (HCC): ICD-10-CM

## 2025-02-14 DIAGNOSIS — Z95.5 PRESENCE OF STENT IN LAD CORONARY ARTERY: ICD-10-CM

## 2025-02-14 DIAGNOSIS — Z95.5 PRESENCE OF STENT IN LEFT CIRCUMFLEX CORONARY ARTERY: Chronic | ICD-10-CM

## 2025-02-14 DIAGNOSIS — I25.83 CORONARY ARTERY DISEASE DUE TO LIPID RICH PLAQUE: ICD-10-CM

## 2025-02-14 DIAGNOSIS — I10 ESSENTIAL HYPERTENSION: Chronic | ICD-10-CM

## 2025-02-14 DIAGNOSIS — E78.5 DYSLIPIDEMIA: ICD-10-CM

## 2025-02-14 PROCEDURE — 3078F DIAST BP <80 MM HG: CPT

## 2025-02-14 PROCEDURE — 3075F SYST BP GE 130 - 139MM HG: CPT

## 2025-02-14 PROCEDURE — 99214 OFFICE O/P EST MOD 30 MIN: CPT

## 2025-02-14 PROCEDURE — 99212 OFFICE O/P EST SF 10 MIN: CPT

## 2025-02-14 ASSESSMENT — ENCOUNTER SYMPTOMS
GASTROINTESTINAL NEGATIVE: 1
CONSTITUTIONAL NEGATIVE: 1
PALPITATIONS: 0
ORTHOPNEA: 0
NERVOUS/ANXIOUS: 0
SHORTNESS OF BREATH: 0
PND: 0
EYES NEGATIVE: 1
DEPRESSION: 0
NEUROLOGICAL NEGATIVE: 1

## 2025-02-14 ASSESSMENT — FIBROSIS 4 INDEX: FIB4 SCORE: 1.23

## 2025-02-14 NOTE — PROGRESS NOTES
Chief Complaint   Patient presents with    Hypertension    Coronary Artery Disease     Fv dx:Coronary artery disease due to lipid rich plaque    Dyslipidemia       Subjective     Polo Pyle is a 74 y.o. male who presents today for follow up. 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     9/18/2023 he has been doing well besides back and joint pain. No symptoms of chest pain, palpitations, shortness of breath, exercise intolerance, or lower extremity edema.    2/14/2025 feeling really well other than some knee pains. He denies any acute CV symptoms.    Past Medical History:   Diagnosis Date    ASTHMA     as a youth    CAD (coronary artery disease) 8/26/2011    CATARACT     bilateral IOL    Colorblind     problems with red/green    Dental disorder     upper and partial lower    Essential hypertension     Heart burn     High cholesterol     Hyperlipidemia 8/26/2011    Indigestion     Infectious disease     Hx of staph infections- last infection 2018-     Myocardial infarct (HCC) 2005    STENTS    Pain 11/14/11    NECK 7.5/10; C5-C6    Personal history of venous thrombosis and embolism 2006    DVT 01/2019    Pneumonia 2009    Presence of stent in left circumflex coronary artery     Unspecified hemorrhagic conditions     TAKES ASA . PROLONGED BLEEDING, nosebleeds    Venous insufficiency of both lower extremities     White coat syndrome with diagnosis of hypertension     White coat syndrome without hypertension      Past Surgical History:   Procedure Laterality Date    FEMORAL ENDARTERECTOMY Left 09/20/2019    Procedure: ENDARTERECTOMY, FEMORAL;  Surgeon: Candelaria Crawford M.D.;  Location: [facility];  Service: General    VENTRAL HERNIA REPAIR  11/30/2011    Performed by TROY GONZALEZ at SURGERY [facility]    INGUINAL HERNIA REPAIR  11/30/2011    Performed by TROY GONZALEZ at  [facility]    CATARACT PHACO WITH IOL  2010    Performed by RM WILLIS at [facility]ERY [facility]    CATARACT PHACO WITH IOL  2010    Performed by RM WILLIS at [facility]ERY [facility]    OTHER CARDIAC SURGERY  2005    STENTS x 4    SINUS TREPHINE FRONTAL  1971    SINUSOTOMIES  1963    INGUINAL HERNIA REPAIR      WITH UNDESCENDED TESTICLE    TONSILLECTOMY AND ADENOIDECTOMY      EYE SURGERY       Family History   Problem Relation Age of Onset    Heart Attack Mother     Heart Disease Mother     Cancer Mother         LYKIMA     Dementia Mother     Hyperlipidemia Mother     Alcohol abuse Father     Cancer Maternal Grandfather     Stroke Maternal Grandmother      Social History     Socioeconomic History    Marital status:      Spouse name: Not on file    Number of children: Not on file    Years of education: Not on file    Highest education level: Bachelor's degree (e.g., BA, AB, BS)   Occupational History    Not on file   Tobacco Use    Smoking status: Every Day     Current packs/day: 0.00     Types: Cigarettes, Pipe, Cigars     Start date: 1965     Last attempt to quit: 2011     Years since quittin.2     Passive exposure: Never    Smokeless tobacco: Never    Tobacco comments:     I obtained lots of \"swag\" from Jerman Conrad/Currently smoke 2-4 cigarettes daily   Vaping Use    Vaping status: Never Used   Substance and Sexual Activity    Alcohol use: Not Currently    Drug use: No    Sexual activity: Not Currently     Partners: Female     Birth control/protection: Other-See Comments     Comment: Old age   Other Topics Concern    Not on file   Social History Narrative    Not on file     Social Drivers of Health     Financial Resource Strain: Low Risk  (2025)    Overall Financial Resource Strain (CARDIA)     Difficulty of Paying Living Expenses: Not very hard   Food Insecurity: No Food Insecurity (2025)    Hunger Vital Sign     Worried " "About Running Out of Food in the Last Year: Never true     Ran Out of Food in the Last Year: Never true   Transportation Needs: No Transportation Needs (1/23/2025)    PRAPARE - Transportation     Lack of Transportation (Medical): No     Lack of Transportation (Non-Medical): No   Physical Activity: Sufficiently Active (1/23/2025)    Exercise Vital Sign     Days of Exercise per Week: 5 days     Minutes of Exercise per Session: 30 min   Stress: No Stress Concern Present (1/23/2025)    [employer] [place] [employer] - Occupational Stress Questionnaire     Feeling of Stress : Not at all   Social Connections: Moderately Isolated (1/23/2025)    Social Connection and Isolation Panel [NHANES]     Frequency of Communication with Friends and Family: More than three times a week     Frequency of Social Gatherings with Friends and Family: Patient declined     Attends Bahai Services: Never     Active Member of Clubs or Organizations: Yes     Attends Club or Organization Meetings: Patient declined     Marital Status:    Intimate Partner Violence: Not on file   Housing Stability: Low Risk  (1/23/2025)    Housing Stability Vital Sign     Unable to Pay for Housing in the Last Year: No     Number of Times Moved in the Last Year: 0     Homeless in the Last Year: No     Allergies   Allergen Reactions    Mercury Swelling     And mercury based preservatives-Thimerasol  Swelling, \"strange discharge from eyes\"    Other Environmental Anaphylaxis     Insect toxins/ bees and wasps- Anaphylaxis    Sulfa Drugs Anaphylaxis and Swelling    Bee Anaphylaxis, Hives, Itching, Palpitations, Rash, Runny Nose, Shortness of Breath and Swelling    Pravastatin Diarrhea     Diarrhea      Outpatient Encounter Medications as of 2/14/2025   Medication Sig Dispense Refill    rivaroxaban (XARELTO) 20 MG Tab tablet Take 1 Tablet by mouth with dinner. PLEASE CALL [phone] TO SCHEDULE A FOLLOW UP VISIT WITH YOUR CARDIOLOGY PROVIDER TO ENSURE " "FURTHER REFILLS. 100 Tablet 0    alfuzosin (UROXATRAL) 10 MG SR tablet TAKE 1 TABLET BY MOUTH EVERY DAY 90 Tablet 3    rosuvastatin (CRESTOR) 20 MG Tab Take 1 Tablet by mouth every evening. 100 Tablet 0    acetaminophen (TYLENOL) 500 MG Tab Take 500-1,000 mg by mouth every 6 hours as needed.      Iron-Vitamin C (IRON 100/C PO) Take  by mouth.      VITAMIN D PO Take 1 capsule by mouth every day.      Coenzyme Q10 (CO Q-10) 300 MG Cap Take 1 Cap by mouth every day.      Ascorbic Acid (VITAMIN C PO) Take 6,000 mg by mouth every day.      Multiple Vitamin (MULTIVITAMIN PO) Take 1 Tab by mouth every day.       No facility-administered encounter medications on file as of 2/14/2025.     Review of Systems   Constitutional: Negative.    HENT: Negative.     Eyes: Negative.    Respiratory:  Negative for shortness of breath.    Cardiovascular:  Negative for chest pain, palpitations, orthopnea, leg swelling and PND.   Gastrointestinal: Negative.    Genitourinary: Negative.    Musculoskeletal:  Positive for joint pain.   Skin: Negative.    Neurological: Negative.    Endo/Heme/Allergies: Negative.    Psychiatric/Behavioral:  Negative for depression. The patient is not nervous/anxious.               Objective     /64 (BP Location: Left arm, Patient Position: Sitting, BP Cuff Size: Adult)   Pulse 73   Ht 1.727 m (5' 8\")   Wt 122 kg (268 lb)   SpO2 96%   BMI 40.75 kg/m²     Physical Exam  Constitutional:       Appearance: Normal appearance. He is obese.   HENT:      Head: Normocephalic.   Neck:      Vascular: No JVD.   Cardiovascular:      Rate and Rhythm: Normal rate and regular rhythm.      Pulses: Normal pulses.      Heart sounds: Normal heart sounds. No murmur heard.     No friction rub.   Pulmonary:      Effort: Pulmonary effort is normal.      Breath sounds: Normal breath sounds.   Abdominal:      Palpations: Abdomen is soft.   Musculoskeletal:         General: Normal range of motion.      Right lower leg: No edema. "      Left lower leg: No edema.   Skin:     General: Skin is warm and dry.   Neurological:      General: No focal deficit present.      Mental Status: He is alert and oriented to person, place, and time.   Psychiatric:         Mood and Affect: Mood normal.         Behavior: Behavior normal.            Lab Results   Component Value Date/Time    WBC 8.4 05/01/2023 01:22 PM    RBC 4.82 05/01/2023 01:22 PM    HEMOGLOBIN 14.7 05/01/2023 01:22 PM    HEMATOCRIT 46.3 05/01/2023 01:22 PM    MCV 96.1 05/01/2023 01:22 PM    MCH 30.5 05/01/2023 01:22 PM    MCHC 31.7 (L) 05/01/2023 01:22 PM    MPV 10.9 05/01/2023 01:22 PM    NEUTSPOLYS 62.50 05/01/2023 01:22 PM    LYMPHOCYTES 21.30 (L) 05/01/2023 01:22 PM    MONOCYTES 12.40 05/01/2023 01:22 PM    EOSINOPHILS 2.50 05/01/2023 01:22 PM    BASOPHILS 0.80 05/01/2023 01:22 PM    ANISOCYTOSIS 1+ 10/21/2019 01:51 PM      Lab Results   Component Value Date/Time    CHOLSTRLTOT 132 09/16/2023 11:17 AM    LDL 56 09/16/2023 11:17 AM    HDL 49 09/16/2023 11:17 AM    TRIGLYCERIDE 135 09/16/2023 11:17 AM       Lab Results   Component Value Date/Time    SODIUM 144 04/26/2024 12:48 PM    POTASSIUM 5.2 04/26/2024 12:48 PM    CHLORIDE 108 04/26/2024 12:48 PM    CO2 26 04/26/2024 12:48 PM    GLUCOSE 101 (H) 04/26/2024 12:48 PM    BUN 29 (H) 04/26/2024 12:48 PM    CREATININE 1.20 04/26/2024 12:48 PM    CREATININE 1.4 11/28/2006 03:42 AM     Lab Results   Component Value Date/Time    ALKPHOSPHAT 80 04/26/2024 12:48 PM    ASTSGOT 11 (L) 04/26/2024 12:48 PM    ALTSGPT 10 04/26/2024 12:48 PM    TBILIRUBIN 1.7 (H) 04/26/2024 12:48 PM          Echo 7/10/2021:  CONCLUSIONS  Technically difficult due to body habitus and positioning but adequate   study for interpretation.   Mildly reduced left ventricular systolic function.  Left ventricular ejection fraction is visually estimated to be 50%.  Paradoxical septal motion.  Mildly dilated right ventricle.  Reduced right ventricular systolic function.  Hypokinetic  right ventricular free wall.  Right ventricular systolic pressure is estimated to be 28 mmHg.     Echo 7/27/21:      Normal left ventricular size and systolic function. Mild concentric   left ventricular hypertrophy.  Normal right ventricular size and systolic function.  No significant valvular pathology.  Normal pericardium without effusion.  Ascending aorta diameter is dilated to 4.0 cm    CT abdomen 12/23/2022  IMPRESSION:     1.  Slight interval increase in size in infrarenal abdominal aortic aneurysm currently measuring 4.6 x 4.7 cm.     2.  Apparent postsurgical change involving the left common femoral artery with revascularization with aneurysmal dilatation.     3.  Diverticulosis without diverticulitis.     4.  Moderate size hiatal hernia.    CT abdomen 4/29/2024  FINDINGS:  Lung: Visualized lung bases are clear. 7 cm hiatal hernia.  Liver: No focal liver lesion  Spleen: Normal in size, without focal lesion  Adrenal glands: Normal  Pancreas: Normal  Gallbladder: No radiodense stone visualized.  Biliary: No biliary duct dilation visualized.  Kidneys: Stable simple right renal cysts measuring 2.6 cm, 0.8 cm, and 6 cm. No solid mass, stone, or hydronephrosis. No filling defect on delayed urographic phase images.  Vasculature: The 5.7 cm infrarenal abdominal aortic aneurysm. Diffuse plaque throughout the aorta and iliac arteries.  Lymph nodes: No adenopathy  Bowel: Colonic diverticulosis.  Peritoneum: No ascites  Pelvis: Urinary bladder is grossly normal. No pelvic mass or adenopathy.  Musculoskeletal: Degenerative changes.     IMPRESSION:        1. Simple right renal cysts, largest 6 cm. No follow-up required.  2. No solid mass, stone, or hydronephrosis.  3. 7 cm hiatal hernia.  4. Colonic diverticulosis.    Assessment & Plan     1. Presence of stent in LAD coronary artery        2. Presence of stent in left circumflex coronary artery        3. Aortic atherosclerosis (HCC)        4. Coronary artery disease due  to lipid rich plaque        5. Dyslipidemia        6. Essential hypertension        7. Long term (current) use of anticoagulants        8. White coat syndrome without hypertension        9. Morbid obesity with BMI of 40.0-44.9, adult (HCC)        10. Infrarenal abdominal aortic aneurysm (AAA) without rupture (HCC)            Medical Decision Making: Today's Assessment/Status/Plan:        Abdominal aortic aneurysm   -He had been to see Dr. Crandall on 4/25/2024 for his PAD and AAA  - patient had updated CT scan done on 4/29/2024 which showed aneurysm which expanded to 5.7 cm from 4.7 cm in 2022  -urged pt to call and get a FV with vascular surgery ASAP  -sent message to Dr. Crandall and Sadie Crandall    Coronary Artery Disease  Status post stent x4 in 2005  - continue Xarelto 20 mg daily, rosuvastatin 20 mg daily  We addressed the management of atherosclerotic artery disease.  He is on proper antiplatelet, cholesterol management and beta-blockers as appropriate.  We addressed the potential side effects and laboratory follow-up for these medications.     Hypertension  - elevated in the office  - has not been monitoring at home, recommended to get home BP cuff  - he refuses antihypertensive medication at this visit as he had GI upset with unknown antihypertensive medication in the past  - goal < 130/80     Hyperlipidemia with aortic atherosclerosis  -Most recent LDL 56  -Continue rosuvastatin 20 mg daily  -Goal of less than 70  -Check lipid panel      History of DVT and submassive PE  -On long-term anticoagulation with Xarelto  - he is asking about stopping anticoagulation, apparently his DVT/PE were following a COVID infection  -I recommended that he bring this up with vascular surgery to get their opinion      Obesity   Discussed in extensive detail regarding lifestyle modifications, focusing on dietary changes.     Discussed following recommendations with the patient to improve dietary choices:  1.  Increase amount of  whole foods and grains (fruits/vegetables from the produce section without processing).  2.  Pay special attention to the nutrition facts with goal of decreasing saturated fat, foods with high cholesterol and high overall fat content.  3.  Reducing portion size with healthy snack options  4.  Avoid processed/packaged/frozen meals with more than 5 ingredients on the label, or multiple ingredients you don't recognize.  5.  Avoiding red meat and replacing with fresh fruits, vegetables and lean meat.  6.  Minimize sugar.  This means any product which has added sugar in the label (soda, candy, and many processed foods).     Discussed ACC/AHA guidelines of cardio focused exercise for a minimum of 150 minutes/week to maintain healthy weight.        Follow-up with Alla BRANNON in 6 months

## 2025-02-28 ENCOUNTER — OFFICE VISIT (OUTPATIENT)
Facility: MEDICAL CENTER | Age: 75
End: 2025-02-28
Payer: MEDICARE

## 2025-02-28 VITALS
HEIGHT: 68 IN | WEIGHT: 262.02 LBS | SYSTOLIC BLOOD PRESSURE: 146 MMHG | BODY MASS INDEX: 39.71 KG/M2 | DIASTOLIC BLOOD PRESSURE: 78 MMHG | OXYGEN SATURATION: 97 % | TEMPERATURE: 98.2 F | HEART RATE: 86 BPM

## 2025-02-28 DIAGNOSIS — I77.9 PAOD (PERIPHERAL ARTERIAL OCCLUSIVE DISEASE) (HCC): ICD-10-CM

## 2025-02-28 DIAGNOSIS — I71.43 INFRARENAL ABDOMINAL AORTIC ANEURYSM (AAA) WITHOUT RUPTURE (HCC): ICD-10-CM

## 2025-02-28 ASSESSMENT — FIBROSIS 4 INDEX: FIB4 SCORE: 1.23

## 2025-02-28 NOTE — PROGRESS NOTES
"                 VASCULAR SURGERY                 Clinic Progress Note  _________________________________    Vitals for Today's Visit  BP (!) 146/78 (BP Location: Right arm, Patient Position: Sitting, BP Cuff Size: Adult)   Pulse 86   Temp 36.8 °C (98.2 °F) (Temporal)   Ht 1.727 m (5' 8\")   Wt 119 kg (262 lb 0.3 oz)   SpO2 97%   BMI 39.84 kg/m²   _________________________________          2/28/2025      This patient returns to my office.  As you recall I evaluated this patient almost a year ago for a known abdominal aortic aneurysm as well as arterial insufficiency.  The patient was originally seen by Dr. Crawford.  I had ordered a CTA of his aorta however he failed to follow-up after my initial visit.    He returns now as he has a new primary who recommended that he follow-up.  Reviewing the CT scan from 10 months ago he does have a abdominal aortic aneurysm which is measured at 5.7 cm.  He also has significant atherosclerotic disease of his right common femoral artery.  There is dilatation of his left common femoral artery consistent with his previous endarterectomy performed by Dr. Crawford.    Based on the size of the aneurysm there is definite indications for repair.  With the significant amount of atherosclerotic disease within his femoral arteries this will need to be addressed at the time of the aneurysm surgery.    Based on the fact that the CT scan is now dated he will need a more contemporary CTA before formal recommendations are made.    Impression-     Abdominal aortic aneurysm  Peripheral arterial disease    Plan-will obtain a repeat CTA of his abdomen pelvis to assess his aneurysm and to plan repair.    I encouraged the patient to follow-up this time and not miss his follow-up appointment as surgery is indicated.    I discussed the pathophysiology and natural history of aortic aneurysms.  I discussed the need for follow-up and possible surgery.  Patient agrees and will follow-up        Mohinder " Raz AGUILERA  [facility]  [phone]  [address], [place] [address]

## 2025-03-04 NOTE — Clinical Note
[facility]  [address] [place] [address]    [id number]    Polo Vasquez Kristopherlani  [address]   [place] [address]    March 4, 2025    Member Name: Polo Pyle   Member Number: [id number]   Reference Number: [id number]   Approved Services: MRI/CAT Scan   Approved Service Dates: 03/07/2025 - 07/02/2025   Requesting Provider: Mohinder Crandall   Requested Provider: [facility]     Dear Polo Pyle:    The following medical service(s) requested by Mohinder Crandall have been approved:    Procedure Code Procedure Code Name Requested Quantity Approved Quantity Status   96196 (CPT®) IN CT ANGIO ABD&PLVIS CNTRST MTRL W/WO CNTRST IMGES 1 1 Authorized       Approved Quantity means the number of visits approved for medication treatments and/or medical services.    The services should be provided by [facility] no later than 07/02/2025. Please contact the provider listed below with any questions.     Provider Information:  [facility]  [phone]    Your plan benefit may require a deductible, co-payment or coinsurance for these services. This authorization does not guarantee [facility] will pay the claim for services that you receive. Payment by [facility] for these services is subject to the terms of your Evidence of Coverage, your eligibility at the time of service, and confirmation of benefit coverage.    For any questions or additional information, please contact Customer Service:    [facility] Toll Free: [phone]  TTY users dial: [phone]   Call Center Hours:  Oct 1 - Mar 31, Mon - Fri 7 AM to 8 PM PST  Oct 1 - Mar 31, Sat - Sun 8 AM to 8 PM PST  Apr 1 - Sep 30, Mon - Fri 7 AM to 8 PM PST   Office Hours: Mon - Fri 8 AM to 5 PM PST   E-mail: [email or web address]   Website:  [email or web address]      This information is available for free in other languages. Please contact Customer Service  at the phone number above for more information. [facility] complies with applicable Federal civil rights laws and does not discriminate on the basis of race, color, national origin, age, disability or sex.    Sincerely,     Healthcare Utilization Management Department     Cc: [facility]   Mohinder Crandall    Multi-Language Insert  Multi- Services  English: We have free  services to answer any questions you may have about our health or drug plan.  To get an , just call us at [phone].  Someone who speaks English/Language can help you.  This is a free service.  Yoruba: Tenemos servicios de intérprete sin costo alguno  para responder cualquier pregunta que pueda tener sobre nuestro plan de elzbieta o medicamentos. Para hablar con un intérprete, por favor llame al [phone]. Alguien que hable español le podrá ayudar. Megan es un servicio gratuito.  Chinese Mandarin: ?????????????????????????????? ???????????????? [phone]????????????????? ?????????  Chinese Cantonese: ?????????????????????????????? ????????????? [phone]???????????????????? ????????  Tagalog:  Montrell diaz serbisyo sa hullsasalavis alfaroa jonathon brar hinggil sa salima gould o panggamot.  jade Arauz sa [phone]. Vivian Kualog.  Higinio mckeon.  Indonesian:  Nous proposons álvaro services gratuits d'interprétation pour répondre à toutes gretel questions relatives à notre régime de santé ou d'assurance-médicaments. Pour accéder au service d'interprétation, il vous suffit de nous appeler au [phone]. Un interlocuteur parlant Français pourra vous aider. Ce service est gratuit.  Japanese:  Josey ferguson có d?ch v? thông d?ch mi?n phí ð? tr? l?i các câu h?i v? chýõng s?c kh?e và chýõng trình thu?c men. N?u quí v? c?n raymundo  d?ch viên kaitlyn g?i [phone] s? có nhân viên nói ti?ng Vi?t giúp ð? quí v?. Ðây là d?ch v? mi?n phí .  Albanian:  Unser kostenser Dolmetscherservice beantwortet Ihren Fragen zu unserem Gesundheits- und Arzneimittelplan. Unsere Dolmetscher erreichen Sie [phone]. Man wird Ihnen ann auf [facility]. Dieser Service ist [facility].  Kinyarwanda:  ??? ?? ?? ?? ?? ??? ?? ??? ?? ???? ?? ?? ???? ???? ????. ?? ???? ????? ?? [phone] ??? ??? ????.  ???? ?? ???? ?? ?? ????. ? ???? ??? ?????.   Tanzanian: Åñëè ó âàñ âîçíèêíóò âîïðîñû îòíîñèòåëüíî ñòðàõîâîãî èëè ìåäèêàìåíòíîãî ïëàíà, âû ìîæåòå âîñïîëüçîâàòüñÿ íàøèìè áåñïëàòíûìè óñëóãàìè ïåðåâîä÷èêîâ. ×òîáû âîñïîëüçîâàòüñÿ óñëóãàìè ïåðåâîä÷èêà, ïîçâîíèòå íàì ïî òåëåôîíó [phone]. Âàì îêàæåò ïîìîùü ñîòðóäíèê, êîòîðûé ãîâîðèò ïî-póññêè. Äàííàÿ óñëóãà áåñïëàòíàÿ.  Lao: ÅääÇ äÞÏã ÎÏãÇÊ ÇáãÊÑÌã ÇáÝæÑí ÇáãÌÇäíÉ ááÅÌÇÈÉ Úä Ãí ÃÓÆáÉ ÊÊÚáÞ ÈÇáÕÍÉ Ãæ ÌÏæá ÇáÃÏæíÉ áÏíäÇ. ááÍÕæá Úáì ãÊÑÌã ÝæÑí¡ áíÓ Úáíß Óæì ÇáÇÊÕÇá ÈäÇ Úáì [phone] . ÓíÞæã ÔÎÕ ãÇ íÊÍÏË ÇáÚÑÈíÉ ÈãÓÇÚÏÊß. åÐå ÎÏãÉ ãÌÇäíÉ.  Fina: ????? ????????? ?? ??? ?? ????? ?? ???? ??? ???? ???? ?? ?????? ?? ???? ???? ?? ??? ????? ??? ????? ???????? ?????? ?????? ???. ?? ???????? ??????? ???? ?? ???, ?? ???? [phone] ?? ??? ????. ??? ??????? ?? ?????? ????? ?? ???? ??? ?? ???? ??. ?? ?? ????? ???? ??.   Greenlandic:  È disponibile un servizio di interpretariato gratuito per rispondere a eventuali domande sul nostro piano sanitario e farmaceutico. Per un interprete, contattare il raul [phone]. Un nostro incaricato rudy parla Italianovi fornirà l'assistenza necessaria. È un servizio gratuito.  Portugués:  Dispomos de serviços de interpretação gratuitos para responder a qualquer questão que tenha acerca do nosso plano de saúde ou de medicação. Para obter um intérprete, contacte-nos através do número [phone]. Irá encontrar alguém que fale o idioma  Português para o ajudar. Megan serviço é  gratuito.  Romansh Creole:  Nou genyen sèvis entèprèt gratis trinidad reponn tout kesyon ou ta genyen konsènan plan medikal oswa dwòg nou an.  Trinidad jwenn yon entèprèt, jis rele nou nan [phone]. Yon moun ki pale Kreyòl kapab tarik w.  Sa a se yon sèvis ki gratis.  Polish:  Umo¿liwiamy bezp³atne skorzystanie z us³ug t³umacza ustnego, który pomo¿e w uzyskaniu odpowiedzi na temat planu zdrowotnego lub dawkowania lesviaw. Мария skorzystaæ z pomocy t³umacza znaj¹cego mohan coe¿y zadzwoniæ pod numer [phone]. Ta us³uga jest bezp³atna.  Tongan: ????? ??????? ????????????????????? ??????????????????????????????????[phone] ???????????????? ? ????????????????? ?????

## 2025-03-07 ENCOUNTER — HOSPITAL ENCOUNTER (OUTPATIENT)
Dept: LAB | Facility: MEDICAL CENTER | Age: 75
End: 2025-03-07
Payer: MEDICARE

## 2025-03-07 DIAGNOSIS — E78.5 DYSLIPIDEMIA: ICD-10-CM

## 2025-03-07 DIAGNOSIS — I10 ESSENTIAL HYPERTENSION: ICD-10-CM

## 2025-03-07 DIAGNOSIS — E66.01 MORBID OBESITY WITH BMI OF 40.0-44.9, ADULT (HCC): ICD-10-CM

## 2025-03-07 LAB
ERYTHROCYTE [DISTWIDTH] IN BLOOD BY AUTOMATED COUNT: 48.4 FL (ref 35.9–50)
HCT VFR BLD AUTO: 49.7 % (ref 42–52)
HGB BLD-MCNC: 16 G/DL (ref 14–18)
MCH RBC QN AUTO: 30.9 PG (ref 27–33)
MCHC RBC AUTO-ENTMCNC: 32.2 G/DL (ref 32.3–36.5)
MCV RBC AUTO: 96.1 FL (ref 81.4–97.8)
PLATELET # BLD AUTO: 215 K/UL (ref 164–446)
PMV BLD AUTO: 11.3 FL (ref 9–12.9)
RBC # BLD AUTO: 5.17 M/UL (ref 4.7–6.1)
WBC # BLD AUTO: 7.2 K/UL (ref 4.8–10.8)

## 2025-03-07 PROCEDURE — 80053 COMPREHEN METABOLIC PANEL: CPT

## 2025-03-07 PROCEDURE — 36415 COLL VENOUS BLD VENIPUNCTURE: CPT

## 2025-03-07 PROCEDURE — 85027 COMPLETE CBC AUTOMATED: CPT

## 2025-03-08 LAB
ALBUMIN SERPL BCP-MCNC: 3.9 G/DL (ref 3.2–4.9)
ALBUMIN/GLOB SERPL: 1.2 G/DL
ALP SERPL-CCNC: 85 U/L (ref 30–99)
ALT SERPL-CCNC: 14 U/L (ref 2–50)
ANION GAP SERPL CALC-SCNC: 9 MMOL/L (ref 7–16)
AST SERPL-CCNC: 24 U/L (ref 12–45)
BILIRUB SERPL-MCNC: 1.2 MG/DL (ref 0.1–1.5)
BUN SERPL-MCNC: 24 MG/DL (ref 8–22)
CALCIUM ALBUM COR SERPL-MCNC: 9.5 MG/DL (ref 8.5–10.5)
CALCIUM SERPL-MCNC: 9.4 MG/DL (ref 8.5–10.5)
CHLORIDE SERPL-SCNC: 106 MMOL/L (ref 96–112)
CO2 SERPL-SCNC: 26 MMOL/L (ref 20–33)
CREAT SERPL-MCNC: 1.28 MG/DL (ref 0.5–1.4)
GFR SERPLBLD CREATININE-BSD FMLA CKD-EPI: 59 ML/MIN/1.73 M 2
GLOBULIN SER CALC-MCNC: 3.3 G/DL (ref 1.9–3.5)
GLUCOSE SERPL-MCNC: 92 MG/DL (ref 65–99)
POTASSIUM SERPL-SCNC: 5.1 MMOL/L (ref 3.6–5.5)
PROT SERPL-MCNC: 7.2 G/DL (ref 6–8.2)
SODIUM SERPL-SCNC: 141 MMOL/L (ref 135–145)

## 2025-03-10 ENCOUNTER — RESULTS FOLLOW-UP (OUTPATIENT)
Dept: MEDICAL GROUP | Facility: MEDICAL CENTER | Age: 75
End: 2025-03-10
Payer: MEDICARE

## 2025-03-14 ENCOUNTER — HOSPITAL ENCOUNTER (OUTPATIENT)
Dept: RADIOLOGY | Facility: MEDICAL CENTER | Age: 75
End: 2025-03-14
Attending: SURGERY
Payer: MEDICARE

## 2025-03-14 DIAGNOSIS — I77.9 PAOD (PERIPHERAL ARTERIAL OCCLUSIVE DISEASE) (HCC): ICD-10-CM

## 2025-03-14 DIAGNOSIS — I71.43 INFRARENAL ABDOMINAL AORTIC ANEURYSM (AAA) WITHOUT RUPTURE (HCC): ICD-10-CM

## 2025-03-14 PROCEDURE — 74174 CTA ABD&PLVS W/CONTRAST: CPT

## 2025-03-14 PROCEDURE — 700117 HCHG RX CONTRAST REV CODE 255: Performed by: SURGERY

## 2025-03-14 RX ADMIN — IOHEXOL 100 ML: 350 INJECTION, SOLUTION INTRAVENOUS at 10:19

## 2025-03-21 ENCOUNTER — TELEPHONE (OUTPATIENT)
Dept: HEALTH INFORMATION MANAGEMENT | Facility: OTHER | Age: 75
End: 2025-03-21
Payer: MEDICARE

## 2025-04-08 ENCOUNTER — OFFICE VISIT (OUTPATIENT)
Facility: MEDICAL CENTER | Age: 75
End: 2025-04-08
Payer: MEDICARE

## 2025-04-08 VITALS
HEIGHT: 68 IN | SYSTOLIC BLOOD PRESSURE: 124 MMHG | DIASTOLIC BLOOD PRESSURE: 80 MMHG | WEIGHT: 260.14 LBS | OXYGEN SATURATION: 94 % | HEART RATE: 72 BPM | BODY MASS INDEX: 39.43 KG/M2

## 2025-04-08 DIAGNOSIS — I71.43 INFRARENAL ABDOMINAL AORTIC ANEURYSM (AAA) WITHOUT RUPTURE (HCC): ICD-10-CM

## 2025-04-08 DIAGNOSIS — I77.9 PAOD (PERIPHERAL ARTERIAL OCCLUSIVE DISEASE) (HCC): ICD-10-CM

## 2025-04-08 PROCEDURE — 99213 OFFICE O/P EST LOW 20 MIN: CPT | Performed by: SURGERY

## 2025-04-08 PROCEDURE — 3079F DIAST BP 80-89 MM HG: CPT | Performed by: SURGERY

## 2025-04-08 PROCEDURE — 3074F SYST BP LT 130 MM HG: CPT | Performed by: SURGERY

## 2025-04-08 ASSESSMENT — FIBROSIS 4 INDEX: FIB4 SCORE: 2.21

## 2025-04-08 NOTE — PROGRESS NOTES
Vascular Surgery  Established Patient Evaluation    Patient:Polo Pyle  MRN:[id number]  Primary care physician:AMAYA Daigle    Vascular Consultant: Mohinder Crandall MD    4/8/2025  _____________________________________________________    Chief Complaint/Reason for Visit:   Follow-up imaging      History of Present Illness:   Polo Pyle is a 75 y.o. year old male who as you recall has an abdominal aortic aneurysm which has not been in surveillance lately.  I elected to obtain a CTA of his aorta.  This demonstrates a 5.6 cm abdominal aortic aneurysm.  It also demonstrates significant atherosclerotic disease within the right common femoral artery.  As you recall the patient's undergone a previous left femoral artery endarterectomy.  The patient is asymptomatic with respect to any claudication symptoms but this is due to his severe degenerative arthritis and not necessarily absence of symptoms if he were to ambulate at distance.  See discussion below  Past Medical History:     Past Medical History:   Diagnosis Date    ASTHMA     as a youth    CAD (coronary artery disease) 8/26/2011    CATARACT     bilateral IOL    Colorblind     problems with red/green    Dental disorder     upper and partial lower    Essential hypertension     Heart burn     High cholesterol     Hyperlipidemia 8/26/2011    Indigestion     Infectious disease     Hx of staph infections- last infection 2018-     Myocardial infarct (HCC) 2005    STENTS    Pain 11/14/11    NECK 7.5/10; C5-C6    Personal history of venous thrombosis and embolism 2006    DVT 01/2019    Pneumonia 2009    Presence of stent in left circumflex coronary artery     Unspecified hemorrhagic conditions     TAKES ASA . PROLONGED BLEEDING, nosebleeds    Venous insufficiency of both lower extremities     White coat syndrome with diagnosis of hypertension     White coat syndrome without hypertension      Past Surgical History:     Past Surgical History:  "  Procedure Laterality Date    FEMORAL ENDARTERECTOMY Left 09/20/2019    Procedure: ENDARTERECTOMY, FEMORAL;  Surgeon: Candelaria Crawford M.D.;  Location: SURGERY [facility];  Service: General    VENTRAL HERNIA REPAIR  11/30/2011    Performed by TROY GONZALEZ at SURGERY [facility]    INGUINAL HERNIA REPAIR  11/30/2011    Performed by TROY GONZALEZ at SURGERY [facility]    CATARACT PHACO WITH IOL  11/23/2010    Performed by RM WILLIS at [facility]    CATARACT PHACO WITH IOL  11/02/2010    Performed by RM WILLIS at [facility]    OTHER CARDIAC SURGERY  2005    STENTS x 4    SINUS TREPHINE FRONTAL  1971    SINUSOTOMIES  08/1963    INGUINAL HERNIA REPAIR  1962    WITH UNDESCENDED TESTICLE    TONSILLECTOMY AND ADENOIDECTOMY  1957    EYE SURGERY       Allergies:     Allergies   Allergen Reactions    Mercury Swelling     And mercury based preservatives-Thimerasol  Swelling, \"strange discharge from eyes\"    Other Environmental Anaphylaxis     Insect toxins/ bees and wasps- Anaphylaxis    Sulfa Drugs Anaphylaxis and Swelling    Bee Anaphylaxis, Hives, Itching, Palpitations, Rash, Runny Nose, Shortness of Breath and Swelling    Pravastatin Diarrhea     Diarrhea      Medications:     Outpatient Encounter Medications as of 4/8/2025   Medication Sig Dispense Refill    rivaroxaban (XARELTO) 20 MG Tab tablet Take 1 Tablet by mouth with dinner. PLEASE CALL [phone] TO SCHEDULE A FOLLOW UP VISIT WITH YOUR CARDIOLOGY PROVIDER TO ENSURE FURTHER REFILLS. 100 Tablet 0    alfuzosin (UROXATRAL) 10 MG SR tablet TAKE 1 TABLET BY MOUTH EVERY DAY 90 Tablet 3    rosuvastatin (CRESTOR) 20 MG Tab Take 1 Tablet by mouth every evening. 100 Tablet 0    acetaminophen (TYLENOL) 500 MG Tab Take 500-1,000 mg by mouth every 6 hours as needed.      Iron-Vitamin C (IRON 100/C PO) Take  by mouth.      VITAMIN D PO Take 1 capsule by mouth every day.      Coenzyme Q10 (CO Q-10) 300 MG Cap " "Take 1 Cap by mouth every day.      Ascorbic Acid (VITAMIN C PO) Take 6,000 mg by mouth every day.      Multiple Vitamin (MULTIVITAMIN PO) Take 1 Tab by mouth every day.       No facility-administered encounter medications on file as of 2025.     Social History:     Social History     Socioeconomic History    Marital status:      Spouse name: Not on file    Number of children: Not on file    Years of education: Not on file    Highest education level: Bachelor's degree (e.g., BA, AB, BS)   Occupational History    Not on file   Tobacco Use    Smoking status: Every Day     Current packs/day: 0.00     Types: Cigarettes, Pipe, Cigars     Start date: 1965     Last attempt to quit: 2011     Years since quittin.4     Passive exposure: Never    Smokeless tobacco: Never    Tobacco comments:     I obtained lots of \"swag\" from [employer]/Currently smoke 2-4 cigarettes daily   Vaping Use    Vaping status: Never Used   Substance and Sexual Activity    Alcohol use: Not Currently    Drug use: No    Sexual activity: Not Currently     Partners: Female     Birth control/protection: Other-See Comments     Comment: Old age   Other Topics Concern    Not on file   Social History Narrative    Not on file     Social Drivers of Health     Financial Resource Strain: Low Risk  (2025)    Overall Financial Resource Strain (CARDIA)     Difficulty of Paying Living Expenses: Not very hard   Food Insecurity: No Food Insecurity (2025)    Hunger Vital Sign     Worried About Running Out of Food in the Last Year: Never true     Ran Out of Food in the Last Year: Never true   Transportation Needs: No Transportation Needs (2025)    PRAPARE - Transportation     Lack of Transportation (Medical): No     Lack of Transportation (Non-Medical): No   Physical Activity: Sufficiently Active (2025)    Exercise Vital Sign     Days of Exercise per Week: 5 days     Minutes of Exercise per Session: 30 min   Stress: No Stress " "Concern Present (2025)    [place] [employer] - Occupational Stress Questionnaire     Feeling of Stress : Not at all   Social Connections: Moderately Isolated (2025)    Social Connection and Isolation Panel [NHANES]     Frequency of Communication with Friends and Family: More than three times a week     Frequency of Social Gatherings with Friends and Family: Patient declined     Attends Latter-day Services: Never     Active Member of Clubs or Organizations: Yes     Attends Club or Organization Meetings: Patient declined     Marital Status:    Intimate Partner Violence: Not on file   Housing Stability: Low Risk  (2025)    Housing Stability Vital Sign     Unable to Pay for Housing in the Last Year: No     Number of Times Moved in the Last Year: 0     Homeless in the Last Year: No      Social History     Tobacco Use   Smoking Status Every Day    Current packs/day: 0.00    Types: Cigarettes, Pipe, Cigars    Start date: 1965    Last attempt to quit: 2011    Years since quittin.4    Passive exposure: Never   Smokeless Tobacco Never   Tobacco Comments    I obtained lots of \"swag\" from Jerman Conrad/Currently smoke 2-4 cigarettes daily     Social History     Substance and Sexual Activity   Alcohol Use Not Currently     Social History     Substance and Sexual Activity   Drug Use No      Family History:     Family History   Problem Relation Age of Onset    Heart Attack Mother     Heart Disease Mother     Cancer Mother         LYKIMA     Dementia Mother     Hyperlipidemia Mother     Alcohol abuse Father     Cancer Maternal Grandfather     Stroke Maternal Grandmother        Review of Systems:   Constitutional: Negative for fever or chills  HENT:   Negative for hearing loss or tinnitus    Eyes:    Negative for blurred vision or loss of vision  Respiratory:  Negative for cough or hemoptysis  Cardiac:  Negative for chest pain or palpitations  Vascular:  Negative for claudication, " Negative for rest pain  Gastrointestinal: Negative for vomiting or abdominal pain     Negative for hematochezia or melena   Genitourinary: Negative for dysuria or hematuria   Musculoskeletal: Negative for myalgias or acute joint pain  Skin:   Negative for itching or rash  Neurological:  Negative for dizziness or headaches     Negative for speech disturbance     Negative for extremity weakness or paresthesias  Endo/Heme:  Negative for easy bruising or bleeding         Exam:   There were no vitals taken for this visit.    Constitutional: Alert, oriented, no acute distress  HEENT:  Normocephalic and atraumatic, EOMI  Neck:   Supple, no JVD,   Cardiovascular: Regular rate and rhythm,   Pulmonary:  Good air entry bilaterally,    Abdominal:  Soft, non-tender, non-distended         Musculoskeletal: No tenderness, no deformity  Neurological:  CN II-XII grossly intact, no focal deficits  Skin:   Skin is warm and dry. No rash noted.  Vascular exam: Adequately perfused           Imaging:   CTA 3/14/2025  IMPRESSION:        1. Enlarging abdominal aortic aneurysm measuring 5.6 cm on sagittal images, previously 5.3 cm on 4/29/2024. Maximum dimension on transverse images is 5.9 cm, previously 5.7 cm.     2. Accessory left kidney lower pole renal artery originates off upper aspect of aneurysm.     3. Left common femoral artery 2.9 cm aneurysm with surgical clips and occluded superficial femoral artery. Right femoral artery measures 1.5 cm. Right common iliac artery 1.5 cm. Left iliac 1.3 cm.     4. Moderate hiatal hernia. Prior right inguinal hernia surgery.       Assessment and Plan:   - 5.9 cm abdominal aortic aneurysm  Significant right calcific disease    Plan-     Endovascular repair of abdominal aortic aneurysm with right common femoral endarterectomy.  I discussed risk benefits alternatives expectations and most importantly indications and the patient agrees to  proceed.          _____________________________________________________  [facility]  [phone]  [address], [place] [address]

## 2025-04-09 ENCOUNTER — TELEPHONE (OUTPATIENT)
Dept: SURGERY | Facility: MEDICAL CENTER | Age: 75
End: 2025-04-09
Payer: MEDICARE

## 2025-04-10 ENCOUNTER — TELEPHONE (OUTPATIENT)
Dept: SURGERY | Facility: MEDICAL CENTER | Age: 75
End: 2025-04-10
Payer: MEDICARE

## 2025-04-10 NOTE — TELEPHONE ENCOUNTER
Jr Cuellar I got your voicemail and following on your request to communicate this way.  For the surgery date, he suggested on 5/21/25 with a check in time of 07:00 am; nothing to eat or drink after midnight.  This will take place at [facility] on the first floor.  Let me know, if this works for you?

## 2025-04-28 ENCOUNTER — APPOINTMENT (OUTPATIENT)
Dept: ADMISSIONS | Facility: MEDICAL CENTER | Age: 75
DRG: 269 | End: 2025-04-28
Attending: SURGERY
Payer: MEDICARE

## 2025-05-09 ENCOUNTER — PRE-ADMISSION TESTING (OUTPATIENT)
Dept: ADMISSIONS | Facility: MEDICAL CENTER | Age: 75
DRG: 269 | End: 2025-05-09
Attending: SURGERY
Payer: MEDICARE

## 2025-05-09 NOTE — OR NURSING
SURGERY ON: 5/21/25  Patient provided medication instructions per [facility]'s Guideline for Pre-Operative Medication Management. AVS sent to [facility]. Additionally, patient instructed to follow up with the prescribing provider and/or surgeon for guidence on continuing IRON prior to surgery. Preparing For Your Procedure packet reviewed with patient. Encouraged patient to increase oral intake the day prior to procedure including intake of electrolyte drinks such as Gatorade or electrolyte water and may have 16 oz of clear liquids up to 2 hours prior to surgery, unless otherwise directed by the surgeon. Patient advised to contact surgeon with any additional questions or concerns.     Patient verbalized understanding of their instructions.    Fall risk    Adverse reaction to anesthesia: None reported

## 2025-05-09 NOTE — PREADMIT AVS NOTE
Current Medications   Medication Instructions    NAPROXEN PO Stop 5 days before surgery    Iron-Vitamin C (IRON 100/C PO) Stop 7 days before surgery    VITAMIN D PO Stop 7 days before surgery    Coenzyme Q10 (CO Q-10) 300 MG Cap Stop 7 days before surgery    Ascorbic Acid (VITAMIN C PO) Stop 7 days before surgery    Multiple Vitamin (MULTIVITAMIN PO) Stop 7 days before surgery

## 2025-05-12 ENCOUNTER — PRE-ADMISSION TESTING (OUTPATIENT)
Dept: ADMISSIONS | Facility: MEDICAL CENTER | Age: 75
DRG: 269 | End: 2025-05-12
Attending: SURGERY
Payer: MEDICARE

## 2025-05-12 DIAGNOSIS — Z01.812 PRE-OPERATIVE LABORATORY EXAMINATION: ICD-10-CM

## 2025-05-12 DIAGNOSIS — Z01.810 PRE-OPERATIVE CARDIOVASCULAR EXAMINATION: ICD-10-CM

## 2025-05-12 LAB
ABO GROUP BLD: NORMAL
ANION GAP SERPL CALC-SCNC: 10 MMOL/L (ref 7–16)
BLD GP AB SCN SERPL QL: NORMAL
BUN SERPL-MCNC: 27 MG/DL (ref 8–22)
CALCIUM SERPL-MCNC: 9.5 MG/DL (ref 8.5–10.5)
CHLORIDE SERPL-SCNC: 106 MMOL/L (ref 96–112)
CO2 SERPL-SCNC: 26 MMOL/L (ref 20–33)
CREAT SERPL-MCNC: 1.33 MG/DL (ref 0.5–1.4)
ERYTHROCYTE [DISTWIDTH] IN BLOOD BY AUTOMATED COUNT: 46.9 FL (ref 35.9–50)
GFR SERPLBLD CREATININE-BSD FMLA CKD-EPI: 56 ML/MIN/1.73 M 2
GLUCOSE SERPL-MCNC: 85 MG/DL (ref 65–99)
HCT VFR BLD AUTO: 45.5 % (ref 42–52)
HGB BLD-MCNC: 14.9 G/DL (ref 14–18)
MCH RBC QN AUTO: 30.3 PG (ref 27–33)
MCHC RBC AUTO-ENTMCNC: 32.7 G/DL (ref 32.3–36.5)
MCV RBC AUTO: 92.5 FL (ref 81.4–97.8)
PLATELET # BLD AUTO: 247 K/UL (ref 164–446)
PMV BLD AUTO: 10.2 FL (ref 9–12.9)
POTASSIUM SERPL-SCNC: 4.3 MMOL/L (ref 3.6–5.5)
RBC # BLD AUTO: 4.92 M/UL (ref 4.7–6.1)
RH BLD: NORMAL
SODIUM SERPL-SCNC: 142 MMOL/L (ref 135–145)
WBC # BLD AUTO: 6.8 K/UL (ref 4.8–10.8)

## 2025-05-12 PROCEDURE — 85027 COMPLETE CBC AUTOMATED: CPT

## 2025-05-12 PROCEDURE — 86850 RBC ANTIBODY SCREEN: CPT

## 2025-05-12 PROCEDURE — 93005 ELECTROCARDIOGRAM TRACING: CPT | Mod: TC

## 2025-05-12 PROCEDURE — 86900 BLOOD TYPING SEROLOGIC ABO: CPT

## 2025-05-12 PROCEDURE — 86901 BLOOD TYPING SEROLOGIC RH(D): CPT

## 2025-05-12 PROCEDURE — 80048 BASIC METABOLIC PNL TOTAL CA: CPT

## 2025-05-12 PROCEDURE — 36415 COLL VENOUS BLD VENIPUNCTURE: CPT

## 2025-05-19 ENCOUNTER — APPOINTMENT (OUTPATIENT)
Dept: ADMISSIONS | Facility: MEDICAL CENTER | Age: 75
DRG: 269 | End: 2025-05-19
Attending: SURGERY
Payer: MEDICARE

## 2025-05-19 DIAGNOSIS — Z01.810 PRE-OPERATIVE CARDIOVASCULAR EXAMINATION: Primary | ICD-10-CM

## 2025-05-19 LAB
EKG IMPRESSION: NORMAL
EKG IMPRESSION: NORMAL

## 2025-05-19 PROCEDURE — 93005 ELECTROCARDIOGRAM TRACING: CPT | Mod: TC

## 2025-05-19 PROCEDURE — 93010 ELECTROCARDIOGRAM REPORT: CPT | Performed by: INTERNAL MEDICINE

## 2025-05-21 ENCOUNTER — APPOINTMENT (OUTPATIENT)
Dept: RADIOLOGY | Facility: MEDICAL CENTER | Age: 75
DRG: 269 | End: 2025-05-21
Attending: SURGERY
Payer: MEDICARE

## 2025-05-21 ENCOUNTER — ANESTHESIA EVENT (OUTPATIENT)
Dept: SURGERY | Facility: MEDICAL CENTER | Age: 75
DRG: 269 | End: 2025-05-21
Payer: MEDICARE

## 2025-05-21 ENCOUNTER — ANESTHESIA (OUTPATIENT)
Dept: SURGERY | Facility: MEDICAL CENTER | Age: 75
DRG: 269 | End: 2025-05-21
Payer: MEDICARE

## 2025-05-21 ENCOUNTER — HOSPITAL ENCOUNTER (INPATIENT)
Facility: MEDICAL CENTER | Age: 75
LOS: 1 days | DRG: 269 | End: 2025-05-22
Attending: SURGERY | Admitting: SURGERY
Payer: MEDICARE

## 2025-05-21 DIAGNOSIS — G89.18 POST-OP PAIN: Primary | ICD-10-CM

## 2025-05-21 LAB — ABO + RH BLD: NORMAL

## 2025-05-21 PROCEDURE — C1874 STENT, COATED/COV W/DEL SYS: HCPCS | Performed by: SURGERY

## 2025-05-21 PROCEDURE — 34812 OPN FEM ART EXPOS: CPT | Mod: 59,LT | Performed by: SURGERY

## 2025-05-21 PROCEDURE — 700101 HCHG RX REV CODE 250: Performed by: ANESTHESIOLOGY

## 2025-05-21 PROCEDURE — 86901 BLOOD TYPING SEROLOGIC RH(D): CPT

## 2025-05-21 PROCEDURE — C1781 MESH (IMPLANTABLE): HCPCS | Performed by: SURGERY

## 2025-05-21 PROCEDURE — 75625 CONTRAST EXAM ABDOMINL AORTA: CPT | Mod: 26,59 | Performed by: SURGERY

## 2025-05-21 PROCEDURE — C1887 CATHETER, GUIDING: HCPCS | Performed by: SURGERY

## 2025-05-21 PROCEDURE — 34705 EVAC RPR A-BIILIAC NDGFT: CPT | Mod: AS | Performed by: NURSE PRACTITIONER

## 2025-05-21 PROCEDURE — 700102 HCHG RX REV CODE 250 W/ 637 OVERRIDE(OP): Performed by: ANESTHESIOLOGY

## 2025-05-21 PROCEDURE — 770001 HCHG ROOM/CARE - MED/SURG/GYN PRIV*

## 2025-05-21 PROCEDURE — 86900 BLOOD TYPING SEROLOGIC ABO: CPT

## 2025-05-21 PROCEDURE — 160002 HCHG RECOVERY MINUTES (STAT): Performed by: SURGERY

## 2025-05-21 PROCEDURE — 04V03DZ RESTRICTION OF ABDOMINAL AORTA WITH INTRALUMINAL DEVICE, PERCUTANEOUS APPROACH: ICD-10-PCS | Performed by: SURGERY

## 2025-05-21 PROCEDURE — 04CK0ZZ EXTIRPATION OF MATTER FROM RIGHT FEMORAL ARTERY, OPEN APPROACH: ICD-10-PCS | Performed by: SURGERY

## 2025-05-21 PROCEDURE — 34812 OPN FEM ART EXPOS: CPT | Mod: AS,LT,59 | Performed by: NURSE PRACTITIONER

## 2025-05-21 PROCEDURE — C2628 CATHETER, OCCLUSION: HCPCS | Performed by: SURGERY

## 2025-05-21 PROCEDURE — 160042 HCHG SURGERY MINUTES - EA ADDL 1 MIN LEVEL 5: Performed by: SURGERY

## 2025-05-21 PROCEDURE — 700105 HCHG RX REV CODE 258: Performed by: ANESTHESIOLOGY

## 2025-05-21 PROCEDURE — C1768 GRAFT, VASCULAR: HCPCS | Performed by: SURGERY

## 2025-05-21 PROCEDURE — C1751 CATH, INF, PER/CENT/MIDLINE: HCPCS | Performed by: SURGERY

## 2025-05-21 PROCEDURE — 160015 HCHG STAT PREOP MINUTES: Performed by: SURGERY

## 2025-05-21 PROCEDURE — C1769 GUIDE WIRE: HCPCS | Performed by: SURGERY

## 2025-05-21 PROCEDURE — 160031 HCHG SURGERY MINUTES - 1ST 30 MINS LEVEL 5: Performed by: SURGERY

## 2025-05-21 PROCEDURE — 700101 HCHG RX REV CODE 250: Performed by: SURGERY

## 2025-05-21 PROCEDURE — 700117 HCHG RX CONTRAST REV CODE 255: Performed by: SURGERY

## 2025-05-21 PROCEDURE — C1894 INTRO/SHEATH, NON-LASER: HCPCS | Performed by: SURGERY

## 2025-05-21 PROCEDURE — 35361 RECHANNELING OF ARTERY: CPT | Mod: RT | Performed by: SURGERY

## 2025-05-21 PROCEDURE — 160035 HCHG PACU - 1ST 60 MINS PHASE I: Performed by: SURGERY

## 2025-05-21 PROCEDURE — 160048 HCHG OR STATISTICAL LEVEL 1-5: Performed by: SURGERY

## 2025-05-21 PROCEDURE — 700105 HCHG RX REV CODE 258: Performed by: SURGERY

## 2025-05-21 PROCEDURE — A9270 NON-COVERED ITEM OR SERVICE: HCPCS | Performed by: ANESTHESIOLOGY

## 2025-05-21 PROCEDURE — 34705 EVAC RPR A-BIILIAC NDGFT: CPT | Performed by: SURGERY

## 2025-05-21 PROCEDURE — 700111 HCHG RX REV CODE 636 W/ 250 OVERRIDE (IP): Mod: JZ | Performed by: ANESTHESIOLOGY

## 2025-05-21 PROCEDURE — 35361 RECHANNELING OF ARTERY: CPT | Mod: AS,RT | Performed by: NURSE PRACTITIONER

## 2025-05-21 PROCEDURE — 04UK0KZ SUPPLEMENT RIGHT FEMORAL ARTERY WITH NONAUTOLOGOUS TISSUE SUBSTITUTE, OPEN APPROACH: ICD-10-PCS | Performed by: SURGERY

## 2025-05-21 PROCEDURE — 700111 HCHG RX REV CODE 636 W/ 250 OVERRIDE (IP): Performed by: SURGERY

## 2025-05-21 PROCEDURE — 502000 HCHG MISC OR IMPLANTS RC 0278: Performed by: SURGERY

## 2025-05-21 PROCEDURE — 36415 COLL VENOUS BLD VENIPUNCTURE: CPT

## 2025-05-21 PROCEDURE — 160009 HCHG ANES TIME/MIN: Performed by: SURGERY

## 2025-05-21 PROCEDURE — 75625 CONTRAST EXAM ABDOMINL AORTA: CPT | Mod: 26,AS,59 | Performed by: NURSE PRACTITIONER

## 2025-05-21 PROCEDURE — 110454 HCHG SHELL REV 250: Performed by: SURGERY

## 2025-05-21 DEVICE — ENDOPROSTHESIS CONTRALATERAL LEG 18MM X 9.5CM: Type: IMPLANTABLE DEVICE | Site: GROIN | Status: FUNCTIONAL

## 2025-05-21 DEVICE — IMPLANTABLE DEVICE: Type: IMPLANTABLE DEVICE | Site: GROIN | Status: FUNCTIONAL

## 2025-05-21 DEVICE — PATCH .8X8CM XENOSURE BIOLOGIC VASCULAR---ORDER IN MULTIPLES OF 5---: Type: IMPLANTABLE DEVICE | Site: GROIN | Status: FUNCTIONAL

## 2025-05-21 DEVICE — ENDOPROSTHESIS CONTRALATERAL LEG 14.5MM X 12CM: Type: IMPLANTABLE DEVICE | Site: GROIN | Status: FUNCTIONAL

## 2025-05-21 RX ORDER — LIDOCAINE 4 G/G
1 PATCH TOPICAL PRN
COMMUNITY

## 2025-05-21 RX ORDER — SODIUM CHLORIDE, SODIUM LACTATE, POTASSIUM CHLORIDE, CALCIUM CHLORIDE 600; 310; 30; 20 MG/100ML; MG/100ML; MG/100ML; MG/100ML
INJECTION, SOLUTION INTRAVENOUS CONTINUOUS
Status: ACTIVE | OUTPATIENT
Start: 2025-05-21 | End: 2025-05-21

## 2025-05-21 RX ORDER — OXYCODONE HCL 5 MG/5 ML
5 SOLUTION, ORAL ORAL
Status: DISCONTINUED | OUTPATIENT
Start: 2025-05-21 | End: 2025-05-21 | Stop reason: HOSPADM

## 2025-05-21 RX ORDER — OXYCODONE HCL 5 MG/5 ML
10 SOLUTION, ORAL ORAL
Status: DISCONTINUED | OUTPATIENT
Start: 2025-05-21 | End: 2025-05-21 | Stop reason: HOSPADM

## 2025-05-21 RX ORDER — HEPARIN SODIUM,PORCINE 1000/ML
VIAL (ML) INJECTION PRN
Status: DISCONTINUED | OUTPATIENT
Start: 2025-05-21 | End: 2025-05-21 | Stop reason: SURG

## 2025-05-21 RX ORDER — ONDANSETRON 2 MG/ML
4 INJECTION INTRAMUSCULAR; INTRAVENOUS
Status: COMPLETED | OUTPATIENT
Start: 2025-05-21 | End: 2025-05-21

## 2025-05-21 RX ORDER — HALOPERIDOL 5 MG/ML
1 INJECTION INTRAMUSCULAR EVERY 6 HOURS PRN
Status: DISCONTINUED | OUTPATIENT
Start: 2025-05-21 | End: 2025-05-22 | Stop reason: HOSPADM

## 2025-05-21 RX ORDER — HALOPERIDOL 5 MG/ML
1 INJECTION INTRAMUSCULAR
Status: DISCONTINUED | OUTPATIENT
Start: 2025-05-21 | End: 2025-05-21 | Stop reason: HOSPADM

## 2025-05-21 RX ORDER — OXYCODONE HYDROCHLORIDE 10 MG/1
10 TABLET ORAL
Status: DISCONTINUED | OUTPATIENT
Start: 2025-05-21 | End: 2025-05-22 | Stop reason: HOSPADM

## 2025-05-21 RX ORDER — HYDRALAZINE HYDROCHLORIDE 20 MG/ML
5 INJECTION INTRAMUSCULAR; INTRAVENOUS
Status: DISCONTINUED | OUTPATIENT
Start: 2025-05-21 | End: 2025-05-21 | Stop reason: HOSPADM

## 2025-05-21 RX ORDER — CEFAZOLIN SODIUM 1 G/3ML
INJECTION, POWDER, FOR SOLUTION INTRAMUSCULAR; INTRAVENOUS PRN
Status: DISCONTINUED | OUTPATIENT
Start: 2025-05-21 | End: 2025-05-21 | Stop reason: SURG

## 2025-05-21 RX ORDER — HEPARIN SODIUM,PORCINE 1000/ML
VIAL (ML) INJECTION
Status: DISCONTINUED | OUTPATIENT
Start: 2025-05-21 | End: 2025-05-21 | Stop reason: HOSPADM

## 2025-05-21 RX ORDER — OXYCODONE HYDROCHLORIDE 5 MG/1
5 TABLET ORAL
Status: DISCONTINUED | OUTPATIENT
Start: 2025-05-21 | End: 2025-05-22 | Stop reason: HOSPADM

## 2025-05-21 RX ORDER — DEXAMETHASONE SODIUM PHOSPHATE 4 MG/ML
INJECTION, SOLUTION INTRA-ARTICULAR; INTRALESIONAL; INTRAMUSCULAR; INTRAVENOUS; SOFT TISSUE PRN
Status: DISCONTINUED | OUTPATIENT
Start: 2025-05-21 | End: 2025-05-21 | Stop reason: SURG

## 2025-05-21 RX ORDER — METOPROLOL TARTRATE 1 MG/ML
INJECTION, SOLUTION INTRAVENOUS PRN
Status: DISCONTINUED | OUTPATIENT
Start: 2025-05-21 | End: 2025-05-21 | Stop reason: SURG

## 2025-05-21 RX ORDER — NAPROXEN SODIUM 220 MG/1
220 TABLET, FILM COATED ORAL 2 TIMES DAILY PRN
COMMUNITY

## 2025-05-21 RX ORDER — ACETAMINOPHEN 500 MG
1000 TABLET ORAL ONCE
Status: COMPLETED | OUTPATIENT
Start: 2025-05-21 | End: 2025-05-21

## 2025-05-21 RX ORDER — IPRATROPIUM BROMIDE AND ALBUTEROL SULFATE 2.5; .5 MG/3ML; MG/3ML
3 SOLUTION RESPIRATORY (INHALATION)
Status: DISCONTINUED | OUTPATIENT
Start: 2025-05-21 | End: 2025-05-21 | Stop reason: HOSPADM

## 2025-05-21 RX ORDER — ONDANSETRON 2 MG/ML
INJECTION INTRAMUSCULAR; INTRAVENOUS PRN
Status: DISCONTINUED | OUTPATIENT
Start: 2025-05-21 | End: 2025-05-21 | Stop reason: SURG

## 2025-05-21 RX ORDER — HYDROMORPHONE HYDROCHLORIDE 1 MG/ML
0.5 INJECTION, SOLUTION INTRAMUSCULAR; INTRAVENOUS; SUBCUTANEOUS
Status: DISCONTINUED | OUTPATIENT
Start: 2025-05-21 | End: 2025-05-22 | Stop reason: HOSPADM

## 2025-05-21 RX ORDER — CLONIDINE HYDROCHLORIDE 0.1 MG/1
0.1 TABLET ORAL
Status: DISCONTINUED | OUTPATIENT
Start: 2025-05-21 | End: 2025-05-22 | Stop reason: HOSPADM

## 2025-05-21 RX ORDER — ACETAMINOPHEN 325 MG/1
650 TABLET ORAL EVERY 4 HOURS PRN
COMMUNITY

## 2025-05-21 RX ORDER — SCOPOLAMINE 1 MG/3D
1 PATCH, EXTENDED RELEASE TRANSDERMAL
Status: DISCONTINUED | OUTPATIENT
Start: 2025-05-21 | End: 2025-05-22 | Stop reason: HOSPADM

## 2025-05-21 RX ORDER — DEXAMETHASONE SODIUM PHOSPHATE 4 MG/ML
4 INJECTION, SOLUTION INTRA-ARTICULAR; INTRALESIONAL; INTRAMUSCULAR; INTRAVENOUS; SOFT TISSUE
Status: DISCONTINUED | OUTPATIENT
Start: 2025-05-21 | End: 2025-05-22 | Stop reason: HOSPADM

## 2025-05-21 RX ORDER — ONDANSETRON 2 MG/ML
4 INJECTION INTRAMUSCULAR; INTRAVENOUS EVERY 4 HOURS PRN
Status: DISCONTINUED | OUTPATIENT
Start: 2025-05-21 | End: 2025-05-22 | Stop reason: HOSPADM

## 2025-05-21 RX ORDER — HYDROMORPHONE HYDROCHLORIDE 1 MG/ML
0.2 INJECTION, SOLUTION INTRAMUSCULAR; INTRAVENOUS; SUBCUTANEOUS
Status: DISCONTINUED | OUTPATIENT
Start: 2025-05-21 | End: 2025-05-21 | Stop reason: HOSPADM

## 2025-05-21 RX ORDER — PROTAMINE SULFATE 10 MG/ML
INJECTION, SOLUTION INTRAVENOUS PRN
Status: DISCONTINUED | OUTPATIENT
Start: 2025-05-21 | End: 2025-05-21 | Stop reason: SURG

## 2025-05-21 RX ORDER — IODIXANOL 270 MG/ML
INJECTION, SOLUTION INTRAVASCULAR
Status: DISCONTINUED | OUTPATIENT
Start: 2025-05-21 | End: 2025-05-21 | Stop reason: HOSPADM

## 2025-05-21 RX ORDER — DIPHENHYDRAMINE HYDROCHLORIDE 50 MG/ML
25 INJECTION, SOLUTION INTRAMUSCULAR; INTRAVENOUS EVERY 6 HOURS PRN
Status: DISCONTINUED | OUTPATIENT
Start: 2025-05-21 | End: 2025-05-22 | Stop reason: HOSPADM

## 2025-05-21 RX ORDER — HYDROMORPHONE HYDROCHLORIDE 1 MG/ML
0.5 INJECTION, SOLUTION INTRAMUSCULAR; INTRAVENOUS; SUBCUTANEOUS
Status: DISCONTINUED | OUTPATIENT
Start: 2025-05-21 | End: 2025-05-21 | Stop reason: HOSPADM

## 2025-05-21 RX ORDER — CLONIDINE HYDROCHLORIDE 0.1 MG/1
0.2 TABLET ORAL
Status: DISCONTINUED | OUTPATIENT
Start: 2025-05-21 | End: 2025-05-22 | Stop reason: HOSPADM

## 2025-05-21 RX ORDER — LABETALOL HYDROCHLORIDE 5 MG/ML
10 INJECTION, SOLUTION INTRAVENOUS EVERY 4 HOURS PRN
Status: DISCONTINUED | OUTPATIENT
Start: 2025-05-21 | End: 2025-05-22 | Stop reason: HOSPADM

## 2025-05-21 RX ORDER — HYDROCODONE BITARTRATE AND ACETAMINOPHEN 5; 325 MG/1; MG/1
1 TABLET ORAL EVERY 4 HOURS PRN
Qty: 10 TABLET | Refills: 0 | Status: SHIPPED | OUTPATIENT
Start: 2025-05-21 | End: 2025-05-23

## 2025-05-21 RX ORDER — ROCURONIUM BROMIDE 10 MG/ML
INJECTION, SOLUTION INTRAVENOUS PRN
Status: DISCONTINUED | OUTPATIENT
Start: 2025-05-21 | End: 2025-05-21 | Stop reason: SURG

## 2025-05-21 RX ORDER — LIDOCAINE HYDROCHLORIDE 20 MG/ML
INJECTION, SOLUTION EPIDURAL; INFILTRATION; INTRACAUDAL; PERINEURAL PRN
Status: DISCONTINUED | OUTPATIENT
Start: 2025-05-21 | End: 2025-05-21 | Stop reason: SURG

## 2025-05-21 RX ORDER — HYDROMORPHONE HYDROCHLORIDE 1 MG/ML
0.4 INJECTION, SOLUTION INTRAMUSCULAR; INTRAVENOUS; SUBCUTANEOUS
Status: DISCONTINUED | OUTPATIENT
Start: 2025-05-21 | End: 2025-05-21 | Stop reason: HOSPADM

## 2025-05-21 RX ORDER — SODIUM CHLORIDE, SODIUM LACTATE, POTASSIUM CHLORIDE, CALCIUM CHLORIDE 600; 310; 30; 20 MG/100ML; MG/100ML; MG/100ML; MG/100ML
INJECTION, SOLUTION INTRAVENOUS CONTINUOUS
Status: DISCONTINUED | OUTPATIENT
Start: 2025-05-21 | End: 2025-05-21 | Stop reason: HOSPADM

## 2025-05-21 RX ORDER — HYDRALAZINE HYDROCHLORIDE 20 MG/ML
INJECTION INTRAMUSCULAR; INTRAVENOUS PRN
Status: DISCONTINUED | OUTPATIENT
Start: 2025-05-21 | End: 2025-05-21 | Stop reason: SURG

## 2025-05-21 RX ADMIN — SUGAMMADEX 200 MG: 100 INJECTION, SOLUTION INTRAVENOUS at 10:45

## 2025-05-21 RX ADMIN — ROCURONIUM BROMIDE 10 MG: 10 INJECTION INTRAVENOUS at 10:16

## 2025-05-21 RX ADMIN — SODIUM CHLORIDE, POTASSIUM CHLORIDE, SODIUM LACTATE AND CALCIUM CHLORIDE: 600; 310; 30; 20 INJECTION, SOLUTION INTRAVENOUS at 10:39

## 2025-05-21 RX ADMIN — ROCURONIUM BROMIDE 80 MG: 10 INJECTION INTRAVENOUS at 09:06

## 2025-05-21 RX ADMIN — HEPARIN SODIUM 10000 UNITS: 1000 INJECTION, SOLUTION INTRAVENOUS; SUBCUTANEOUS at 09:42

## 2025-05-21 RX ADMIN — SODIUM CHLORIDE, POTASSIUM CHLORIDE, SODIUM LACTATE AND CALCIUM CHLORIDE: 600; 310; 30; 20 INJECTION, SOLUTION INTRAVENOUS at 09:02

## 2025-05-21 RX ADMIN — CEFAZOLIN 3 G: 1 INJECTION, POWDER, FOR SOLUTION INTRAMUSCULAR; INTRAVENOUS at 09:02

## 2025-05-21 RX ADMIN — FENTANYL CITRATE 50 MCG: 50 INJECTION, SOLUTION INTRAMUSCULAR; INTRAVENOUS at 09:47

## 2025-05-21 RX ADMIN — ONDANSETRON 4 MG: 2 INJECTION INTRAMUSCULAR; INTRAVENOUS at 10:44

## 2025-05-21 RX ADMIN — LIDOCAINE HYDROCHLORIDE 100 MG: 20 INJECTION, SOLUTION EPIDURAL; INFILTRATION; INTRACAUDAL; PERINEURAL at 09:06

## 2025-05-21 RX ADMIN — FENTANYL CITRATE 100 MCG: 50 INJECTION, SOLUTION INTRAMUSCULAR; INTRAVENOUS at 09:06

## 2025-05-21 RX ADMIN — FENTANYL CITRATE 50 MCG: 50 INJECTION, SOLUTION INTRAMUSCULAR; INTRAVENOUS at 10:52

## 2025-05-21 RX ADMIN — PHENYLEPHRINE HYDROCHLORIDE 50 MCG/MIN: 10 INJECTION INTRAVENOUS at 09:18

## 2025-05-21 RX ADMIN — PROTAMINE SULFATE 10 MG: 10 INJECTION, SOLUTION INTRAVENOUS at 10:43

## 2025-05-21 RX ADMIN — PROPOFOL 50 MG: 10 INJECTION, EMULSION INTRAVENOUS at 09:08

## 2025-05-21 RX ADMIN — METOPROLOL TARTRATE 5 MG: 5 INJECTION INTRAVENOUS at 10:52

## 2025-05-21 RX ADMIN — HYDRALAZINE HYDROCHLORIDE 10 MG: 20 INJECTION, SOLUTION INTRAMUSCULAR; INTRAVENOUS at 10:57

## 2025-05-21 RX ADMIN — FENTANYL CITRATE 50 MCG: 50 INJECTION, SOLUTION INTRAMUSCULAR; INTRAVENOUS at 09:15

## 2025-05-21 RX ADMIN — PROPOFOL 150 MG: 10 INJECTION, EMULSION INTRAVENOUS at 09:06

## 2025-05-21 RX ADMIN — FENTANYL CITRATE 50 MCG: 50 INJECTION, SOLUTION INTRAMUSCULAR; INTRAVENOUS at 11:40

## 2025-05-21 RX ADMIN — ONDANSETRON 4 MG: 2 INJECTION INTRAMUSCULAR; INTRAVENOUS at 11:39

## 2025-05-21 RX ADMIN — ACETAMINOPHEN 1000 MG: 500 TABLET ORAL at 08:07

## 2025-05-21 RX ADMIN — DEXAMETHASONE SODIUM PHOSPHATE 8 MG: 4 INJECTION INTRA-ARTICULAR; INTRALESIONAL; INTRAMUSCULAR; INTRAVENOUS; SOFT TISSUE at 09:11

## 2025-05-21 RX ADMIN — PROTAMINE SULFATE 40 MG: 10 INJECTION, SOLUTION INTRAVENOUS at 10:44

## 2025-05-21 ASSESSMENT — SOCIAL DETERMINANTS OF HEALTH (SDOH)
WITHIN THE LAST YEAR, HAVE YOU BEEN AFRAID OF YOUR PARTNER OR EX-PARTNER?: NO
WITHIN THE PAST 12 MONTHS, THE FOOD YOU BOUGHT JUST DIDN'T LAST AND YOU DIDN'T HAVE MONEY TO GET MORE: NEVER TRUE
WITHIN THE LAST YEAR, HAVE YOU BEEN KICKED, HIT, SLAPPED, OR OTHERWISE PHYSICALLY HURT BY YOUR PARTNER OR EX-PARTNER?: NO
WITHIN THE LAST YEAR, HAVE YOU BEEN HUMILIATED OR EMOTIONALLY ABUSED IN OTHER WAYS BY YOUR PARTNER OR EX-PARTNER?: NO
IN THE PAST 12 MONTHS, HAS THE ELECTRIC, GAS, OIL, OR WATER COMPANY THREATENED TO SHUT OFF SERVICE IN YOUR HOME?: NO
WITHIN THE LAST YEAR, HAVE TO BEEN RAPED OR FORCED TO HAVE ANY KIND OF SEXUAL ACTIVITY BY YOUR PARTNER OR EX-PARTNER?: NO
WITHIN THE PAST 12 MONTHS, YOU WORRIED THAT YOUR FOOD WOULD RUN OUT BEFORE YOU GOT THE MONEY TO BUY MORE: NEVER TRUE

## 2025-05-21 ASSESSMENT — LIFESTYLE VARIABLES
TOTAL SCORE: 0
TOTAL SCORE: 0
HAVE PEOPLE ANNOYED YOU BY CRITICIZING YOUR DRINKING: NO
AVERAGE NUMBER OF DAYS PER WEEK YOU HAVE A DRINK CONTAINING ALCOHOL: 0
EVER HAD A DRINK FIRST THING IN THE MORNING TO STEADY YOUR NERVES TO GET RID OF A HANGOVER: NO
CONSUMPTION TOTAL: NEGATIVE
HOW MANY TIMES IN THE PAST YEAR HAVE YOU HAD 5 OR MORE DRINKS IN A DAY: 0
TOTAL SCORE: 0
ON A TYPICAL DAY WHEN YOU DRINK ALCOHOL HOW MANY DRINKS DO YOU HAVE: 0
HAVE YOU EVER FELT YOU SHOULD CUT DOWN ON YOUR DRINKING: NO
ALCOHOL_USE: NO
EVER FELT BAD OR GUILTY ABOUT YOUR DRINKING: NO

## 2025-05-21 ASSESSMENT — FIBROSIS 4 INDEX: FIB4 SCORE: 1.92

## 2025-05-21 ASSESSMENT — PATIENT HEALTH QUESTIONNAIRE - PHQ9
2. FEELING DOWN, DEPRESSED, IRRITABLE, OR HOPELESS: NOT AT ALL
SUM OF ALL RESPONSES TO PHQ9 QUESTIONS 1 AND 2: 0
1. LITTLE INTEREST OR PLEASURE IN DOING THINGS: NOT AT ALL

## 2025-05-21 ASSESSMENT — PAIN DESCRIPTION - PAIN TYPE
TYPE: ACUTE PAIN
TYPE: SURGICAL PAIN
TYPE: CHRONIC PAIN

## 2025-05-21 ASSESSMENT — PAIN SCALES - GENERAL: PAIN_LEVEL: 3

## 2025-05-21 NOTE — PROGRESS NOTES
Patient admitted to room T405 via transport in [facility] from PACU at 1650.      Patient reports pain at 2 on a scale of 0-10. Educated patient regarding pharmacologic and non pharmacologic modalities for pain management. Oriented to room call light and smoking policy.  Reviewed plan of care (equipment, incentive spirometer, sequential compression devices, medications, activity, diet, fall precautions, skin care, and pain) with patient and family. Welcome packet given and reviewed with patient, all questions answered. Education provided on oral hygiene program.    A&Ox4. Denies CP/SOB.  Tolerating regular diet. Denies N/V.  + void. Last BM PTA.  Bilateral groin incision sites with dressings in place, CDI.  Pt on bed rest until 1700.  All needs met at this time. Call light within reach. Pt calls appropriately. Bed low and locked, non skid socks in place. Hourly rounding in place.

## 2025-05-21 NOTE — ANESTHESIA PROCEDURE NOTES
Airway    Date/Time: 5/21/2025 9:08 AM    Performed by: Simba Trejo M.D.  Authorized by: Simba Trejo M.D.    Location:  OR  Urgency:  Elective  Difficult Airway: No    Indications for Airway Management:  Anesthesia      Spontaneous Ventilation: absent    Sedation Level:  Deep  Preoxygenated: Yes    Patient Position:  Sniffing  Mask Difficulty Assessment:  2 - vent by mask + OA or adjuvant +/- NMBA  Final Airway Type:  Endotracheal airway  Final Endotracheal Airway:  ETT  Cuffed: Yes    Technique Used for Successful ETT Placement:  Direct laryngoscopy    Insertion Site:  Oral  Blade Type:  San  Laryngoscope Blade/Videolaryngoscope Blade Size:  2  ETT Size (mm):  7.0  Measured from:  Lips  ETT to Lips (cm):  24  Placement Verified by: auscultation and capnometry    Cormack-Lehane Classification:  Grade IIa - partial view of glottis  Number of Attempts at Approach:  1

## 2025-05-21 NOTE — H&P
Vascular Surgery  Established Patient Evaluation     Patient:Polo Pyle  MRN:[id number]  Primary care physician:AMAYA Daigle     Vascular Consultant: Mohinder Crandall MD     5/21/25  _____________________________________________________     Chief Complaint/Reason for Visit:   Follow-up imaging        History of Present Illness:   Polo Pyle is a 75 y.o. year old male who as you recall has an abdominal aortic aneurysm which has not been in surveillance lately.  I elected to obtain a CTA of his aorta.  This demonstrates a 5.6 cm abdominal aortic aneurysm.  It also demonstrates significant atherosclerotic disease within the right common femoral artery.  As you recall the patient's undergone a previous left femoral artery endarterectomy.  The patient is asymptomatic with respect to any claudication symptoms but this is due to his severe degenerative arthritis and not necessarily absence of symptoms if he were to ambulate at distance.  See discussion below  Past Medical History:      Past Medical History        Past Medical History:   Diagnosis Date    ASTHMA       as a youth    CAD (coronary artery disease) 8/26/2011    CATARACT       bilateral IOL    Colorblind       problems with red/green    Dental disorder       upper and partial lower    Essential hypertension      Heart burn      High cholesterol      Hyperlipidemia 8/26/2011    Indigestion      Infectious disease       Hx of staph infections- last infection 2018-     Myocardial infarct (HCC) 2005     STENTS    Pain 11/14/11     NECK 7.5/10; C5-C6    Personal history of venous thrombosis and embolism 2006     DVT 01/2019    Pneumonia 2009    Presence of stent in left circumflex coronary artery      Unspecified hemorrhagic conditions       TAKES ASA . PROLONGED BLEEDING, nosebleeds    Venous insufficiency of both lower extremities      White coat syndrome with diagnosis of hypertension      White coat syndrome without hypertension          "  Past Surgical History:      Past Surgical History         Past Surgical History:   Procedure Laterality Date    FEMORAL ENDARTERECTOMY Left 09/20/2019     Procedure: ENDARTERECTOMY, FEMORAL;  Surgeon: Candelaria Crawford M.D.;  Location: SURGERY [facility];  Service: General    VENTRAL HERNIA REPAIR   11/30/2011     Performed by TROY GONZALEZ at SURGERY [facility]    INGUINAL HERNIA REPAIR   11/30/2011     Performed by TROY GONZALEZ at SURGERY [facility]    CATARACT PHACO WITH IOL   11/23/2010     Performed by RM WILLIS at SURGERY [facility]    CATARACT PHACO WITH IOL   11/02/2010     Performed by RM WILLIS at SURGERY SAME DAY [facility]    OTHER CARDIAC SURGERY   2005     STENTS x 4    SINUS TREPHINE [facility]   1971    SINUSOTOMIES   08/1963    INGUINAL HERNIA REPAIR   1962     WITH UNDESCENDED TESTICLE    TONSILLECTOMY AND ADENOIDECTOMY   1957    EYE SURGERY             Allergies:      Allergies         Allergies   Allergen Reactions    Mercury Swelling       And mercury based preservatives-Thimerasol  Swelling, \"strange discharge from eyes\"    Other Environmental Anaphylaxis       Insect toxins/ bees and wasps- Anaphylaxis    Sulfa Drugs Anaphylaxis and Swelling    Bee Anaphylaxis, Hives, Itching, Palpitations, Rash, Runny Nose, Shortness of Breath and Swelling    Pravastatin Diarrhea       Diarrhea          Medications:      Encounter Medications          Outpatient Encounter Medications as of 4/8/2025   Medication Sig Dispense Refill    rivaroxaban (XARELTO) 20 MG Tab tablet Take 1 Tablet by mouth with dinner. PLEASE CALL [phone] TO SCHEDULE A FOLLOW UP VISIT WITH YOUR CARDIOLOGY PROVIDER TO ENSURE FURTHER REFILLS. 100 Tablet 0    alfuzosin (UROXATRAL) 10 MG SR tablet TAKE 1 TABLET BY MOUTH EVERY DAY 90 Tablet 3    rosuvastatin (CRESTOR) 20 MG Tab Take 1 Tablet by mouth every evening. 100 Tablet 0    acetaminophen (TYLENOL) 500 MG Tab Take 500-1,000 mg by mouth " "every 6 hours as needed.        Iron-Vitamin C (IRON 100/C PO) Take  by mouth.        VITAMIN D PO Take 1 capsule by mouth every day.        Coenzyme Q10 (CO Q-10) 300 MG Cap Take 1 Cap by mouth every day.        Ascorbic Acid (VITAMIN C PO) Take 6,000 mg by mouth every day.        Multiple Vitamin (MULTIVITAMIN PO) Take 1 Tab by mouth every day.          No facility-administered encounter medications on file as of 2025.         Social History:      Social History               Socioeconomic History    Marital status:        Spouse name: Not on file    Number of children: Not on file    Years of education: Not on file    Highest education level: Bachelor's degree (e.g., BA, AB, BS)   Occupational History    Not on file   Tobacco Use    Smoking status: Every Day       Current packs/day: 0.00       Types: Cigarettes, Pipe, Cigars       Start date: 1965       Last attempt to quit: 2011       Years since quittin.4       Passive exposure: Never    Smokeless tobacco: Never    Tobacco comments:       I obtained lots of \"swag\" from [employer]/Currently smoke 2-4 cigarettes daily   Vaping Use    Vaping status: Never Used   Substance and Sexual Activity    Alcohol use: Not Currently    Drug use: No    Sexual activity: Not Currently       Partners: Female       Birth control/protection: Other-See Comments       Comment: Old age   Other Topics Concern    Not on file   Social History Narrative    Not on file      Social Drivers of Health           Financial Resource Strain: Low Risk  (2025)     Overall Financial Resource Strain (CARDIA)      Difficulty of Paying Living Expenses: Not very hard   Food Insecurity: No Food Insecurity (2025)     Hunger Vital Sign      Worried About Running Out of Food in the Last Year: Never true      Ran Out of Food in the Last Year: Never true   Transportation Needs: No Transportation Needs (2025)     PRAPARE - Transportation      Lack of Transportation " "(Medical): No      Lack of Transportation (Non-Medical): No   Physical Activity: Sufficiently Active (2025)     Exercise Vital Sign      Days of Exercise per Week: 5 days      Minutes of Exercise per Session: 30 min   Stress: No Stress Concern Present (2025)     [employer] - Occupational Stress Questionnaire      Feeling of Stress : Not at all   Social Connections: Moderately Isolated (2025)     Social Connection and Isolation Panel [NHANES]      Frequency of Communication with Friends and Family: More than three times a week      Frequency of Social Gatherings with Friends and Family: Patient declined      Attends Rastafarian Services: Never      Active Member of Clubs or Organizations: Yes      Attends Club or Organization Meetings: Patient declined      Marital Status:    Intimate Partner Violence: Not on file   Housing Stability: Low Risk  (2025)     Housing Stability Vital Sign      Unable to Pay for Housing in the Last Year: No      Number of Times Moved in the Last Year: 0      Homeless in the Last Year: No         Tobacco Use History   Social History           Tobacco Use   Smoking Status Every Day    Current packs/day: 0.00    Types: Cigarettes, Pipe, Cigars    Start date: 1965    Last attempt to quit: 2011    Years since quittin.4    Passive exposure: Never   Smokeless Tobacco Never   Tobacco Comments     I obtained lots of \"swag\" from [employer]/Currently smoke 2-4 cigarettes daily         Social History          Substance and Sexual Activity   Alcohol Use Not Currently      Social History          Substance and Sexual Activity   Drug Use No      Family History:      Family History         Family History   Problem Relation Age of Onset    Heart Attack Mother      Heart Disease Mother      Cancer Mother           LYKIMA     Dementia Mother      Hyperlipidemia Mother      Alcohol abuse Father      Cancer Maternal Grandfather      Stroke " Maternal Grandmother              Review of Systems:   Constitutional:          Negative for fever or chills  HENT:                         Negative for hearing loss or tinnitus    Eyes:                           Negative for blurred vision or loss of vision  Respiratory:               Negative for cough or hemoptysis  Cardiac:                      Negative for chest pain or palpitations  Vascular:                    Negative for claudication, Negative for rest pain  Gastrointestinal:       Negative for vomiting or abdominal pain                                      Negative for hematochezia or melena   Genitourinary:           Negative for dysuria or hematuria   Musculoskeletal:       Negative for myalgias or acute joint pain  Skin:                           Negative for itching or rash  Neurological:             Negative for dizziness or headaches                                      Negative for speech disturbance                                      Negative for extremity weakness or paresthesias  Endo/Heme:               Negative for easy bruising or bleeding            Exam:   There were no vitals taken for this visit.     Constitutional:          Alert, oriented, no acute distress  HEENT:                       Normocephalic and atraumatic, EOMI  Neck:                          Supple, no JVD,   Cardiovascular:         Regular rate and rhythm,   Pulmonary:                Good air entry bilaterally,    Abdominal:                Soft, non-tender, non-distended                                          Musculoskeletal:       No tenderness, no deformity  Neurological:             CN II-XII grossly intact, no focal deficits  Skin:                           Skin is warm and dry. No rash noted.  Vascular exam:         Adequately perfused                 Imaging:   CTA 3/14/2025  IMPRESSION:        1. Enlarging abdominal aortic aneurysm measuring 5.6 cm on sagittal images, previously 5.3 cm on 4/29/2024. Maximum  dimension on transverse images is 5.9 cm, previously 5.7 cm.     2. Accessory left kidney lower pole renal artery originates off upper aspect of aneurysm.     3. Left common femoral artery 2.9 cm aneurysm with surgical clips and occluded superficial femoral artery. Right femoral artery measures 1.5 cm. Right common iliac artery 1.5 cm. Left iliac 1.3 cm.     4. Moderate hiatal hernia. Prior right inguinal hernia surgery.        Assessment and Plan:   - 5.9 cm abdominal aortic aneurysm  Significant right calcific disease     Plan-      Endovascular repair of abdominal aortic aneurysm with right common femoral endarterectomy.  I discussed risk benefits alternatives expectations and most importantly indications and the patient agrees to proceed.              _____________________________________________________  Mohinder Crandall MD  [facility]  [phone]  [address], [place] [address]

## 2025-05-21 NOTE — ANESTHESIA TIME REPORT
Anesthesia Start and Stop Event Times       Date Time Event    5/21/2025 0713 Ready for Procedure     0902 Anesthesia Start     1107 Anesthesia Stop          Responsible Staff  05/21/25      Name Role Begin End    Simba Trejo M.D. Anesth 0902 1107          Overtime Reason:  no overtime (within assigned shift)    Comments:

## 2025-05-21 NOTE — OP REPORT
[facility] OPERATIVE NOTE    05/21/25      PRE-OPERATIVE DIAGNOSIS -     Abdominal aortic aneurysm  Peripheral arterial disease with severe right common femoral artery stenosis    POST-OPERATIVE DIAGNOSIS -same    PROCEDURE PERFORMED -     Endovascular repair of infrarenal abdominal aortic aneurysm using a 23 mm x 14.5 mm x 12 cm aortic endograft  Placement of a left common iliac artery extender limb using an 18 mm x 9.5 cm  Right common femoral and profunda femoris artery endarterectomy with bovine patch angioplasty  Catheter placement aorta  Aortogram    SURGEON - Mohinder Crandall M.D.    ASSISTANT - SALUD Roth, ALFREDO    TYPE OF ANESTHESIA -  General     ANESTHESIOLOGIST  - Anesthesiologist: Simba Trejo M.D.     SPECIMENS -     INDICATIONS - 75 y.o. [unfilled]     PROCEDURE IN DETAIL -     The patient was taken to the hybrid endovascular suite at [facility] and placed in the supine position.  After adequate anesthesia the patient's abdomen groins and thighs were prepped and draped in sterile fashion.  An oblique incision was made overlying the right inguinal ligament incision was carried down through the subcutaneous tissue and the right common femoral profunda femoris and proximal superficial femoral artery was dissected from the surrounding tissue and isolated.  A mirror image incision was made on the contralateral side and the left common femoral artery was dissected from the surrounding tissue and isolated.  10,000 units of heparin was administered after an appropriate period of time the right common iliac artery was occluded using a vascular clamp as well as the profunda and proximal superficial femoral artery.  A longitudinal arteriotomy was made in the common femoral artery and carried down to the profunda femoris artery using Caban scissors.  Patient had dense calcific disease within that vessel which was endarterectomized using a freer elevator.  Once the material was  removed from the artery a bovine patch was sewn in circumferentially using 5-0 Prolene sutures in running fashion.  After near completion of the patch a wire was advanced retrograde into the aorta followed by a 6 [place] sheath.  Through the 6 [place] sheath I was able to manipulate a Glidewire into the perivisceral aorta and over catheter exchange a stiff XiaoSheng.fmerquist wire was advanced.  On the left side a thinwall access needle was inserted into the left common femoral artery using a Seldinger technique a 6 [place] sheath was placed.  Wire was negotiated through the aorta into the perivisceral aorta stiff wire was placed as before.  16 [place] sheath was then advanced up the right side into the aneurysm sac followed by a 12 [place] sheath on the left side.  A predeployment aided aortogram was performed which documented the location of the renal arteries and help with proper graft selection.  A 23 mm x 14.5 mm x 12 cm [place] endograft was advanced into the aneurysm and was deployed just below the renal arteries.  Contralateral gate was selected and a 14.5 x 12 cm contralateral limb was advanced and was deployed.  Just proximal to the iliac bifurcation.  On the right side a retrograde pelvic angiogram was performed documenting the takeoff of the internal iliac and a 18 mm x 9.5 cm extender limb was advanced up the right side and was deployed just proximal to the iliac bifurcation.  Post deployment angioplasty with a compliant balloon was used at all Cruz and junctions and a completion angiogram demonstrated excellent position of the endograft with no evidence of endoleak.  All catheters wires sheaths were retrieved.  The patch in the right femoral artery was completed using the 5-0 Prolene with and after for flushing and back flushing the patch was completed and flow was established to the leg.  On the left side the sheath was removed and that arteriotomy was repaired using a 5-0 Prolene suture.  Flow was established to  both lower extremities and 50 mg of protamine was administered.  2-0 Vicryl running sutures were used in several layers followed by 4-0 strata fix for the skin benzoin Steri-Strip sterile dressings were applied      _______________________  Mohinder Crandall M.D.

## 2025-05-21 NOTE — PROGRESS NOTES
Pharmacy Medication Reconciliation      ~Medication reconciliation updated and complete per patient   ~Allergies have been verified and updated   ~No oral ABX within the last 30 days  ~Is dispense history available in EPIC: yes  ~[facility] home pharmacy :  [facility] [phone]      ~Anticoagulants (rivaroxaban, apixaban, edoxaban, dabigatran, warfarin, enoxaparin) taken in the last 14 days? No

## 2025-05-21 NOTE — ANESTHESIA POSTPROCEDURE EVALUATION
Patient: Polo Pyle    Procedure Summary       Date: 05/21/25 Room / Location: [place] [address] / SURGERY [facility]    Anesthesia Start: 0902 Anesthesia Stop: 1107    Procedures:       RIGHT COMMON FEMORAL ENDARTERECTOMY (Bilateral: Groin)      ENDOVASCULAR REPAIR AAA (Bilateral: Groin) Diagnosis: (INFRARENAL ABDOMINAL AORTIC ANEURYSM)    Surgeons: Mohinder Crandall M.D. Responsible Provider: Simba Trejo M.D.    Anesthesia Type: general ASA Status: 3            Final Anesthesia Type: general  Last vitals  BP   Blood Pressure : 129/66    Temp   37 °C (98.6 °F)    Pulse   71   Resp   16    SpO2   95 %      Anesthesia Post Evaluation    Patient location during evaluation: PACU  Patient participation: complete - patient participated  Level of consciousness: awake and alert  Pain score: 3    Airway patency: patent  Anesthetic complications: no  Cardiovascular status: hemodynamically stable  Respiratory status: acceptable  Hydration status: euvolemic    PONV: none          No notable events documented.     Nurse Pain Score: 3 (NPRS)

## 2025-05-21 NOTE — OR NURSING
1104: Pt arrived from OR, handoff received from anesthesiologist and RN. Patient asleep, breathing even and unlabored. Vitals table. Bilateral groin dressing s c/d/I, sites soft, no bruising present. Bilateral pedal pulses heard by doppler. Per anesthesia, SBP goal <150.     1115: Patient awake, oriented x4, moving all extremities. Denies pain and nausea.     1220: Patient's sister updated on status. Belongings retrieved from locker.     1240: APRN, Yany aware of area of firmness noted under right groin site dressing, no bleeding or bruising present.     1245: Yany at bedside to assess groin sites, no new orders.     1335: Patient's sister updated, has patient's car keys.     1400: Groin sites stable, no increase/change in right groin firmness, under dressing.     1430: Patient offered snacks, tolerating oral fluids. On hospital bed, awaiting room assignment.     1540: Patient sleeping intermittently. Able to to void. Denies needs. Groin sites remain clean/dry.     1555: Patient's sister updated omn status and room assignment. Room cleaning.     1630: Report give to T4 RN, Sol. Groin sites clean/dry, no bruising. Firmness to right groin unchanged.     1642: Patient transferred to T4, in stable condition. Belongings with patient upon leaving unit. Patient's sister updated on transfer to room.

## 2025-05-21 NOTE — ANESTHESIA PREPROCEDURE EVALUATION
Case: [id number] Date/Time: 05/21/25 0845    Procedures:       RIGHT COMMON FEMORAL ENDARTERECTOMY, ENDOVASCULAR REPAIR OF ABDOMINAL AORTIC ANEURYSM      EXCISION OR REPAIR, AAA    Pre-op diagnosis: INFRARENAL ABDOMINAL AORTIC ANEURYSM    Location: [facility] OR 19 / SURGERY [facility]    Surgeons: Mohinder Crandall M.D.            Relevant Problems   CARDIAC   (positive) Abdominal aortic aneurysm without rupture (HCC)   (positive) Aortic atherosclerosis (HCC)   (positive) Chronic pulmonary embolism without acute cor pulmonale (HCC)   (positive) Coronary artery disease due to lipid rich plaque   (positive) Essential hypertension   (positive) Old MI (myocardial infarction)   (positive) Peripheral artery disease (HCC)   (positive) Presence of stent in LAD coronary artery   (positive) Presence of stent in left circumflex coronary artery   (positive) White coat syndrome without hypertension       Physical Exam    Airway   Mallampati: II  TM distance: >3 FB  Neck ROM: full       Cardiovascular - normal exam  Rhythm: regular  Rate: normal    (-) murmur     Dental - normal exam           Pulmonary - normal examBreath sounds clear to auscultation     Abdominal    Neurological - normal exam                   Anesthesia Plan    ASA 3   ASA physical status 3 criteria: CAD (>3 months), Stents (> 3 months) and MI or angina - history (> 3 months)    Plan - general       Airway plan will be ETT    (Possible thea)      Induction: intravenous    Postoperative Plan: Postoperative administration of opioids is intended.    Pertinent diagnostic labs and testing reviewed    Informed Consent:    Anesthetic plan and risks discussed with patient.    Use of blood products discussed with: patient whom consented to blood products.

## 2025-05-22 VITALS
HEART RATE: 81 BPM | OXYGEN SATURATION: 94 % | RESPIRATION RATE: 16 BRPM | SYSTOLIC BLOOD PRESSURE: 163 MMHG | HEIGHT: 69 IN | BODY MASS INDEX: 37.71 KG/M2 | TEMPERATURE: 97.9 F | WEIGHT: 254.63 LBS | DIASTOLIC BLOOD PRESSURE: 93 MMHG

## 2025-05-22 LAB
ANION GAP SERPL CALC-SCNC: 12 MMOL/L (ref 7–16)
BUN SERPL-MCNC: 26 MG/DL (ref 8–22)
CALCIUM SERPL-MCNC: 9.1 MG/DL (ref 8.5–10.5)
CHLORIDE SERPL-SCNC: 103 MMOL/L (ref 96–112)
CO2 SERPL-SCNC: 24 MMOL/L (ref 20–33)
CREAT SERPL-MCNC: 1.49 MG/DL (ref 0.5–1.4)
ERYTHROCYTE [DISTWIDTH] IN BLOOD BY AUTOMATED COUNT: 47.8 FL (ref 35.9–50)
GFR SERPLBLD CREATININE-BSD FMLA CKD-EPI: 49 ML/MIN/1.73 M 2
GLUCOSE SERPL-MCNC: 121 MG/DL (ref 65–99)
HCT VFR BLD AUTO: 41.4 % (ref 42–52)
HGB BLD-MCNC: 13.5 G/DL (ref 14–18)
MCH RBC QN AUTO: 30.5 PG (ref 27–33)
MCHC RBC AUTO-ENTMCNC: 32.6 G/DL (ref 32.3–36.5)
MCV RBC AUTO: 93.7 FL (ref 81.4–97.8)
PLATELET # BLD AUTO: 194 K/UL (ref 164–446)
PMV BLD AUTO: 10.1 FL (ref 9–12.9)
POTASSIUM SERPL-SCNC: 4.5 MMOL/L (ref 3.6–5.5)
RBC # BLD AUTO: 4.42 M/UL (ref 4.7–6.1)
SODIUM SERPL-SCNC: 139 MMOL/L (ref 135–145)
WBC # BLD AUTO: 13.5 K/UL (ref 4.8–10.8)

## 2025-05-22 PROCEDURE — 80048 BASIC METABOLIC PNL TOTAL CA: CPT

## 2025-05-22 PROCEDURE — 85027 COMPLETE CBC AUTOMATED: CPT

## 2025-05-22 ASSESSMENT — COGNITIVE AND FUNCTIONAL STATUS - GENERAL
HELP NEEDED FOR BATHING: A LITTLE
DRESSING REGULAR LOWER BODY CLOTHING: A LITTLE
MOBILITY SCORE: 18
DAILY ACTIVITIY SCORE: 21
WALKING IN HOSPITAL ROOM: A LITTLE
CLIMB 3 TO 5 STEPS WITH RAILING: A LITTLE
SUGGESTED CMS G CODE MODIFIER DAILY ACTIVITY: CJ
TOILETING: A LITTLE
TURNING FROM BACK TO SIDE WHILE IN FLAT BAD: A LITTLE
MOVING FROM LYING ON BACK TO SITTING ON SIDE OF FLAT BED: A LITTLE
SUGGESTED CMS G CODE MODIFIER MOBILITY: CK
STANDING UP FROM CHAIR USING ARMS: A LITTLE
MOVING TO AND FROM BED TO CHAIR: A LITTLE

## 2025-05-22 ASSESSMENT — FIBROSIS 4 INDEX: FIB4 SCORE: 1.92

## 2025-05-22 NOTE — DISCHARGE SUMMARY
DISCHARGE SUMMARY    Polo Pyle  [id number]  [id number]  _____________________________________    DATE OF ADMISSION: 5/21/2025    DATE OF DISCHARGE:     ADMISSION DIAGNOSIS (ES):  Infrarenal abdominal aortic aneurysm (AAA) without rupture (HCC) [I71.43]    DISCHARGE DIAGNOSIS (ES):  Infrarenal abdominal aortic aneurysm (AAA) without rupture (HCC) [I71.43]    DISCHARGE CONDITION:  stable    CONSULTATIONS:  none    PROCEDURES:  5/21/2025      Endovascular repair of infrarenal abdominal aortic aneurysm using a 23 mm x 14.5 mm x 12 cm aortic endograft  Placement of a left common iliac artery extender limb using an 18 mm x 9.5 cm  Right common femoral and profunda femoris artery endarterectomy with bovine patch angioplasty  Catheter placement aorta  Aortogram      HOSPITAL COURSE:  Polo Pyle is a 75 y.o. male .    Patient underwent the above-mentioned procedure without complication and was admitted to [facility] postoperatively. he had an uneventful recovery and was discharged intact on postoperative day #1.    MEDICATIONS:  Current Outpatient Medications   Medication Sig Dispense Refill    HYDROcodone-acetaminophen (NORCO) 5-325 MG Tab per tablet Take 1 Tablet by mouth every four hours as needed (post op pain) for up to 2 days. 10 Tablet 0       FOLLOW-UP:  Please call my office at [phone] to make an appointment in 2 week(s)    DISCHARGE INSTRUCTIONS:  Resume preadmission diet.  Light activity for 4 weeks.  OK to shower and get incisions wet.  Call or go to ER if you have chest pain, shortness of breath, lightheadedness, stroke-like symptoms, fever, worsening pain, or any other concerns.    Mohinder Crandall MD   [facility]

## 2025-05-22 NOTE — CARE PLAN
The patient is Stable - Low risk of patient condition declining or worsening    Shift Goals  Clinical Goals: pain control, monitor pulses    Progress made toward(s) clinical / shift goals:  Patient denies pain. Pulses checked with doppler.      Problem: Knowledge Deficit - Standard  Goal: Patient and family/care givers will demonstrate understanding of plan of care, disease process/condition, diagnostic tests and medications  Description: Target End Date:  1-3 days or as soon as patient condition allowsDocument in Patient Education1.  Patient and family/caregiver oriented to unit, equipment, visitation policy and means for communicating concern2.  Complete/review Learning Assessment3.  Assess knowledge level of disease process/condition, treatment plan, diagnostic tests and medications4.  Explain disease process/condition, treatment plan, diagnostic tests and medications  Outcome: Progressing     Problem: Pain - Standard  Goal: Alleviation of pain or a reduction in pain to the patient’s comfort goal  Description: Target End Date:  Prior to discharge or change in level of careDocument on Vitals flowsheet1.  Document pain using the appropriate pain scale per order or unit policy2.  Educate and implement non-pharmacologic comfort measures (i.e. relaxation, distraction, massage, cold/heat therapy, etc.)3.  Pain management medications as ordered4.  Reassess pain after pain med administration per policy5.  If opiods administered assess patient's response to pain medication is appropriate per POSS sedation scale6.  Follow pain management plan developed in collaboration with patient and interdisciplinary team (including palliative care or pain specialists if applicable)  Outcome: Progressing

## 2025-05-22 NOTE — PROGRESS NOTES
Virtual Nurse   Virtual nurse portion of admit profile partially completed. Notified primary RN. Patient has no needs at this time.

## 2025-05-22 NOTE — PROGRESS NOTES
Report received from previous shift RN.  Assessment complete.  Patient resting in bed comfortably, even and unlabored breathing present.  Surgical incision to bilateral groin, dressing in place, CDI.   All needs met at this time. Call light within reach. Hourly rounding in place.

## 2025-05-22 NOTE — DISCHARGE PLANNING
Case Management Discharge Planning    Admission Date: 5/21/2025  GMLOS: 1.5  ALOS: 1    6-Clicks ADL Score: 21  6-Clicks Mobility Score: 18      Anticipated Discharge Dispo: Discharge Disposition: Discharged to home/self care (01) with close OP follow up    DME Needed: No    Action(s) Taken: Updated Provider/Nurse on Discharge Plan    Discharge plan is home with close OP follow up.  This RN CM added resources in AVS for Pt s social drivers of health.        Escalations Completed: None    Medically Clear: Yes    Next Steps:   CM to continue to assist Pt with discharge as needed    Barriers to Discharge:   No    Is the patient up for discharge tomorrow: No

## 2025-05-22 NOTE — PROGRESS NOTES
Patient BP is 164/96. RN provided education about need for PRN BP medication, patient refusing medication despite education.   Will recheck BP in 15 minutes.

## 2025-05-22 NOTE — PROGRESS NOTES
Discharge orders received.  Patient arrived to the discharge lounge.  PIV removed by discharge RN. Meds to beds medications not ordered, medication sent to patient's home pharmacy.  Instructions given, medications reviewed and general discharge education provided to patient.  Follow up appointments discussed.  Patient verbalized understanding of dc instructions and prescriptions.  Patient signed discharge instructions.  Patient verbalized he had all belongings with him, Denied having any home medications locked in our inpatient pharmacy that  he needs back. Patient wheeled from discharge lounge to private vehicle. Patient left via car with sister to home in stable condition.

## 2025-05-22 NOTE — PROGRESS NOTES
4 Eyes Skin Assessment Completed by JOSE LUIS Palmer and JOSE LUIS Hennessy.    Head Blanching and Redness  Ears Redness and Blanching  Nose Redness and Blanching  Mouth WDL  Neck WDL  Breast/Chest WDL  Shoulder Blades WDL  Spine WDL  (R) Arm/Elbow/Hand Scab, Swelling, and Edema, Scratch  (L) Arm/Elbow/Hand Scab, Swelling, and Edema  Abdomen WDL  Groin bilateral groin Incision DIP   Scrotum/Coccyx/Buttocks WDL  (R) Leg Bruising, Swelling, and Edema  (L) Leg Bruising, Swelling, and Edema  (R) Heel/Foot/Toe Redness, Blanching, Discoloration, Swelling, and Edema, Dead Big Toe Nail, Purple Discoloration  (L) Heel/Foot/Toe Redness, Blanching, Discoloration, Swelling, and Edema, Purple Discoloration          Devices In Places Blood Pressure Cuff, Pulse Ox, and SCD's      Interventions In Place Heel Mepilex and Pillows    Possible Skin Injury No    Pictures Uploaded Into Epic N/A  Wound Consult Placed N/A  RN Wound Prevention Protocol Ordered No

## 2025-05-22 NOTE — PROGRESS NOTES
[facility]

## 2025-05-22 NOTE — PROGRESS NOTES
Report received from RN, assumed care at 1900  Pt is A0X4, and responds appropriately   Pt declines any SOB, chest pain, new onset of numbness/ tingling  Pt is on RA  Pt rates pain at 1/10, on a scale of 1-10, pt declines intervention at this time  Pt is voiding adequately and without hesitancy  Pt has + flatus, + bowel sounds, - BM, PTA  Pt ambulates with a standby assist with a cane  Pt is tolerating a regular diet, pt denies any nausea/vomiting  Plan of care discussed, all questions answered. Explained importance of calling before getting OOB and pt verbalizes understanding. Explained importance of oral care. Call light is within reach, treaded slipper socks on, bed in lowest/ locked position, hourly rounding in place, all needs met at this time

## 2025-05-22 NOTE — CARE PLAN
Problem: Knowledge Deficit - Standard  Goal: Patient and family/care givers will demonstrate understanding of plan of care, disease process/condition, diagnostic tests and medications  Outcome: Progressing     Problem: Pain - Standard  Goal: Alleviation of pain or a reduction in pain to the patient’s comfort goal  Outcome: Progressing       The patient is Stable - Low risk of patient condition declining or worsening    Shift Goals  Clinical Goals: monitor VS and pulses  Patient Goals: BP control, rest  Family Goals: comfort, rest    Progress made toward(s) clinical / shift goals:  Pt vital signs monitored and blood pressure controlled without need for medications per MAR. Pt pulses monitored with doppler. Pt reported pain controlled. Pt rested this shift.

## 2025-05-22 NOTE — DISCHARGE INSTRUCTIONS
Femoral Endarterectomy, Care After  The following information offers guidance on how to care for yourself after your procedure. Your health care provider may also give you more specific instructions. If you have problems or questions, contact your health care provider.  What can I expect after the procedure?  After the procedure, it is common to have mild pain in the incision area.  Follow these instructions at home:  Incision care         Follow instructions from your health care provider about how to take care of your incision. Make sure you:  Wash your hands with soap and water for at least 20 seconds before and after you change your bandage (dressing). If soap and water are not available, use hand .  Change your dressing as told by your health care provider.  Leave stitches (sutures), skin glue, or adhesive strips in place. These skin closures may need to stay in place for 2 weeks or longer. If adhesive strip edges start to loosen and curl up, you may trim the loose edges. Do not remove adhesive strips completely unless your health care provider tells you to do that.  Do not  apply lotions, creams, or ointments unless your health care provider tells you to do that.  Check your incision area every day for signs of infection. Check for:  Redness, swelling, or more pain.  Fluid or blood.  Warmth.  Pus or a bad smell.  Do not take baths, swim, or use a hot tub until your health care provider approves. Ask your health care provider if you may take showers. You may only be allowed to take sponge baths.  Activity  If you were given a sedative during the procedure, it can affect you for several hours. Do not drive or operate machinery until your health care provider says that it is safe.  Rest as told by your health care provider.  Avoid sitting for a long time without moving. Get up to take short walks every 1-2 hours. This is important to improve blood flow and breathing. Ask for help if you feel weak or  unsteady.  Until your health care provider says that it is safe:  Do not lift anything that is heavier than 10 lb (4.5 kg), or the limit that you are told.  Do not do any activities that take a lot of effort or energy.  Do not take long car trips.  Do not travel by air.  Do not stand for long periods at a time.  Raise (elevate) your leg above the level of your heart while you are sitting or lying down.  Do exercises as told by your health care provider. Talk with your health care provider before starting a new exercise plan.  Return to your normal activities as told by your health care provider. Ask your health care provider what activities are safe for you.  Eating and drinking  Eat a heart-healthy diet that includes fruits, vegetables, and whole grains.  Avoid saturated fats.  Follow instructions from your health care provider about any other eating and drinking restrictions.  Lifestyle    Do not use any products that contain nicotine or tobacco. These products include cigarettes, chewing tobacco, and vaping devices, such as e-cigarettes. These can delay healing after surgery. If you need help quitting, ask your health care provider.  Maintain a healthy weight. Talk with your health care provider if you need help.  Medicines  Take over-the-counter and prescription medicines only as told by your health care provider.  Ask your health care provider if the medicine prescribed to you:  Requires you to avoid driving or using machinery.  Can cause constipation. You may need to take these actions to prevent or treat constipation:  Drink enough fluid to keep your urine pale yellow.  Take over-the-counter or prescription medicines.  Eat foods that are high in fiber, such as beans, whole grains, and fresh fruits and vegetables.  Limit foods that are high in fat and processed sugars, such as fried or sweet foods.  If you are taking blood thinners:  Talk with your health care provider before you take any medicines that  contain aspirin or NSAIDs, such as ibuprofen. These medicines increase your risk for dangerous bleeding.  Take your medicine exactly as told at the same time every day.  Avoid activities that could cause injury or bruising, and follow instructions about how to prevent falls.  Wear a medical alert bracelet or carry a card that lists what medicines you take.  General instructions  Wear compression stockings as told by your health care provider. These stockings help to prevent blood clots and reduce swelling in your legs.  Keep all follow-up visits. This is important.  Contact a health care provider if you have:  Signs of infection at your incision, such as:  Redness, swelling, or more pain.  Fluid or blood.  Warmth.  Pus or a bad smell.  A fever.  Pain that does not get better with medicine.  Pain in your leg while you walk.  Difficulty urinating.  Get help right away if:  You have severe pain in your leg.  You have shortness of breath.  You have chest pain.  You see red streaks going away from your incision.  You have redness, swelling, or warmth in your calf or leg.  Your leg or foot turns blue or gray or feels cold or numb.  These symptoms may represent a serious problem that is an emergency. Do not wait to see if the symptoms will go away. Get medical help right away. Call your local emergency services (911 in the [place] Do not drive yourself to the hospital.  Summary  After a femoral endarterectomy, it is common to have mild pain in the incision area.  Check your incision area every day for signs of infection, such as redness or swelling.  Follow instructions about rest and activity as told by your health care provider.  Do not use any products that contain nicotine or tobacco. These products include cigarettes, chewing tobacco, and vaping devices, such as e-cigarettes. If you need help quitting, ask your health care provider.  You should get help right away if you notice red streaks leading away from your  incision or if your leg or foot turns blue or gray or feels cold or numb.  This information is not intended to replace advice given to you by your health care provider. Make sure you discuss any questions you have with your health care provider.  Document Revised: 06/21/2021 Document Reviewed: 06/21/2021  [employer] Patient Education © 2023 [employer]    Resume preadmission diet.  Light activity for 4 weeks.  OK to shower and get incisions wet.  Call or go to ER if you have chest pain, shortness of breath, lightheadedness, stroke-like symptoms, fever, worsening pain, or any other concerns.

## 2025-05-23 ENCOUNTER — TELEPHONE (OUTPATIENT)
Dept: HEALTH INFORMATION MANAGEMENT | Facility: OTHER | Age: 75
End: 2025-05-23
Payer: MEDICARE

## 2025-05-30 ENCOUNTER — TELEPHONE (OUTPATIENT)
Dept: HEALTH INFORMATION MANAGEMENT | Facility: OTHER | Age: 75
End: 2025-05-30
Payer: MEDICARE

## 2025-06-02 ENCOUNTER — OFFICE VISIT (OUTPATIENT)
Dept: MEDICAL GROUP | Facility: MEDICAL CENTER | Age: 75
End: 2025-06-02
Payer: MEDICARE

## 2025-06-02 VITALS
TEMPERATURE: 98.3 F | HEART RATE: 78 BPM | HEIGHT: 69 IN | DIASTOLIC BLOOD PRESSURE: 74 MMHG | SYSTOLIC BLOOD PRESSURE: 128 MMHG | WEIGHT: 261.4 LBS | OXYGEN SATURATION: 94 % | RESPIRATION RATE: 12 BRPM | BODY MASS INDEX: 38.72 KG/M2

## 2025-06-02 DIAGNOSIS — N17.9 AKI (ACUTE KIDNEY INJURY) (HCC): ICD-10-CM

## 2025-06-02 DIAGNOSIS — I71.43 INFRARENAL ABDOMINAL AORTIC ANEURYSM (AAA) WITHOUT RUPTURE (HCC): ICD-10-CM

## 2025-06-02 DIAGNOSIS — E78.5 DYSLIPIDEMIA: ICD-10-CM

## 2025-06-02 DIAGNOSIS — H61.92 SKIN LESION OF LEFT EAR: ICD-10-CM

## 2025-06-02 DIAGNOSIS — L82.1 SEBORRHEIC KERATOSES: ICD-10-CM

## 2025-06-02 DIAGNOSIS — M25.562 CHRONIC PAIN OF LEFT KNEE: ICD-10-CM

## 2025-06-02 DIAGNOSIS — G89.29 CHRONIC PAIN OF LEFT KNEE: ICD-10-CM

## 2025-06-02 DIAGNOSIS — Z09 HOSPITAL DISCHARGE FOLLOW-UP: Primary | ICD-10-CM

## 2025-06-02 RX ORDER — ROSUVASTATIN CALCIUM 20 MG/1
20 TABLET, COATED ORAL EVERY EVENING
Qty: 100 TABLET | Refills: 3 | Status: SHIPPED | OUTPATIENT
Start: 2025-06-02 | End: 2026-07-07

## 2025-06-02 RX ORDER — KETOCONAZOLE 20 MG/ML
120 SHAMPOO, SUSPENSION TOPICAL
Qty: 120 ML | Refills: 3 | Status: SHIPPED | OUTPATIENT
Start: 2025-06-02

## 2025-06-02 ASSESSMENT — FIBROSIS 4 INDEX: FIB4 SCORE: 2.45

## 2025-06-02 NOTE — Clinical Note
Hello,   Patient has a follow up with you scheduled for 6/10. He has been off of his Xarelto for about 5-6 months. Recently had an enterectomy with Dr. Crandall. Uncertain if he should go back on it? It was not mentioned in postop discharge note. He was on it for a PE after he had COVID-19 in 2021.   Thanks,  Karina

## 2025-06-02 NOTE — PROGRESS NOTES
Subjective:     Polo Pyle is a 75 y.o. male who presents for Hospital Follow-up.    Transitional Care Management        History of Present Illness  The patient presents for a follow-up regarding his hospitalization from 05/21/2025 to 05/22/2025 for the endovascular repair of an infrarenal abdominal aortic aneurysm performed by Dr. Gonzalez. The procedure involved the placement of a 23 mm x 14.5 mm x 20, 12 cm aortic endograft via the left common iliac artery extender limb, utilizing an 18 mm x 9.5 cm right common femoral and profunda femoris artery enterectomy with bovine patch angioplasty. Post-discharge, the patient has adhered to instructions to resume a preoperative diet, engage in light activity for 4 weeks, and has been cleared for showering. He was advised to seek emergency care if he developed symptoms such as chest pain, dyspnea, lightheadedness, stroke-like symptoms, pyrexia, chills, or worsening pain. A follow-up appointment with his vascular team is scheduled for 06/10/2025.    Post-Surgical Recovery  The patient reports a satisfactory recovery post-surgery, with the exception of ongoing left knee pain. He was ambulatory the day following surgery, although he initially experienced difficulty standing, which resolved promptly. He did not experience significant pain except during sneezing. He did not fill his prescribed analgesic medication, finding it unnecessary, and manages any discomfort with acetaminophen. He has an upcoming appointment with Dr. Gonzalez next Tuesday to obtain a work release letter. He feels sufficiently recovered to return to work but anticipates challenges with heavy lifting. He plans to discuss safe exercise options with Dr. Gonzalez. The patient has been off rivaroxaban for approximately 5 to 6 months without complications and has not received any prescription refills for rosuvastatin. He states his medications were never refilled so he discontinued them. His surgical  incisions have healed well, with no leakage reported since the day after surgery.  - Onset: Post-surgery on 05/21/2025.  - Location: Surgical site and general recovery.  - Duration: Since discharge on 05/22/2025.  - Character: Satisfactory recovery, difficulty standing initially, pain during sneezing.  - Alleviating Factors: Acetaminophen for discomfort.  - Timing: Ambulatory the day following surgery, difficulty standing resolved promptly.  - Severity: No significant pain, surgical incisions healed well.    Knee Pain  The patient reports knee pain, which he finds more bothersome than his recent surgery. He has not yet received a response from the orthopedic referral. He has obtained a handicap placard, which he finds beneficial.  - Onset: Post-surgery.  - Location: Knee.  - Character: More bothersome than recent surgery.  - Alleviating Factors: Handicap placard beneficial.    Skin Issues  The patient has been experiencing scaling on the back of his neck and ear, along with xerosis and pruritus of the scalp. He has been using a topical medication that appears to alleviate the symptoms. He has tried a medicated shampoo, which did not provide relief. He has previously used coconut shampoo without any allergic reactions. He underwent a Mohs procedure performed by a dermatologist a few years ago, which did not reveal any malignancy.  - Onset: Not specified.  - Location: Back of neck, ear, scalp.  - Character: Scaling, xerosis, pruritus.  - Alleviating Factors: Topical medication alleviates symptoms, coconut shampoo without allergic reactions.  - Aggravating Factors: Medicated shampoo did not provide relief.    PAST SURGICAL HISTORY:  - Endovascular repair of infrarenal abdominal aortic aneurysm on 05/21/2025  - Mohs procedure (date unspecified)      Current medicines (including reconciliation performed today)  Current Outpatient Medications   Medication Sig Dispense Refill    rosuvastatin (CRESTOR) 20 MG Tab Take 1  "Tablet by mouth every evening. 100 Tablet 3    ketoconazole (NIZORAL) 2 % shampoo Apply 120 mL topically two times a week. 120 mL 3    acetaminophen (TYLENOL) 325 MG Tab Take 650 mg by mouth every four hours as needed for Mild Pain.      lidocaine (SALONPAS PAIN RELIEVING) 4 % Patch Place 1 Patch on the skin as needed (pain).      Polyvinyl Alcohol-Povidone (REFRESH OP) Administer 1 Drop into affected eye(s) as needed (dry eyes).      Iron-Vitamin C (IRON 100/C PO) Take 1 Tablet by mouth every day.      VITAMIN D PO Take 1 capsule by mouth every day.      Coenzyme Q10 (CO Q-10) 300 MG Cap Take 1 Cap by mouth every day.      Ascorbic Acid (VITAMIN C PO) Take 6,000 mg by mouth every day.      Multiple Vitamin (MULTIVITAMIN PO) Take 1 Tab by mouth every day.       No current facility-administered medications for this visit.       Allergies:   Bee, Mercury, Other environmental, Sulfa drugs, and Pravastatin    Social History     Tobacco Use    Smoking status: Every Day     Current packs/day: 0.25     Average packs/day: 0.5 packs/day for 35.4 years (17.4 ttl pk-yrs)     Types: Cigarettes, Pipe, Cigars     Start date: 1966     Last attempt to quit: 2000     Passive exposure: Never    Smokeless tobacco: Never    Tobacco comments:     Currently smoke 2-4 cigarettes daily   Vaping Use    Vaping status: Never Used   Substance Use Topics    Alcohol use: Not Currently    Drug use: No       ROS:  See HPI    Objective:     Vitals:    06/02/25 1354   BP: 128/74   Pulse: 78   Resp: 12   Temp: 36.8 °C (98.3 °F)   TempSrc: Temporal   SpO2: 94%   Weight: 119 kg (261 lb 6.4 oz)   Height: 1.753 m (5' 9\")     Body mass index is 38.6 kg/m².    Physical Exam:  Physical Exam  Constitutional:       Appearance: He is normal weight.   HENT:      Ears:        Comments: Skin lesion  Eyes:      Pupils: Pupils are equal, round, and reactive to light.   Cardiovascular:      Rate and Rhythm: Normal rate and regular rhythm.      Pulses: Normal pulses. "      Heart sounds: Normal heart sounds.   Pulmonary:      Effort: Pulmonary effort is normal.      Breath sounds: Normal breath sounds.   Neurological:      Mental Status: He is alert and oriented to person, place, and time.   Psychiatric:         Mood and Affect: Mood normal.         Behavior: Behavior normal.           Assessment & Plan         1. Hospital discharge follow-up  2. Infrarenal abdominal aortic aneurysm (AAA) without rupture (HCC)  Chronic, stable  Currently in the recovery phase following infrarenal abdominal aortic aneurysm repair performed by Dr. Gonzalez.  Diagnostic plan: Communication will be initiated with Dr. Gonzalez to ascertain whether he should resume his Xarelto medication.  Treatment plan: Advised to maintain a regimen of light activity for a duration of 4 weeks. Prescription for rosuvastatin will be renewed until further instructions are received from Dr. Gonzalez.    3. MIKAYLA (acute kidney injury) (HCC)  Acute, uncomplicated  Kidney function had slightly decreased during the hospital stay.  Diagnostic plan: Recheck of kidney function will be ordered to ensure it has returned to baseline and was not a side effect from medications or dehydration.  Treatment plan: Patient advised to complete the blood work sometime this week.  - Comp Metabolic Panel; Future    4. Dyslipidemia  Chronic, stable  Cholesterol panel will be ordered as it has been 2 years since the last cholesterol check.  Treatment plan: Advised to fast for 8 hours before the test. Discussion about the importance of managing lipids and plaque in arteries, especially considering cardiac stents and aneurysm repair.  - Lipid Profile; Future  - rosuvastatin (CRESTOR) 20 MG Tab; Take 1 Tablet by mouth every evening.  Dispense: 100 Tablet; Refill: 3    5. Skin lesion of left ear  Chronic, stable  White patch and scalling behind the ear raises concern for potential skin cancer.  Diagnostic plan: Referral to [facility] and Dermatology has  been made for further evaluation.  Clinical decision making: Emphasis on the importance of monitoring for precancerous lesions due to sun exposure. Previous dermatological evaluation did not show negative results, but continued monitoring is recommended.  - Referral to Dermatology    6. Seborrheic keratoses  Chronic, stable  Reports dry and itchy scalp, along with scaling on the back of the neck and ear.  Treatment plan: Prescription for ketoconazole shampoo has been provided to alleviate itching. Advised to try Vanicream once a week as an alternative treatment option. Discussion about potential allergens in shampoo and recommendation for zinc-based shampoo if ketoconazole does not help.    - ketoconazole (NIZORAL) 2 % shampoo; Apply 120 mL topically two times a week.  Dispense: 120 mL; Refill: 3    7. Chronic pain of left knee  Chronic, stable  Reports that knee pain has been more bothersome than the postoperative pain from the aneurysm repair.  Diagnostic plan: Referral to [facility] has been provided for further evaluation and management of knee pain.  Treatment plan: Advised to send a [employer] message if he does not hear back from [facility], and an alternative referral to [facility] will be arranged. Knee pain management will be prioritized now that the aneurysm has been addressed.      - Chart and discharge summary were reviewed.   - Hospitalization and results reviewed with patient.   - Medications reviewed including instructions regarding high risk medications, dosing and side effects.  - Recommended Services: No services needed at this time  - Advance directive/POLST on file?  Yes    Follow-up:No follow-ups on file.    Face-to-face transitional care management services with MODERATE (today's visit is within 14 days post discharge & LACE+ score of 28-58) medical decision complexity were provided.

## 2025-06-04 ENCOUNTER — HOSPITAL ENCOUNTER (OUTPATIENT)
Dept: LAB | Facility: MEDICAL CENTER | Age: 75
End: 2025-06-04
Payer: MEDICARE

## 2025-06-04 DIAGNOSIS — N17.9 AKI (ACUTE KIDNEY INJURY) (HCC): ICD-10-CM

## 2025-06-04 DIAGNOSIS — E78.5 DYSLIPIDEMIA: ICD-10-CM

## 2025-06-04 PROCEDURE — 36415 COLL VENOUS BLD VENIPUNCTURE: CPT

## 2025-06-04 PROCEDURE — 80053 COMPREHEN METABOLIC PANEL: CPT

## 2025-06-04 PROCEDURE — 80061 LIPID PANEL: CPT

## 2025-06-05 LAB
ALBUMIN SERPL BCP-MCNC: 3.7 G/DL (ref 3.2–4.9)
ALBUMIN/GLOB SERPL: 0.9 G/DL
ALP SERPL-CCNC: 103 U/L (ref 30–99)
ALT SERPL-CCNC: 11 U/L (ref 2–50)
ANION GAP SERPL CALC-SCNC: 11 MMOL/L (ref 7–16)
AST SERPL-CCNC: 23 U/L (ref 12–45)
BILIRUB SERPL-MCNC: 0.9 MG/DL (ref 0.1–1.5)
BUN SERPL-MCNC: 20 MG/DL (ref 8–22)
CALCIUM ALBUM COR SERPL-MCNC: 9.5 MG/DL (ref 8.5–10.5)
CALCIUM SERPL-MCNC: 9.3 MG/DL (ref 8.5–10.5)
CHLORIDE SERPL-SCNC: 103 MMOL/L (ref 96–112)
CHOLEST SERPL-MCNC: 238 MG/DL (ref 100–199)
CO2 SERPL-SCNC: 24 MMOL/L (ref 20–33)
CREAT SERPL-MCNC: 1.51 MG/DL (ref 0.5–1.4)
GFR SERPLBLD CREATININE-BSD FMLA CKD-EPI: 48 ML/MIN/1.73 M 2
GLOBULIN SER CALC-MCNC: 3.9 G/DL (ref 1.9–3.5)
GLUCOSE SERPL-MCNC: 88 MG/DL (ref 65–99)
HDLC SERPL-MCNC: 43 MG/DL
LDLC SERPL CALC-MCNC: 165 MG/DL
POTASSIUM SERPL-SCNC: 4.4 MMOL/L (ref 3.6–5.5)
PROT SERPL-MCNC: 7.6 G/DL (ref 6–8.2)
SODIUM SERPL-SCNC: 138 MMOL/L (ref 135–145)
TRIGL SERPL-MCNC: 151 MG/DL (ref 0–149)

## 2025-06-06 ENCOUNTER — OFFICE VISIT (OUTPATIENT)
Dept: DERMATOLOGY | Facility: IMAGING CENTER | Age: 75
End: 2025-06-06
Payer: MEDICARE

## 2025-06-06 ENCOUNTER — RESULTS FOLLOW-UP (OUTPATIENT)
Dept: MEDICAL GROUP | Facility: MEDICAL CENTER | Age: 75
End: 2025-06-06

## 2025-06-06 DIAGNOSIS — Z85.89 HISTORY OF SQUAMOUS CELL CARCINOMA: ICD-10-CM

## 2025-06-06 DIAGNOSIS — L57.0 ACTINIC KERATOSIS: Primary | ICD-10-CM

## 2025-06-06 PROCEDURE — 17000 DESTRUCT PREMALG LESION: CPT | Performed by: DERMATOLOGY

## 2025-06-06 PROCEDURE — 99212 OFFICE O/P EST SF 10 MIN: CPT | Mod: 25 | Performed by: DERMATOLOGY

## 2025-06-06 PROCEDURE — 17003 DESTRUCT PREMALG LES 2-14: CPT | Performed by: DERMATOLOGY

## 2025-06-06 NOTE — PROGRESS NOTES
CC: Lesion over left helix    Subjective:  Previously seen patient here for lesion over left helix    HPI/location: [facility] [place]  Time present: 8mo  Painful lesion: No  Itching lesion: No  Enlarging lesion: No smaller    History of skin cancer: SCC left temple, s/p mohs 2022  History of precancers/actinic keratoses: Yes, Details: possibly on forehead  History of biopsies:No  History of blistering/severe sunburns:Yes, Details: [place] child   Family history of skin cancer:No  Family history of atypical moles:No    ROS: no fevers/chills. No itch.  No cough  Relevant PMH: mult med comorbidities  Social: smoker    PE: Gen:WDWN male in NAD. Focal exam: thin HK papules on left sup ear X 2 and left forehead X 1. Scar well healed on left forehead      A/P: AKs: face/ear  -counseled on diagnosis and treatment, including risks/benefits/alternatives of cyrotherapy  -LN2 25 sec X 2 cycles X 3lesions  -f/u if persists at 1 month    Hx of skin cancer:  -cont'd sunprotection and skin cancer surveillance  -Q 6mo-annual exam recommended; f/u suspicious lesions PRN    Advised TORITO in 6 weeks to check LN2 sites and cont surveillance care    I have reviewed medications relevant to my specialty.

## 2025-06-09 NOTE — PROGRESS NOTES
VASCULAR SURGERY                    Postop Note  _________________________________    6/10/2025    Mr. Pyle  is here to follow-up after undergoing uncomplicated right common femoral endarterectomy and endovascular AAA repair almost 3 weeks ago.  Dr. Crandall preformed the operation. He was asymptomatic at the time of repair.  He has had prior left femoral endarterectomy in the past.     Patient reports doing well and has no concerns.  He felt great the next day and has had no residual effects of surgery.  He is ready to go back to work.      Vitals  There were no vitals taken for this visit.    Exam  Well appearing male,  ambulates with a cane   Right groin Incision healing well  Clean, dry and intact  No erythema or drainage  Perfusing well      Diagnosis/Assessment: PAD:  s/p right common femoral endarterectomy and endovascular AAA repair.        Plan:  We have discussed steps to come.  He will get his CTA done in the next 2-3 weeks and then present back to see Dr. Crandall in f/u.  If all looks well, he will then follow with vascular medicine for surveillance.  He is in agreement.    He also requests a note to go back to work with no restrictions as of next week.      The patient demonstrates a clear understanding of our discussion and has no questions at this time. He will reach out with any questions or concerns via phone or My Chart.    Follow up:    CTA complete      With Dr. Crandall after imaging complete.      SALUD Roth  [facility]  [phone]  [address], [place] [address]

## 2025-06-10 ENCOUNTER — OFFICE VISIT (OUTPATIENT)
Facility: MEDICAL CENTER | Age: 75
End: 2025-06-10
Payer: MEDICARE

## 2025-06-10 VITALS
BODY MASS INDEX: 39.09 KG/M2 | TEMPERATURE: 98 F | OXYGEN SATURATION: 94 % | HEIGHT: 68 IN | WEIGHT: 257.94 LBS | HEART RATE: 78 BPM | SYSTOLIC BLOOD PRESSURE: 156 MMHG | DIASTOLIC BLOOD PRESSURE: 84 MMHG

## 2025-06-10 DIAGNOSIS — I71.40 ABDOMINAL AORTIC ANEURYSM (AAA) WITHOUT RUPTURE, UNSPECIFIED PART (HCC): Primary | ICD-10-CM

## 2025-06-10 ASSESSMENT — FIBROSIS 4 INDEX: FIB4 SCORE: 2.65

## 2025-06-12 NOTE — Clinical Note
[facility]  [address] [place] [address]    [id number]    Polo Vasquez Kristopherlani  [address] [place] [address]    June 12, 2025    Member Name: Polo Pyle   Member Number: [id number]   Reference Number: [id number]   Approved Services: MRI/CAT Scan   Approved Service Dates: 06/12/2025 - 10/10/2025   Requesting Provider: Sadie Crandall   Requested Provider: [facility]     Dear Polo Pyle:    The following medical service(s) requested by Sadie Crandall have been approved:    Procedure Code Procedure Code Name Requested Quantity Approved Quantity Status   15564 (CPT®) VA CT ANGIO ABD&PLVIS CNTRST MTRL W/WO CNTRST IMGES 1 1 Authorized       Approved Quantity means the number of visits approved for medication treatments and/or medical services.    The services should be provided by [facility] no later than 10/10/2025. Please contact the provider listed below with any questions.     Provider Information:  [facility]  [phone]    Your plan benefit may require a deductible, co-payment or coinsurance for these services. This authorization does not guarantee [facility] will pay the claim for services that you receive. Payment by [facility] for these services is subject to the terms of your Evidence of Coverage, your eligibility at the time of service, and confirmation of benefit coverage.    For any questions or additional information, please contact Customer Service:    retirement Plus Toll Free: [phone]  TTY users dial: [phone]   Call Center Hours:  Oct 1 - Mar 31, Mon - Fri 7 AM to 8 PM PST  Oct 1 - Mar 31, Sat - Sun 8 AM to 8 PM PST  Apr 1 - Sep 30, Mon - Fri 7 AM to 8 PM PST   Office Hours: Mon - Fri 8 AM to 5 PM PST   E-mail: [email or web address]   Website:  [email or web address]      This information is available for free in other languages. Please contact Customer  Service at the phone number above for more information. [facility] complies with applicable Federal civil rights laws and does not discriminate on the basis of race, color, national origin, age, disability or sex.    Sincerely,     Healthcare Utilization Management Department     Cc: [facility]   Sadie Crandall    Multi-Language Insert  Multi- Services  English: We have free  services to answer any questions you may have about our health or drug plan.  To get an , just call us at [phone].  Someone who speaks English/Language can help you.  This is a free service.  Wallisian: Tenemos servicios de intérprete sin costo alguno  para responder cualquier pregunta que pueda tener sobre nuestro plan de elzbieta o medicamentos. Para hablar con un intérprete, por favor llame al [phone]. Alguien que hable español le podrá ayudar. Megan es un servicio gratuito.  Chinese Mandarin: ?????????????????????????????? ???????????????? [phone]????????????????? ?????????  Chinese Cantonese: ?????????????????????????????? ????????????? [phone]???????????????????? ????????  Tagalog:  Montrell diaz serbisyo sa hullsaemily alfaroa jonathon brar hinggil sa salima gould o panggamot.  jade Arauz  [phone]. Vivian dominguez ng Tagalog.  Higinio mckeon.  Hong Konger:  Nous proposons álvaro services gratuits d'interprétation pour répondre à toutes gretel questions relatives à notre régime de santé ou d'assurance-médicaments. Pour accéder au service d'interprétation, il vous suffit de nous appeler au [phone]. Un interlocuteur parlant Françs pourra vous aider. Ce service est gratuit.  Prydeinig:  Josey sarahi có d?ch v? thông d?ch mi?n phí ð? tr? l?i các câu h?i v? chýõng s?c kh?e và chýõng trìn thu?c men. N?u quí v?  c?n thông d?ch viên kaitlyn g?i [phone] s? có nhân viên nói ti?ng Vi?t giúp ð? quí v?. Ðây là d?ch v? mi?n phí .  Indonesian:  Unser kostenser Dolmetscherservice beantwortet Ihren Fragen zu unserem Gesundheits- und Arzneimittelplan. Unsere Dolmetscher erreichen Sie [phone]. Man wird Ihnen ann auf [facility]. Dieser Service ist [facility].  Turkish:  ??? ?? ?? ?? ?? ??? ?? ??? ?? ???? ?? ?? ???? ???? ????. ?? ???? ????? ?? [phone] ??? ??? ????.  ???? ?? ???? ?? ?? ????. ? ???? ??? ?????.   Stateless: Åñëè ó âàñ âîçíèêíóò âîïðîñû îòíîñèòåëüíî ñòðàõîâîãî èëè ìåäèêàìåíòíîãî ïëàíà, âû ìîæåòå âîñïîëüçîâàòüñÿ íàøèìè áåñïëàòíûìè óñëóãàìè ïåðåâîä÷èêîâ. ×òîáû âîñïîëüçîâàòüñÿ óñëóãàìè ïåðåâîä÷èêà, ïîçâîíèòå íàì ïî òåëåôîíó [phone]. Âàì îêàæåò ïîìîùü ñîòðóäíèê, êîòîðûé ãîâîðèò ïî-póññêè. Äàííàÿ óñëóãà áåñïëàòíàÿ.  Luxembourgish: ÅääÇ äÞÏã ÎÏãÇÊ ÇáãÊÑÌã ÇáÝæÑí ÇáãÌÇäíÉ ááÅÌÇÈÉ Úä Ãí ÃÓÆáÉ ÊÊÚáÞ ÈÇáÕÍÉ Ãæ ÌÏæá ÇáÃÏæíÉ áÏíäÇ. ááÍÕæá Úáì ãÊÑÌã ÝæÑí¡ áíÓ Úáíß Óæì ÇáÇÊÕÇá ÈäÇ Úáì [phone] . ÓíÞæã ÔÎÕ ãÇ íÊÍÏË ÇáÚÑÈíÉ ÈãÓÇÚÏÊß. åÐå ÎÏãÉ ãÌÇäíÉ.  Fina: ????? ????????? ?? ??? ?? ????? ?? ???? ??? ???? ???? ?? ?????? ?? ???? ???? ?? ??? ????? ??? ????? ???????? ?????? ?????? ???. ?? ???????? ??????? ???? ?? ???, ?? ???? [phone] ?? ??? ????. ??? ??????? ?? ?????? ????? ?? ???? ??? ?? ???? ??. ?? ?? ????? ???? ??.   Spanish:  È disponibile un servizio di interpretariato gratuito per rispondere a eventuali domande sul nostro piano sanitario e farmaceutico. Per un interprete, contattare il raul [phone]. Un nostro incaricato rudy parla Italianovi fornirà l'assistenza necessaria. È un servizio gratuito.  Portugués:  Dispomos de serviços de interpretação gratuitos para responder a qualquer questão que tenha acerca do nosso plano de saúde ou de medicação. Para obter um intérprete, contacte-nos através do número [phone]. Irá encontrar alguém que fale o idioma  Português para o ajudar. Megan  serviço é gratuito.  Greek Creole:  Nou genyen sèvis entèprèt gratis trinidad reponn tout kesyon ou ta genyen konsènan plan medikal oswa dwòg nou an.  Trinidad jwenn yon entèprèt, jis rele nou nan [phone]. Yon moun ki pale Kreyòl kapab tarik w.  Sa a se yon sèvis ki gratis.  Polish:  Umo¿liwiamy bezp³atne skorzystanie z us³ug t³umacza ustnego, który pomo¿e w uzyskaniu odpowiedzi na temat planu zdrowotnego lub dawdonna villarreal. Мария skorzystaæ z pomocy t³umacza znaj¹cego mohan coe¿y zadzwoniæ pod numer [phone]. Ta us³uga jest bezp³atna.  Bolivian: ????? ??????? ????????????????????? ??????????????????????????????????[phone] ???????????????? ? ????????????????? ?????

## 2025-06-23 ENCOUNTER — HOSPITAL ENCOUNTER (OUTPATIENT)
Dept: RADIOLOGY | Facility: MEDICAL CENTER | Age: 75
End: 2025-06-23
Attending: NURSE PRACTITIONER
Payer: MEDICARE

## 2025-06-23 DIAGNOSIS — I71.40 ABDOMINAL AORTIC ANEURYSM (AAA) WITHOUT RUPTURE, UNSPECIFIED PART (HCC): ICD-10-CM

## 2025-06-23 PROCEDURE — 74174 CTA ABD&PLVS W/CONTRAST: CPT

## 2025-06-23 PROCEDURE — 700117 HCHG RX CONTRAST REV CODE 255: Performed by: NURSE PRACTITIONER

## 2025-06-23 RX ADMIN — IOHEXOL 100 ML: 350 INJECTION, SOLUTION INTRAVENOUS at 09:36

## 2025-07-01 ENCOUNTER — OFFICE VISIT (OUTPATIENT)
Facility: MEDICAL CENTER | Age: 75
End: 2025-07-01
Payer: MEDICARE

## 2025-07-01 VITALS
HEART RATE: 77 BPM | HEIGHT: 68 IN | WEIGHT: 254.52 LBS | BODY MASS INDEX: 38.57 KG/M2 | OXYGEN SATURATION: 95 % | TEMPERATURE: 97.6 F | SYSTOLIC BLOOD PRESSURE: 136 MMHG | DIASTOLIC BLOOD PRESSURE: 92 MMHG

## 2025-07-01 DIAGNOSIS — I71.43 INFRARENAL ABDOMINAL AORTIC ANEURYSM (AAA) WITHOUT RUPTURE (HCC): Primary | ICD-10-CM

## 2025-07-01 PROCEDURE — 3080F DIAST BP >= 90 MM HG: CPT | Performed by: SURGERY

## 2025-07-01 PROCEDURE — 99024 POSTOP FOLLOW-UP VISIT: CPT | Performed by: SURGERY

## 2025-07-01 PROCEDURE — 3075F SYST BP GE 130 - 139MM HG: CPT | Performed by: SURGERY

## 2025-07-01 ASSESSMENT — FIBROSIS 4 INDEX: FIB4 SCORE: 2.65

## 2025-07-01 NOTE — PROGRESS NOTES
VASCULAR SURGERY                 Clinic Progress Note  _________________________________    Vitals for Today's Visit  There were no vitals taken for this visit.  _________________________________          7/1/2025  This patient returns for follow-up and for review of of his surveillance CT scan.  As you recall back in 5/21/2025 he underwent endovascular repair of an infrarenal abdominal aortic aneurysm.  At that time he also had a right common femoral artery endarterectomy with bovine patch angioplasty.    The results of his CTA looked good.  There is no evidence of endoleak and the device is in good position.  His right femoral artery is patent.    Patient reports no complaints and is doing well from all respects.    Impression-     Status post endovascular repair of abdominal aortic aneurysm and right femoral artery endarterectomy and patch  Patient doing well from all respects reports symptoms in his right leg are improved after endarterectomy    Plan     Will refer patient to the [facility] for vascular health for ongoing medical management and surveillance for the endograft.  Recommend aortic ultrasound 1 year        Mohinder Crandall MD  [facility]  [phone] [address], [place] [address]

## 2025-07-09 NOTE — Clinical Note
REFERRAL APPROVAL NOTICE         Sent on July 8, 2025                   Polo Pedro Kristopherlani  [address] [place] [address]   [place] [address]                   Dear Mr. Pyle,    After a careful review of the medical information and benefit coverage, [facility] has processed your referral. See below for additional details.    If applicable, you must be actively enrolled with your insurance for coverage of the authorized service. If you have any questions regarding your coverage, please contact your insurance directly.    REFERRAL INFORMATION   Referral #:  [id number]  Referred-To Department    Referred-By Provider:  Vascular Medicine    Mohinder Crandall M.D.   Vascular Medicine      [address] [place] [id number]  [phone] [address] [place] [address]  [phone]    Referral Start Date:  07/01/2025  Referral End Date:   07/01/2026             SCHEDULING  If you do not already have an appointment, please call [phone] to make an appointment.     MORE INFORMATION  If you do not already have a [employer] account, sign up at: [email or web address]  You can access your medical information, make appointments, see lab results, billing information, and more.  If you have questions regarding this referral, please contact  the [facility] Referrals department at:             [phone]. Monday - Friday 8:00AM - 5:00PM.     Sincerely,    [facility]

## 2025-07-18 ENCOUNTER — OFFICE VISIT (OUTPATIENT)
Dept: DERMATOLOGY | Facility: IMAGING CENTER | Age: 75
End: 2025-07-18
Payer: MEDICARE

## 2025-07-18 DIAGNOSIS — L57.0 ACTINIC KERATOSIS: Primary | ICD-10-CM

## 2025-07-18 PROCEDURE — 17000 DESTRUCT PREMALG LESION: CPT | Performed by: DERMATOLOGY

## 2025-07-18 PROCEDURE — 17003 DESTRUCT PREMALG LES 2-14: CPT | Performed by: DERMATOLOGY

## 2025-07-18 NOTE — PROGRESS NOTES
CC: AKs    Subjective:  Previously seen patient here to f/u Aks - declined TORITO.    Good blister response on left ear after LN2 trx - here for f/u and new rough sites on face    History of skin cancer: SCC left temple, s/p mohs 2022  History of precancers/actinic keratoses: Yes, Details: possibly on forehead  History of biopsies:No  History of blistering/severe sunburns:Yes, Details: [place]   Family history of skin cancer:No  Family history of atypical moles:No    ROS: no fevers/chills. No itch.  No cough  Relevant PMH: mult med comorbidities  Social: smoker    PE: Gen:WDWN male in NAD. Focal exam: thin HK papules on left sup ear, right cheek X 2 and left cheek X1    A/P: AKs: face/ear  -counseled on diagnosis and treatment, including risks/benefits/alternatives of cyrotherapy  -LN2 25 sec X 2 cycles X 4lesions  -f/u if persists at 1 month  -reviewed field trx and interest for PDT Fall 2025    Bx site on left helix if recurs    Hx of skin cancer:  -cont'd sunprotection and skin cancer surveillance  -Q 6mo-annual exam recommended; f/u suspicious lesions PRN    TORITO advised    I have reviewed medications relevant to my specialty.

## 2025-07-25 ENCOUNTER — OFFICE VISIT (OUTPATIENT)
Dept: MEDICAL GROUP | Facility: MEDICAL CENTER | Age: 75
End: 2025-07-25
Payer: MEDICARE

## 2025-07-25 VITALS
RESPIRATION RATE: 15 BRPM | SYSTOLIC BLOOD PRESSURE: 140 MMHG | HEIGHT: 68 IN | TEMPERATURE: 97.7 F | OXYGEN SATURATION: 96 % | BODY MASS INDEX: 38.01 KG/M2 | WEIGHT: 250.8 LBS | DIASTOLIC BLOOD PRESSURE: 88 MMHG | HEART RATE: 80 BPM

## 2025-07-25 DIAGNOSIS — G89.29 CHRONIC PAIN OF LEFT KNEE: Primary | ICD-10-CM

## 2025-07-25 DIAGNOSIS — M25.562 CHRONIC PAIN OF LEFT KNEE: Primary | ICD-10-CM

## 2025-07-25 DIAGNOSIS — M79.651 ACUTE PAIN OF RIGHT THIGH: ICD-10-CM

## 2025-07-25 DIAGNOSIS — I10 ESSENTIAL HYPERTENSION: Chronic | ICD-10-CM

## 2025-07-25 DIAGNOSIS — I71.43 INFRARENAL ABDOMINAL AORTIC ANEURYSM (AAA) WITHOUT RUPTURE (HCC): ICD-10-CM

## 2025-07-25 DIAGNOSIS — L98.9 SKIN LESIONS: ICD-10-CM

## 2025-07-25 DIAGNOSIS — H61.92 SKIN LESION OF LEFT EAR: ICD-10-CM

## 2025-07-25 ASSESSMENT — ENCOUNTER SYMPTOMS
PALPITATIONS: 0
ORTHOPNEA: 0
COUGH: 0
CHILLS: 0
FEVER: 0
SHORTNESS OF BREATH: 0

## 2025-07-25 ASSESSMENT — FIBROSIS 4 INDEX: FIB4 SCORE: 2.65

## 2025-07-25 NOTE — PROGRESS NOTES
Subjective:     Verbal consent was acquired by the patient to use [facility] ambient listening note generation during this visit Yes    CC: Follow-Up      HPI:   Polo is a 75 y.o. male who presents today for:    History of Present Illness  The patient presents for a follow-up visit.    Left Knee Pain and Treatment  He has requested a referral to Dr. Mitesh Truong at [facility] for a second opinion regarding his left knee. His current physician has been administering intra-articular corticosteroid injections and advised against knee arthroplasty due to potential circulatory complications that could necessitate amputation. The patient is seeking alternative treatments that may be more efficacious than the injections. He manages his pain with two acetaminophen tablets in the morning.  - Treatment: Intra-articular corticosteroid injections.  - Alleviating/Aggravating Factors: Advised against knee arthroplasty due to potential circulatory complications.  - Severity: Manages pain with two acetaminophen tablets in the morning.    Vascular Health  He recently consulted with Dr. Crandall, a vascular specialist, who recommended periodic evaluations. The patient reports no issues with thrombosis. He was previously on low-dose aspirin for an extended period without any adverse effects. He is also taking a statin medication.    Acute Right Thigh Pain  Approximately 1.5 weeks ago, he experienced acute pain in his right thigh upon awakening, describing it as if he had been struck with a blunt object. The area remains tender to palpation and exhibits constant aching, although the intensity of the pain has slightly decreased. He inquires if this could be related to his recent surgical procedure. He contacted Dr. Crandall's office regarding this symptom, but no correlation was identified. He reports no swelling or ecchymosis in his leg, although he notes mild bilateral lower extremity edema. He uses compression  "stockings for relief.  - Onset: Approximately 1.5 weeks ago.  - Location: Right thigh.  - Character: Acute pain described as if struck with a blunt object; tender to palpation; constant aching.  - Alleviating/Aggravating Factors: No correlation identified with recent surgical procedure; uses compression stockings for relief.  - Severity: Intensity of pain has slightly decreased; no swelling or ecchymosis; mild bilateral lower extremity edema.    Dermatological Issues  He underwent cryotherapy for a lesion on his ear. He had a follow-up appointment for this procedure and also has a jace on his face from similar treatment. The dermatologist has recommended a follow-up visit and mentioned the possibility of utilizing LED light therapy for rough patches on his face in conjunction with topical chemotherapy cream.    Social History:  Occupations:   Coffee/Tea/Caffeine-containing Drinks: Consumes coffee, had half a cup this morning         Allergies: Bee, Mercury, Other environmental, Sulfa drugs, and Pravastatin     Medications: Current Medications[1]      ROS:  Review of Systems   Constitutional:  Negative for chills and fever.   Respiratory:  Negative for cough and shortness of breath.    Cardiovascular:  Negative for chest pain, palpitations, orthopnea and leg swelling.   Musculoskeletal:  Positive for joint pain.       Objective:     Exam:  BP (!) 140/88   Pulse 80   Temp 36.5 °C (97.7 °F) (Temporal)   Resp 15   Ht 1.727 m (5' 8\")   Wt 114 kg (250 lb 12.8 oz)   SpO2 96%   BMI 38.13 kg/m²  Body mass index is 38.13 kg/m².    Physical Exam  Constitutional:       Appearance: He is normal weight.   Eyes:      Pupils: Pupils are equal, round, and reactive to light.   Cardiovascular:      Rate and Rhythm: Normal rate and regular rhythm.      Pulses: Normal pulses.      Heart sounds: Normal heart sounds.   Pulmonary:      Effort: Pulmonary effort is normal.      Breath sounds: Normal breath sounds. "   Musculoskeletal:      Left knee: Bony tenderness present.   Neurological:      Mental Status: He is alert and oriented to person, place, and time.   Psychiatric:         Mood and Affect: Mood normal.         Behavior: Behavior normal.           Assessment & Plan:     Polo a 75 y.o. male with the following -     Assessment & Plan       1. Chronic pain of left knee  Chronic, stable  Referral to Dr. Mitesh Truong at [facility] initiated for a second opinion.  Currently receiving cortisone shots; seeking alternatives due to concerns about knee replacement surgery complications.  Discussed potential risks associated with knee replacement given circulation issues.  Treatment plan: Advised to continue managing pain with Tylenol as needed.  - Referral to Orthopedics    2. Essential hypertension  Chronic, stable  Blood pressure noted to be high, likely due to stress and rushing to the appointment. Not currently on antihypertensive medication. BP improved later in visit, but still marlee. Will hold off on medication, but if BP remains elevated, may need to start medication.   Rechecked and found still elevated but improving.  Treatment plan: Advised to monitor blood pressure regularly.    3. Infrarenal abdominal aortic aneurysm (AAA) without rupture (HCC)  Chronic, stable  Off Xarelto for several months now.  Cardiology recommended continuing Xarelto; prefers to discuss further with cardiologist SALUD Moreno on 08/15/2025 before resuming medication.  Previously on baby aspirin without negative side effects; considering returning to it due to cost concerns.    4. Skin lesion of left ear  5. Skin lesions  Chronic, stable  Recommend continue to follow-up with dermatologist as directed for treatment.    6. Acute pain of right thigh  Undiagnosed problem with uncertain prognosis  Recently had AAA repair, now having right thigh pain. History of thrombus, off OAC for several months.   Diagnostic plan:  Ultrasound of the right thigh will be ordered to rule out any blockages/ new DVT.  No swelling or significant bruising noted; wears compression socks.  - US-EXTREMITY VENOUS LOWER UNILAT RIGHT; Future        Anticipatory guidance included the following: Patient counseled about skin care, diet, supplements, smoking, drugs/alcohol use, safe sex and exercise.     Return in 8 months (on 3/25/2026), or if symptoms worsen or fail to improve.    Please note that this dictation was created using voice recognition software. I have made every reasonable attempt to correct obvious errors, but I expect that there are errors of grammar and possibly content that I did not discover before finalizing the note.           [1]   Current Outpatient Medications:     rosuvastatin (CRESTOR) 20 MG Tab, Take 1 Tablet by mouth every evening., Disp: 100 Tablet, Rfl: 3    ketoconazole (NIZORAL) 2 % shampoo, Apply 120 mL topically two times a week., Disp: 120 mL, Rfl: 3    acetaminophen (TYLENOL) 325 MG Tab, Take 650 mg by mouth every four hours as needed for Mild Pain., Disp: , Rfl:     lidocaine (SALONPAS PAIN RELIEVING) 4 % Patch, Place 1 Patch on the skin as needed (pain)., Disp: , Rfl:     Polyvinyl Alcohol-Povidone (REFRESH OP), Administer 1 Drop into affected eye(s) as needed (dry eyes)., Disp: , Rfl:     Iron-Vitamin C (IRON 100/C PO), Take 1 Tablet by mouth every day., Disp: , Rfl:     VITAMIN D PO, Take 1 capsule by mouth every day., Disp: , Rfl:     Coenzyme Q10 (CO Q-10) 300 MG Cap, Take 1 Cap by mouth every day., Disp: , Rfl:     Ascorbic Acid (VITAMIN C PO), Take 6,000 mg by mouth every day., Disp: , Rfl:     Multiple Vitamin (MULTIVITAMIN PO), Take 1 Tab by mouth every day., Disp: , Rfl:

## 2025-07-25 NOTE — Clinical Note
REFERRAL APPROVAL NOTICE         Sent on July 25, 2025                   Polo Pedro Pyle  [address]   [place] [address]                   Dear Mr. Pyle,    After a careful review of the medical information and benefit coverage, [facility] has processed your referral. See below for additional details.    If applicable, you must be actively enrolled with your insurance for coverage of the authorized service. If you have any questions regarding your coverage, please contact your insurance directly.    REFERRAL INFORMATION   Referral #:  [id number]  Referred-To Provider    Referred-By Provider:  Orthopedic Surgery    AMAYA Daigle BRIAN B      [address]  [address]  [place] [id number] [phone] [address] [facility]  [address]  [place] [address] [phone]    Referral Start Date:  07/25/2025  Referral End Date:   07/25/2026             SCHEDULING  If you do not already have an appointment, please call [phone] to make an appointment.     MORE INFORMATION  If you do not already have a [employer] account, sign up at: [email or web address]  You can access your medical information, make appointments, see lab results, billing information, and more.  If you have questions regarding this referral, please contact  the [facility] Referrals department at:             [phone]. Monday - Friday 8:00AM - 5:00PM.     Sincerely,    [facility]

## 2025-08-02 ENCOUNTER — HOSPITAL ENCOUNTER (OUTPATIENT)
Dept: RADIOLOGY | Facility: MEDICAL CENTER | Age: 75
End: 2025-08-02
Payer: MEDICARE

## 2025-08-02 DIAGNOSIS — M79.651 ACUTE PAIN OF RIGHT THIGH: ICD-10-CM

## 2025-08-02 DIAGNOSIS — I82.513 CHRONIC DEEP VEIN THROMBOSIS (DVT) OF FEMORAL VEIN OF BOTH LOWER EXTREMITIES (HCC): Primary | ICD-10-CM

## 2025-08-02 PROCEDURE — 93970 EXTREMITY STUDY: CPT

## 2025-08-15 ENCOUNTER — OFFICE VISIT (OUTPATIENT)
Dept: CARDIOLOGY | Facility: MEDICAL CENTER | Age: 75
End: 2025-08-15
Payer: MEDICARE

## 2025-08-15 VITALS
RESPIRATION RATE: 18 BRPM | WEIGHT: 251 LBS | SYSTOLIC BLOOD PRESSURE: 125 MMHG | OXYGEN SATURATION: 94 % | BODY MASS INDEX: 38.04 KG/M2 | HEIGHT: 68 IN | DIASTOLIC BLOOD PRESSURE: 75 MMHG | HEART RATE: 83 BPM

## 2025-08-15 DIAGNOSIS — I10 ESSENTIAL HYPERTENSION: Chronic | ICD-10-CM

## 2025-08-15 DIAGNOSIS — I70.0 AORTIC ATHEROSCLEROSIS (HCC): ICD-10-CM

## 2025-08-15 DIAGNOSIS — I25.83 CORONARY ARTERY DISEASE DUE TO LIPID RICH PLAQUE: ICD-10-CM

## 2025-08-15 DIAGNOSIS — I25.10 CORONARY ARTERY DISEASE DUE TO LIPID RICH PLAQUE: ICD-10-CM

## 2025-08-15 DIAGNOSIS — Z95.5 PRESENCE OF STENT IN LEFT CIRCUMFLEX CORONARY ARTERY: Chronic | ICD-10-CM

## 2025-08-15 DIAGNOSIS — E78.5 DYSLIPIDEMIA: ICD-10-CM

## 2025-08-15 DIAGNOSIS — Z95.5 PRESENCE OF STENT IN LAD CORONARY ARTERY: Primary | ICD-10-CM

## 2025-08-15 PROBLEM — E66.812 CLASS 2 SEVERE OBESITY DUE TO EXCESS CALORIES WITH SERIOUS COMORBIDITY AND BODY MASS INDEX (BMI) OF 38.0 TO 38.9 IN ADULT (HCC): Status: ACTIVE | Noted: 2025-01-24

## 2025-08-15 PROCEDURE — 99214 OFFICE O/P EST MOD 30 MIN: CPT

## 2025-08-15 PROCEDURE — 3078F DIAST BP <80 MM HG: CPT

## 2025-08-15 PROCEDURE — 3074F SYST BP LT 130 MM HG: CPT

## 2025-08-15 PROCEDURE — 99213 OFFICE O/P EST LOW 20 MIN: CPT

## 2025-08-15 ASSESSMENT — ENCOUNTER SYMPTOMS
NEUROLOGICAL NEGATIVE: 1
GASTROINTESTINAL NEGATIVE: 1
EYES NEGATIVE: 1
ORTHOPNEA: 0
CONSTITUTIONAL NEGATIVE: 1
SHORTNESS OF BREATH: 0
NERVOUS/ANXIOUS: 0
DEPRESSION: 0
PND: 0
PALPITATIONS: 0

## 2025-08-15 ASSESSMENT — FIBROSIS 4 INDEX: FIB4 SCORE: 2.65

## 2025-08-19 ENCOUNTER — TELEPHONE (OUTPATIENT)
Dept: HEALTH INFORMATION MANAGEMENT | Facility: OTHER | Age: 75
End: 2025-08-19
Payer: MEDICARE

## 2025-08-22 DIAGNOSIS — I82.513 CHRONIC DEEP VEIN THROMBOSIS (DVT) OF FEMORAL VEIN OF BOTH LOWER EXTREMITIES (HCC): ICD-10-CM

## 2025-08-25 ENCOUNTER — HOSPITAL ENCOUNTER (OUTPATIENT)
Dept: LAB | Facility: MEDICAL CENTER | Age: 75
End: 2025-08-25
Payer: MEDICARE

## 2025-08-25 DIAGNOSIS — E78.5 DYSLIPIDEMIA: ICD-10-CM

## 2025-08-25 PROCEDURE — 36415 COLL VENOUS BLD VENIPUNCTURE: CPT

## 2025-08-25 PROCEDURE — 80061 LIPID PANEL: CPT

## 2025-08-26 LAB
CHOLEST SERPL-MCNC: 127 MG/DL (ref 100–199)
HDLC SERPL-MCNC: 48 MG/DL
LDLC SERPL CALC-MCNC: 59 MG/DL
TRIGL SERPL-MCNC: 98 MG/DL (ref 0–149)

## (undated) DEVICE — SODIUM CHL IRRIGATION 0.9% 1000ML (12EA/CA)

## (undated) DEVICE — SUCTION TIP STRAIGHT ARGYLE - 50EA/CA

## (undated) DEVICE — SET EXTENSION WITH 2 PORTS (48EA/CA) ***PART #2C8610 IS A SUBSTITUTE*****

## (undated) DEVICE — STOPCOCK 3-WAY W/SWIVEL LEVER LOCK (50EA/CA)

## (undated) DEVICE — WIPE INSTRUMENT MICROWIPE (20EA/SP)

## (undated) DEVICE — TOWELS CLOTH SURGICAL - (4/PK 20PK/CA)

## (undated) DEVICE — MASK ANESTHESIA ADULT  - (100/CA)

## (undated) DEVICE — PACK MAJOR BASIN - (3EA/CA)

## (undated) DEVICE — LACTATED RINGERS INJ 1000 ML - (14EA/CA 60CA/PF)

## (undated) DEVICE — SET LEADWIRE 5 LEAD BEDSIDE DISPOSABLE ECG (1SET OF 5/EA)

## (undated) DEVICE — SUTURE 4-0 30CM STRATAFIX SPIRAL PS-2 (12EA/BX)

## (undated) DEVICE — MEDICINE CUP STERILE 2 OZ (100/CA)

## (undated) DEVICE — SHEET CARDIOVASCULAR - (4/CA)

## (undated) DEVICE — KIT ANESTHESIA W/CIRCUIT & 3/LT BAG W/FILTER (20EA/CA)

## (undated) DEVICE — BOVIE BLADE 6 EXTENDED - (50/PK)

## (undated) DEVICE — KIT INTROPERCUTANEOUS SHEATH - 8.5 FR W/MAX BARRIER AND BIOPATCH (5/CA)

## (undated) DEVICE — SPONGE XRAY 8X4 STERL. 12PL - (10EA/TY 80TY/CA)

## (undated) DEVICE — TUBING CLEARLINK DUO-VENT - C-FLO (48EA/CA)

## (undated) DEVICE — CHLORAPREP 26 ML APPLICATOR - ORANGE TINT(25/CA)

## (undated) DEVICE — STAPLER SKIN DISP - (6/BX 10BX/CA) VISISTAT

## (undated) DEVICE — GLOVE BIOGEL PI ORTHO SZ 6 SURGICAL PF LF (40PR/BX)

## (undated) DEVICE — VESSELOOP MAXI BLUE STERILE- SURG-I-LOOP (10EA/BX)

## (undated) DEVICE — SYRINGE SAFETY TB 1 ML 27 GA X 1/2 IN (100/BX 4BX/CA)

## (undated) DEVICE — SYRINGE 30 ML LL (56/BX)

## (undated) DEVICE — DRAPE LARGE 3 QUARTER - (20/CA)

## (undated) DEVICE — SENSOR SPO2 NEO LNCS ADHESIVE (20/BX) SEE USER NOTES

## (undated) DEVICE — VESSELOOP MINI BLUE STERILE - SURG-I-LOOP (10EA/BX)

## (undated) DEVICE — BLADE SURGICAL #15 - (50/BX 3BX/CA)

## (undated) DEVICE — DECANTER FLD BLS - (50/CA)

## (undated) DEVICE — BALLOON RELIANT

## (undated) DEVICE — TRAY, CV CATH MULTI-LUM.AK-25703

## (undated) DEVICE — PACK MAJOR BASIN - (2EA/CA)

## (undated) DEVICE — KIT SURGIFLO W/OUT THROMBIN - (6EA/BX)

## (undated) DEVICE — SUTURE 4-0 MONOCRYL PLUS PS-2 - 27 INCH (36/BX)

## (undated) DEVICE — SPONGE GAUZESTER 4 X 4 4PLY - (128PK/CA)

## (undated) DEVICE — RESERVOIR SUCTION 100 CC - SILICONE (20EA/CA)

## (undated) DEVICE — COVER LIGHT HANDLE ALC PLUS DISP (18EA/BX)

## (undated) DEVICE — SUTURE 2-0 VICRYL PLUS CT-1 - 8 X 18 INCH(12/BX)

## (undated) DEVICE — SUTURE 5-0 PROLENE BLUE C-1 HS 1 X 30 (36EA/BX)"

## (undated) DEVICE — WIRE GUIDE LUNDQST.035X180 - TSMG-35-180-4-LES ORDER BY BOX (5EA/BX)

## (undated) DEVICE — GOWN SURGEONS LARGE - (32/CA)

## (undated) DEVICE — SUTURE 3-0 VICRYL PLUS SH - 8X 18 INCH (12/BX)

## (undated) DEVICE — TUBE E-T HI-LO CUFF 7.0MM (10EA/PK)

## (undated) DEVICE — DRAPE MAYO STAND - (30/CA)

## (undated) DEVICE — SUTURE 2-0 VICRYL PLUS CT-1 36 (36PK/BX)"

## (undated) DEVICE — CANISTER SUCTION 3000ML MECHANICAL FILTER AUTO SHUTOFF MEDI-VAC NONSTERILE LF DISP  (40EA/CA)

## (undated) DEVICE — CLIP MED INTNL HRZN TI ESCP - (25/BX)

## (undated) DEVICE — DRAPE SURGICAL U 77X120 - (10/CA)

## (undated) DEVICE — PAD PREP 24 X 48 CUFFED - (100/CA)

## (undated) DEVICE — GOWN SURGEONS X-LARGE - DISP. (30/CA)

## (undated) DEVICE — SUCTION INSTRUMENT YANKAUER BULBOUS TIP W/O VENT (50EA/CA)

## (undated) DEVICE — SUTURE GENERAL

## (undated) DEVICE — NEEDLE THINWALL 19GA X 7CM

## (undated) DEVICE — GLOVE BIOGEL PI ORTHO SZ 7 PF LF (40PR/BX)

## (undated) DEVICE — BLADE SURGICAL #11 - (50/BX)

## (undated) DEVICE — DRESSING LEUKOMED STERILE 11.75X4IN - (50/CA)

## (undated) DEVICE — MARKER SIZING OMNIFLUSH 5F 70 - 5/BX .035 20CM SEGMENT

## (undated) DEVICE — ELECTRODE 850 FOAM ADHESIVE - HYDROGEL RADIOTRNSPRNT (50/PK)

## (undated) DEVICE — KIT RADIAL ARTERY 20GA W/MAX BARRIER AND BIOPATCH  (5EA/CA) #10740 IS FOR THE SET RADIAL ARTERIAL

## (undated) DEVICE — BLANKET UNDERBODY FULL ACCES - (5/CA)

## (undated) DEVICE — SYRINGE SAFETY 10 ML 18 GA X 1 1/2 BLUNT LL (100/BX 4BX/CA)

## (undated) DEVICE — SYRINGE 10 ML CONTROL LL (25EA/BX 4BX/CA)

## (undated) DEVICE — ELECTRODE DUAL RETURN W/ CORD - (50/PK)

## (undated) DEVICE — SET BIFURCATED BLOOD - (48EA/CS)

## (undated) DEVICE — TRAY SURESTEP FOLEY TEMP SENSING 16FR SNAP SECURE(10EA/CA) ORDER #18764 FOR TEMP FOLEY ONLY

## (undated) DEVICE — SODIUM CHL. INJ. 0.9% 500ML (24EA/CA 50CA/PF)

## (undated) DEVICE — SYRINGE SAFETY 5 ML 18 GA X 1-1/2 BLUNT LL (100/BX 4BX/CA)

## (undated) DEVICE — SUTURE 3-0 VICRYL PLUS SH - 27 INCH (36/BX)

## (undated) DEVICE — TRANSDUCER ADULT DISP. SINGLE BONDED STERILE - (20EA/CA)

## (undated) DEVICE — SURGIFOAM (SIZE 100) - (6EA/CA)

## (undated) DEVICE — HEAD HOLDER JUNIOR/ADULT

## (undated) DEVICE — GLOVE BIOGEL INDICATOR SZ 6.5 SURGICAL PF LTX - (50PR/BX 4BX/CA)

## (undated) DEVICE — SUTURE 3-0 SILK 12 X 18 IN - (36/BX)

## (undated) DEVICE — SUTURE 4-0 SILK 12 X 18 INCH - (36/BX)

## (undated) DEVICE — SHEATH RO 6F 10CM (10EA/BX)

## (undated) DEVICE — SET FLUID WARMING STANDARD FLOW - (10/CA)

## (undated) DEVICE — KIT ROOM DECONTAMINATION

## (undated) DEVICE — BAG DECANTER (50EA/CS)

## (undated) DEVICE — DISH PETRI STERILE (50EA/CA)

## (undated) DEVICE — BAG RESUSCITATION DISPOSABLE - WITH MASK (10 EA/CA)

## (undated) DEVICE — DRAPE IOBAN II INCISE 23X17 - (10EA/BX 4BX/CA)

## (undated) DEVICE — SHEATH DRYSEAL FLEX INTRODUCER 12FR

## (undated) DEVICE — Device

## (undated) DEVICE — CANISTER SUCTION 3000ML MECHANICAL FILTER AUTO SHUTOFF MEDI-VAC NONSTERILE LF DISP (40EA/CA)

## (undated) DEVICE — SYRINGE DISP. 12 CC LL - (100/BX)

## (undated) DEVICE — PACK AV FISTULA (2EA/CA)

## (undated) DEVICE — DRAPE IOBAN II 23 IN X 33 IN - (10/BX)

## (undated) DEVICE — NEPTUNE 4 PORT MANIFOLD - (20/PK)

## (undated) DEVICE — TRANSDUCER ADULT DISP. SINGLE BONDED STERILE - (10EA/CA)

## (undated) DEVICE — GLOVE BIOGEL SZ 6 PF LATEX - (50EA/BX 4BX/CA)

## (undated) DEVICE — CLIP MED LG INTNL HRZN TI ESCP - (20/BX)

## (undated) DEVICE — PAD LAP STERILE 18 X 18 - (5/PK 40PK/CA)

## (undated) DEVICE — SENSOR OXIMETER ADULT SPO2 RD SET (20EA/BX)

## (undated) DEVICE — SUTURE 6-0 PROLENE BV-1 D/A 24 (36PK/BX)"

## (undated) DEVICE — GLIDEWIRE ANGLED .035 X 180 (5EA/BX)

## (undated) DEVICE — SUTURE 5-0 PROLENE C-1 D/A 24 (36PK/BX)"

## (undated) DEVICE — GOWN WARMING STANDARD FLEX - (30/CA)

## (undated) DEVICE — GLOVE BIOGEL SZ 6.5 SURGICAL PF LTX (50PR/BX 4BX/CA)

## (undated) DEVICE — PTFE PLED STER - (250/CA)

## (undated) DEVICE — CLIP SM INTNL HRZN TI ESCP LGT - (24EA/PK 25PK/BX)

## (undated) DEVICE — SLEEVE, VASO, THIGH, MED

## (undated) DEVICE — KIT RADIAL ARTERY 20GA W/MAX BARRIER AND BIOPATCH (5EA/CA) #10740 IS FOR THE SET RADIAL ARTERIAL

## (undated) DEVICE — WATER IRRIG. STER. 1500 ML - (9/CA)

## (undated) DEVICE — SUTURE 3-0 ETHILON FS-1 - (36/BX) 30 INCH

## (undated) DEVICE — GLOVE BIOGEL SZ 8 SURGICAL PF LTX - (50PR/BX 4BX/CA)

## (undated) DEVICE — CLIP LG INTNL HRZN TI ESCP LGT - (20/BX)

## (undated) DEVICE — SUTURE CV

## (undated) DEVICE — GLIDECATH ANGLE 4F X 70CM (5EA/BX)

## (undated) DEVICE — CLIP INTERNAL LIGATE TITANIUM MEDIUM WECK HORIZON (6EA/PK 30PK/BX)

## (undated) DEVICE — BLADE SURGICAL CLIPPER - (50EA/CA)

## (undated) DEVICE — GELAQUASONIC 100 ULTRASOUND - 48/BX 20GM STERILE FOIL POUCH

## (undated) DEVICE — GUIDEWIRES STARTER (PTFE COATED) J CURVED FIXED CORE 0.035 180CM 10 3 MM J FS

## (undated) DEVICE — TUBE NG SALEM SUMP 16FR (50EA/CA)

## (undated) DEVICE — FILTER BLOOD TRANSFUSION - LEAD TIME IS 30 DAYS (40/CA) (PALL)

## (undated) DEVICE — BAG ISOLATION 20X20 INVISI - SHEILD (10EA/BX)

## (undated) DEVICE — DRAIN J-VAC 10MM FLAT - (10/CA)

## (undated) DEVICE — PROTECTOR ULNA NERVE - (36PR/CA)

## (undated) DEVICE — SYRINGE 20 ML LL (50EA/BX 4BX/CA)

## (undated) DEVICE — DRAPE MAGNETIC (INSTRA-MAG) - (30/CA)